# Patient Record
Sex: FEMALE | Race: WHITE | Employment: OTHER | URBAN - METROPOLITAN AREA
[De-identification: names, ages, dates, MRNs, and addresses within clinical notes are randomized per-mention and may not be internally consistent; named-entity substitution may affect disease eponyms.]

---

## 2017-01-02 DIAGNOSIS — E78.2 MIXED HYPERLIPIDEMIA: ICD-10-CM

## 2017-01-02 DIAGNOSIS — E03.9 ACQUIRED HYPOTHYROIDISM: ICD-10-CM

## 2017-01-02 DIAGNOSIS — K21.9 GASTROESOPHAGEAL REFLUX DISEASE WITHOUT ESOPHAGITIS: ICD-10-CM

## 2017-01-02 DIAGNOSIS — I10 ESSENTIAL HYPERTENSION: ICD-10-CM

## 2017-01-02 DIAGNOSIS — F51.01 PRIMARY INSOMNIA: ICD-10-CM

## 2017-01-02 DIAGNOSIS — G25.81 RLS (RESTLESS LEGS SYNDROME): ICD-10-CM

## 2017-01-02 DIAGNOSIS — F32.89 OTHER DEPRESSION: ICD-10-CM

## 2017-01-03 RX ORDER — OMEPRAZOLE 40 MG/1
CAPSULE, DELAYED RELEASE ORAL
Qty: 90 CAPSULE | Refills: 1 | Status: SHIPPED | OUTPATIENT
Start: 2017-01-03 | End: 2017-06-20 | Stop reason: SDUPTHER

## 2017-01-03 RX ORDER — LEVOTHYROXINE SODIUM 0.12 MG/1
TABLET ORAL
Qty: 90 TABLET | Refills: 1 | Status: SHIPPED | OUTPATIENT
Start: 2017-01-03 | End: 2017-06-20 | Stop reason: SDUPTHER

## 2017-01-03 RX ORDER — QUETIAPINE 150 MG/1
TABLET, FILM COATED, EXTENDED RELEASE ORAL
Qty: 90 TABLET | Refills: 1 | Status: SHIPPED | OUTPATIENT
Start: 2017-01-03 | End: 2017-06-20 | Stop reason: SDUPTHER

## 2017-01-03 RX ORDER — TRAZODONE HYDROCHLORIDE 150 MG/1
TABLET ORAL
Qty: 90 TABLET | Refills: 1 | Status: SHIPPED | OUTPATIENT
Start: 2017-01-03 | End: 2017-06-20 | Stop reason: SDUPTHER

## 2017-01-03 RX ORDER — VERAPAMIL HYDROCHLORIDE 360 MG/1
CAPSULE, DELAYED RELEASE PELLETS ORAL
Qty: 90 CAPSULE | Refills: 1 | Status: SHIPPED | OUTPATIENT
Start: 2017-01-03 | End: 2017-06-20 | Stop reason: SDUPTHER

## 2017-01-03 RX ORDER — ROPINIROLE 1 MG/1
TABLET, FILM COATED ORAL
Qty: 270 TABLET | Refills: 1 | Status: SHIPPED | OUTPATIENT
Start: 2017-01-03 | End: 2017-06-20 | Stop reason: SDUPTHER

## 2017-01-03 RX ORDER — LOVASTATIN 20 MG/1
TABLET ORAL
Qty: 90 TABLET | Refills: 1 | Status: SHIPPED | OUTPATIENT
Start: 2017-01-03 | End: 2017-06-20 | Stop reason: SDUPTHER

## 2017-01-10 RX ORDER — HYDROCHLOROTHIAZIDE 50 MG/1
TABLET ORAL
Qty: 90 TABLET | Refills: 1 | Status: SHIPPED | OUTPATIENT
Start: 2017-01-10 | End: 2017-06-19

## 2017-05-15 ENCOUNTER — TELEPHONE (OUTPATIENT)
Dept: BARIATRICS/WEIGHT MGMT | Age: 67
End: 2017-05-15

## 2017-05-18 ENCOUNTER — OFFICE VISIT (OUTPATIENT)
Dept: ORTHOPEDIC SURGERY | Age: 67
End: 2017-05-18

## 2017-05-18 VITALS
HEART RATE: 67 BPM | HEIGHT: 64 IN | WEIGHT: 255.95 LBS | DIASTOLIC BLOOD PRESSURE: 65 MMHG | SYSTOLIC BLOOD PRESSURE: 116 MMHG | BODY MASS INDEX: 43.7 KG/M2

## 2017-05-18 DIAGNOSIS — M25.562 ACUTE PAIN OF BOTH KNEES: Primary | ICD-10-CM

## 2017-05-18 DIAGNOSIS — M25.561 ACUTE PAIN OF BOTH KNEES: Primary | ICD-10-CM

## 2017-05-18 DIAGNOSIS — M17.10 LOCALIZED OSTEOARTHROSIS, LOWER LEG: ICD-10-CM

## 2017-05-18 PROCEDURE — 99202 OFFICE O/P NEW SF 15 MIN: CPT | Performed by: ORTHOPAEDIC SURGERY

## 2017-05-18 PROCEDURE — 73562 X-RAY EXAM OF KNEE 3: CPT | Performed by: ORTHOPAEDIC SURGERY

## 2017-05-18 PROCEDURE — 20610 DRAIN/INJ JOINT/BURSA W/O US: CPT | Performed by: ORTHOPAEDIC SURGERY

## 2017-05-23 ENCOUNTER — TELEPHONE (OUTPATIENT)
Dept: ORTHOPEDIC SURGERY | Age: 67
End: 2017-05-23

## 2017-05-31 ENCOUNTER — OFFICE VISIT (OUTPATIENT)
Dept: ORTHOPEDIC SURGERY | Age: 67
End: 2017-05-31

## 2017-05-31 VITALS
WEIGHT: 255.95 LBS | DIASTOLIC BLOOD PRESSURE: 49 MMHG | HEART RATE: 74 BPM | HEIGHT: 64 IN | BODY MASS INDEX: 43.7 KG/M2 | SYSTOLIC BLOOD PRESSURE: 113 MMHG

## 2017-05-31 DIAGNOSIS — M17.10 LOCALIZED OSTEOARTHROSIS, LOWER LEG: Primary | ICD-10-CM

## 2017-05-31 PROCEDURE — 20610 DRAIN/INJ JOINT/BURSA W/O US: CPT | Performed by: ORTHOPAEDIC SURGERY

## 2017-06-07 ENCOUNTER — OFFICE VISIT (OUTPATIENT)
Dept: ORTHOPEDIC SURGERY | Age: 67
End: 2017-06-07

## 2017-06-07 VITALS
HEART RATE: 66 BPM | HEIGHT: 64 IN | SYSTOLIC BLOOD PRESSURE: 131 MMHG | BODY MASS INDEX: 43.7 KG/M2 | DIASTOLIC BLOOD PRESSURE: 76 MMHG | WEIGHT: 255.95 LBS

## 2017-06-07 DIAGNOSIS — M17.10 LOCALIZED OSTEOARTHROSIS, LOWER LEG: Primary | ICD-10-CM

## 2017-06-07 PROCEDURE — 20610 DRAIN/INJ JOINT/BURSA W/O US: CPT | Performed by: ORTHOPAEDIC SURGERY

## 2017-06-14 ENCOUNTER — OFFICE VISIT (OUTPATIENT)
Dept: ORTHOPEDIC SURGERY | Age: 67
End: 2017-06-14

## 2017-06-14 VITALS
SYSTOLIC BLOOD PRESSURE: 137 MMHG | WEIGHT: 255.95 LBS | DIASTOLIC BLOOD PRESSURE: 77 MMHG | HEIGHT: 64 IN | HEART RATE: 67 BPM | BODY MASS INDEX: 43.7 KG/M2

## 2017-06-14 DIAGNOSIS — M17.10 LOCALIZED OSTEOARTHROSIS, LOWER LEG: Primary | ICD-10-CM

## 2017-06-14 PROCEDURE — 20610 DRAIN/INJ JOINT/BURSA W/O US: CPT | Performed by: ORTHOPAEDIC SURGERY

## 2017-06-19 ENCOUNTER — OFFICE VISIT (OUTPATIENT)
Dept: FAMILY MEDICINE CLINIC | Age: 67
End: 2017-06-19

## 2017-06-19 VITALS
HEIGHT: 64 IN | SYSTOLIC BLOOD PRESSURE: 118 MMHG | WEIGHT: 250 LBS | HEART RATE: 70 BPM | OXYGEN SATURATION: 98 % | DIASTOLIC BLOOD PRESSURE: 70 MMHG | BODY MASS INDEX: 42.68 KG/M2

## 2017-06-19 DIAGNOSIS — R60.0 LOCALIZED EDEMA: Primary | ICD-10-CM

## 2017-06-19 PROCEDURE — 99213 OFFICE O/P EST LOW 20 MIN: CPT | Performed by: FAMILY MEDICINE

## 2017-06-19 RX ORDER — FUROSEMIDE 40 MG/1
40 TABLET ORAL DAILY
Qty: 30 TABLET | Refills: 0 | Status: SHIPPED | OUTPATIENT
Start: 2017-06-19 | End: 2019-01-23 | Stop reason: SDUPTHER

## 2017-06-19 ASSESSMENT — PATIENT HEALTH QUESTIONNAIRE - PHQ9
1. LITTLE INTEREST OR PLEASURE IN DOING THINGS: 0
SUM OF ALL RESPONSES TO PHQ QUESTIONS 1-9: 0
2. FEELING DOWN, DEPRESSED OR HOPELESS: 0
SUM OF ALL RESPONSES TO PHQ9 QUESTIONS 1 & 2: 0

## 2017-06-19 ASSESSMENT — ENCOUNTER SYMPTOMS
GASTROINTESTINAL NEGATIVE: 1
SHORTNESS OF BREATH: 1

## 2017-06-20 DIAGNOSIS — F32.89 OTHER DEPRESSION: ICD-10-CM

## 2017-06-20 DIAGNOSIS — E03.9 ACQUIRED HYPOTHYROIDISM: ICD-10-CM

## 2017-06-20 DIAGNOSIS — K21.9 GASTROESOPHAGEAL REFLUX DISEASE WITHOUT ESOPHAGITIS: ICD-10-CM

## 2017-06-20 DIAGNOSIS — F51.01 PRIMARY INSOMNIA: ICD-10-CM

## 2017-06-20 DIAGNOSIS — E78.2 MIXED HYPERLIPIDEMIA: ICD-10-CM

## 2017-06-20 DIAGNOSIS — I10 ESSENTIAL HYPERTENSION: ICD-10-CM

## 2017-06-20 DIAGNOSIS — G25.81 RLS (RESTLESS LEGS SYNDROME): ICD-10-CM

## 2017-06-21 RX ORDER — LEVOTHYROXINE SODIUM 0.12 MG/1
TABLET ORAL
Qty: 90 TABLET | Refills: 1 | Status: SHIPPED | OUTPATIENT
Start: 2017-06-21 | End: 2017-12-28 | Stop reason: SDUPTHER

## 2017-06-21 RX ORDER — ROPINIROLE 1 MG/1
TABLET, FILM COATED ORAL
Qty: 270 TABLET | Refills: 1 | Status: SHIPPED | OUTPATIENT
Start: 2017-06-21 | End: 2017-12-28 | Stop reason: SDUPTHER

## 2017-06-21 RX ORDER — VERAPAMIL HYDROCHLORIDE 360 MG/1
CAPSULE, DELAYED RELEASE PELLETS ORAL
Qty: 90 CAPSULE | Refills: 1 | Status: SHIPPED | OUTPATIENT
Start: 2017-06-21 | End: 2017-12-28 | Stop reason: SDUPTHER

## 2017-06-21 RX ORDER — LOVASTATIN 20 MG/1
TABLET ORAL
Qty: 90 TABLET | Refills: 1 | Status: SHIPPED | OUTPATIENT
Start: 2017-06-21 | End: 2017-12-28 | Stop reason: SDUPTHER

## 2017-06-21 RX ORDER — QUETIAPINE 150 MG/1
TABLET, FILM COATED, EXTENDED RELEASE ORAL
Qty: 90 TABLET | Refills: 1 | Status: SHIPPED | OUTPATIENT
Start: 2017-06-21 | End: 2017-10-20 | Stop reason: SDUPTHER

## 2017-06-21 RX ORDER — TRAZODONE HYDROCHLORIDE 150 MG/1
TABLET ORAL
Qty: 90 TABLET | Refills: 1 | Status: SHIPPED | OUTPATIENT
Start: 2017-06-21 | End: 2017-12-28 | Stop reason: SDUPTHER

## 2017-06-21 RX ORDER — OMEPRAZOLE 40 MG/1
CAPSULE, DELAYED RELEASE ORAL
Qty: 90 CAPSULE | Refills: 1 | Status: SHIPPED | OUTPATIENT
Start: 2017-06-21 | End: 2017-12-28 | Stop reason: SDUPTHER

## 2017-06-23 ENCOUNTER — OFFICE VISIT (OUTPATIENT)
Dept: ORTHOPEDIC SURGERY | Age: 67
End: 2017-06-23

## 2017-06-23 ENCOUNTER — OFFICE VISIT (OUTPATIENT)
Dept: FAMILY MEDICINE CLINIC | Age: 67
End: 2017-06-23

## 2017-06-23 VITALS
DIASTOLIC BLOOD PRESSURE: 80 MMHG | HEIGHT: 64 IN | WEIGHT: 248 LBS | HEART RATE: 84 BPM | SYSTOLIC BLOOD PRESSURE: 122 MMHG | OXYGEN SATURATION: 96 % | BODY MASS INDEX: 42.34 KG/M2

## 2017-06-23 VITALS
SYSTOLIC BLOOD PRESSURE: 131 MMHG | WEIGHT: 250 LBS | BODY MASS INDEX: 42.68 KG/M2 | HEART RATE: 66 BPM | HEIGHT: 64 IN | DIASTOLIC BLOOD PRESSURE: 76 MMHG

## 2017-06-23 DIAGNOSIS — G89.29 CHRONIC LEFT-SIDED LOW BACK PAIN WITH LEFT-SIDED SCIATICA: Primary | ICD-10-CM

## 2017-06-23 DIAGNOSIS — T73.3XXA FATIGUE DUE TO EXCESSIVE EXERTION, INITIAL ENCOUNTER: ICD-10-CM

## 2017-06-23 DIAGNOSIS — R06.02 SOB (SHORTNESS OF BREATH) ON EXERTION: ICD-10-CM

## 2017-06-23 DIAGNOSIS — R60.0 BILATERAL EDEMA OF LOWER EXTREMITY: Primary | ICD-10-CM

## 2017-06-23 DIAGNOSIS — M54.16 LUMBAR RADICULOPATHY: ICD-10-CM

## 2017-06-23 DIAGNOSIS — M54.42 CHRONIC LEFT-SIDED LOW BACK PAIN WITH LEFT-SIDED SCIATICA: Primary | ICD-10-CM

## 2017-06-23 PROCEDURE — 72100 X-RAY EXAM L-S SPINE 2/3 VWS: CPT | Performed by: PHYSICAL MEDICINE & REHABILITATION

## 2017-06-23 PROCEDURE — 99214 OFFICE O/P EST MOD 30 MIN: CPT | Performed by: PHYSICAL MEDICINE & REHABILITATION

## 2017-06-23 PROCEDURE — 99214 OFFICE O/P EST MOD 30 MIN: CPT | Performed by: FAMILY MEDICINE

## 2017-06-23 RX ORDER — PREDNISONE 10 MG/1
TABLET ORAL
Qty: 26 TABLET | Refills: 0 | Status: SHIPPED | OUTPATIENT
Start: 2017-06-23 | End: 2017-09-28

## 2017-07-05 ENCOUNTER — HOSPITAL ENCOUNTER (OUTPATIENT)
Dept: NON INVASIVE DIAGNOSTICS | Age: 67
Discharge: HOME | End: 2017-07-05
Attending: FAMILY MEDICINE | Admitting: FAMILY MEDICINE

## 2017-07-05 DIAGNOSIS — R06.02 BREATH SHORTNESS: ICD-10-CM

## 2017-07-05 DIAGNOSIS — R60.0 LOCALIZED EDEMA: ICD-10-CM

## 2017-07-05 LAB
LV EF: 55 %
LVEF MODALITY: NORMAL

## 2017-09-28 ENCOUNTER — OFFICE VISIT (OUTPATIENT)
Dept: FAMILY MEDICINE CLINIC | Age: 67
End: 2017-09-28

## 2017-09-28 VITALS
WEIGHT: 247 LBS | TEMPERATURE: 97.6 F | OXYGEN SATURATION: 96 % | BODY MASS INDEX: 42.4 KG/M2 | DIASTOLIC BLOOD PRESSURE: 76 MMHG | HEART RATE: 76 BPM | SYSTOLIC BLOOD PRESSURE: 124 MMHG

## 2017-09-28 DIAGNOSIS — J01.90 ACUTE SINUSITIS, RECURRENCE NOT SPECIFIED, UNSPECIFIED LOCATION: ICD-10-CM

## 2017-09-28 PROCEDURE — 99212 OFFICE O/P EST SF 10 MIN: CPT | Performed by: NURSE PRACTITIONER

## 2017-09-28 RX ORDER — AMOXICILLIN 500 MG/1
500 CAPSULE ORAL 2 TIMES DAILY
Qty: 20 CAPSULE | Refills: 0 | Status: SHIPPED | OUTPATIENT
Start: 2017-09-28 | End: 2017-10-08

## 2017-09-28 NOTE — MR AVS SNAPSHOT
becoming more physically active will help lower your risk of developing or worsening diseases associated with obesity. Learn more at: Nancy.co.uk             Today's Medication Changes          These changes are accurate as of: 9/28/17  4:02 PM.  If you have any questions, ask your nurse or doctor. START taking these medications           amoxicillin 500 MG capsule   Commonly known as:  AMOXIL   Instructions: Take 1 capsule by mouth 2 times daily for 10 days   Quantity:  20 capsule   Refills:  0   Started by:  Naye Glynn NP         STOP taking these medications           predniSONE 10 MG tablet   Commonly known as:  Noemí Nader by:  Naye Glynn NP            Where to Get Your Medications      These medications were sent to [facility] #[id number] - [place], [address], [address] [facility] [address]     Phone:  [phone]     amoxicillin 500 MG capsule               Your Current Medications Are              amoxicillin (AMOXIL) 500 MG capsule Take 1 capsule by mouth 2 times daily for 10 days    lovastatin (MEVACOR) 20 MG tablet TAKE 1 TABLET EVERY NIGHT    rOPINIRole (REQUIP) 1 MG tablet TAKE 1 TABLET THREE TIMES DAILY    QUEtiapine (SEROQUEL XR) 150 MG TB24 extended release tablet TAKE 1 TABLET EVERY DAY    traZODone (DESYREL) 150 MG tablet TAKE 1 TABLET EVERY NIGHT    verapamil (VERELAN) 360 MG extended release capsule TAKE 1 CAPSULE EVERY NIGHT    levothyroxine (SYNTHROID) 125 MCG tablet TAKE 1 TABLET EVERY DAY    omeprazole (PRILOSEC) 40 MG delayed release capsule TAKE 1 CAPSULE EVERY DAY    furosemide (LASIX) 40 MG tablet Take 1 tablet by mouth daily    gabapentin (NEURONTIN) 600 MG tablet TAKE 1 TABLET TWICE DAILY    aspirin 81 MG EC tablet Take 81 mg by mouth daily. Indications: OTC    Calcium Carbonate-Vit D-Min (CALCIUM 1200 PO) Take  by mouth.  Indications: OTC

## 2017-10-01 ASSESSMENT — ENCOUNTER SYMPTOMS
WHEEZING: 0
NAUSEA: 0
CHEST TIGHTNESS: 0
COUGH: 1
SORE THROAT: 0
VOMITING: 0
SINUS PRESSURE: 1
SHORTNESS OF BREATH: 0
ABDOMINAL PAIN: 0

## 2017-10-20 DIAGNOSIS — F32.89 OTHER DEPRESSION: ICD-10-CM

## 2017-10-20 RX ORDER — QUETIAPINE 150 MG/1
TABLET, FILM COATED, EXTENDED RELEASE ORAL
Qty: 90 TABLET | Refills: 1 | Status: SHIPPED | OUTPATIENT
Start: 2017-10-20 | End: 2017-12-28 | Stop reason: SDUPTHER

## 2017-10-25 ENCOUNTER — OFFICE VISIT (OUTPATIENT)
Dept: ORTHOPEDIC SURGERY | Age: 67
End: 2017-10-25

## 2017-10-25 VITALS — HEIGHT: 64 IN | BODY MASS INDEX: 42.15 KG/M2 | WEIGHT: 246.91 LBS

## 2017-10-25 DIAGNOSIS — M79.605 LEFT LEG PAIN: Primary | ICD-10-CM

## 2017-10-25 DIAGNOSIS — M25.561 ACUTE PAIN OF BOTH KNEES: ICD-10-CM

## 2017-10-25 DIAGNOSIS — M17.10 LOCALIZED OSTEOARTHROSIS, LOWER LEG: ICD-10-CM

## 2017-10-25 DIAGNOSIS — M25.562 ACUTE PAIN OF BOTH KNEES: ICD-10-CM

## 2017-10-25 PROCEDURE — 4040F PNEUMOC VAC/ADMIN/RCVD: CPT | Performed by: ORTHOPAEDIC SURGERY

## 2017-10-25 PROCEDURE — 1036F TOBACCO NON-USER: CPT | Performed by: ORTHOPAEDIC SURGERY

## 2017-10-25 PROCEDURE — 3017F COLORECTAL CA SCREEN DOC REV: CPT | Performed by: ORTHOPAEDIC SURGERY

## 2017-10-25 PROCEDURE — 1090F PRES/ABSN URINE INCON ASSESS: CPT | Performed by: ORTHOPAEDIC SURGERY

## 2017-10-25 PROCEDURE — 3014F SCREEN MAMMO DOC REV: CPT | Performed by: ORTHOPAEDIC SURGERY

## 2017-10-25 PROCEDURE — G8427 DOCREV CUR MEDS BY ELIG CLIN: HCPCS | Performed by: ORTHOPAEDIC SURGERY

## 2017-10-25 PROCEDURE — 99213 OFFICE O/P EST LOW 20 MIN: CPT | Performed by: ORTHOPAEDIC SURGERY

## 2017-10-25 PROCEDURE — G8484 FLU IMMUNIZE NO ADMIN: HCPCS | Performed by: ORTHOPAEDIC SURGERY

## 2017-10-25 PROCEDURE — 1123F ACP DISCUSS/DSCN MKR DOCD: CPT | Performed by: ORTHOPAEDIC SURGERY

## 2017-10-25 PROCEDURE — G8417 CALC BMI ABV UP PARAM F/U: HCPCS | Performed by: ORTHOPAEDIC SURGERY

## 2017-10-25 PROCEDURE — G8399 PT W/DXA RESULTS DOCUMENT: HCPCS | Performed by: ORTHOPAEDIC SURGERY

## 2017-10-25 RX ORDER — MELOXICAM 15 MG/1
TABLET ORAL
Qty: 30 TABLET | Refills: 3 | Status: SHIPPED | OUTPATIENT
Start: 2017-10-25 | End: 2018-06-11

## 2017-10-25 NOTE — PATIENT INSTRUCTIONS
Patient Education        Knee Arthritis: Care Instructions  Your Care Instructions  Knee arthritis is a breakdown of the cartilage that cushions your knee joint. When the cartilage wears down, your bones rub against each other. This causes pain and stiffness. Knee arthritis tends to get worse with time. Treatment for knee arthritis involves reducing pain, making the leg muscles stronger, and staying at a healthy body weight. The treatment usually does not improve the health of the cartilage, but it can reduce pain and improve how well your knee works. You can take simple measures to protect your knee joints, ease your pain, and help you stay active. Follow-up care is a key part of your treatment and safety. Be sure to make and go to all appointments, and call your doctor if you are having problems. It's also a good idea to know your test results and keep a list of the medicines you take. How can you care for yourself at home? · Know that knee arthritis will cause more pain on some days than on others. · Stay at a healthy weight. Lose weight if you are overweight. When you stand up, the pressure on your knees from every pound of body weight is multiplied four times. So if you lose 10 pounds, you will reduce the pressure on your knees by 40 pounds. · Talk to your doctor or physical therapist about exercises that will help ease joint pain. ¨ Stretch to help prevent stiffness and to prevent injury before you exercise. You may enjoy gentle forms of yoga to help keep your knee joints and muscles flexible. ¨ Walk instead of jog. ¨ Ride a bike. This makes your thigh muscles stronger and takes pressure off your knee. ¨ Wear well-fitting and comfortable shoes. ¨ Exercise in chest-deep water. This can help you exercise longer with less pain. ¨ Avoid exercises that include squatting or kneeling. They can put a lot of strain on your knees.   ¨ Talk to your doctor to make sure that the exercise you do is not making the arthritis worse. · Do not sit for long periods of time. Try to walk once in a while to keep your knee from getting stiff. · Ask your doctor or physical therapist whether shoe inserts may reduce your arthritis pain. · If you can afford it, get new athletic shoes at least every year. This can help reduce the strain on your knees. · Use a device to help you do everyday activities. ¨ A cane or walking stick can help you keep your balance when you walk. Hold the cane or walking stick in the hand opposite the painful knee. ¨ If you feel like you may fall when you walk, try using crutches or a front-wheeled walker. These can prevent falls that could cause more damage to your knee. ¨ A knee brace may help keep your knee stable and prevent pain. ¨ You also can use other things to make life easier, such as a higher toilet seat and handrails in the bathtub or shower. · Take pain medicines exactly as directed. ¨ Do not wait until you are in severe pain. You will get better results if you take it sooner. ¨ If you are not taking a prescription pain medicine, take an over-the-counter medicine such as acetaminophen (Tylenol), ibuprofen (Advil, Motrin), or naproxen (Aleve). Read and follow all instructions on the label. ¨ Do not take two or more pain medicines at the same time unless the doctor told you to. Many pain medicines have acetaminophen, which is Tylenol. Too much acetaminophen (Tylenol) can be harmful. ¨ Tell your doctor if you take a blood thinner, have diabetes, or have allergies to shellfish. · Ask your doctor if you might benefit from a shot of steroid medicine into your knee. This may provide pain relief for several months. · Many people take the supplements glucosamine and chondroitin for osteoarthritis. Some people feel they help, but the medical research does not show that they work. Talk to your doctor before you take these supplements. When should you call for help?   Call your doctor now or seek immediate medical care if:  · You have sudden swelling, warmth, or pain in your knee. · You have knee pain and a fever or rash. · You have such bad pain that you cannot use your knee. Watch closely for changes in your health, and be sure to contact your doctor if you have any problems. Where can you learn more? Go to [email or web address] and sign in to your [employer] account. Enter V968 in the Float: [place] box to learn more about \"Knee Arthritis: Care Instructions. \"     If you do not have an account, please click on the \"Sign Up Now\" link. Current as of: October 31, 2016  Content Version: 11.3  © 8495-8239 [employer]. Care instructions adapted under license by [facility] ([facility]). If you have questions about a medical condition or this instruction, always ask your healthcare professional. [address] any warranty or liability for your use of this information.

## 2017-12-28 DIAGNOSIS — E78.2 MIXED HYPERLIPIDEMIA: ICD-10-CM

## 2017-12-28 DIAGNOSIS — G25.81 RLS (RESTLESS LEGS SYNDROME): ICD-10-CM

## 2017-12-28 DIAGNOSIS — E03.9 ACQUIRED HYPOTHYROIDISM: ICD-10-CM

## 2017-12-28 DIAGNOSIS — I10 ESSENTIAL HYPERTENSION: ICD-10-CM

## 2017-12-28 DIAGNOSIS — F32.89 OTHER DEPRESSION: ICD-10-CM

## 2017-12-28 DIAGNOSIS — K21.9 GASTROESOPHAGEAL REFLUX DISEASE WITHOUT ESOPHAGITIS: ICD-10-CM

## 2017-12-28 DIAGNOSIS — F51.01 PRIMARY INSOMNIA: ICD-10-CM

## 2017-12-28 RX ORDER — LOVASTATIN 20 MG/1
20 TABLET ORAL NIGHTLY
Qty: 90 TABLET | Refills: 1 | Status: SHIPPED | OUTPATIENT
Start: 2017-12-28 | End: 2018-04-11 | Stop reason: SDUPTHER

## 2017-12-28 RX ORDER — OMEPRAZOLE 40 MG/1
40 CAPSULE, DELAYED RELEASE ORAL DAILY
Qty: 90 CAPSULE | Refills: 1 | Status: SHIPPED | OUTPATIENT
Start: 2017-12-28 | End: 2018-04-11 | Stop reason: SDUPTHER

## 2017-12-28 RX ORDER — GABAPENTIN 600 MG/1
TABLET ORAL
Qty: 180 TABLET | Refills: 1 | Status: SHIPPED | OUTPATIENT
Start: 2017-12-28 | End: 2018-04-11 | Stop reason: SDUPTHER

## 2017-12-28 RX ORDER — VERAPAMIL HYDROCHLORIDE 360 MG/1
360 CAPSULE, DELAYED RELEASE PELLETS ORAL NIGHTLY
Qty: 90 CAPSULE | Refills: 1 | Status: SHIPPED | OUTPATIENT
Start: 2017-12-28 | End: 2018-04-11 | Stop reason: SDUPTHER

## 2017-12-28 RX ORDER — ROPINIROLE 1 MG/1
1 TABLET, FILM COATED ORAL 3 TIMES DAILY
Qty: 270 TABLET | Refills: 1 | Status: SHIPPED | OUTPATIENT
Start: 2017-12-28 | End: 2018-04-11 | Stop reason: SDUPTHER

## 2017-12-28 RX ORDER — QUETIAPINE 150 MG/1
150 TABLET, FILM COATED, EXTENDED RELEASE ORAL DAILY
Qty: 90 TABLET | Refills: 1 | Status: SHIPPED | OUTPATIENT
Start: 2017-12-28 | End: 2018-04-11 | Stop reason: SDUPTHER

## 2017-12-28 RX ORDER — LEVOTHYROXINE SODIUM 0.12 MG/1
125 TABLET ORAL DAILY
Qty: 90 TABLET | Refills: 1 | Status: SHIPPED | OUTPATIENT
Start: 2017-12-28 | End: 2018-02-01 | Stop reason: SDUPTHER

## 2017-12-28 RX ORDER — TRAZODONE HYDROCHLORIDE 150 MG/1
150 TABLET ORAL NIGHTLY
Qty: 90 TABLET | Refills: 1 | Status: SHIPPED | OUTPATIENT
Start: 2017-12-28 | End: 2018-04-11 | Stop reason: SDUPTHER

## 2017-12-28 NOTE — TELEPHONE ENCOUNTER
From: Christelle Feliciano  Sent: 12/28/2017 11:55 AM EST  Subject: Medication Renewal Request    Christelle Feliciano would like a refill of the following medications:  lovastatin (MEVACOR) 20 MG tablet [Mauri Bunch DO]  rOPINIRole (REQUIP) 1 MG tablet [Mauri Bunch DO]  traZODone (DESYREL) 150 MG tablet [Mauri Bunch DO]  verapamil (VERELAN) 360 MG extended release capsule [Mauri Bunch DO]  levothyroxine (SYNTHROID) 125 MCG tablet [Mauri Bunch DO]  omeprazole (PRILOSEC) 40 MG delayed release capsule Penderandreina Morales DO]    Preferred pharmacy: [address] [place] [address], [address] [place] [address] [phone] - F [phone]    Comment:      Medication renewals requested in this message routed separately:  gabapentin (NEURONTIN) 600 MG tablet [VEE SIDHU, CNP]  Cholecalciferol (VITAMIN D3) 5000 UNITS CAPS [MARCELA TRAMMELL DO]  QUEtiapine (SEROQUEL XR) 150 MG TB24 extended release tablet [Lindsay Espinosa, DO]

## 2018-02-01 DIAGNOSIS — E03.9 ACQUIRED HYPOTHYROIDISM: ICD-10-CM

## 2018-02-02 RX ORDER — LEVOTHYROXINE SODIUM 0.12 MG/1
TABLET ORAL
Qty: 90 TABLET | Refills: 0 | Status: SHIPPED | OUTPATIENT
Start: 2018-02-02 | End: 2018-04-11 | Stop reason: SDUPTHER

## 2018-04-10 ENCOUNTER — OFFICE VISIT (OUTPATIENT)
Dept: FAMILY MEDICINE CLINIC | Age: 68
End: 2018-04-10

## 2018-04-10 VITALS
HEART RATE: 74 BPM | RESPIRATION RATE: 16 BRPM | HEIGHT: 64 IN | DIASTOLIC BLOOD PRESSURE: 78 MMHG | BODY MASS INDEX: 43.36 KG/M2 | WEIGHT: 254 LBS | SYSTOLIC BLOOD PRESSURE: 128 MMHG | OXYGEN SATURATION: 97 %

## 2018-04-10 DIAGNOSIS — F33.0 MAJOR DEPRESSIVE DISORDER, RECURRENT EPISODE, MILD (HCC): ICD-10-CM

## 2018-04-10 DIAGNOSIS — E03.9 ACQUIRED HYPOTHYROIDISM: ICD-10-CM

## 2018-04-10 DIAGNOSIS — M79.7 FIBROMYALGIA: ICD-10-CM

## 2018-04-10 DIAGNOSIS — F39 MOOD DISORDER (HCC): ICD-10-CM

## 2018-04-10 DIAGNOSIS — E78.2 MIXED HYPERLIPIDEMIA: ICD-10-CM

## 2018-04-10 DIAGNOSIS — R60.0 BILATERAL EDEMA OF LOWER EXTREMITY: ICD-10-CM

## 2018-04-10 DIAGNOSIS — I10 ESSENTIAL HYPERTENSION: ICD-10-CM

## 2018-04-10 LAB
A/G RATIO: 2 (ref 1.1–2.2)
ALBUMIN SERPL-MCNC: 4.5 G/DL (ref 3.4–5)
ALP BLD-CCNC: 74 U/L (ref 40–129)
ALT SERPL-CCNC: 12 U/L (ref 10–40)
ANION GAP SERPL CALCULATED.3IONS-SCNC: 15 MMOL/L (ref 3–16)
AST SERPL-CCNC: 14 U/L (ref 15–37)
BASOPHILS ABSOLUTE: 0 K/UL (ref 0–0.2)
BASOPHILS RELATIVE PERCENT: 0.3 %
BILIRUB SERPL-MCNC: 0.4 MG/DL (ref 0–1)
BILIRUBIN, POC: NORMAL
BLOOD URINE, POC: NORMAL
BUN BLDV-MCNC: 20 MG/DL (ref 7–20)
CALCIUM SERPL-MCNC: 9 MG/DL (ref 8.3–10.6)
CHLORIDE BLD-SCNC: 101 MMOL/L (ref 99–110)
CHOLESTEROL, TOTAL: 182 MG/DL (ref 0–199)
CLARITY, POC: NORMAL
CO2: 26 MMOL/L (ref 21–32)
COLOR, POC: NORMAL
CREAT SERPL-MCNC: 1.2 MG/DL (ref 0.6–1.2)
EOSINOPHILS ABSOLUTE: 0.1 K/UL (ref 0–0.6)
EOSINOPHILS RELATIVE PERCENT: 2.3 %
GFR AFRICAN AMERICAN: 54
GFR NON-AFRICAN AMERICAN: 45
GLOBULIN: 2.2 G/DL
GLUCOSE BLD-MCNC: 94 MG/DL (ref 70–99)
GLUCOSE URINE, POC: NORMAL
HCT VFR BLD CALC: 40.8 % (ref 36–48)
HDLC SERPL-MCNC: 68 MG/DL (ref 40–60)
HEMOGLOBIN: 13.3 G/DL (ref 12–16)
KETONES, POC: NORMAL
LDL CHOLESTEROL CALCULATED: 74 MG/DL
LEUKOCYTE EST, POC: NORMAL
LYMPHOCYTES ABSOLUTE: 2.2 K/UL (ref 1–5.1)
LYMPHOCYTES RELATIVE PERCENT: 45.4 %
MCH RBC QN AUTO: 28.5 PG (ref 26–34)
MCHC RBC AUTO-ENTMCNC: 32.5 G/DL (ref 31–36)
MCV RBC AUTO: 87.6 FL (ref 80–100)
MONOCYTES ABSOLUTE: 0.3 K/UL (ref 0–1.3)
MONOCYTES RELATIVE PERCENT: 7.2 %
NEUTROPHILS ABSOLUTE: 2.2 K/UL (ref 1.7–7.7)
NEUTROPHILS RELATIVE PERCENT: 44.8 %
NITRITE, POC: NORMAL
PDW BLD-RTO: 15 % (ref 12.4–15.4)
PH, POC: 7.5
PLATELET # BLD: 219 K/UL (ref 135–450)
PMV BLD AUTO: 10.2 FL (ref 5–10.5)
POTASSIUM SERPL-SCNC: 4.7 MMOL/L (ref 3.5–5.1)
PROTEIN, POC: NORMAL
RBC # BLD: 4.66 M/UL (ref 4–5.2)
SODIUM BLD-SCNC: 142 MMOL/L (ref 136–145)
SPECIFIC GRAVITY, POC: 1.01
T4 FREE: 1.4 NG/DL (ref 0.9–1.8)
TOTAL PROTEIN: 6.7 G/DL (ref 6.4–8.2)
TRIGL SERPL-MCNC: 198 MG/DL (ref 0–150)
TSH SERPL DL<=0.05 MIU/L-ACNC: 9.57 UIU/ML (ref 0.27–4.2)
UROBILINOGEN, POC: 0.2
VLDLC SERPL CALC-MCNC: 40 MG/DL
WBC # BLD: 4.8 K/UL (ref 4–11)

## 2018-04-10 PROCEDURE — 3014F SCREEN MAMMO DOC REV: CPT | Performed by: NURSE PRACTITIONER

## 2018-04-10 PROCEDURE — 1036F TOBACCO NON-USER: CPT | Performed by: NURSE PRACTITIONER

## 2018-04-10 PROCEDURE — 81002 URINALYSIS NONAUTO W/O SCOPE: CPT | Performed by: NURSE PRACTITIONER

## 2018-04-10 PROCEDURE — 99214 OFFICE O/P EST MOD 30 MIN: CPT | Performed by: NURSE PRACTITIONER

## 2018-04-10 PROCEDURE — 1090F PRES/ABSN URINE INCON ASSESS: CPT | Performed by: NURSE PRACTITIONER

## 2018-04-10 PROCEDURE — G8427 DOCREV CUR MEDS BY ELIG CLIN: HCPCS | Performed by: NURSE PRACTITIONER

## 2018-04-10 PROCEDURE — G8417 CALC BMI ABV UP PARAM F/U: HCPCS | Performed by: NURSE PRACTITIONER

## 2018-04-10 PROCEDURE — 1123F ACP DISCUSS/DSCN MKR DOCD: CPT | Performed by: NURSE PRACTITIONER

## 2018-04-10 PROCEDURE — 3017F COLORECTAL CA SCREEN DOC REV: CPT | Performed by: NURSE PRACTITIONER

## 2018-04-10 PROCEDURE — 4040F PNEUMOC VAC/ADMIN/RCVD: CPT | Performed by: NURSE PRACTITIONER

## 2018-04-10 PROCEDURE — G8399 PT W/DXA RESULTS DOCUMENT: HCPCS | Performed by: NURSE PRACTITIONER

## 2018-04-10 PROCEDURE — G0444 DEPRESSION SCREEN ANNUAL: HCPCS | Performed by: NURSE PRACTITIONER

## 2018-04-10 RX ORDER — ESCITALOPRAM OXALATE 10 MG/1
10 TABLET ORAL DAILY
Qty: 30 TABLET | Refills: 3 | Status: SHIPPED | OUTPATIENT
Start: 2018-04-10 | End: 2018-04-10

## 2018-04-10 RX ORDER — ESCITALOPRAM OXALATE 10 MG/1
10 TABLET ORAL DAILY
Qty: 30 TABLET | Refills: 3 | Status: SHIPPED | OUTPATIENT
Start: 2018-04-10 | End: 2018-05-01 | Stop reason: SDUPTHER

## 2018-04-10 ASSESSMENT — PATIENT HEALTH QUESTIONNAIRE - PHQ9
5. POOR APPETITE OR OVEREATING: 3
3. TROUBLE FALLING OR STAYING ASLEEP: 3
10. IF YOU CHECKED OFF ANY PROBLEMS, HOW DIFFICULT HAVE THESE PROBLEMS MADE IT FOR YOU TO DO YOUR WORK, TAKE CARE OF THINGS AT HOME, OR GET ALONG WITH OTHER PEOPLE: 2
6. FEELING BAD ABOUT YOURSELF - OR THAT YOU ARE A FAILURE OR HAVE LET YOURSELF OR YOUR FAMILY DOWN: 3
7. TROUBLE CONCENTRATING ON THINGS, SUCH AS READING THE NEWSPAPER OR WATCHING TELEVISION: 0
4. FEELING TIRED OR HAVING LITTLE ENERGY: 3
2. FEELING DOWN, DEPRESSED OR HOPELESS: 3
SUM OF ALL RESPONSES TO PHQ9 QUESTIONS 1 & 2: 6
1. LITTLE INTEREST OR PLEASURE IN DOING THINGS: 3
8. MOVING OR SPEAKING SO SLOWLY THAT OTHER PEOPLE COULD HAVE NOTICED. OR THE OPPOSITE, BEING SO FIGETY OR RESTLESS THAT YOU HAVE BEEN MOVING AROUND A LOT MORE THAN USUAL: 0
SUM OF ALL RESPONSES TO PHQ QUESTIONS 1-9: 18
9. THOUGHTS THAT YOU WOULD BE BETTER OFF DEAD, OR OF HURTING YOURSELF: 0

## 2018-04-11 DIAGNOSIS — G25.81 RLS (RESTLESS LEGS SYNDROME): ICD-10-CM

## 2018-04-11 DIAGNOSIS — K21.9 GASTROESOPHAGEAL REFLUX DISEASE WITHOUT ESOPHAGITIS: ICD-10-CM

## 2018-04-11 DIAGNOSIS — I10 ESSENTIAL HYPERTENSION: ICD-10-CM

## 2018-04-11 DIAGNOSIS — E03.9 ACQUIRED HYPOTHYROIDISM: ICD-10-CM

## 2018-04-11 DIAGNOSIS — F51.01 PRIMARY INSOMNIA: ICD-10-CM

## 2018-04-11 DIAGNOSIS — F32.89 OTHER DEPRESSION: ICD-10-CM

## 2018-04-11 DIAGNOSIS — E78.2 MIXED HYPERLIPIDEMIA: ICD-10-CM

## 2018-04-11 RX ORDER — ROPINIROLE 1 MG/1
1 TABLET, FILM COATED ORAL 3 TIMES DAILY
Qty: 270 TABLET | Refills: 1 | Status: SHIPPED | OUTPATIENT
Start: 2018-04-11 | End: 2018-10-15 | Stop reason: SDUPTHER

## 2018-04-11 RX ORDER — LEVOTHYROXINE SODIUM 112 UG/1
TABLET ORAL
Qty: 90 TABLET | Refills: 0 | Status: SHIPPED | OUTPATIENT
Start: 2018-04-11 | End: 2018-04-11

## 2018-04-11 RX ORDER — GABAPENTIN 600 MG/1
TABLET ORAL
Qty: 180 TABLET | Refills: 1 | Status: SHIPPED | OUTPATIENT
Start: 2018-04-11 | End: 2018-08-24

## 2018-04-11 RX ORDER — LOVASTATIN 20 MG/1
20 TABLET ORAL NIGHTLY
Qty: 90 TABLET | Refills: 1 | Status: SHIPPED | OUTPATIENT
Start: 2018-04-11 | End: 2018-11-14 | Stop reason: SDUPTHER

## 2018-04-11 RX ORDER — OMEPRAZOLE 40 MG/1
40 CAPSULE, DELAYED RELEASE ORAL DAILY
Qty: 90 CAPSULE | Refills: 0 | Status: SHIPPED | OUTPATIENT
Start: 2018-04-11 | End: 2018-07-13 | Stop reason: SDUPTHER

## 2018-04-11 RX ORDER — LEVOTHYROXINE SODIUM 137 UG/1
TABLET ORAL
Qty: 90 TABLET | Refills: 0 | Status: SHIPPED | OUTPATIENT
Start: 2018-04-11 | End: 2018-05-01 | Stop reason: SDUPTHER

## 2018-04-11 RX ORDER — QUETIAPINE 150 MG/1
150 TABLET, FILM COATED, EXTENDED RELEASE ORAL DAILY
Qty: 90 TABLET | Refills: 1 | Status: SHIPPED | OUTPATIENT
Start: 2018-04-11 | End: 2018-10-15 | Stop reason: SDUPTHER

## 2018-04-11 RX ORDER — TRAZODONE HYDROCHLORIDE 150 MG/1
150 TABLET ORAL NIGHTLY
Qty: 90 TABLET | Refills: 1 | Status: SHIPPED | OUTPATIENT
Start: 2018-04-11 | End: 2018-10-15 | Stop reason: SDUPTHER

## 2018-04-11 RX ORDER — VERAPAMIL HYDROCHLORIDE 360 MG/1
360 CAPSULE, DELAYED RELEASE PELLETS ORAL NIGHTLY
Qty: 90 CAPSULE | Refills: 1 | Status: SHIPPED | OUTPATIENT
Start: 2018-04-11 | End: 2019-01-23 | Stop reason: SDUPTHER

## 2018-04-18 ENCOUNTER — OFFICE VISIT (OUTPATIENT)
Dept: ORTHOPEDIC SURGERY | Age: 68
End: 2018-04-18

## 2018-04-18 VITALS
HEART RATE: 69 BPM | SYSTOLIC BLOOD PRESSURE: 107 MMHG | BODY MASS INDEX: 43.36 KG/M2 | HEIGHT: 64 IN | DIASTOLIC BLOOD PRESSURE: 46 MMHG | WEIGHT: 253.97 LBS

## 2018-04-18 DIAGNOSIS — M17.10 LOCALIZED OSTEOARTHROSIS, LOWER LEG: Primary | ICD-10-CM

## 2018-04-18 PROCEDURE — 1036F TOBACCO NON-USER: CPT | Performed by: ORTHOPAEDIC SURGERY

## 2018-04-18 PROCEDURE — G8417 CALC BMI ABV UP PARAM F/U: HCPCS | Performed by: ORTHOPAEDIC SURGERY

## 2018-04-18 PROCEDURE — 1123F ACP DISCUSS/DSCN MKR DOCD: CPT | Performed by: ORTHOPAEDIC SURGERY

## 2018-04-18 PROCEDURE — 20610 DRAIN/INJ JOINT/BURSA W/O US: CPT | Performed by: ORTHOPAEDIC SURGERY

## 2018-04-18 PROCEDURE — G8399 PT W/DXA RESULTS DOCUMENT: HCPCS | Performed by: ORTHOPAEDIC SURGERY

## 2018-04-18 PROCEDURE — 1090F PRES/ABSN URINE INCON ASSESS: CPT | Performed by: ORTHOPAEDIC SURGERY

## 2018-04-18 PROCEDURE — G8427 DOCREV CUR MEDS BY ELIG CLIN: HCPCS | Performed by: ORTHOPAEDIC SURGERY

## 2018-04-18 PROCEDURE — 3017F COLORECTAL CA SCREEN DOC REV: CPT | Performed by: ORTHOPAEDIC SURGERY

## 2018-04-18 PROCEDURE — 3014F SCREEN MAMMO DOC REV: CPT | Performed by: ORTHOPAEDIC SURGERY

## 2018-04-18 PROCEDURE — 99213 OFFICE O/P EST LOW 20 MIN: CPT | Performed by: ORTHOPAEDIC SURGERY

## 2018-04-18 PROCEDURE — 4040F PNEUMOC VAC/ADMIN/RCVD: CPT | Performed by: ORTHOPAEDIC SURGERY

## 2018-04-18 ASSESSMENT — ENCOUNTER SYMPTOMS
ORTHOPNEA: 0
ABDOMINAL PAIN: 0
SHORTNESS OF BREATH: 0
COUGH: 0
BLOOD IN STOOL: 0

## 2018-04-26 ENCOUNTER — OFFICE VISIT (OUTPATIENT)
Dept: ORTHOPEDIC SURGERY | Age: 68
End: 2018-04-26

## 2018-04-26 VITALS
HEART RATE: 68 BPM | HEIGHT: 64 IN | BODY MASS INDEX: 43.36 KG/M2 | WEIGHT: 253.97 LBS | SYSTOLIC BLOOD PRESSURE: 120 MMHG | DIASTOLIC BLOOD PRESSURE: 54 MMHG

## 2018-04-26 DIAGNOSIS — M17.10 LOCALIZED OSTEOARTHROSIS, LOWER LEG: Primary | ICD-10-CM

## 2018-04-26 PROCEDURE — 99999 PR OFFICE/OUTPT VISIT,PROCEDURE ONLY: CPT | Performed by: ORTHOPAEDIC SURGERY

## 2018-04-26 PROCEDURE — 20610 DRAIN/INJ JOINT/BURSA W/O US: CPT | Performed by: ORTHOPAEDIC SURGERY

## 2018-04-27 ENCOUNTER — TELEPHONE (OUTPATIENT)
Dept: ORTHOPEDIC SURGERY | Age: 68
End: 2018-04-27

## 2018-05-03 ENCOUNTER — OFFICE VISIT (OUTPATIENT)
Dept: ORTHOPEDIC SURGERY | Age: 68
End: 2018-05-03

## 2018-05-03 VITALS
WEIGHT: 253.97 LBS | HEIGHT: 64 IN | DIASTOLIC BLOOD PRESSURE: 53 MMHG | BODY MASS INDEX: 43.36 KG/M2 | HEART RATE: 64 BPM | SYSTOLIC BLOOD PRESSURE: 124 MMHG

## 2018-05-03 DIAGNOSIS — M17.10 LOCALIZED OSTEOARTHROSIS, LOWER LEG: Primary | ICD-10-CM

## 2018-05-03 PROCEDURE — 20610 DRAIN/INJ JOINT/BURSA W/O US: CPT | Performed by: ORTHOPAEDIC SURGERY

## 2018-05-03 PROCEDURE — 99999 PR OFFICE/OUTPT VISIT,PROCEDURE ONLY: CPT | Performed by: ORTHOPAEDIC SURGERY

## 2018-05-09 ENCOUNTER — OFFICE VISIT (OUTPATIENT)
Dept: FAMILY MEDICINE CLINIC | Age: 68
End: 2018-05-09

## 2018-05-09 VITALS
SYSTOLIC BLOOD PRESSURE: 116 MMHG | BODY MASS INDEX: 43.23 KG/M2 | HEART RATE: 67 BPM | TEMPERATURE: 97.9 F | DIASTOLIC BLOOD PRESSURE: 70 MMHG | WEIGHT: 252 LBS | OXYGEN SATURATION: 98 %

## 2018-05-09 DIAGNOSIS — J40 BRONCHITIS: Primary | ICD-10-CM

## 2018-05-09 PROCEDURE — 99213 OFFICE O/P EST LOW 20 MIN: CPT | Performed by: PHYSICIAN ASSISTANT

## 2018-05-09 PROCEDURE — G8427 DOCREV CUR MEDS BY ELIG CLIN: HCPCS | Performed by: PHYSICIAN ASSISTANT

## 2018-05-09 PROCEDURE — 1036F TOBACCO NON-USER: CPT | Performed by: PHYSICIAN ASSISTANT

## 2018-05-09 PROCEDURE — 1123F ACP DISCUSS/DSCN MKR DOCD: CPT | Performed by: PHYSICIAN ASSISTANT

## 2018-05-09 PROCEDURE — 1090F PRES/ABSN URINE INCON ASSESS: CPT | Performed by: PHYSICIAN ASSISTANT

## 2018-05-09 PROCEDURE — 3017F COLORECTAL CA SCREEN DOC REV: CPT | Performed by: PHYSICIAN ASSISTANT

## 2018-05-09 PROCEDURE — G8399 PT W/DXA RESULTS DOCUMENT: HCPCS | Performed by: PHYSICIAN ASSISTANT

## 2018-05-09 PROCEDURE — G8417 CALC BMI ABV UP PARAM F/U: HCPCS | Performed by: PHYSICIAN ASSISTANT

## 2018-05-09 PROCEDURE — 4040F PNEUMOC VAC/ADMIN/RCVD: CPT | Performed by: PHYSICIAN ASSISTANT

## 2018-05-09 RX ORDER — AZITHROMYCIN 250 MG/1
TABLET, FILM COATED ORAL
Qty: 1 PACKET | Refills: 0 | Status: SHIPPED | OUTPATIENT
Start: 2018-05-09 | End: 2018-05-19

## 2018-05-09 RX ORDER — ALBUTEROL SULFATE 90 UG/1
2 AEROSOL, METERED RESPIRATORY (INHALATION) EVERY 6 HOURS PRN
Qty: 1 INHALER | Refills: 3 | Status: SHIPPED | OUTPATIENT
Start: 2018-05-09 | End: 2019-01-23 | Stop reason: SDUPTHER

## 2018-05-09 RX ORDER — BENZONATATE 100 MG/1
100 CAPSULE ORAL 3 TIMES DAILY PRN
Qty: 18 CAPSULE | Refills: 0 | Status: SHIPPED | OUTPATIENT
Start: 2018-05-09 | End: 2018-05-16

## 2018-05-09 ASSESSMENT — ENCOUNTER SYMPTOMS
DIARRHEA: 0
COUGH: 1
VOMITING: 0
NAUSEA: 0
SINUS PAIN: 0
SINUS PRESSURE: 0
SORE THROAT: 0
SHORTNESS OF BREATH: 1

## 2018-06-11 ENCOUNTER — OFFICE VISIT (OUTPATIENT)
Dept: FAMILY MEDICINE CLINIC | Age: 68
End: 2018-06-11

## 2018-06-11 VITALS
DIASTOLIC BLOOD PRESSURE: 88 MMHG | OXYGEN SATURATION: 97 % | SYSTOLIC BLOOD PRESSURE: 140 MMHG | BODY MASS INDEX: 42.85 KG/M2 | HEART RATE: 67 BPM | WEIGHT: 251 LBS | HEIGHT: 64 IN

## 2018-06-11 DIAGNOSIS — H53.8 BLURRED VISION, RIGHT EYE: ICD-10-CM

## 2018-06-11 DIAGNOSIS — F33.0 MAJOR DEPRESSIVE DISORDER, RECURRENT EPISODE, MILD (HCC): Primary | ICD-10-CM

## 2018-06-11 DIAGNOSIS — R82.90 ABNORMAL FINDING IN URINE: ICD-10-CM

## 2018-06-11 LAB
BILIRUBIN, POC: NORMAL
BLOOD URINE, POC: NORMAL
CLARITY, POC: CLEAR
COLOR, POC: YELLOW
GLUCOSE URINE, POC: NORMAL
KETONES, POC: NORMAL
LEUKOCYTE EST, POC: NORMAL
NITRITE, POC: NORMAL
PH, POC: 6.5
PROTEIN, POC: NORMAL
SPECIFIC GRAVITY, POC: 1.02
UROBILINOGEN, POC: 0.2

## 2018-06-11 PROCEDURE — 1036F TOBACCO NON-USER: CPT | Performed by: FAMILY MEDICINE

## 2018-06-11 PROCEDURE — 81002 URINALYSIS NONAUTO W/O SCOPE: CPT | Performed by: FAMILY MEDICINE

## 2018-06-11 PROCEDURE — G8417 CALC BMI ABV UP PARAM F/U: HCPCS | Performed by: FAMILY MEDICINE

## 2018-06-11 PROCEDURE — G8427 DOCREV CUR MEDS BY ELIG CLIN: HCPCS | Performed by: FAMILY MEDICINE

## 2018-06-11 PROCEDURE — 1090F PRES/ABSN URINE INCON ASSESS: CPT | Performed by: FAMILY MEDICINE

## 2018-06-11 PROCEDURE — 4040F PNEUMOC VAC/ADMIN/RCVD: CPT | Performed by: FAMILY MEDICINE

## 2018-06-11 PROCEDURE — 3017F COLORECTAL CA SCREEN DOC REV: CPT | Performed by: FAMILY MEDICINE

## 2018-06-11 PROCEDURE — 1123F ACP DISCUSS/DSCN MKR DOCD: CPT | Performed by: FAMILY MEDICINE

## 2018-06-11 PROCEDURE — G8399 PT W/DXA RESULTS DOCUMENT: HCPCS | Performed by: FAMILY MEDICINE

## 2018-06-11 PROCEDURE — 99214 OFFICE O/P EST MOD 30 MIN: CPT | Performed by: FAMILY MEDICINE

## 2018-06-11 RX ORDER — ESCITALOPRAM OXALATE 20 MG/1
20 TABLET ORAL DAILY
Qty: 90 TABLET | Refills: 1 | Status: SHIPPED | OUTPATIENT
Start: 2018-06-11 | End: 2018-10-15 | Stop reason: SDUPTHER

## 2018-06-11 ASSESSMENT — ENCOUNTER SYMPTOMS
RESPIRATORY NEGATIVE: 1
GASTROINTESTINAL NEGATIVE: 1

## 2018-06-26 RX ORDER — ESCITALOPRAM OXALATE 20 MG/1
20 TABLET ORAL DAILY
Qty: 90 TABLET | Refills: 1 | OUTPATIENT
Start: 2018-06-26

## 2018-08-15 ENCOUNTER — HOSPITAL ENCOUNTER (EMERGENCY)
Age: 68
Discharge: HOME | End: 2018-08-15
Payer: MEDICARE

## 2018-08-15 VITALS
TEMPERATURE: 98.2 F | DIASTOLIC BLOOD PRESSURE: 81 MMHG | HEART RATE: 68 BPM | SYSTOLIC BLOOD PRESSURE: 130 MMHG | OXYGEN SATURATION: 93 % | HEIGHT: 64 IN | WEIGHT: 235 LBS | BODY MASS INDEX: 40.12 KG/M2 | RESPIRATION RATE: 16 BRPM

## 2018-08-15 DIAGNOSIS — M54.41 ACUTE RIGHT-SIDED LOW BACK PAIN WITH RIGHT-SIDED SCIATICA: Primary | ICD-10-CM

## 2018-08-15 LAB
BACTERIA: ABNORMAL /HPF
BILIRUBIN URINE: NEGATIVE
BLOOD, URINE: ABNORMAL
CLARITY: CLEAR
COLOR: YELLOW
EPITHELIAL CELLS, UA: ABNORMAL /HPF
GLUCOSE URINE: NEGATIVE MG/DL
KETONES, URINE: NEGATIVE MG/DL
LEUKOCYTE ESTERASE, URINE: NEGATIVE
MICROSCOPIC EXAMINATION: YES
NITRITE, URINE: NEGATIVE
PH UA: 6
PROTEIN UA: NEGATIVE MG/DL
RBC UA: ABNORMAL /HPF (ref 0–2)
SPECIFIC GRAVITY UA: 1.02
URINE TYPE: ABNORMAL
UROBILINOGEN, URINE: 0.2 E.U./DL
WBC UA: ABNORMAL /HPF (ref 0–5)

## 2018-08-15 PROCEDURE — 96372 THER/PROPH/DIAG INJ SC/IM: CPT

## 2018-08-15 PROCEDURE — 99283 EMERGENCY DEPT VISIT LOW MDM: CPT

## 2018-08-15 PROCEDURE — 6370000000 HC RX 637 (ALT 250 FOR IP): Performed by: PHYSICIAN ASSISTANT

## 2018-08-15 PROCEDURE — 81001 URINALYSIS AUTO W/SCOPE: CPT

## 2018-08-15 PROCEDURE — 6360000002 HC RX W HCPCS: Performed by: PHYSICIAN ASSISTANT

## 2018-08-15 RX ORDER — PREDNISONE 20 MG/1
20 TABLET ORAL DAILY
Qty: 9 TABLET | Refills: 0 | Status: SHIPPED | OUTPATIENT
Start: 2018-08-15 | End: 2018-08-24

## 2018-08-15 RX ORDER — NAPROXEN 500 MG/1
500 TABLET ORAL 2 TIMES DAILY WITH MEALS
Qty: 14 TABLET | Refills: 0 | Status: SHIPPED | OUTPATIENT
Start: 2018-08-15 | End: 2018-08-24

## 2018-08-15 RX ORDER — CYCLOBENZAPRINE HCL 10 MG
10 TABLET ORAL 3 TIMES DAILY PRN
Qty: 15 TABLET | Refills: 0 | Status: SHIPPED | OUTPATIENT
Start: 2018-08-15 | End: 2018-08-24

## 2018-08-15 RX ORDER — KETOROLAC TROMETHAMINE 30 MG/ML
30 INJECTION, SOLUTION INTRAMUSCULAR; INTRAVENOUS ONCE
Status: COMPLETED | OUTPATIENT
Start: 2018-08-15 | End: 2018-08-15

## 2018-08-15 RX ORDER — LIDOCAINE 50 MG/G
1 PATCH TOPICAL DAILY
Status: DISCONTINUED | OUTPATIENT
Start: 2018-08-15 | End: 2018-08-15 | Stop reason: HOSPADM

## 2018-08-15 RX ORDER — OXYCODONE HYDROCHLORIDE AND ACETAMINOPHEN 5; 325 MG/1; MG/1
1 TABLET ORAL ONCE
Status: COMPLETED | OUTPATIENT
Start: 2018-08-15 | End: 2018-08-15

## 2018-08-15 RX ORDER — OXYCODONE HYDROCHLORIDE AND ACETAMINOPHEN 5; 325 MG/1; MG/1
1 TABLET ORAL EVERY 6 HOURS PRN
Qty: 10 TABLET | Refills: 0 | Status: SHIPPED | OUTPATIENT
Start: 2018-08-15 | End: 2018-08-22

## 2018-08-15 RX ORDER — LIDOCAINE 50 MG/G
1 PATCH TOPICAL DAILY
Qty: 30 PATCH | Refills: 0 | Status: SHIPPED | OUTPATIENT
Start: 2018-08-15 | End: 2018-08-24

## 2018-08-15 RX ADMIN — OXYCODONE HYDROCHLORIDE AND ACETAMINOPHEN 1 TABLET: 5; 325 TABLET ORAL at 10:03

## 2018-08-15 RX ADMIN — KETOROLAC TROMETHAMINE 30 MG: 30 INJECTION, SOLUTION INTRAMUSCULAR at 10:03

## 2018-08-15 ASSESSMENT — PAIN SCALES - WONG BAKER: WONGBAKER_NUMERICALRESPONSE: 8

## 2018-08-15 ASSESSMENT — PAIN SCALES - GENERAL
PAINLEVEL_OUTOF10: 9
PAINLEVEL_OUTOF10: 9
PAINLEVEL_OUTOF10: 6
PAINLEVEL_OUTOF10: 8

## 2018-08-15 ASSESSMENT — PAIN DESCRIPTION - DESCRIPTORS: DESCRIPTORS: SHARP

## 2018-08-15 ASSESSMENT — ENCOUNTER SYMPTOMS
ABDOMINAL PAIN: 0
BACK PAIN: 1
EYES NEGATIVE: 1
SHORTNESS OF BREATH: 0
COLOR CHANGE: 0
COUGH: 0

## 2018-08-15 ASSESSMENT — PAIN DESCRIPTION - PAIN TYPE: TYPE: ACUTE PAIN

## 2018-08-15 ASSESSMENT — PAIN DESCRIPTION - LOCATION: LOCATION: BACK

## 2018-08-15 ASSESSMENT — PAIN DESCRIPTION - ORIENTATION: ORIENTATION: RIGHT;LOWER

## 2018-08-15 NOTE — ED PROVIDER NOTES
[facility]  ED  eMERGENCY dEPARTMENT eNCOUnter        Pt Name: Negro Pastor  MRN: [id number]  [place] 1950  Date of evaluation: 8/15/2018  Provider: Stephan Silverman PA-C  PCP: Dago Boyer DO  ED Attending: Liliana Baldwin MD    History provided by the patient    CHIEF COMPLAINT:     Chief Complaint   Patient presents with    Back Pain     RIGHT SIDED LOWER BACK PAIN THAT RADIATES TO HER GROIN. NO INJURY       HISTORY OF PRESENT ILLNESS:      Negro Pastor is a 70 y.o. female who arrives to the emergency department by private vehicle. She states her  brought her. The patient complains of pain to her right hip. In speaking with her further, she has pain that seems to originate at her right lower back/upper buttock/SI joint region and radiates around her right hip to her groin. Symptoms started on Monday, 8/13 and have gradually worsened. She works at the [employer] in the [place]. She doesn't recall an injury or trauma or anything that would've necessarily cause this pain. She's had these symptoms on the left side in the past, though states it was over a year ago and symptoms were not as bad. She has tried over-the-counter Tylenol and ibuprofen without any significant improvement in pain. She denies fevers, headaches, neck pain, chest pain or abdominal pain. She denies bowel or bladder incontinence, urinary symptoms such as dysuria, peripheral weakness, numbness or tingling. Pain is exacerbated with movement or when she bends over. She can't identify alleviating factors. Nursing Notes were reviewed     REVIEW OF SYSTEMS:     Review of Systems   Constitutional: Positive for activity change. Negative for appetite change, chills and fever. HENT: Negative. Eyes: Negative. Respiratory: Negative for cough and shortness of breath. Cardiovascular: Negative for chest pain. Gastrointestinal: Negative for abdominal pain.    Genitourinary: Negative for difficulty urinating and dysuria. Musculoskeletal: Positive for arthralgias (right hip) and back pain. Negative for neck pain. Skin: Negative for color change and rash. Neurological: Negative for weakness, numbness and headaches. No bowel or bladder incontinence. No saddle anesthesia. All other systems reviewed and are negative. Except as noted above in the ROS, all other systems were reviewed and negative. PAST MEDICAL HISTORY:     Past Medical History:   Diagnosis Date    Arthritis     Bipolar disorder ([facility] [address]     questionable dx    Cellulitis     L ankle, recurrent; over area of prior surgeries    Chronic back pain     Closed fracture of left fibula 1995    s/p fall; surgery x 6    Closed fracture of left tibia 1995    s/p fall on ice; hx of surgery x 6    Colon polyps     Depression     Fibroid (bleeding) (uterine) 1982    Fibromyalgia     GERD (gastroesophageal reflux disease)     Hyperlipidemia     Hypertension     Myoview Lexiscan WNL on 5/17/12    Hypothyroidism     Migraine     Miscarriage     Morbid obesity ([facility] [address]     Neuropathy 2011    seen initially by neuro., Dr. Tory Macias, on 8/11/10; EMG 7/16/10 showed abn.'s c/w L5 radiculopathy & periph. neuropathy; pt. reports sx in hands and feet ; dx'd by rheum.      MAXIMO (obstructive sleep apnea) 6/7/2012    mild    RLS (restless legs syndrome)     Urinary incontinence     Urine incontinence     saw urology, Dr. Ángela Alexander, on 7/21/11 for microscopic hematuria & hematuria; was started on Vesicare & scheduled for cystoscopy    Vitamin D deficiency 2/27/12    22 ng/mL         SURGICAL HISTORY:      Past Surgical History:   Procedure Laterality Date    BARIATRIC SURGERY  11/14/12    Laparoscopic Sleeve Gastrectomy, Lap Hiatal hernia repair    CHOLECYSTECTOMY  1985    COLONOSCOPY  2010     colon polyps; Dr. Choco Oviedo for fibroids and DUB    LEG SURGERY  1995    L; s/p tibia and fibular fx's; has uma in L spine about 14 months ago that was normal and therefore I did not reimage her at this time. She did get some improvement after Toradol IM, Percocet orally in the Lidoderm patch. I told her she may require outpatient MRI if symptoms persisted or worsened. She has an established PCP and has also been seen by [facility]. Upon discharge, she will be given medication for her symptoms including a prednisone taper for 6 days, a short course of Percocet, Flexeril, Lidoderm patches and then naproxen to start after completion of prednisone. She does not have any report of trauma or indication to x-ray her today. She has no infectious signs or symptoms or concerning neurologic signs or symptoms that would warrant imaging. I estimate there is LOW risk for ACUTE VERTEBRAL FRACTURE, ABDOMINAL AORTIC ANEURYSM, CAUDA EQUINA SYNDROME, EPIDURAL MASS LESION, SPINAL STENOSIS, OR HERNIATED DISK CAUSING SEVERE STENOSIS, thus I consider the discharge disposition reasonable. Wallace Cates and I have discussed the diagnosis and risks, and we agree with discharging home to follow-up with their primary doctor. We also discussed returning to the Emergency Department immediately if new or worsening symptoms occur. We have discussed the symptoms which are most concerning (e.g., saddle anesthesia, urinary or bowel incontinence or retention, changing or worsening pain) that necessitate immediate return. FINAL IMPRESSION:      1.  Acute right-sided low back pain with right-sided sciatica          DISPOSITION/PLAN:   DISPOSITION     DISCHARGE    PATIENT REFERRED TO:  Giuliana Hall DO  [address]  [phone]    Schedule an appointment as soon as possible for a visit       Follow up with [facility] for persistent, worsening pain            DISCHARGE MEDICATIONS:  New Prescriptions    CYCLOBENZAPRINE (FLEXERIL) 10 MG TABLET    Take 1 tablet by mouth 3 times daily as needed for

## 2018-08-15 NOTE — ED NOTES
Patient given all discharge paperwork.  Verbalized understanding and left without difficulty     Zheng Klein RN  08/15/18 [id number]

## 2018-08-22 ENCOUNTER — OFFICE VISIT (OUTPATIENT)
Dept: FAMILY MEDICINE CLINIC | Age: 68
End: 2018-08-22

## 2018-08-22 VITALS
HEIGHT: 64 IN | WEIGHT: 254.2 LBS | HEART RATE: 65 BPM | BODY MASS INDEX: 43.4 KG/M2 | OXYGEN SATURATION: 96 % | DIASTOLIC BLOOD PRESSURE: 78 MMHG | SYSTOLIC BLOOD PRESSURE: 120 MMHG

## 2018-08-22 DIAGNOSIS — Z01.818 PRE-OP EXAMINATION: ICD-10-CM

## 2018-08-22 DIAGNOSIS — H25.012 CORTICAL AGE-RELATED CATARACT OF LEFT EYE: Primary | ICD-10-CM

## 2018-08-22 PROCEDURE — 1101F PT FALLS ASSESS-DOCD LE1/YR: CPT | Performed by: FAMILY MEDICINE

## 2018-08-22 PROCEDURE — 1090F PRES/ABSN URINE INCON ASSESS: CPT | Performed by: FAMILY MEDICINE

## 2018-08-22 PROCEDURE — 99214 OFFICE O/P EST MOD 30 MIN: CPT | Performed by: FAMILY MEDICINE

## 2018-08-22 PROCEDURE — 1036F TOBACCO NON-USER: CPT | Performed by: FAMILY MEDICINE

## 2018-08-22 PROCEDURE — G8417 CALC BMI ABV UP PARAM F/U: HCPCS | Performed by: FAMILY MEDICINE

## 2018-08-22 PROCEDURE — G8399 PT W/DXA RESULTS DOCUMENT: HCPCS | Performed by: FAMILY MEDICINE

## 2018-08-22 PROCEDURE — 3017F COLORECTAL CA SCREEN DOC REV: CPT | Performed by: FAMILY MEDICINE

## 2018-08-22 PROCEDURE — 93000 ELECTROCARDIOGRAM COMPLETE: CPT | Performed by: FAMILY MEDICINE

## 2018-08-22 PROCEDURE — G8427 DOCREV CUR MEDS BY ELIG CLIN: HCPCS | Performed by: FAMILY MEDICINE

## 2018-08-22 PROCEDURE — 1123F ACP DISCUSS/DSCN MKR DOCD: CPT | Performed by: FAMILY MEDICINE

## 2018-08-22 PROCEDURE — 4040F PNEUMOC VAC/ADMIN/RCVD: CPT | Performed by: FAMILY MEDICINE

## 2018-08-22 NOTE — PROGRESS NOTES
Preoperative Consultation      Mariano Jamison  YOB: 1950    Date of Service:  8/22/2018    Vitals:    08/22/18 1529   BP: 120/78   Site: Left Arm   Position: Sitting   Cuff Size: Large Adult   Pulse: 65   SpO2: 96%   Weight: 254 lb 3.2 oz (115.3 kg)   Height: 5' 4\" (1.626 m)      Wt Readings from Last 2 Encounters:   08/22/18 254 lb 3.2 oz (115.3 kg)   08/15/18 235 lb (106.6 kg)     BP Readings from Last 3 Encounters:   08/22/18 120/78   08/15/18 130/81   06/11/18 (!) 140/88        Chief Complaint   Patient presents with   Daniel Otero Pre-op Exam     Patient is having cataract surgery 8/31/18 for the L eye and 9/7/18 for the R eye.  This is being done at [facility] by Dr. Pola Linares Antibiotics Itching     Outpatient Prescriptions Marked as Taking for the 8/22/18 encounter (Office Visit) with Corrine Bunch, DO   Medication Sig Dispense Refill    naproxen (NAPROSYN) 500 MG tablet Take 1 tablet by mouth 2 times daily (with meals) for 7 days 14 tablet 0    levothyroxine (SYNTHROID) 137 MCG tablet TAKE 1 TABLET EVERY DAY 90 tablet 1    omeprazole (PRILOSEC) 40 MG delayed release capsule TAKE 1 CAPSULE EVERY DAY 90 capsule 1    escitalopram (LEXAPRO) 20 MG tablet Take 1 tablet by mouth daily 90 tablet 1    albuterol sulfate HFA (PROVENTIL HFA) 108 (90 Base) MCG/ACT inhaler Inhale 2 puffs into the lungs every 6 hours as needed for Wheezing 1 Inhaler 3    lovastatin (MEVACOR) 20 MG tablet Take 1 tablet by mouth nightly 90 tablet 1    rOPINIRole (REQUIP) 1 MG tablet Take 1 tablet by mouth 3 times daily 270 tablet 1    traZODone (DESYREL) 150 MG tablet Take 1 tablet by mouth nightly 90 tablet 1    verapamil (VERELAN) 360 MG extended release capsule Take 1 capsule by mouth nightly 90 capsule 1    QUEtiapine (SEROQUEL XR) 150 MG TB24 extended release tablet Take 1 tablet by mouth daily 90 tablet 1    furosemide (LASIX) 40 MG tablet Take 1 tablet by mouth daily 30 tablet 0 Used    Alcohol use 0.0 oz/week      Comment: rare    Drug use: No    Sexual activity: Yes     Other Topics Concern    Not on file     Social History Narrative    No narrative on file       Review of Systems  A comprehensive review of systems was negative except for what was noted in the HPI. Physical Exam   Constitutional: She is oriented to person, place, and time. She appears well-developed and well-nourished. No distress. HENT:   Head: Normocephalic and atraumatic. Mouth/Throat: Uvula is midline, oropharynx is clear and moist and mucous membranes are normal.   Eyes: Conjunctivae and EOM are normal. Pupils are equal, round, and reactive to light. Neck: Trachea normal and normal range of motion. Neck supple. No JVD present. Carotid bruit is not present. No mass and no thyromegaly present. Cardiovascular: Normal rate, regular rhythm, normal heart sounds and intact distal pulses. Exam reveals no gallop and no friction rub. No murmur heard. Pulmonary/Chest: Effort normal and breath sounds normal. No respiratory distress. She has no wheezes. She has no rales. Abdominal: Soft. Normal aorta and bowel sounds are normal. She exhibits no distension and no mass. There is no hepatosplenomegaly. No tenderness. Musculoskeletal: She exhibits 1+ edema and no tenderness. Neurological: She is alert and oriented to person, place, and time. She has normal strength. No cranial nerve deficit or sensory deficit. Coordination and gait normal.   Skin: Skin is warm and dry. No rash noted. No erythema. Psychiatric: She has a normal mood and affect. Her behavior is normal.     EKG Interpretation: Essent normal.     Lab Review not applicable        Assessment:       70 y.o. patient with planned surgery as above. Known risk factors for perioperative complications: Hypertension  Current medications which may produce withdrawal symptoms if withheld perioperatively: none      Plan:     1.  Preoperative workup as

## 2018-08-24 RX ORDER — GABAPENTIN 600 MG/1
600 TABLET ORAL 2 TIMES DAILY
COMMUNITY
End: 2019-09-18 | Stop reason: SDUPTHER

## 2018-08-27 ENCOUNTER — OFFICE VISIT (OUTPATIENT)
Dept: ORTHOPEDIC SURGERY | Age: 68
End: 2018-08-27

## 2018-08-27 DIAGNOSIS — M48.062 SPINAL STENOSIS, LUMBAR REGION, WITH NEUROGENIC CLAUDICATION: ICD-10-CM

## 2018-08-27 DIAGNOSIS — M54.41 ACUTE RIGHT-SIDED LOW BACK PAIN WITH RIGHT-SIDED SCIATICA: Primary | ICD-10-CM

## 2018-08-27 PROCEDURE — 4040F PNEUMOC VAC/ADMIN/RCVD: CPT | Performed by: PHYSICAL MEDICINE & REHABILITATION

## 2018-08-27 PROCEDURE — 99214 OFFICE O/P EST MOD 30 MIN: CPT | Performed by: PHYSICAL MEDICINE & REHABILITATION

## 2018-08-27 PROCEDURE — G8427 DOCREV CUR MEDS BY ELIG CLIN: HCPCS | Performed by: PHYSICAL MEDICINE & REHABILITATION

## 2018-08-27 PROCEDURE — G8417 CALC BMI ABV UP PARAM F/U: HCPCS | Performed by: PHYSICAL MEDICINE & REHABILITATION

## 2018-08-27 PROCEDURE — 1036F TOBACCO NON-USER: CPT | Performed by: PHYSICAL MEDICINE & REHABILITATION

## 2018-08-27 PROCEDURE — 1123F ACP DISCUSS/DSCN MKR DOCD: CPT | Performed by: PHYSICAL MEDICINE & REHABILITATION

## 2018-08-27 PROCEDURE — G8399 PT W/DXA RESULTS DOCUMENT: HCPCS | Performed by: PHYSICAL MEDICINE & REHABILITATION

## 2018-08-27 PROCEDURE — 3017F COLORECTAL CA SCREEN DOC REV: CPT | Performed by: PHYSICAL MEDICINE & REHABILITATION

## 2018-08-27 PROCEDURE — 1101F PT FALLS ASSESS-DOCD LE1/YR: CPT | Performed by: PHYSICAL MEDICINE & REHABILITATION

## 2018-08-27 PROCEDURE — 1090F PRES/ABSN URINE INCON ASSESS: CPT | Performed by: PHYSICAL MEDICINE & REHABILITATION

## 2018-08-27 NOTE — PROGRESS NOTES
mouth daily, Disp: 30 tablet, Rfl: 0    aspirin 81 MG EC tablet, Take 81 mg by mouth daily. Indications: OTC, Disp: , Rfl:   Allergies:  Sulfa antibiotics  Social History:    reports that she has never smoked. She has never used smokeless tobacco. She reports that she drinks alcohol. She reports that she does not use drugs. Family History:   Family History   Problem Relation Age of Onset    Arthritis Mother     Asthma Mother     Diabetes Mother     High Blood Pressure Mother     Depression Mother     Heart Disease Mother 48        CHF    High Cholesterol Mother     Stroke Mother     Mental Illness Mother     Arthritis Father     Cancer Father 36        colon    Stroke Father 48        x 2    Substance Abuse Father         alcohol    Kidney Disease Sister     Cancer Sister 50        urethral CA    Cancer Sister 48        lymphoma    Cancer Maternal Uncle         prostate    Cancer Maternal Uncle         prostate    Cancer Maternal Uncle         prostate    Emphysema Neg Hx     Heart Failure Neg Hx     Hypertension Neg Hx        REVIEW OF SYSTEMS: Full ROS noted & scanned   CONSTITUTIONAL: Denies unexplained weight loss, fevers, chills or fatigue  NEUROLOGICAL: Denies unsteady gait or progressive weakness  MUSCULOSKELETAL: Denies joint swelling or redness  PSYCHOLOGICAL: Denies anxiety, depression   SKIN: Denies skin changes, delayed healing, rash, itching   HEMATOLOGIC: Denies easy bleeding or bruising  ENDOCRINE: Denies excessive thirst, urination, heat/cold  RESPIRATORY: Denies current dyspnea, cough  GI: Denies nausea, vomiting, diarrhea   : Denies bowel or bladder issues       PHYSICAL EXAM:    Vitals: RR 12, pulse 66, height 5' 4.17\" (1.63 m), weight 250 lb (113.4 kg),     GENERAL EXAM:  · General Apparence: Uncomfortable and listing to the left with sitting Orientation: The patient is oriented to time, place and person.    · Mood & Affect:The patient's mood and affect are appropriate Value Ref Range & Units Status Collected Lab   Color, UA Yellow  Straw/Yellow Final 08/15/2018  9:05  [place], [place] Clear  Clear Final 08/15/2018  9:05 AM [facility]   Glucose, Ur Negative  Negative mg/dL Final 08/15/2018  9:05 AM [facility]   Bilirubin Urine Negative  Negative Final 08/15/2018  9:05 AM [facility]   Ketones, Urine Negative  Negative mg/dL Final 08/15/2018  9:05 AM [facility]   Specific Gravity, UA 1.020  1.005 - 1.030 Final 08/15/2018  9:05 AM [facility]   Blood, Urine SMALL   Negative Final 08/15/2018  9:05 AM [facility]   pH, UA 6.0  5.0 - 8.0 Final 08/15/2018  9:05 AM [facility]   Protein, UA Negative  Negative mg/dL Final 08/15/2018  9:05 AM [facility]   Urobilinogen, Urine 0.2  <2.0 E.U./dL Final 08/15/2018  9:05 AM [address] Lab   Nitrite, Urine Negative  Negative Final 08/15/2018  9:05 AM [facility]   Leukocyte Esterase, Urine Negative  Negative Final 08/15/2018  9:05 AM [facility]   Microscopic Examination YES   Final 08/15/2018  9:05 AM [address] Lab   Urine Type Not Specified   Final 08/15/2018  9:05 AM [facility]     UA micro    8/15/2018  9:32 AM - Veronica Rossi Incoming Lab Results From Soft (Epic Adt)     Component Results     Component Value Ref Range & Units Status Collected Lab   WBC, UA 0-2  0 - 5 /HPF Final 08/15/2018  9:05 AM [facility]   RBC, UA 3-5   0 - 2 /HPF Final 08/15/2018  9:05 AM [facility]   Epi Cells 3-5  /HPF Final 08/15/2018  9:05 AM [facility]   Bacteria, UA Rare                2010 EMG reviewed showing bilateral L5 chronic radiculopathy and peripheral neuropathy    2 views lumbar spine June 20 13,017 AP and lateral lumbar spine show mild diffuse spondylosis no evidence of fracture or mass    Impression:    Lumbar

## 2018-08-30 ENCOUNTER — ANESTHESIA EVENT (OUTPATIENT)
Dept: OPERATING ROOM | Age: 68
End: 2018-08-30
Payer: MEDICARE

## 2018-08-31 ENCOUNTER — ANESTHESIA (OUTPATIENT)
Dept: OPERATING ROOM | Age: 68
End: 2018-08-31
Payer: MEDICARE

## 2018-08-31 ENCOUNTER — HOSPITAL ENCOUNTER (OUTPATIENT)
Age: 68
Setting detail: OUTPATIENT SURGERY
Discharge: HOME | End: 2018-08-31
Attending: OPHTHALMOLOGY | Admitting: OPHTHALMOLOGY
Payer: MEDICARE

## 2018-08-31 VITALS
SYSTOLIC BLOOD PRESSURE: 119 MMHG | HEIGHT: 64 IN | BODY MASS INDEX: 42.68 KG/M2 | WEIGHT: 250 LBS | OXYGEN SATURATION: 96 % | HEART RATE: 66 BPM | RESPIRATION RATE: 16 BRPM | TEMPERATURE: 97.5 F | DIASTOLIC BLOOD PRESSURE: 55 MMHG

## 2018-08-31 VITALS — DIASTOLIC BLOOD PRESSURE: 65 MMHG | OXYGEN SATURATION: 100 % | SYSTOLIC BLOOD PRESSURE: 112 MMHG

## 2018-08-31 PROCEDURE — 7100000010 HC PHASE II RECOVERY - FIRST 15 MIN: Performed by: OPHTHALMOLOGY

## 2018-08-31 PROCEDURE — 6360000002 HC RX W HCPCS: Performed by: NURSE ANESTHETIST, CERTIFIED REGISTERED

## 2018-08-31 PROCEDURE — 2580000003 HC RX 258: Performed by: ANESTHESIOLOGY

## 2018-08-31 PROCEDURE — 2580000003 HC RX 258: Performed by: OPHTHALMOLOGY

## 2018-08-31 PROCEDURE — 3600000003 HC SURGERY LEVEL 3 BASE: Performed by: OPHTHALMOLOGY

## 2018-08-31 PROCEDURE — 6370000000 HC RX 637 (ALT 250 FOR IP)

## 2018-08-31 PROCEDURE — 3700000001 HC ADD 15 MINUTES (ANESTHESIA): Performed by: OPHTHALMOLOGY

## 2018-08-31 PROCEDURE — V2632 POST CHMBR INTRAOCULAR LENS: HCPCS | Performed by: OPHTHALMOLOGY

## 2018-08-31 PROCEDURE — 6370000000 HC RX 637 (ALT 250 FOR IP): Performed by: OPHTHALMOLOGY

## 2018-08-31 PROCEDURE — 3600000013 HC SURGERY LEVEL 3 ADDTL 15MIN: Performed by: OPHTHALMOLOGY

## 2018-08-31 PROCEDURE — 3700000000 HC ANESTHESIA ATTENDED CARE: Performed by: OPHTHALMOLOGY

## 2018-08-31 PROCEDURE — 2709999900 HC NON-CHARGEABLE SUPPLY: Performed by: OPHTHALMOLOGY

## 2018-08-31 PROCEDURE — 2720000010 HC SURG SUPPLY STERILE: Performed by: OPHTHALMOLOGY

## 2018-08-31 DEVICE — ACRYSOF(R) IQ ASPHERIC NATURAL IOL, SINGLE-PIECE ACRYLIC FOLDABLE PCL, UV WITH BLUE LIGHTFILTER, 13.0MM LENGTH, 6.0MM ANTERIORASYMMETRIC BICONVEX OPTIC, PLANAR HAPTICS.
Type: IMPLANTABLE DEVICE | Site: EYE | Status: FUNCTIONAL
Brand: ACRYSOF®

## 2018-08-31 RX ORDER — BRIMONIDINE TARTRATE 2 MG/ML
SOLUTION/ DROPS OPHTHALMIC PRN
Status: DISCONTINUED | OUTPATIENT
Start: 2018-08-31 | End: 2018-08-31 | Stop reason: HOSPADM

## 2018-08-31 RX ORDER — MOXIFLOXACIN 5 MG/ML
SOLUTION/ DROPS OPHTHALMIC PRN
Status: DISCONTINUED | OUTPATIENT
Start: 2018-08-31 | End: 2018-08-31 | Stop reason: HOSPADM

## 2018-08-31 RX ORDER — MAGNESIUM HYDROXIDE 1200 MG/15ML
LIQUID ORAL PRN
Status: DISCONTINUED | OUTPATIENT
Start: 2018-08-31 | End: 2018-08-31 | Stop reason: HOSPADM

## 2018-08-31 RX ORDER — FENTANYL CITRATE 50 UG/ML
INJECTION, SOLUTION INTRAMUSCULAR; INTRAVENOUS PRN
Status: DISCONTINUED | OUTPATIENT
Start: 2018-08-31 | End: 2018-08-31 | Stop reason: SDUPTHER

## 2018-08-31 RX ORDER — SODIUM CHLORIDE, SODIUM LACTATE, POTASSIUM CHLORIDE, CALCIUM CHLORIDE 600; 310; 30; 20 MG/100ML; MG/100ML; MG/100ML; MG/100ML
INJECTION, SOLUTION INTRAVENOUS CONTINUOUS
Status: DISCONTINUED | OUTPATIENT
Start: 2018-08-31 | End: 2018-08-31 | Stop reason: HOSPADM

## 2018-08-31 RX ORDER — MIDAZOLAM HYDROCHLORIDE 1 MG/ML
INJECTION INTRAMUSCULAR; INTRAVENOUS PRN
Status: DISCONTINUED | OUTPATIENT
Start: 2018-08-31 | End: 2018-08-31 | Stop reason: SDUPTHER

## 2018-08-31 RX ADMIN — MIDAZOLAM HYDROCHLORIDE 1 MG: 2 INJECTION, SOLUTION INTRAMUSCULAR; INTRAVENOUS at 08:20

## 2018-08-31 RX ADMIN — FENTANYL CITRATE 50 MCG: 50 INJECTION INTRAMUSCULAR; INTRAVENOUS at 08:18

## 2018-08-31 RX ADMIN — Medication 0.3 ML: at 06:50

## 2018-08-31 RX ADMIN — MIDAZOLAM HYDROCHLORIDE 1 MG: 2 INJECTION, SOLUTION INTRAMUSCULAR; INTRAVENOUS at 08:18

## 2018-08-31 RX ADMIN — SODIUM CHLORIDE, POTASSIUM CHLORIDE, SODIUM LACTATE AND CALCIUM CHLORIDE: 600; 310; 30; 20 INJECTION, SOLUTION INTRAVENOUS at 07:04

## 2018-08-31 ASSESSMENT — PULMONARY FUNCTION TESTS
PIF_VALUE: 0
PIF_VALUE: 0
PIF_VALUE: 1
PIF_VALUE: 0
PIF_VALUE: 2
PIF_VALUE: 0
PIF_VALUE: 1
PIF_VALUE: 0

## 2018-08-31 ASSESSMENT — PAIN SCALES - GENERAL: PAINLEVEL_OUTOF10: 0

## 2018-08-31 ASSESSMENT — PAIN - FUNCTIONAL ASSESSMENT: PAIN_FUNCTIONAL_ASSESSMENT: 0-10

## 2018-08-31 NOTE — ANESTHESIA PRE PROCEDURE
 Dermatitis L30.9    Localized edema R60.0    Localized osteoarthrosis, lower leg M17.10    Blurred vision, right eye H53.8       Past Medical History:        Diagnosis Date    Arthritis     Bipolar disorder ([facility] [address]     questionable dx    Cellulitis     L ankle, recurrent; over area of prior surgeries    Chronic back pain     Closed fracture of left fibula 1995    s/p fall; surgery x 6    Closed fracture of left tibia 1995    s/p fall on ice; hx of surgery x 6    Colon polyps     Depression     Fibroid (bleeding) (uterine) 1982    Fibromyalgia     GERD (gastroesophageal reflux disease)     Hyperlipidemia     Hypertension     Myoview Lexiscan WNL on 5/17/12    Hypothyroidism     Migraine     Miscarriage     Morbid obesity ([facility] [address]     Neuropathy 2011    seen initially by neuro., Dr. Gregory, on 8/11/10; EMG 7/16/10 showed abn.'s c/w L5 radiculopathy & periph. neuropathy; pt. reports sx in hands and feet ; dx'd by rheum.      MAXIMO (obstructive sleep apnea) 06/07/2012    mild-does not require appliance    RLS (restless legs syndrome)     Urinary incontinence     Urine incontinence     saw urology, Dr. Gabi Laughlin, on 7/21/11 for microscopic hematuria & hematuria; was started on Vesicare & scheduled for cystoscopy    Vitamin D deficiency 2/27/12    22 ng/mL       Past Surgical History:        Procedure Laterality Date    BARIATRIC SURGERY  11/14/12    Laparoscopic Sleeve Gastrectomy, Lap Hiatal hernia repair    CHOLECYSTECTOMY  1985    COLONOSCOPY  2010     colon polyps; Dr. Grace Ngo for fibroids and DUB    LEG SURGERY  1995    L; s/p tibia and fibular fx's; has uma in L tibia; surgery x 6    UPPER GASTROINTESTINAL ENDOSCOPY  2010    Dr. Pili Moreno  4-27-12    WNL       Social History:    Social History   Substance Use Topics    Smoking status: Never Smoker    Smokeless tobacco: Never Used    Alcohol use 0.0 oz/week      Comment: 1 beer twice a year                                Counseling given: Not Answered      Vital Signs (Current):   Vitals:    08/24/18 1349   Weight: 254 lb (115.2 kg)   Height: 5' 4\" (1.626 m)                                              BP Readings from Last 3 Encounters:   08/22/18 120/78   08/15/18 130/81   06/11/18 (!) 140/88       NPO Status:                                                                                 BMI:   Wt Readings from Last 3 Encounters:   08/22/18 254 lb 3.2 oz (115.3 kg)   08/15/18 235 lb (106.6 kg)   06/11/18 251 lb (113.9 kg)     Body mass index is 43.6 kg/m². CBC:   Lab Results   Component Value Date    WBC 4.8 04/10/2018    RBC 4.66 04/10/2018    HGB 13.3 04/10/2018    HCT 40.8 04/10/2018    MCV 87.6 04/10/2018    RDW 15.0 04/10/2018     04/10/2018       CMP:   Lab Results   Component Value Date     04/10/2018    K 4.7 04/10/2018     04/10/2018    CO2 26 04/10/2018    BUN 20 04/10/2018    CREATININE 1.2 04/10/2018    GFRAA 54 04/10/2018    GFRAA >60 06/11/2013    AGRATIO 2.0 04/10/2018    LABGLOM 45 04/10/2018    GLUCOSE 94 04/10/2018    PROT 6.7 04/10/2018    PROT 6.3 11/15/2012    CALCIUM 9.0 04/10/2018    BILITOT 0.4 04/10/2018    ALKPHOS 74 04/10/2018    AST 14 04/10/2018    ALT 12 04/10/2018       POC Tests: No results for input(s): POCGLU, POCNA, POCK, POCCL, POCBUN, POCHEMO, POCHCT in the last 72 hours.     Coags: No results found for: PROTIME, INR, APTT    HCG (If Applicable): No results found for: PREGTESTUR, PREGSERUM, HCG, HCGQUANT     ABGs: No results found for: PHART, PO2ART, QZJ3XMO, ABO4TSS, BEART, N3DSDEKI     Type & Screen (If Applicable):  Lab Results   Component Value Date    LABABO O 11/08/2012    [facility] ERENDIRA Positive 11/08/2012       Anesthesia Evaluation  Patient summary reviewed no history of anesthetic complications:   Airway: Mallampati: II  TM distance: >3 FB   Neck ROM: full  Mouth opening: > = 3 FB Dental:    (+)

## 2018-08-31 NOTE — OP NOTE
Nia Monzon    OPERATIVE NOTE    Preoperative Diagnosis: Cataract, left eye         Miotic pupil left eye         Pseudoexfoliation syndrome, left eye    Postoperative Diagnosis: Cataract, left eye                      Miotic pupil left eye           Pseudoexfoliation syndrome, left eye    Procedure: Phacoemulsification with intraocular lens inplantation, left eye, complex    Surgeon: Dennis Herrera    Anesthesia: MAC with topical    Complications: none    Specimens: none    Indications for procedure: The patient is a 70y.o. year old with decreased vision, glare and halos around lights, and trouble with activities of daily living. Examination revealed a visually significant cataract in the left eye. Risks, benefits, and alternatives to surgery were discussed with the patient and the patient elected to proceed with phacoemulsification with lens implantation. Details of the procedure: Following informed consent, the patient was taken to the operating room and placed in the supine position. The eye was prepped and draped in the usual sterile fashion using aseptic technique for cataract surgery. Following topical tetracaine drops a side port incision was made in the temporal cornea. The eye was filled with trypan and rinsed with a pharmacy prepared epi-Shugarcaine mixture. The eye was then filled with viscoelastic and a 2.4 mm keratome blade was used to make a 3-plane clear corneal incision in the temporal cornea. A 7.0 Malugyn ring was inserted to allow for improved dilation. The cystitome was used to make a tear in the anterior capsule and a Utrata forceps was used to make a complete curvilinear capsulorrhexis. The lens was hydrodissected and freely rotated. Phacoemulsification was performed. Irrigation/aspiration was used to remove all cortical material from the capsular bag.   The eye was filled with viscoelastic and a foldable posterior chamber intraocular lens was injected into the capsular

## 2018-09-04 ENCOUNTER — HOSPITAL ENCOUNTER (OUTPATIENT)
Dept: PHYSICAL THERAPY | Age: 68
Setting detail: THERAPIES SERIES
Discharge: HOME | End: 2018-09-04
Payer: MEDICARE

## 2018-09-04 NOTE — FLOWSHEET NOTE
Physical Therapy  Cancellation/No-show Note    Patient Name:  Ricardo Clark  :  1950   Date:  2018  Cancels to Date: 1  No-shows to Date: 0    For today's appointment patient:  [x]  Cancelled PT eval  [x]  Rescheduled appointment  []  No-show     Reason given by patient:  []  Patient ill  []  Conflicting appointment  []  No transportation    []  Conflict with work  []  No reason given  [x]  Other:     Comments:  Patient arrived for PT eval at 12:30. There was a 20 min delay with start of eval due problems with registering the patient. At that time, the patient stated that she wanted to reschedule because she would need to leave in 30 min for another appt. Patient felt she has been rushed today for personal reasons, and would like to reschedule her eval for another time. Patient rescheduled by office staff.      Electronically signed by:  Bethany Delgado [address] [place] [address]

## 2018-09-10 ENCOUNTER — HOSPITAL ENCOUNTER (OUTPATIENT)
Dept: PHYSICAL THERAPY | Age: 68
Setting detail: THERAPIES SERIES
Discharge: HOME | End: 2018-09-10
Payer: MEDICARE

## 2018-09-10 NOTE — FLOWSHEET NOTE
Physical Therapy  Cancellation/No-show Note    Patient Name:  Mery Robles  :  1950   Date:  9/10/2018  Cancels to Date: 2  No-shows to Date: 0    For today's appointment patient:  [x]  Cancelled PT eval  [x]  Rescheduled appointment  []  No-show     Reason given by patient:  []  Patient ill  []  Conflicting appointment  []  No transportation    []  Conflict with work  []  No reason given  [x]  Other:     Comments:  Patient called to cancel appt. Electronically signed by:   Rafaela Armenta, [address]

## 2018-09-13 ENCOUNTER — ANESTHESIA EVENT (OUTPATIENT)
Dept: OPERATING ROOM | Age: 68
End: 2018-09-13
Payer: MEDICARE

## 2018-09-14 ENCOUNTER — HOSPITAL ENCOUNTER (OUTPATIENT)
Age: 68
Setting detail: OUTPATIENT SURGERY
Discharge: HOME | End: 2018-09-14
Attending: OPHTHALMOLOGY | Admitting: OPHTHALMOLOGY
Payer: MEDICARE

## 2018-09-14 ENCOUNTER — ANESTHESIA (OUTPATIENT)
Dept: OPERATING ROOM | Age: 68
End: 2018-09-14
Payer: MEDICARE

## 2018-09-14 VITALS
RESPIRATION RATE: 15 BRPM | HEIGHT: 64 IN | WEIGHT: 250 LBS | HEART RATE: 70 BPM | DIASTOLIC BLOOD PRESSURE: 68 MMHG | OXYGEN SATURATION: 100 % | TEMPERATURE: 97 F | BODY MASS INDEX: 42.68 KG/M2 | SYSTOLIC BLOOD PRESSURE: 152 MMHG

## 2018-09-14 VITALS — SYSTOLIC BLOOD PRESSURE: 191 MMHG | OXYGEN SATURATION: 100 % | DIASTOLIC BLOOD PRESSURE: 100 MMHG

## 2018-09-14 PROCEDURE — V2632 POST CHMBR INTRAOCULAR LENS: HCPCS | Performed by: OPHTHALMOLOGY

## 2018-09-14 PROCEDURE — 6360000002 HC RX W HCPCS: Performed by: NURSE ANESTHETIST, CERTIFIED REGISTERED

## 2018-09-14 PROCEDURE — 6370000000 HC RX 637 (ALT 250 FOR IP): Performed by: OPHTHALMOLOGY

## 2018-09-14 PROCEDURE — 3700000001 HC ADD 15 MINUTES (ANESTHESIA): Performed by: OPHTHALMOLOGY

## 2018-09-14 PROCEDURE — 7100000010 HC PHASE II RECOVERY - FIRST 15 MIN: Performed by: OPHTHALMOLOGY

## 2018-09-14 PROCEDURE — 2709999900 HC NON-CHARGEABLE SUPPLY: Performed by: OPHTHALMOLOGY

## 2018-09-14 PROCEDURE — 2580000003 HC RX 258: Performed by: ANESTHESIOLOGY

## 2018-09-14 PROCEDURE — 2720000010 HC SURG SUPPLY STERILE: Performed by: OPHTHALMOLOGY

## 2018-09-14 PROCEDURE — 3700000000 HC ANESTHESIA ATTENDED CARE: Performed by: OPHTHALMOLOGY

## 2018-09-14 PROCEDURE — 2500000003 HC RX 250 WO HCPCS: Performed by: OPHTHALMOLOGY

## 2018-09-14 PROCEDURE — 6360000002 HC RX W HCPCS: Performed by: OPHTHALMOLOGY

## 2018-09-14 PROCEDURE — 3600000003 HC SURGERY LEVEL 3 BASE: Performed by: OPHTHALMOLOGY

## 2018-09-14 PROCEDURE — 6370000000 HC RX 637 (ALT 250 FOR IP)

## 2018-09-14 PROCEDURE — 7100000011 HC PHASE II RECOVERY - ADDTL 15 MIN: Performed by: OPHTHALMOLOGY

## 2018-09-14 PROCEDURE — 3600000013 HC SURGERY LEVEL 3 ADDTL 15MIN: Performed by: OPHTHALMOLOGY

## 2018-09-14 DEVICE — ACRYSOF(R) IQ ASPHERIC NATURAL IOL, SINGLE-PIECE ACRYLIC FOLDABLE PCL, UV WITH BLUE LIGHTFILTER, 13.0MM LENGTH, 6.0MM ANTERIORASYMMETRIC BICONVEX OPTIC, PLANAR HAPTICS.
Type: IMPLANTABLE DEVICE | Site: ANTERIOR CHAMBER | Status: FUNCTIONAL
Brand: ACRYSOF®

## 2018-09-14 RX ORDER — MEPERIDINE HYDROCHLORIDE 50 MG/ML
12.5 INJECTION INTRAMUSCULAR; INTRAVENOUS; SUBCUTANEOUS EVERY 5 MIN PRN
Status: DISCONTINUED | OUTPATIENT
Start: 2018-09-14 | End: 2018-09-14 | Stop reason: HOSPADM

## 2018-09-14 RX ORDER — OXYCODONE HYDROCHLORIDE AND ACETAMINOPHEN 5; 325 MG/1; MG/1
2 TABLET ORAL PRN
Status: DISCONTINUED | OUTPATIENT
Start: 2018-09-14 | End: 2018-09-14 | Stop reason: HOSPADM

## 2018-09-14 RX ORDER — BRIMONIDINE TARTRATE 2 MG/ML
SOLUTION/ DROPS OPHTHALMIC PRN
Status: DISCONTINUED | OUTPATIENT
Start: 2018-09-14 | End: 2018-09-14 | Stop reason: HOSPADM

## 2018-09-14 RX ORDER — SODIUM CHLORIDE, SODIUM LACTATE, POTASSIUM CHLORIDE, CALCIUM CHLORIDE 600; 310; 30; 20 MG/100ML; MG/100ML; MG/100ML; MG/100ML
INJECTION, SOLUTION INTRAVENOUS CONTINUOUS
Status: DISCONTINUED | OUTPATIENT
Start: 2018-09-14 | End: 2018-09-14 | Stop reason: HOSPADM

## 2018-09-14 RX ORDER — LIDOCAINE HYDROCHLORIDE 10 MG/ML
1 INJECTION, SOLUTION EPIDURAL; INFILTRATION; INTRACAUDAL; PERINEURAL
Status: DISCONTINUED | OUTPATIENT
Start: 2018-09-14 | End: 2018-09-14 | Stop reason: HOSPADM

## 2018-09-14 RX ORDER — MORPHINE SULFATE 2 MG/ML
1 INJECTION, SOLUTION INTRAMUSCULAR; INTRAVENOUS EVERY 5 MIN PRN
Status: DISCONTINUED | OUTPATIENT
Start: 2018-09-14 | End: 2018-09-14 | Stop reason: HOSPADM

## 2018-09-14 RX ORDER — OXYCODONE HYDROCHLORIDE AND ACETAMINOPHEN 5; 325 MG/1; MG/1
1 TABLET ORAL PRN
Status: DISCONTINUED | OUTPATIENT
Start: 2018-09-14 | End: 2018-09-14 | Stop reason: HOSPADM

## 2018-09-14 RX ORDER — MOXIFLOXACIN 5 MG/ML
SOLUTION/ DROPS OPHTHALMIC PRN
Status: DISCONTINUED | OUTPATIENT
Start: 2018-09-14 | End: 2018-09-14 | Stop reason: HOSPADM

## 2018-09-14 RX ORDER — TETRACAINE HYDROCHLORIDE 5 MG/ML
SOLUTION OPHTHALMIC PRN
Status: DISCONTINUED | OUTPATIENT
Start: 2018-09-14 | End: 2018-09-14 | Stop reason: HOSPADM

## 2018-09-14 RX ORDER — SODIUM CHLORIDE 0.9 % (FLUSH) 0.9 %
10 SYRINGE (ML) INJECTION PRN
Status: DISCONTINUED | OUTPATIENT
Start: 2018-09-14 | End: 2018-09-14 | Stop reason: HOSPADM

## 2018-09-14 RX ORDER — LABETALOL HYDROCHLORIDE 5 MG/ML
5 INJECTION, SOLUTION INTRAVENOUS EVERY 10 MIN PRN
Status: DISCONTINUED | OUTPATIENT
Start: 2018-09-14 | End: 2018-09-14 | Stop reason: HOSPADM

## 2018-09-14 RX ORDER — MORPHINE SULFATE 2 MG/ML
2 INJECTION, SOLUTION INTRAMUSCULAR; INTRAVENOUS EVERY 5 MIN PRN
Status: DISCONTINUED | OUTPATIENT
Start: 2018-09-14 | End: 2018-09-14 | Stop reason: HOSPADM

## 2018-09-14 RX ORDER — SODIUM CHLORIDE 0.9 % (FLUSH) 0.9 %
10 SYRINGE (ML) INJECTION EVERY 12 HOURS SCHEDULED
Status: DISCONTINUED | OUTPATIENT
Start: 2018-09-14 | End: 2018-09-14 | Stop reason: HOSPADM

## 2018-09-14 RX ORDER — HYDRALAZINE HYDROCHLORIDE 20 MG/ML
INJECTION INTRAMUSCULAR; INTRAVENOUS PRN
Status: DISCONTINUED | OUTPATIENT
Start: 2018-09-14 | End: 2018-09-14 | Stop reason: SDUPTHER

## 2018-09-14 RX ORDER — HYDRALAZINE HYDROCHLORIDE 20 MG/ML
5 INJECTION INTRAMUSCULAR; INTRAVENOUS EVERY 10 MIN PRN
Status: DISCONTINUED | OUTPATIENT
Start: 2018-09-14 | End: 2018-09-14 | Stop reason: HOSPADM

## 2018-09-14 RX ORDER — FENTANYL CITRATE 50 UG/ML
INJECTION, SOLUTION INTRAMUSCULAR; INTRAVENOUS PRN
Status: DISCONTINUED | OUTPATIENT
Start: 2018-09-14 | End: 2018-09-14 | Stop reason: SDUPTHER

## 2018-09-14 RX ORDER — ONDANSETRON 2 MG/ML
4 INJECTION INTRAMUSCULAR; INTRAVENOUS PRN
Status: DISCONTINUED | OUTPATIENT
Start: 2018-09-14 | End: 2018-09-14 | Stop reason: HOSPADM

## 2018-09-14 RX ORDER — PROMETHAZINE HYDROCHLORIDE 25 MG/ML
6.25 INJECTION, SOLUTION INTRAMUSCULAR; INTRAVENOUS
Status: DISCONTINUED | OUTPATIENT
Start: 2018-09-14 | End: 2018-09-14 | Stop reason: HOSPADM

## 2018-09-14 RX ORDER — DIPHENHYDRAMINE HYDROCHLORIDE 50 MG/ML
12.5 INJECTION INTRAMUSCULAR; INTRAVENOUS
Status: DISCONTINUED | OUTPATIENT
Start: 2018-09-14 | End: 2018-09-14 | Stop reason: HOSPADM

## 2018-09-14 RX ORDER — MIDAZOLAM HYDROCHLORIDE 1 MG/ML
INJECTION INTRAMUSCULAR; INTRAVENOUS PRN
Status: DISCONTINUED | OUTPATIENT
Start: 2018-09-14 | End: 2018-09-14 | Stop reason: SDUPTHER

## 2018-09-14 RX ADMIN — FENTANYL CITRATE 50 MCG: 50 INJECTION INTRAMUSCULAR; INTRAVENOUS at 08:36

## 2018-09-14 RX ADMIN — MIDAZOLAM HYDROCHLORIDE 2 MG: 2 INJECTION, SOLUTION INTRAMUSCULAR; INTRAVENOUS at 08:36

## 2018-09-14 RX ADMIN — MIDAZOLAM HYDROCHLORIDE 1 MG: 2 INJECTION, SOLUTION INTRAMUSCULAR; INTRAVENOUS at 08:49

## 2018-09-14 RX ADMIN — HYDRALAZINE HYDROCHLORIDE 5 MG: 20 INJECTION INTRAMUSCULAR; INTRAVENOUS at 08:41

## 2018-09-14 RX ADMIN — SODIUM CHLORIDE, POTASSIUM CHLORIDE, SODIUM LACTATE AND CALCIUM CHLORIDE: 600; 310; 30; 20 INJECTION, SOLUTION INTRAVENOUS at 07:01

## 2018-09-14 RX ADMIN — Medication 0.3 ML: at 06:47

## 2018-09-14 ASSESSMENT — PULMONARY FUNCTION TESTS
PIF_VALUE: 0

## 2018-09-14 ASSESSMENT — PAIN - FUNCTIONAL ASSESSMENT: PAIN_FUNCTIONAL_ASSESSMENT: 0-10

## 2018-09-14 ASSESSMENT — PAIN SCALES - GENERAL: PAINLEVEL_OUTOF10: 0

## 2018-09-14 NOTE — ANESTHESIA PRE PROCEDURE
Myoview Guidoiscan WNL on 5/17/12    Hypothyroidism     Migraine     Miscarriage     Morbid obesity ([address]     Neuropathy 2011    seen initially by neuro., Dr. Gregory, on 8/11/10; EMG 7/16/10 showed abn.'s c/w L5 radiculopathy & periph. neuropathy; pt. reports sx in hands and feet ; dx'd by rheum.      MAXIMO (obstructive sleep apnea) 06/07/2012    mild-does not require appliance    RLS (restless legs syndrome)     Urinary incontinence     Urine incontinence     saw urology, Dr. Gabi Laughlin, on 7/21/11 for microscopic hematuria & hematuria; was started on Vesicare & scheduled for cystoscopy    Vitamin D deficiency 2/27/12    22 ng/mL       Past Surgical History:        Procedure Laterality Date    BARIATRIC SURGERY  11/14/12    Laparoscopic Sleeve Gastrectomy, Lap Hiatal hernia repair    CHOLECYSTECTOMY  1985    COLONOSCOPY  2010     colon polyps; Dr. Zeus Saleem Left 08/31/2018    phaco with IOL    HIATAL HERNIA REPAIR      lap    HYSTERECTOMY  1982    TAHBSO for fibroids and DUB    LEG SURGERY  1995    L; s/p tibia and fibular fx's; has uma in L tibia; surgery x 6    [place] REMV CATARACT EXTRACAP,INSERT LENS Left 8/31/2018    PHACOEMULSIFICATION OF CATARACT LEFT EYE WITH INTRAOCULAR LENS IMPLANT performed by Carlene Ham MD at [facility] [address]  2010    Dr. Evelyn Zuñiga  4-27-12    WN       Social History:    Social History   Substance Use Topics    Smoking status: Never Smoker    Smokeless tobacco: Never Used    Alcohol use 0.0 oz/week      Comment: 1 beer twice a year                                Counseling given: Not Answered      Vital Signs (Current):   Vitals:    09/11/18 0916 09/14/18 0704   BP:  (!) 164/84   Pulse:  66   Resp:  16   Temp:  97 °F (36.1 °C)   SpO2:  96%   Weight: 250 lb (113.4 kg) 250 lb (113.4 kg)   Height: 5' 4\" (1.626 m) 5' 4\" (1.626 m)                                              BP Readings from Last 3 Cardiovascular:    (+) hypertension: mild,         Rhythm: regular  Rate: normal           Beta Blocker:  Not on Beta Blocker         Neuro/Psych:   (+) headaches:, psychiatric history:            GI/Hepatic/Renal:   (+) GERD: well controlled,           Endo/Other:    (+) hypothyroidism::., .                 Abdominal:           Vascular: negative vascular ROS. Anesthesia Plan      MAC     ASA 2       Induction: intravenous. Anesthetic plan and risks discussed with patient. Plan discussed with CRNA.                   Corky Morales MD   9/14/2018

## 2018-09-14 NOTE — OP NOTE
Liz Monzon    OPERATIVE NOTE    Preoperative Diagnosis: Cataract, right eye         Miotic pupil, right eye         Pseudoexfoliation syndrome    Postoperative Diagnosis: Cataract, right eye                      Miotic pupil right eye                                            Pseudoexfoliation syndrome    Procedure: Phacoemulsification with intraocular lens inplantation, right eye    Surgeon: Tamanna Colon    Anesthesia: MAC with topical    Complications: none    Specimens: none    Indications for procedure: The patient is a 70y.o. year old with decreased vision, glare and halos around lights, and trouble with activities of daily living. Examination revealed a visually significant cataract in the right eye. Risks, benefits, and alternatives to surgery were discussed with the patient and the patient elected to proceed with phacoemulsification with lens implantation. Details of the procedure: Following informed consent, the patient was taken to the operating room and placed in the supine position. The eye was prepped and draped in the usual sterile fashion using aseptic technique for cataract surgery. Following topical tetracaine drops a side port incision was made in the temporal cornea. The eye was filled with trypan and rinsed with a pharmacy prepared epi-Shugarcaine mixture. The eye was then filled with viscoelastic and a 2.4 mm keratome blade was used to make a 3-plane clear corneal incision in the temporal cornea. A 7.0 Malugyn ring was inserted to allow for improved dilation. The cystitome was used to make a tear in the anterior capsule and a Utrata forceps was used to make a complete curvilinear capsulorrhexis. The lens was hydrodissected and freely rotated. Phacoemulsification was performed. Irrigation/aspiration was used to remove all cortical material from the capsular bag.   The eye was filled with viscoelastic and a foldable posterior chamber intraocular lens was injected into the capsular bag. The Malyugn ring was removed. Irrigation/aspiration was used to remove all excess viscoelastic. The eye was pressurized and the wounds were checked for leaks and none were found. The patient had Vigamox and Alphagan solutions placed on the eye. The eye was covered with a metal shield. The patient tolerated the procedure well and was taken to the PACU in excellent condition. They will follow up with me tomorrow for postop care.     CDE: 9.04    Lens: SN60WF 20.5 D, SN [id number]    Aries Appiah

## 2018-09-14 NOTE — H&P
Update to the History and Physical    There have been no changes to the patient's past medical or surgical history since the pre-operative history and physical.  The patient has not been experiencing any new or worsening symptoms.     Lennox Hamel

## 2018-09-14 NOTE — PROGRESS NOTES
Discharged in no distress accompanied to passenger side of car with family or significant other driving car. Assessment unchanged. Patient denies pain.

## 2018-09-19 DIAGNOSIS — E03.9 ACQUIRED HYPOTHYROIDISM: ICD-10-CM

## 2018-09-19 RX ORDER — LEVOTHYROXINE SODIUM 137 UG/1
137 TABLET ORAL DAILY
Qty: 90 TABLET | Refills: 1 | Status: SHIPPED | OUTPATIENT
Start: 2018-09-19 | End: 2019-07-23 | Stop reason: SDUPTHER

## 2018-10-15 DIAGNOSIS — G25.81 RLS (RESTLESS LEGS SYNDROME): ICD-10-CM

## 2018-10-15 DIAGNOSIS — F51.01 PRIMARY INSOMNIA: ICD-10-CM

## 2018-10-15 DIAGNOSIS — F32.89 OTHER DEPRESSION: ICD-10-CM

## 2018-10-15 RX ORDER — ROPINIROLE 1 MG/1
TABLET, FILM COATED ORAL
Qty: 270 TABLET | Refills: 1 | Status: SHIPPED | OUTPATIENT
Start: 2018-10-15 | End: 2019-01-23 | Stop reason: SDUPTHER

## 2018-10-15 RX ORDER — TRAZODONE HYDROCHLORIDE 150 MG/1
TABLET ORAL
Qty: 90 TABLET | Refills: 1 | Status: SHIPPED | OUTPATIENT
Start: 2018-10-15 | End: 2019-01-23 | Stop reason: SDUPTHER

## 2018-10-15 RX ORDER — QUETIAPINE 150 MG/1
TABLET, FILM COATED, EXTENDED RELEASE ORAL
Qty: 90 TABLET | Refills: 1 | Status: SHIPPED | OUTPATIENT
Start: 2018-10-15 | End: 2019-01-23 | Stop reason: SDUPTHER

## 2018-10-15 RX ORDER — ESCITALOPRAM OXALATE 20 MG/1
TABLET ORAL
Qty: 90 TABLET | Refills: 1 | Status: SHIPPED | OUTPATIENT
Start: 2018-10-15 | End: 2019-01-23 | Stop reason: SDUPTHER

## 2018-11-14 DIAGNOSIS — E78.2 MIXED HYPERLIPIDEMIA: ICD-10-CM

## 2018-11-14 RX ORDER — LOVASTATIN 20 MG/1
TABLET ORAL
Qty: 90 TABLET | Refills: 1 | Status: SHIPPED | OUTPATIENT
Start: 2018-11-14 | End: 2019-01-23 | Stop reason: SDUPTHER

## 2019-01-21 ENCOUNTER — OFFICE VISIT (OUTPATIENT)
Dept: FAMILY MEDICINE CLINIC | Age: 69
End: 2019-01-21
Payer: MEDICARE

## 2019-01-21 VITALS
WEIGHT: 251 LBS | BODY MASS INDEX: 43.08 KG/M2 | SYSTOLIC BLOOD PRESSURE: 128 MMHG | RESPIRATION RATE: 16 BRPM | OXYGEN SATURATION: 98 % | HEART RATE: 68 BPM | DIASTOLIC BLOOD PRESSURE: 82 MMHG

## 2019-01-21 DIAGNOSIS — E03.9 ACQUIRED HYPOTHYROIDISM: ICD-10-CM

## 2019-01-21 DIAGNOSIS — F33.0 MAJOR DEPRESSIVE DISORDER, RECURRENT EPISODE, MILD (HCC): ICD-10-CM

## 2019-01-21 DIAGNOSIS — Z01.818 PRE-OP EXAM: ICD-10-CM

## 2019-01-21 DIAGNOSIS — Z23 NEED FOR INFLUENZA VACCINATION: ICD-10-CM

## 2019-01-21 DIAGNOSIS — H02.403 DROOPING EYELID, BILATERAL: ICD-10-CM

## 2019-01-21 DIAGNOSIS — I10 ESSENTIAL HYPERTENSION: ICD-10-CM

## 2019-01-21 DIAGNOSIS — G47.00 INSOMNIA, UNSPECIFIED TYPE: ICD-10-CM

## 2019-01-21 DIAGNOSIS — Z12.39 SCREENING FOR BREAST CANCER: ICD-10-CM

## 2019-01-21 PROCEDURE — G8482 FLU IMMUNIZE ORDER/ADMIN: HCPCS | Performed by: NURSE PRACTITIONER

## 2019-01-21 PROCEDURE — 90662 IIV NO PRSV INCREASED AG IM: CPT | Performed by: NURSE PRACTITIONER

## 2019-01-21 PROCEDURE — 93000 ELECTROCARDIOGRAM COMPLETE: CPT | Performed by: NURSE PRACTITIONER

## 2019-01-21 PROCEDURE — 1123F ACP DISCUSS/DSCN MKR DOCD: CPT | Performed by: NURSE PRACTITIONER

## 2019-01-21 PROCEDURE — 36415 COLL VENOUS BLD VENIPUNCTURE: CPT | Performed by: NURSE PRACTITIONER

## 2019-01-21 PROCEDURE — 99214 OFFICE O/P EST MOD 30 MIN: CPT | Performed by: NURSE PRACTITIONER

## 2019-01-21 PROCEDURE — 4040F PNEUMOC VAC/ADMIN/RCVD: CPT | Performed by: NURSE PRACTITIONER

## 2019-01-21 PROCEDURE — 1036F TOBACCO NON-USER: CPT | Performed by: NURSE PRACTITIONER

## 2019-01-21 PROCEDURE — 1090F PRES/ABSN URINE INCON ASSESS: CPT | Performed by: NURSE PRACTITIONER

## 2019-01-21 PROCEDURE — 1101F PT FALLS ASSESS-DOCD LE1/YR: CPT | Performed by: NURSE PRACTITIONER

## 2019-01-21 PROCEDURE — G8427 DOCREV CUR MEDS BY ELIG CLIN: HCPCS | Performed by: NURSE PRACTITIONER

## 2019-01-21 PROCEDURE — G0008 ADMIN INFLUENZA VIRUS VAC: HCPCS | Performed by: NURSE PRACTITIONER

## 2019-01-21 PROCEDURE — G8417 CALC BMI ABV UP PARAM F/U: HCPCS | Performed by: NURSE PRACTITIONER

## 2019-01-21 PROCEDURE — G8399 PT W/DXA RESULTS DOCUMENT: HCPCS | Performed by: NURSE PRACTITIONER

## 2019-01-21 PROCEDURE — 3017F COLORECTAL CA SCREEN DOC REV: CPT | Performed by: NURSE PRACTITIONER

## 2019-01-21 ASSESSMENT — PATIENT HEALTH QUESTIONNAIRE - PHQ9
1. LITTLE INTEREST OR PLEASURE IN DOING THINGS: 1
SUM OF ALL RESPONSES TO PHQ9 QUESTIONS 1 & 2: 2
2. FEELING DOWN, DEPRESSED OR HOPELESS: 1
SUM OF ALL RESPONSES TO PHQ QUESTIONS 1-9: 2
SUM OF ALL RESPONSES TO PHQ QUESTIONS 1-9: 2

## 2019-01-22 LAB
A/G RATIO: 1.9 (ref 1.1–2.2)
ALBUMIN SERPL-MCNC: 4.5 G/DL (ref 3.4–5)
ALP BLD-CCNC: 83 U/L (ref 40–129)
ALT SERPL-CCNC: 12 U/L (ref 10–40)
ANION GAP SERPL CALCULATED.3IONS-SCNC: 14 MMOL/L (ref 3–16)
AST SERPL-CCNC: 13 U/L (ref 15–37)
BILIRUB SERPL-MCNC: 0.4 MG/DL (ref 0–1)
BUN BLDV-MCNC: 21 MG/DL (ref 7–20)
CALCIUM SERPL-MCNC: 9.3 MG/DL (ref 8.3–10.6)
CHLORIDE BLD-SCNC: 104 MMOL/L (ref 99–110)
CO2: 27 MMOL/L (ref 21–32)
CREAT SERPL-MCNC: 1.2 MG/DL (ref 0.6–1.2)
GFR AFRICAN AMERICAN: 54
GFR NON-AFRICAN AMERICAN: 44
GLOBULIN: 2.4 G/DL
GLUCOSE BLD-MCNC: 90 MG/DL (ref 70–99)
POTASSIUM SERPL-SCNC: 4.8 MMOL/L (ref 3.5–5.1)
SODIUM BLD-SCNC: 145 MMOL/L (ref 136–145)
TOTAL PROTEIN: 6.9 G/DL (ref 6.4–8.2)
TSH REFLEX: 2.7 UIU/ML (ref 0.27–4.2)

## 2019-01-23 DIAGNOSIS — E78.2 MIXED HYPERLIPIDEMIA: ICD-10-CM

## 2019-01-23 DIAGNOSIS — G25.81 RLS (RESTLESS LEGS SYNDROME): ICD-10-CM

## 2019-01-23 DIAGNOSIS — J40 BRONCHITIS: ICD-10-CM

## 2019-01-23 DIAGNOSIS — I10 ESSENTIAL HYPERTENSION: ICD-10-CM

## 2019-01-23 DIAGNOSIS — F51.01 PRIMARY INSOMNIA: ICD-10-CM

## 2019-01-23 DIAGNOSIS — K21.9 GASTROESOPHAGEAL REFLUX DISEASE WITHOUT ESOPHAGITIS: ICD-10-CM

## 2019-01-23 DIAGNOSIS — F32.89 OTHER DEPRESSION: ICD-10-CM

## 2019-01-23 RX ORDER — OMEPRAZOLE 40 MG/1
40 CAPSULE, DELAYED RELEASE ORAL DAILY
Qty: 90 CAPSULE | Refills: 3 | Status: SHIPPED | OUTPATIENT
Start: 2019-01-23 | End: 2020-03-17

## 2019-01-23 RX ORDER — VERAPAMIL HYDROCHLORIDE 360 MG/1
360 CAPSULE, DELAYED RELEASE PELLETS ORAL NIGHTLY
Qty: 90 CAPSULE | Refills: 1 | Status: SHIPPED | OUTPATIENT
Start: 2019-01-23 | End: 2020-03-17

## 2019-01-23 RX ORDER — QUETIAPINE 150 MG/1
150 TABLET, FILM COATED, EXTENDED RELEASE ORAL DAILY
Qty: 90 TABLET | Refills: 1 | Status: SHIPPED | OUTPATIENT
Start: 2019-01-23 | End: 2019-09-03 | Stop reason: SDUPTHER

## 2019-01-23 RX ORDER — FUROSEMIDE 40 MG/1
40 TABLET ORAL DAILY
Qty: 90 TABLET | Refills: 1 | Status: SHIPPED | OUTPATIENT
Start: 2019-01-23 | End: 2020-04-13

## 2019-01-23 RX ORDER — ROPINIROLE 1 MG/1
1 TABLET, FILM COATED ORAL 3 TIMES DAILY
Qty: 270 TABLET | Refills: 1 | Status: SHIPPED | OUTPATIENT
Start: 2019-01-23 | End: 2019-09-03 | Stop reason: SDUPTHER

## 2019-01-23 RX ORDER — ALBUTEROL SULFATE 90 UG/1
2 AEROSOL, METERED RESPIRATORY (INHALATION) EVERY 6 HOURS PRN
Qty: 3 INHALER | Refills: 3 | Status: SHIPPED | OUTPATIENT
Start: 2019-01-23 | End: 2021-09-13 | Stop reason: SDUPTHER

## 2019-01-23 RX ORDER — TRAZODONE HYDROCHLORIDE 150 MG/1
150 TABLET ORAL NIGHTLY
Qty: 90 TABLET | Refills: 1 | Status: SHIPPED | OUTPATIENT
Start: 2019-01-23 | End: 2019-09-03 | Stop reason: SDUPTHER

## 2019-01-23 RX ORDER — LOVASTATIN 20 MG/1
20 TABLET ORAL NIGHTLY
Qty: 90 TABLET | Refills: 1 | Status: SHIPPED | OUTPATIENT
Start: 2019-01-23 | End: 2019-07-23 | Stop reason: SDUPTHER

## 2019-01-23 RX ORDER — ESCITALOPRAM OXALATE 20 MG/1
20 TABLET ORAL DAILY
Qty: 90 TABLET | Refills: 1 | Status: SHIPPED | OUTPATIENT
Start: 2019-01-23 | End: 2019-04-15 | Stop reason: SDUPTHER

## 2019-03-04 ENCOUNTER — OFFICE VISIT (OUTPATIENT)
Dept: ORTHOPEDIC SURGERY | Age: 69
End: 2019-03-04
Payer: MEDICARE

## 2019-03-04 VITALS
HEIGHT: 64 IN | BODY MASS INDEX: 42.87 KG/M2 | DIASTOLIC BLOOD PRESSURE: 45 MMHG | HEART RATE: 82 BPM | SYSTOLIC BLOOD PRESSURE: 101 MMHG | WEIGHT: 251.1 LBS

## 2019-03-04 DIAGNOSIS — M25.562 LEFT KNEE PAIN, UNSPECIFIED CHRONICITY: Primary | ICD-10-CM

## 2019-03-04 DIAGNOSIS — M25.561 RIGHT KNEE PAIN, UNSPECIFIED CHRONICITY: ICD-10-CM

## 2019-03-04 DIAGNOSIS — M17.0 PRIMARY OSTEOARTHRITIS OF BOTH KNEES: ICD-10-CM

## 2019-03-04 PROCEDURE — G8399 PT W/DXA RESULTS DOCUMENT: HCPCS | Performed by: ORTHOPAEDIC SURGERY

## 2019-03-04 PROCEDURE — 1123F ACP DISCUSS/DSCN MKR DOCD: CPT | Performed by: ORTHOPAEDIC SURGERY

## 2019-03-04 PROCEDURE — 1090F PRES/ABSN URINE INCON ASSESS: CPT | Performed by: ORTHOPAEDIC SURGERY

## 2019-03-04 PROCEDURE — G8428 CUR MEDS NOT DOCUMENT: HCPCS | Performed by: ORTHOPAEDIC SURGERY

## 2019-03-04 PROCEDURE — 1101F PT FALLS ASSESS-DOCD LE1/YR: CPT | Performed by: ORTHOPAEDIC SURGERY

## 2019-03-04 PROCEDURE — G8482 FLU IMMUNIZE ORDER/ADMIN: HCPCS | Performed by: ORTHOPAEDIC SURGERY

## 2019-03-04 PROCEDURE — 1036F TOBACCO NON-USER: CPT | Performed by: ORTHOPAEDIC SURGERY

## 2019-03-04 PROCEDURE — 99214 OFFICE O/P EST MOD 30 MIN: CPT | Performed by: ORTHOPAEDIC SURGERY

## 2019-03-04 PROCEDURE — G8417 CALC BMI ABV UP PARAM F/U: HCPCS | Performed by: ORTHOPAEDIC SURGERY

## 2019-03-04 PROCEDURE — 3017F COLORECTAL CA SCREEN DOC REV: CPT | Performed by: ORTHOPAEDIC SURGERY

## 2019-03-04 PROCEDURE — 20610 DRAIN/INJ JOINT/BURSA W/O US: CPT | Performed by: ORTHOPAEDIC SURGERY

## 2019-03-04 PROCEDURE — 4040F PNEUMOC VAC/ADMIN/RCVD: CPT | Performed by: ORTHOPAEDIC SURGERY

## 2019-03-07 ENCOUNTER — TELEPHONE (OUTPATIENT)
Dept: ORTHOPEDIC SURGERY | Age: 69
End: 2019-03-07

## 2019-03-07 ENCOUNTER — OFFICE VISIT (OUTPATIENT)
Dept: FAMILY MEDICINE CLINIC | Age: 69
End: 2019-03-07
Payer: MEDICARE

## 2019-03-07 VITALS
HEIGHT: 64 IN | HEART RATE: 75 BPM | TEMPERATURE: 99.6 F | WEIGHT: 248 LBS | SYSTOLIC BLOOD PRESSURE: 132 MMHG | DIASTOLIC BLOOD PRESSURE: 80 MMHG | BODY MASS INDEX: 42.34 KG/M2 | OXYGEN SATURATION: 97 %

## 2019-03-07 DIAGNOSIS — R05.9 COUGH: ICD-10-CM

## 2019-03-07 DIAGNOSIS — R06.2 WHEEZING: ICD-10-CM

## 2019-03-07 DIAGNOSIS — R50.9 FEVER, UNSPECIFIED FEVER CAUSE: ICD-10-CM

## 2019-03-07 DIAGNOSIS — J40 BRONCHITIS: ICD-10-CM

## 2019-03-07 DIAGNOSIS — R68.89 FLU-LIKE SYMPTOMS: Primary | ICD-10-CM

## 2019-03-07 PROCEDURE — 99213 OFFICE O/P EST LOW 20 MIN: CPT | Performed by: NURSE PRACTITIONER

## 2019-03-07 PROCEDURE — G8482 FLU IMMUNIZE ORDER/ADMIN: HCPCS | Performed by: NURSE PRACTITIONER

## 2019-03-07 PROCEDURE — 4040F PNEUMOC VAC/ADMIN/RCVD: CPT | Performed by: NURSE PRACTITIONER

## 2019-03-07 PROCEDURE — 1090F PRES/ABSN URINE INCON ASSESS: CPT | Performed by: NURSE PRACTITIONER

## 2019-03-07 PROCEDURE — 3017F COLORECTAL CA SCREEN DOC REV: CPT | Performed by: NURSE PRACTITIONER

## 2019-03-07 PROCEDURE — 94640 AIRWAY INHALATION TREATMENT: CPT | Performed by: NURSE PRACTITIONER

## 2019-03-07 PROCEDURE — 1123F ACP DISCUSS/DSCN MKR DOCD: CPT | Performed by: NURSE PRACTITIONER

## 2019-03-07 PROCEDURE — 87804 INFLUENZA ASSAY W/OPTIC: CPT | Performed by: NURSE PRACTITIONER

## 2019-03-07 PROCEDURE — 1101F PT FALLS ASSESS-DOCD LE1/YR: CPT | Performed by: NURSE PRACTITIONER

## 2019-03-07 PROCEDURE — G8417 CALC BMI ABV UP PARAM F/U: HCPCS | Performed by: NURSE PRACTITIONER

## 2019-03-07 PROCEDURE — G8399 PT W/DXA RESULTS DOCUMENT: HCPCS | Performed by: NURSE PRACTITIONER

## 2019-03-07 PROCEDURE — G8427 DOCREV CUR MEDS BY ELIG CLIN: HCPCS | Performed by: NURSE PRACTITIONER

## 2019-03-07 PROCEDURE — 1036F TOBACCO NON-USER: CPT | Performed by: NURSE PRACTITIONER

## 2019-03-07 RX ORDER — BENZONATATE 100 MG/1
100 CAPSULE ORAL 3 TIMES DAILY PRN
Qty: 30 CAPSULE | Refills: 0 | Status: SHIPPED | OUTPATIENT
Start: 2019-03-07 | End: 2021-07-06 | Stop reason: CLARIF

## 2019-03-07 RX ORDER — IPRATROPIUM BROMIDE AND ALBUTEROL SULFATE 2.5; .5 MG/3ML; MG/3ML
1 SOLUTION RESPIRATORY (INHALATION) ONCE
Status: COMPLETED | OUTPATIENT
Start: 2019-03-07 | End: 2019-03-07

## 2019-03-07 RX ORDER — METHYLPREDNISOLONE 4 MG/1
TABLET ORAL
Qty: 1 KIT | Refills: 0 | Status: SHIPPED | OUTPATIENT
Start: 2019-03-07 | End: 2021-09-13 | Stop reason: SDUPTHER

## 2019-03-07 RX ORDER — DOXYCYCLINE HYCLATE 100 MG/1
100 CAPSULE ORAL 2 TIMES DAILY
Qty: 14 CAPSULE | Refills: 0 | Status: SHIPPED | OUTPATIENT
Start: 2019-03-07 | End: 2021-09-13 | Stop reason: SDUPTHER

## 2019-03-07 RX ADMIN — IPRATROPIUM BROMIDE AND ALBUTEROL SULFATE 1 AMPULE: 2.5; .5 SOLUTION RESPIRATORY (INHALATION) at 20:25

## 2019-03-07 ASSESSMENT — ENCOUNTER SYMPTOMS
WHEEZING: 1
RHINORRHEA: 1
COUGH: 1
CHEST TIGHTNESS: 1
SINUS PRESSURE: 1
NAUSEA: 0
VOMITING: 0

## 2019-03-21 ENCOUNTER — TELEPHONE (OUTPATIENT)
Dept: FAMILY MEDICINE CLINIC | Age: 69
End: 2019-03-21

## 2019-03-21 RX ORDER — SULFAMETHOXAZOLE AND TRIMETHOPRIM 800; 160 MG/1; MG/1
1 TABLET ORAL 2 TIMES DAILY
Qty: 20 TABLET | Refills: 0 | Status: SHIPPED | OUTPATIENT
Start: 2019-03-21 | End: 2019-03-31

## 2019-03-27 ENCOUNTER — TELEPHONE (OUTPATIENT)
Dept: ORTHOPEDIC SURGERY | Age: 69
End: 2019-03-27

## 2019-04-08 ENCOUNTER — OFFICE VISIT (OUTPATIENT)
Dept: ORTHOPEDIC SURGERY | Age: 69
End: 2019-04-08
Payer: MEDICARE

## 2019-04-08 DIAGNOSIS — M17.0 PRIMARY OSTEOARTHRITIS OF BOTH KNEES: Primary | ICD-10-CM

## 2019-04-08 PROCEDURE — 20610 DRAIN/INJ JOINT/BURSA W/O US: CPT | Performed by: PHYSICIAN ASSISTANT

## 2019-04-08 NOTE — PROGRESS NOTES
Chief Complaint   Patient presents with    Knee Pain     #1 EUFLEXXA RACHANA KNEES     The patient is symptomatic from osteoarthritis of the right and left knee joint with documented radiological signs of osteoarthritis. The patient has also failed 3 months of conservative treatment including home exercise, education, Tylenol and/or NSAIDs use. The patient was offered a Visco supplementation today. Risks, benefits, and alternatives to the injections were discussed in detail with the patient. The risks discussed included but are not limited to infection, skin reactions, hot swollen joints, and anaphylaxis. The patient gave verbal informed consent for the injection. The patient's skin was prepped with  3 sterile gauze  pads soaked with alcohol solution and the knee joint was injected with 2cc Euflexxa intra-articularly under sterile conditions. Technique: Under sterile conditions a [employer] ultrasound unit with a variable frequency (6.0-15.0 MHz) linear transducer was used to localize the placement of a 22-gauge needle into the right and left knee joint. Findings: Successful needle placement for intra-articular Visco supplementation injection. Final images were taken and saved for permanent record. The patient tolerated the injection reasonably well. The patient was given instructions to ice the kne and avoid strenuous activities for 24-48 hours. The patient was instructed to call the office immediately if there is increased pain, redness, warmth, fever, or chills. We will see the patient back in one week for their second injection. [facility]    This dictation was performed with a verbal recognition program [facility]) and it was checked for errors. It is possible that there are still dictated errors within this office note. If so, please bring any errors to my attention for an addendum. All efforts were made to ensure that this office note is accurate.

## 2019-04-08 NOTE — PROGRESS NOTES
SITE: #1 EUFLEXXA RIGHT & LEFT KNEE  [facility]:[id number]  [facility]#:[id number]  EXP:3/2020

## 2019-04-15 ENCOUNTER — OFFICE VISIT (OUTPATIENT)
Dept: ORTHOPEDIC SURGERY | Age: 69
End: 2019-04-15
Payer: MEDICARE

## 2019-04-15 VITALS — BODY MASS INDEX: 42.34 KG/M2 | WEIGHT: 248.02 LBS | HEIGHT: 64 IN

## 2019-04-15 DIAGNOSIS — M17.0 PRIMARY OSTEOARTHRITIS OF BOTH KNEES: Primary | ICD-10-CM

## 2019-04-15 PROCEDURE — 99999 PR OFFICE/OUTPT VISIT,PROCEDURE ONLY: CPT | Performed by: ORTHOPAEDIC SURGERY

## 2019-04-15 PROCEDURE — 20611 DRAIN/INJ JOINT/BURSA W/US: CPT | Performed by: ORTHOPAEDIC SURGERY

## 2019-04-15 NOTE — PROGRESS NOTES
Chief Complaint   Patient presents with    Follow-up     Euflexxa #2 Erwin Knees     Visco supplementation injection toThe patient returns today for their second right and left knee injection for osteoarthritis. The risks, benefits, and complications of the injections were again discussed in detail with the patient. The risks discussed included but are not limited to infection, skin reactions, hot swollen joints, and anaphylaxis. The patient gave verbal informed consent for the injection. The patient skin was prepped with 3 sterile gauze pads soaked with alcohol solution and the knee joint was injected with 2cc Euflexxa intra-articularly under sterile conditions . Technique: Under sterile conditions a [employer] ultrasound unit with a variable frequency (6.0-15.0 MHz) linear transducer was used to localize the placement of a 22-gauge needle into the right and left knee joint. Findings: Successful needle placement for intra-articular Visco supplementation injection. Final images were taken and saved for permanent record. The patient tolerated the injection reasonably well. The patient was given instructions to ice the the knee and avoid strenuous activities for 24-48 hours. The patient was instructed to call the office immediately if there is increased pain, redness, warmth, fever, or chills. We will see the patient back in one week for the third injection. Gisella Covington, [facility]    This dictation was performed with a verbal recognition program [facility]) and it was checked for errors. It is possible that there are still dictated errors within this office note. If so, please bring any errors to my attention for an addendum. All efforts were made to ensure that this office note is accurate.

## 2019-04-16 RX ORDER — ESCITALOPRAM OXALATE 20 MG/1
20 TABLET ORAL DAILY
Qty: 90 TABLET | Refills: 1 | Status: SHIPPED | OUTPATIENT
Start: 2019-04-16 | End: 2019-07-23 | Stop reason: SDUPTHER

## 2019-04-29 ENCOUNTER — OFFICE VISIT (OUTPATIENT)
Dept: ORTHOPEDIC SURGERY | Age: 69
End: 2019-04-29
Payer: MEDICARE

## 2019-04-29 DIAGNOSIS — M17.0 PRIMARY OSTEOARTHRITIS OF BOTH KNEES: Primary | ICD-10-CM

## 2019-04-29 PROCEDURE — 99999 PR OFFICE/OUTPT VISIT,PROCEDURE ONLY: CPT | Performed by: PHYSICIAN ASSISTANT

## 2019-04-29 PROCEDURE — 20610 DRAIN/INJ JOINT/BURSA W/O US: CPT | Performed by: ORTHOPAEDIC SURGERY

## 2019-04-29 NOTE — PROGRESS NOTES
Euflexxa # 3 Right and LEft knee  Diagnosis: Osteoarthritis Right and LEFT knee        The patient returns today for their third and final injection for the treatment of Right and LEFT  knee osteoarthritis. The risks, benefits, and complications of the injections were again discussed in detail with the patient. The risks discussed include but are not limited to infection, skin reactions, hot swollen joints, and anaphylaxis. The patient gave verbal informed consent for the injection. The patient's skin was prepped with 3 sterile gauze pads soaked with alcohol solution and the knee joint was injected with 2 mL Euflexxa RIGHT and LEFT intra-articularly under sterile conditions. Technique: Under sterile conditions a [employer] ultrasound unit with a variable frequency (6.0-15.0 MHz) linear transducer was used to localize the placement of a 22-gauge needle into the knee joint. Findings: Successful needle placement for intra-articular Visco supplementation injection. Final images were taken and saved for permanent record. The patient tolerated the injection reasonably well. The patient was given instructions to ice of the knee and avoid strenuous activity for 24-48 hours. The patient was instructed to call the office immediately if there is increased pain, redness, warmth, fever, or chills. We will see the patient back on an as-needed basis  from this point.

## 2019-05-21 ENCOUNTER — OFFICE VISIT (OUTPATIENT)
Dept: ORTHOPEDIC SURGERY | Age: 69
End: 2019-05-21
Payer: MEDICARE

## 2019-05-21 VITALS
BODY MASS INDEX: 42.34 KG/M2 | HEIGHT: 64 IN | SYSTOLIC BLOOD PRESSURE: 129 MMHG | WEIGHT: 248.02 LBS | HEART RATE: 61 BPM | DIASTOLIC BLOOD PRESSURE: 79 MMHG

## 2019-05-21 DIAGNOSIS — M79.671 PAIN OF RIGHT HEEL: ICD-10-CM

## 2019-05-21 DIAGNOSIS — M19.071 PRIMARY OSTEOARTHRITIS OF RIGHT FOOT: ICD-10-CM

## 2019-05-21 DIAGNOSIS — M79.672 PAIN OF LEFT HEEL: ICD-10-CM

## 2019-05-21 DIAGNOSIS — M77.8 CAPSULITIS OF FOOT, LEFT: ICD-10-CM

## 2019-05-21 DIAGNOSIS — M79.671 FOOT PAIN, RIGHT: Primary | ICD-10-CM

## 2019-05-21 DIAGNOSIS — M72.2 PLANTAR FASCIITIS, BILATERAL: ICD-10-CM

## 2019-05-21 DIAGNOSIS — M79.672 FOOT PAIN, LEFT: ICD-10-CM

## 2019-05-21 PROCEDURE — L3040 FT ARCH SUPRT PREMOLD LONGIT: HCPCS | Performed by: PODIATRIST

## 2019-05-21 PROCEDURE — 1090F PRES/ABSN URINE INCON ASSESS: CPT | Performed by: PODIATRIST

## 2019-05-21 PROCEDURE — 4040F PNEUMOC VAC/ADMIN/RCVD: CPT | Performed by: PODIATRIST

## 2019-05-21 PROCEDURE — G8399 PT W/DXA RESULTS DOCUMENT: HCPCS | Performed by: PODIATRIST

## 2019-05-21 PROCEDURE — 3017F COLORECTAL CA SCREEN DOC REV: CPT | Performed by: PODIATRIST

## 2019-05-21 PROCEDURE — 1036F TOBACCO NON-USER: CPT | Performed by: PODIATRIST

## 2019-05-21 PROCEDURE — G8417 CALC BMI ABV UP PARAM F/U: HCPCS | Performed by: PODIATRIST

## 2019-05-21 PROCEDURE — 1123F ACP DISCUSS/DSCN MKR DOCD: CPT | Performed by: PODIATRIST

## 2019-05-21 PROCEDURE — L3260 AMBULATORY SURGICAL BOOT EAC: HCPCS | Performed by: PODIATRIST

## 2019-05-21 PROCEDURE — G8427 DOCREV CUR MEDS BY ELIG CLIN: HCPCS | Performed by: PODIATRIST

## 2019-05-21 PROCEDURE — 99203 OFFICE O/P NEW LOW 30 MIN: CPT | Performed by: PODIATRIST

## 2019-05-21 RX ORDER — PREDNISONE 10 MG/1
TABLET ORAL
Qty: 26 TABLET | Refills: 0 | Status: ON HOLD | OUTPATIENT
Start: 2019-05-21 | End: 2020-02-02 | Stop reason: HOSPADM

## 2019-05-21 NOTE — PROGRESS NOTES
Verbal and written instructions for the use of and application of this item were provided. They were instructed to contact the office immediately should the brace result in increased pain, decreased sensation, increased swelling or worsening of the condition.  Powerstep Protech Full Length Insert     Patient was prescribed Powerstep Protech Full Length Inserts. The bilateral foot will require stabilization / support from this semi-rigid / rigid orthosis to improve their function. The orthosis will assist in protecting the affected area, provide functional support and facilitate healing. The patient was educated and fit by a healthcare professional with expert knowledge and specialization in brace application while under the direct supervision of the treating physician. Verbal and written instructions for the use of and application of this item were provided. They were instructed to contact the office immediately should the brace result in increased pain, decreased sensation, increased swelling or worsening of the condition.

## 2019-06-18 ENCOUNTER — OFFICE VISIT (OUTPATIENT)
Dept: ORTHOPEDIC SURGERY | Age: 69
End: 2019-06-18
Payer: MEDICARE

## 2019-06-18 VITALS
WEIGHT: 248.02 LBS | SYSTOLIC BLOOD PRESSURE: 132 MMHG | HEIGHT: 64 IN | HEART RATE: 82 BPM | DIASTOLIC BLOOD PRESSURE: 72 MMHG | BODY MASS INDEX: 42.34 KG/M2

## 2019-06-18 DIAGNOSIS — M19.071 PRIMARY OSTEOARTHRITIS OF RIGHT FOOT: Primary | ICD-10-CM

## 2019-06-18 PROCEDURE — G8399 PT W/DXA RESULTS DOCUMENT: HCPCS | Performed by: PODIATRIST

## 2019-06-18 PROCEDURE — 1123F ACP DISCUSS/DSCN MKR DOCD: CPT | Performed by: PODIATRIST

## 2019-06-18 PROCEDURE — 99213 OFFICE O/P EST LOW 20 MIN: CPT | Performed by: PODIATRIST

## 2019-06-18 PROCEDURE — 4040F PNEUMOC VAC/ADMIN/RCVD: CPT | Performed by: PODIATRIST

## 2019-06-18 PROCEDURE — G8428 CUR MEDS NOT DOCUMENT: HCPCS | Performed by: PODIATRIST

## 2019-06-18 PROCEDURE — 1036F TOBACCO NON-USER: CPT | Performed by: PODIATRIST

## 2019-06-18 PROCEDURE — G8417 CALC BMI ABV UP PARAM F/U: HCPCS | Performed by: PODIATRIST

## 2019-06-18 PROCEDURE — 1090F PRES/ABSN URINE INCON ASSESS: CPT | Performed by: PODIATRIST

## 2019-06-18 PROCEDURE — 3017F COLORECTAL CA SCREEN DOC REV: CPT | Performed by: PODIATRIST

## 2019-06-18 NOTE — PROGRESS NOTES
HISTORY OF PRESENT ILLNESS: This is a followup for midfoot synovitis and tenosynovitis of the right foot. The patient states that the pain is overall not improved. She did much better while she was taking the prednisone but once it finished, she began having the same pain. PHYSICAL EXAM: The majority of her palpable tenderness remains over the dorsal central aspect of the right midfoot. There is very mild edema present. There is no erythema or ecchymosis present. The patient has palpable pedal pulses bilateral.  The sensation is grossly intact bilateral.      ASSESSMENT:  Midfoot synovitis and osteoarthritis , right foot. PLAN:I ordered a MRI to evaluate the midfoot further. In the meantime she will return to the postop shoe. A new temporary arch support was applied to the right foot. I will see her back after the MRI.

## 2019-06-19 ENCOUNTER — TELEPHONE (OUTPATIENT)
Dept: ORTHOPEDIC SURGERY | Age: 69
End: 2019-06-19

## 2019-07-05 ENCOUNTER — HOSPITAL ENCOUNTER (OUTPATIENT)
Dept: MRI IMAGING | Age: 69
Discharge: HOME | End: 2019-07-05
Payer: MEDICARE

## 2019-07-05 PROCEDURE — 73718 MRI LOWER EXTREMITY W/O DYE: CPT

## 2019-07-23 DIAGNOSIS — E03.9 ACQUIRED HYPOTHYROIDISM: ICD-10-CM

## 2019-07-23 DIAGNOSIS — E78.2 MIXED HYPERLIPIDEMIA: ICD-10-CM

## 2019-07-24 RX ORDER — LEVOTHYROXINE SODIUM 137 UG/1
TABLET ORAL
Qty: 90 TABLET | Refills: 1 | Status: SHIPPED | OUTPATIENT
Start: 2019-07-24 | End: 2019-12-31

## 2019-07-24 RX ORDER — LOVASTATIN 20 MG/1
TABLET ORAL
Qty: 90 TABLET | Refills: 1 | Status: SHIPPED | OUTPATIENT
Start: 2019-07-24 | End: 2019-12-31

## 2019-07-24 RX ORDER — ESCITALOPRAM OXALATE 20 MG/1
TABLET ORAL
Qty: 90 TABLET | Refills: 1 | Status: SHIPPED | OUTPATIENT
Start: 2019-07-24 | End: 2019-12-31

## 2019-08-30 ENCOUNTER — HOSPITAL ENCOUNTER (EMERGENCY)
Age: 69
Discharge: HOME | End: 2019-08-30
Payer: MEDICARE

## 2019-08-30 ENCOUNTER — APPOINTMENT (OUTPATIENT)
Dept: CT IMAGING | Age: 69
End: 2019-08-30
Payer: MEDICARE

## 2019-08-30 ENCOUNTER — APPOINTMENT (OUTPATIENT)
Dept: GENERAL RADIOLOGY | Age: 69
End: 2019-08-30
Payer: MEDICARE

## 2019-08-30 VITALS
WEIGHT: 240 LBS | TEMPERATURE: 98 F | OXYGEN SATURATION: 97 % | HEIGHT: 64 IN | DIASTOLIC BLOOD PRESSURE: 65 MMHG | RESPIRATION RATE: 14 BRPM | HEART RATE: 80 BPM | BODY MASS INDEX: 40.97 KG/M2 | SYSTOLIC BLOOD PRESSURE: 119 MMHG

## 2019-08-30 DIAGNOSIS — S32.040A COMPRESSION FRACTURE OF L4 LUMBAR VERTEBRA, CLOSED, INITIAL ENCOUNTER (HCC): Primary | ICD-10-CM

## 2019-08-30 DIAGNOSIS — M54.50 ACUTE MIDLINE LOW BACK PAIN WITHOUT SCIATICA: ICD-10-CM

## 2019-08-30 PROCEDURE — 72100 X-RAY EXAM L-S SPINE 2/3 VWS: CPT

## 2019-08-30 PROCEDURE — 6370000000 HC RX 637 (ALT 250 FOR IP): Performed by: NURSE PRACTITIONER

## 2019-08-30 PROCEDURE — 72131 CT LUMBAR SPINE W/O DYE: CPT

## 2019-08-30 PROCEDURE — 99283 EMERGENCY DEPT VISIT LOW MDM: CPT

## 2019-08-30 RX ORDER — LIDOCAINE 4 G/G
1 PATCH TOPICAL ONCE
Status: DISCONTINUED | OUTPATIENT
Start: 2019-08-30 | End: 2019-08-30 | Stop reason: HOSPADM

## 2019-08-30 RX ORDER — OXYCODONE HYDROCHLORIDE AND ACETAMINOPHEN 5; 325 MG/1; MG/1
1 TABLET ORAL ONCE
Status: COMPLETED | OUTPATIENT
Start: 2019-08-30 | End: 2019-08-30

## 2019-08-30 RX ORDER — PREDNISONE 20 MG/1
60 TABLET ORAL ONCE
Status: COMPLETED | OUTPATIENT
Start: 2019-08-30 | End: 2019-08-30

## 2019-08-30 RX ORDER — OXYCODONE HYDROCHLORIDE AND ACETAMINOPHEN 5; 325 MG/1; MG/1
1-2 TABLET ORAL EVERY 6 HOURS PRN
Qty: 20 TABLET | Refills: 0 | Status: SHIPPED | OUTPATIENT
Start: 2019-08-30 | End: 2019-09-04 | Stop reason: SDUPTHER

## 2019-08-30 RX ORDER — CYCLOBENZAPRINE HCL 10 MG
10 TABLET ORAL ONCE
Status: COMPLETED | OUTPATIENT
Start: 2019-08-30 | End: 2019-08-30

## 2019-08-30 RX ORDER — CYCLOBENZAPRINE HCL 10 MG
10 TABLET ORAL 3 TIMES DAILY PRN
Qty: 21 TABLET | Refills: 0 | Status: SHIPPED | OUTPATIENT
Start: 2019-08-30 | End: 2019-09-09

## 2019-08-30 RX ADMIN — OXYCODONE HYDROCHLORIDE AND ACETAMINOPHEN 1 TABLET: 5; 325 TABLET ORAL at 17:34

## 2019-08-30 RX ADMIN — OXYCODONE HYDROCHLORIDE AND ACETAMINOPHEN 1 TABLET: 5; 325 TABLET ORAL at 19:41

## 2019-08-30 RX ADMIN — PREDNISONE 60 MG: 20 TABLET ORAL at 17:34

## 2019-08-30 RX ADMIN — CYCLOBENZAPRINE HYDROCHLORIDE 10 MG: 10 TABLET, FILM COATED ORAL at 17:34

## 2019-08-30 ASSESSMENT — PAIN DESCRIPTION - LOCATION: LOCATION: BACK

## 2019-08-30 ASSESSMENT — PAIN DESCRIPTION - PROGRESSION: CLINICAL_PROGRESSION: NOT CHANGED

## 2019-08-30 ASSESSMENT — PAIN DESCRIPTION - ONSET: ONSET: ON-GOING

## 2019-08-30 ASSESSMENT — PAIN SCALES - GENERAL
PAINLEVEL_OUTOF10: 7
PAINLEVEL_OUTOF10: 7
PAINLEVEL_OUTOF10: 10
PAINLEVEL_OUTOF10: 8

## 2019-08-30 ASSESSMENT — PAIN DESCRIPTION - FREQUENCY: FREQUENCY: CONTINUOUS

## 2019-08-30 ASSESSMENT — PAIN DESCRIPTION - DESCRIPTORS: DESCRIPTORS: ACHING

## 2019-08-30 ASSESSMENT — PAIN DESCRIPTION - PAIN TYPE: TYPE: ACUTE PAIN

## 2019-08-30 NOTE — ED PROVIDER NOTES
Evaluated by Advanced Practice Provider    [address] [facility]  ED    CHIEF COMPLAINT  Back Pain (10 days ago patient went to mow the grass and lifted the gas can and felt a pop in lower back patient states pain getting worse)    HISTORY OF PRESENT ILLNESS  Johnathan Anderson is a 78 y.o. female who presents to the ED complaining of low back pain. The patient states this pain began about 1 week ago, reports it is starting to radiate up the back. Reports that her left sciatic nerve is \"garcia\" acting up. She was lifting a 5 gallon gas can and felt a pop. Denies direct injury/trauma as a precipitating factor to the pain. Denies numbness or tingling into either leg. Denies saddle anesthesia. Denies abdominal pain. Denies bowel or bladder dysfunction/urinary retention, fecal incontinence. The patient does not have a history of back pain. Denies history of recent invasive procedures to the back. Denies ETOH use. Denies diabetes. Denies IVDU. The patient is currently rating their pain as 8/10 and describes it as an aching type of pain. Treatments tried prior to arrival in the ED: tramadol-husbands-not helping, ibuprofen and aleve, chiropractor-popped her back and at first it was wonderful but next morning hurting worse. The patient denies other complaints, modifying factors or associated symptoms. The patient arrived to the ED via private car. Patient is accompanied by family who is/are bedside for the visit. Nursing notes reviewed.    Past Medical History:   Diagnosis Date    Arthritis     Bipolar disorder ([facility] [address]     questionable dx    Cellulitis     L ankle, recurrent; over area of prior surgeries    Chronic back pain     Closed fracture of left fibula 1995    s/p fall; surgery x 6    Closed fracture of left tibia 1995    s/p fall on ice; hx of surgery x 6    Colon polyps     Depression     Fibroid (bleeding) (uterine) 1982    Fibromyalgia     GERD (gastroesophageal reflux disease)     Hyperlipidemia     Hypertension     Myoview Lexiscan WNL on 5/17/12    Hypothyroidism     Migraine     Miscarriage     Morbid obesity ([address]     Neuropathy 2011    seen initially by neuro., Dr. Boom Hart, on 8/11/10; EMG 7/16/10 showed abn.'s c/w L5 radiculopathy & periph. neuropathy; pt. reports sx in hands and feet ; dx'd by rheum.      MAXIMO (obstructive sleep apnea) 06/07/2012    mild-does not require appliance    RLS (restless legs syndrome)     Urinary incontinence     Urine incontinence     saw urology, Dr. Lisa Fuller, on 7/21/11 for microscopic hematuria & hematuria; was started on Vesicare & scheduled for cystoscopy    Vitamin D deficiency 2/27/12    22 ng/mL     Past Surgical History:   Procedure Laterality Date    BARIATRIC SURGERY  11/14/12    Laparoscopic Sleeve Gastrectomy, Lap Hiatal hernia repair    CHOLECYSTECTOMY  1985    COLONOSCOPY  2010     colon polyps; Dr. Shikha Lechuga Left 08/31/2018    phaco with IOL    HIATAL HERNIA REPAIR      lap    HYSTERECTOMY  1982    TAHBSO for fibroids and DUB    LEG SURGERY  1995    L; s/p tibia and fibular fx's; has uma in L tibia; surgery x 6    MS REMV CATARACT EXTRACAP,INSERT LENS Left 8/31/2018    PHACOEMULSIFICATION OF CATARACT LEFT EYE WITH INTRAOCULAR LENS IMPLANT performed by Chanel Davis MD at [address] Right 9/14/2018    PHACOEMULSIFICATION OF CATARACT RIGHT  EYE WITH INTRAOCULAR LENS IMPLANT performed by Chanel Davis MD at [address]  2010    Dr. Jg Hurtado UPPER GASTROINTESTINAL ENDOSCOPY  4-27-12    WNL     Family History   Problem Relation Age of Onset    Arthritis Mother     Asthma Mother     Diabetes Mother     High Blood Pressure Mother     Depression Mother     Heart Disease Mother 48        CHF    High Cholesterol Mother     Stroke Mother     Mental Illness Mother     Arthritis Father     Cancer Father 36        colon    Transdermal Once SANDOR Norris - CNP   1 patch at 08/30/19 4298     Current Outpatient Medications   Medication Sig Dispense Refill    oxyCODONE-acetaminophen (PERCOCET) 5-325 MG per tablet Take 1-2 tablets by mouth every 6 hours as needed for Pain for up to 3 days. WARNING:  May cause drowsiness. May impair ability to operate vehicles or machinery. Do not use in combination with alcohol. 20 tablet 0    cyclobenzaprine (FLEXERIL) 10 MG tablet Take 1 tablet by mouth 3 times daily as needed for Muscle spasms 21 tablet 0    diclofenac sodium 1 % GEL APPLY 4 GRAMS 4 TIMES DAILY 500 g 0    levothyroxine (SYNTHROID) 137 MCG tablet TAKE 1 TABLET EVERY DAY 90 tablet 1    lovastatin (MEVACOR) 20 MG tablet TAKE 1 TABLET EVERY NIGHT 90 tablet 1    escitalopram (LEXAPRO) 20 MG tablet TAKE 1 TABLET EVERY DAY 90 tablet 1    predniSONE (DELTASONE) 10 MG tablet 3 po bid x 2 days, then 2 po bid x 2 days, then 1 po bid x 2 days, then 1 po qd x 2 days 26 tablet 0    benzonatate (TESSALON PERLES) 100 MG capsule Take 1 capsule by mouth 3 times daily as needed for Cough 30 capsule 0    albuterol sulfate HFA (PROVENTIL HFA) 108 (90 Base) MCG/ACT inhaler Inhale 2 puffs into the lungs every 6 hours as needed for Wheezing 3 Inhaler 3    furosemide (LASIX) 40 MG tablet Take 1 tablet by mouth daily 90 tablet 1    traZODone (DESYREL) 150 MG tablet Take 1 tablet by mouth nightly 90 tablet 1    rOPINIRole (REQUIP) 1 MG tablet Take 1 tablet by mouth 3 times daily 270 tablet 1    QUEtiapine (SEROQUEL XR) 150 MG TB24 extended release tablet Take 1 tablet by mouth daily 90 tablet 1    omeprazole (PRILOSEC) 40 MG delayed release capsule Take 1 capsule by mouth daily 90 capsule 3    verapamil (VERELAN) 360 MG extended release capsule Take 1 capsule by mouth nightly 90 capsule 1    gabapentin (NEURONTIN) 600 MG tablet Take 600 mg by mouth 2 times daily. Queenie Chick aspirin 81 MG EC tablet Take 81 mg by mouth daily.  Indications: OTC No Known Allergies    REVIEW OF SYSTEMS    10 systems reviewed, pertinent positives per HPI otherwise noted to be negative      PHYSICAL EXAM  /65   Pulse 80   Temp 98 °F (36.7 °C) (Oral)   Resp 14   Ht 5' 4\" (1.626 m)   Wt 240 lb (108.9 kg)   SpO2 97%   BMI 41.20 kg/m²   GENERAL APPEARANCE: Patient is well-developed, well-nourished. Awake and alert. No apparent distress. HEENT: Normocephalic, atraumatic. Conjunctiva appear normal. Sclera is non-icteric. External ears are normal.  Mucous membranes moist.  EYES: EOM's grossly intact. NECK: Supple with normal ROM. Trachea midline   CARDIOVASCULAR:  Regular rate and rhythm. Brisk capillary refill. LUNGS: Equal symmetric chest rise. Breathing is unlabored. Speaking comfortably in full sentences. Abdomen: Soft, Nondistended, nontender to palpation. There is no pulsatile mass to palpation. No masses or hepatosplenomegaly. EXTREMITIES: Normal ROM, no edema, no tenderness, or deformity. Distal pulses palpable. No gross deformities or trauma noted. Moving all extremities equally and appropriately. BACK: On exam of lumbar spine, there is no swelling, bruising, or color change noted. There is lumbar midline bony tenderness, without crepitus, deformity, or step off. Patient exhibits tenderness of paraspinal musculature to both sides of midline. There is no point tenderness over the SI Joint. Straight leg raise positive. SKIN: Warm and dry. Skin is intact. No rashes/lesions noted. NEUROLOGICAL: Alert and oriented. Normal strength (5/5 in all extremities) and sensation is intact. Patellar deep tendon reflexes are 1+ bilaterally.   L1-inner thigh sensation is intact to light touch  L2 able to adduct thighs bilaterally  L3 Able to extend bilateral knees without difficulty  L4 able to dorsiflex ankles bilaterally  L5 able to point great toes upward bilaterally  S1 able to flex knees bilaterally  S2 able to plantar flex toes bilaterally  S3-5 denies external patch 1 patch (1 patch Transdermal Patch Applied 8/30/19 1735)   oxyCODONE-acetaminophen (PERCOCET) 5-325 MG per tablet 1 tablet (1 tablet Oral Given 8/30/19 1734)   cyclobenzaprine (FLEXERIL) tablet 10 mg (10 mg Oral Given 8/30/19 1734)   predniSONE (DELTASONE) tablet 60 mg (60 mg Oral Given 8/30/19 1734)   oxyCODONE-acetaminophen (PERCOCET) 5-325 MG per tablet 1 tablet (1 tablet Oral Given 8/30/19 1941)     Patient reports that the above medications took the edge off her pain but she was still having a good deal of pain. Patient has no neurologic findings on exam therefore I do feel she can reasonably be discharged and have follow-up with orthopedics in the office. She will be discharged with medications for pain control, patient was requesting a muscle relaxer I did give her a prescription but strongly cautioned her on sedating effects. A discussion was had with the patient regarding diagnosis, diagnostic testing results, treatment/ plan of care, and follow up. All questions were answered. Patient will follow up as directed for further evaluation/treatment. The patient was given strict return precautions as we discussed symptoms that would necessitate return to the ED. Patient will return to ED for new/worsening symptoms. The patient verbalized their understanding and agreement with the above plan. I estimate there is LOW risk for ABDOMINAL AORTIC ANEURYSM, CAUDA EQUINA SYNDROME, EPIDURAL MASS LESION, SPINAL STENOSIS, OR HERNIATED DISK CAUSING SEVERE STENOSIS, thus I consider the discharge disposition reasonable. Johnathan Anderson and I have discussed the diagnosis and risks, and we agree with discharging home to follow-up with their primary doctor. We also discussed returning to the Emergency Department immediately if new or worsening symptoms occur.  We have discussed the symptoms which are most concerning (e.g., saddle anesthesia, urinary or bowel incontinence or retention, changing or

## 2019-09-04 ENCOUNTER — TELEPHONE (OUTPATIENT)
Dept: ORTHOPEDIC SURGERY | Age: 69
End: 2019-09-04

## 2019-09-04 ENCOUNTER — OFFICE VISIT (OUTPATIENT)
Dept: ORTHOPEDIC SURGERY | Age: 69
End: 2019-09-04
Payer: MEDICARE

## 2019-09-04 VITALS — HEIGHT: 64 IN | WEIGHT: 240.08 LBS | BODY MASS INDEX: 40.99 KG/M2

## 2019-09-04 DIAGNOSIS — S32.040A COMPRESSION FRACTURE OF L4 LUMBAR VERTEBRA, CLOSED, INITIAL ENCOUNTER (HCC): ICD-10-CM

## 2019-09-04 DIAGNOSIS — S32.040A CLOSED COMPRESSION FRACTURE OF L4 LUMBAR VERTEBRA, INITIAL ENCOUNTER (HCC): Primary | ICD-10-CM

## 2019-09-04 DIAGNOSIS — M54.50 ACUTE MIDLINE LOW BACK PAIN WITHOUT SCIATICA: ICD-10-CM

## 2019-09-04 PROCEDURE — 99214 OFFICE O/P EST MOD 30 MIN: CPT | Performed by: PHYSICIAN ASSISTANT

## 2019-09-04 PROCEDURE — G8399 PT W/DXA RESULTS DOCUMENT: HCPCS | Performed by: PHYSICIAN ASSISTANT

## 2019-09-04 PROCEDURE — 1123F ACP DISCUSS/DSCN MKR DOCD: CPT | Performed by: PHYSICIAN ASSISTANT

## 2019-09-04 PROCEDURE — 4040F PNEUMOC VAC/ADMIN/RCVD: CPT | Performed by: PHYSICIAN ASSISTANT

## 2019-09-04 PROCEDURE — 1090F PRES/ABSN URINE INCON ASSESS: CPT | Performed by: PHYSICIAN ASSISTANT

## 2019-09-04 PROCEDURE — 1036F TOBACCO NON-USER: CPT | Performed by: PHYSICIAN ASSISTANT

## 2019-09-04 PROCEDURE — G8417 CALC BMI ABV UP PARAM F/U: HCPCS | Performed by: PHYSICIAN ASSISTANT

## 2019-09-04 PROCEDURE — G8427 DOCREV CUR MEDS BY ELIG CLIN: HCPCS | Performed by: PHYSICIAN ASSISTANT

## 2019-09-04 PROCEDURE — 3017F COLORECTAL CA SCREEN DOC REV: CPT | Performed by: PHYSICIAN ASSISTANT

## 2019-09-04 RX ORDER — OXYCODONE HYDROCHLORIDE AND ACETAMINOPHEN 5; 325 MG/1; MG/1
1 TABLET ORAL EVERY 6 HOURS PRN
Qty: 28 TABLET | Refills: 0 | Status: SHIPPED | OUTPATIENT
Start: 2019-09-04 | End: 2019-09-11

## 2019-09-10 ENCOUNTER — HOSPITAL ENCOUNTER (OUTPATIENT)
Dept: MRI IMAGING | Age: 69
Discharge: HOME | End: 2019-09-10
Payer: MEDICARE

## 2019-09-10 DIAGNOSIS — S32.040A CLOSED COMPRESSION FRACTURE OF L4 LUMBAR VERTEBRA, INITIAL ENCOUNTER (HCC): ICD-10-CM

## 2019-09-10 PROCEDURE — 72148 MRI LUMBAR SPINE W/O DYE: CPT

## 2019-09-13 ENCOUNTER — TELEPHONE (OUTPATIENT)
Dept: ORTHOPEDIC SURGERY | Age: 69
End: 2019-09-13

## 2019-09-19 RX ORDER — GABAPENTIN 600 MG/1
TABLET ORAL
Qty: 180 TABLET | Refills: 1 | Status: SHIPPED | OUTPATIENT
Start: 2019-09-19 | End: 2020-03-17

## 2019-10-09 ENCOUNTER — OFFICE VISIT (OUTPATIENT)
Dept: ORTHOPEDIC SURGERY | Age: 69
End: 2019-10-09
Payer: MEDICARE

## 2019-10-09 VITALS — HEIGHT: 64 IN | BODY MASS INDEX: 40.99 KG/M2 | WEIGHT: 240.08 LBS

## 2019-10-09 DIAGNOSIS — S32.040D CLOSED COMPRESSION FRACTURE OF L4 LUMBAR VERTEBRA WITH ROUTINE HEALING, SUBSEQUENT ENCOUNTER: Primary | ICD-10-CM

## 2019-10-09 PROCEDURE — 3017F COLORECTAL CA SCREEN DOC REV: CPT | Performed by: PHYSICIAN ASSISTANT

## 2019-10-09 PROCEDURE — 99213 OFFICE O/P EST LOW 20 MIN: CPT | Performed by: PHYSICIAN ASSISTANT

## 2019-10-09 PROCEDURE — 1036F TOBACCO NON-USER: CPT | Performed by: PHYSICIAN ASSISTANT

## 2019-10-09 PROCEDURE — G8417 CALC BMI ABV UP PARAM F/U: HCPCS | Performed by: PHYSICIAN ASSISTANT

## 2019-10-09 PROCEDURE — 1090F PRES/ABSN URINE INCON ASSESS: CPT | Performed by: PHYSICIAN ASSISTANT

## 2019-10-09 PROCEDURE — G8484 FLU IMMUNIZE NO ADMIN: HCPCS | Performed by: PHYSICIAN ASSISTANT

## 2019-10-09 PROCEDURE — G8399 PT W/DXA RESULTS DOCUMENT: HCPCS | Performed by: PHYSICIAN ASSISTANT

## 2019-10-09 PROCEDURE — 1123F ACP DISCUSS/DSCN MKR DOCD: CPT | Performed by: PHYSICIAN ASSISTANT

## 2019-10-09 PROCEDURE — G8427 DOCREV CUR MEDS BY ELIG CLIN: HCPCS | Performed by: PHYSICIAN ASSISTANT

## 2019-10-09 PROCEDURE — 4040F PNEUMOC VAC/ADMIN/RCVD: CPT | Performed by: PHYSICIAN ASSISTANT

## 2019-10-14 ENCOUNTER — OFFICE VISIT (OUTPATIENT)
Dept: ORTHOPEDIC SURGERY | Age: 69
End: 2019-10-14
Payer: MEDICARE

## 2019-10-14 DIAGNOSIS — M17.0 PRIMARY OSTEOARTHRITIS OF BOTH KNEES: Primary | ICD-10-CM

## 2019-10-14 PROCEDURE — 20611 DRAIN/INJ JOINT/BURSA W/US: CPT | Performed by: ORTHOPAEDIC SURGERY

## 2019-10-14 RX ORDER — METHYLPREDNISOLONE ACETATE 40 MG/ML
160 INJECTION, SUSPENSION INTRA-ARTICULAR; INTRALESIONAL; INTRAMUSCULAR; SOFT TISSUE ONCE
Status: COMPLETED | OUTPATIENT
Start: 2019-10-14 | End: 2019-10-14

## 2019-10-14 RX ADMIN — METHYLPREDNISOLONE ACETATE 160 MG: 40 INJECTION, SUSPENSION INTRA-ARTICULAR; INTRALESIONAL; INTRAMUSCULAR; SOFT TISSUE at 14:11

## 2019-11-04 DIAGNOSIS — M81.0 HIGH RISK FOR FRACTURE DUE TO OSTEOPOROSIS BY DEXA SCAN: Primary | ICD-10-CM

## 2019-11-04 DIAGNOSIS — G25.81 RLS (RESTLESS LEGS SYNDROME): ICD-10-CM

## 2019-11-04 DIAGNOSIS — M80.00XD OSTEOPOROSIS WITH CURRENT PATHOLOGICAL FRACTURE WITH ROUTINE HEALING, UNSPECIFIED OSTEOPOROSIS TYPE, SUBSEQUENT ENCOUNTER: ICD-10-CM

## 2019-11-07 ENCOUNTER — OFFICE VISIT (OUTPATIENT)
Dept: ORTHOPEDIC SURGERY | Age: 69
End: 2019-11-07
Payer: MEDICARE

## 2019-11-07 VITALS — HEIGHT: 64 IN | BODY MASS INDEX: 40.99 KG/M2 | RESPIRATION RATE: 12 BRPM | WEIGHT: 240.08 LBS

## 2019-11-07 DIAGNOSIS — S32.040D CLOSED COMPRESSION FRACTURE OF L4 LUMBAR VERTEBRA WITH ROUTINE HEALING, SUBSEQUENT ENCOUNTER: Primary | ICD-10-CM

## 2019-11-07 PROCEDURE — 4040F PNEUMOC VAC/ADMIN/RCVD: CPT | Performed by: ORTHOPAEDIC SURGERY

## 2019-11-07 PROCEDURE — 1090F PRES/ABSN URINE INCON ASSESS: CPT | Performed by: ORTHOPAEDIC SURGERY

## 2019-11-07 PROCEDURE — G8399 PT W/DXA RESULTS DOCUMENT: HCPCS | Performed by: ORTHOPAEDIC SURGERY

## 2019-11-07 PROCEDURE — G8417 CALC BMI ABV UP PARAM F/U: HCPCS | Performed by: ORTHOPAEDIC SURGERY

## 2019-11-07 PROCEDURE — 99213 OFFICE O/P EST LOW 20 MIN: CPT | Performed by: ORTHOPAEDIC SURGERY

## 2019-11-07 PROCEDURE — G8427 DOCREV CUR MEDS BY ELIG CLIN: HCPCS | Performed by: ORTHOPAEDIC SURGERY

## 2019-11-07 PROCEDURE — G8484 FLU IMMUNIZE NO ADMIN: HCPCS | Performed by: ORTHOPAEDIC SURGERY

## 2019-11-07 PROCEDURE — 3017F COLORECTAL CA SCREEN DOC REV: CPT | Performed by: ORTHOPAEDIC SURGERY

## 2019-11-07 PROCEDURE — 1036F TOBACCO NON-USER: CPT | Performed by: ORTHOPAEDIC SURGERY

## 2019-11-07 PROCEDURE — 1123F ACP DISCUSS/DSCN MKR DOCD: CPT | Performed by: ORTHOPAEDIC SURGERY

## 2019-11-07 RX ORDER — ROPINIROLE 1 MG/1
1 TABLET, FILM COATED ORAL 3 TIMES DAILY
Qty: 270 TABLET | Refills: 0 | Status: SHIPPED | OUTPATIENT
Start: 2019-11-07 | End: 2019-12-31

## 2020-01-09 ENCOUNTER — HOSPITAL ENCOUNTER (EMERGENCY)
Age: 70
Discharge: HOME | End: 2020-01-09
Payer: MEDICARE

## 2020-01-09 ENCOUNTER — TELEPHONE (OUTPATIENT)
Dept: FAMILY MEDICINE CLINIC | Age: 70
End: 2020-01-09

## 2020-01-09 ENCOUNTER — APPOINTMENT (OUTPATIENT)
Dept: CT IMAGING | Age: 70
End: 2020-01-09
Payer: MEDICARE

## 2020-01-09 ENCOUNTER — APPOINTMENT (OUTPATIENT)
Dept: GENERAL RADIOLOGY | Age: 70
End: 2020-01-09
Payer: MEDICARE

## 2020-01-09 VITALS
TEMPERATURE: 98.1 F | HEIGHT: 64 IN | SYSTOLIC BLOOD PRESSURE: 135 MMHG | OXYGEN SATURATION: 99 % | WEIGHT: 245 LBS | HEART RATE: 74 BPM | RESPIRATION RATE: 16 BRPM | DIASTOLIC BLOOD PRESSURE: 80 MMHG | BODY MASS INDEX: 41.83 KG/M2

## 2020-01-09 PROCEDURE — 99284 EMERGENCY DEPT VISIT MOD MDM: CPT

## 2020-01-09 PROCEDURE — 72100 X-RAY EXAM L-S SPINE 2/3 VWS: CPT

## 2020-01-09 PROCEDURE — 6370000000 HC RX 637 (ALT 250 FOR IP): Performed by: NURSE PRACTITIONER

## 2020-01-09 PROCEDURE — 96372 THER/PROPH/DIAG INJ SC/IM: CPT

## 2020-01-09 PROCEDURE — 72125 CT NECK SPINE W/O DYE: CPT

## 2020-01-09 PROCEDURE — 6360000002 HC RX W HCPCS: Performed by: NURSE PRACTITIONER

## 2020-01-09 RX ORDER — LIDOCAINE 4 G/G
1 PATCH TOPICAL ONCE
Status: DISCONTINUED | OUTPATIENT
Start: 2020-01-09 | End: 2020-01-09 | Stop reason: HOSPADM

## 2020-01-09 RX ORDER — ORPHENADRINE CITRATE 30 MG/ML
60 INJECTION INTRAMUSCULAR; INTRAVENOUS ONCE
Status: COMPLETED | OUTPATIENT
Start: 2020-01-09 | End: 2020-01-09

## 2020-01-09 RX ORDER — METHOCARBAMOL 750 MG/1
750 TABLET, FILM COATED ORAL 4 TIMES DAILY
Qty: 40 TABLET | Refills: 0 | Status: SHIPPED | OUTPATIENT
Start: 2020-01-09 | End: 2020-01-19

## 2020-01-09 RX ORDER — KETOROLAC TROMETHAMINE 30 MG/ML
15 INJECTION, SOLUTION INTRAMUSCULAR; INTRAVENOUS ONCE
Status: COMPLETED | OUTPATIENT
Start: 2020-01-09 | End: 2020-01-09

## 2020-01-09 RX ORDER — LIDOCAINE 50 MG/G
1 PATCH TOPICAL DAILY
Qty: 30 PATCH | Refills: 0 | Status: SHIPPED | OUTPATIENT
Start: 2020-01-09 | End: 2021-07-21

## 2020-01-09 RX ADMIN — ORPHENADRINE CITRATE 60 MG: 30 INJECTION INTRAMUSCULAR; INTRAVENOUS at 19:54

## 2020-01-09 RX ADMIN — KETOROLAC TROMETHAMINE 15 MG: 30 INJECTION, SOLUTION INTRAMUSCULAR; INTRAVENOUS at 19:53

## 2020-01-09 ASSESSMENT — PAIN SCALES - GENERAL
PAINLEVEL_OUTOF10: 9
PAINLEVEL_OUTOF10: 3

## 2020-01-09 ASSESSMENT — ENCOUNTER SYMPTOMS: BACK PAIN: 1

## 2020-01-09 ASSESSMENT — PAIN DESCRIPTION - LOCATION: LOCATION: BACK;NECK

## 2020-01-09 NOTE — ED TRIAGE NOTES
Pt reports falling 2X in the last 3 weeks. Falls were mechanical in nature. No LOC. Complaints of neck pain and back pain. No loss of bowel or bladder. Denies any numbness or tingling.  VSS

## 2020-01-09 NOTE — TELEPHONE ENCOUNTER
Patient called. Said she has follen a couple of times recently. Her back and neck are causing her quite a bit of pain. Last fall was the day before yesterday. Thought her neck and back would get better with rest but they seem to be getting worse. No appt available here today. She has considered going to the ER. Please advise. She can be reached on her cell at [phone].

## 2020-01-10 NOTE — ED PROVIDER NOTES
**[facility]  [facility]ER[facility]NCOUNTER      Pt Name: Elliot Briceño  BSL:[id number]  [place] 1950  Date of evaluation: 1/9/2020  Provider: SANDOR Saldivar CNP      Chief Complaint:    Chief Complaint   Patient presents with    Back Pain       Nursing Notes, Past Medical Hx, Past Surgical Hx, Social Hx, Allergies, and Family Hx were all reviewed and agreed with or any disagreements were addressed in the HPI.    HPI:  (Location, Duration, Timing, Severity, Quality, Assoc Sx, Context, Modifying factors)  This is a  78 y.o. female sent to the emergency department with complaints of multiple falls. Patient reports that 2 weeks ago she was on a second step of a stepladder and fell backwards onto her back. She was not evaluated at that time. She reports that last Friday she tripped and fell forward going up the stairs and then yesterday she was sitting in a chair in the chair flipped over and she landed on her buttocks. Since that time she has had pain through her neck and back. Lower back seems to be more left-sided and neck seems to be more right-sided. No new numbness, tingling or weakness. No caudal anesthesia or bowel or bladder dysfunction. She rates her pain 9 out of 10. She has tried taking ibuprofen and Ultram without any relief.     PastMedical/Surgical History:      Diagnosis Date    Arthritis     Bipolar disorder ([facility] [address]     questionable dx    Cellulitis     L ankle, recurrent; over area of prior surgeries    Chronic back pain     Closed fracture of left fibula 1995    s/p fall; surgery x 6    Closed fracture of left tibia 1995    s/p fall on ice; hx of surgery x 6    Colon polyps     Depression     Fibroid (bleeding) (uterine) 1982    Fibromyalgia     GERD (gastroesophageal reflux disease)     Hyperlipidemia     Hypertension     Myoview Lexiscan WNL on 5/17/12    Hypothyroidism     Migraine     Miscarriage     Morbid obesity ([address]     Neuropathy 2011    seen initially by neuro., Dr. Bob Dus, on 8/11/10; EMG 7/16/10 showed abn.'s c/w L5 radiculopathy & periph. neuropathy; pt. reports sx in hands and feet ; dx'd by rheum.      MAXIMO (obstructive sleep apnea) 06/07/2012    mild-does not require appliance    RLS (restless legs syndrome)     Urinary incontinence     Urine incontinence     saw urology, Dr. Meera Steele, on 7/21/11 for microscopic hematuria & hematuria; was started on Vesicare & scheduled for cystoscopy    Vitamin D deficiency 2/27/12    22 ng/mL         Procedure Laterality Date    BARIATRIC SURGERY  11/14/12    Laparoscopic Sleeve Gastrectomy, Lap Hiatal hernia repair   [address] COLONOSCOPY  2010     colon polyps; Dr. Clemencia Mulligan Left 08/31/2018    phaco with IOL    HIATAL HERNIA REPAIR      lap    HYSTERECTOMY  1982    TAHBSO for fibroids and DUB    LEG SURGERY  1995    L; s/p tibia and fibular fx's; has uma in L tibia; surgery x 6    DE XCAPSL CTRC RMVL INSJ IO LENS PROSTH W/O ECP Left 8/31/2018    PHACOEMULSIFICATION OF CATARACT LEFT EYE WITH INTRAOCULAR LENS IMPLANT performed by Lacie Benoit MD at [address] W/O ECP Right 9/14/2018    PHACOEMULSIFICATION OF CATARACT RIGHT  EYE WITH INTRAOCULAR LENS IMPLANT performed by Lacie Benoit MD at [facility] 2010    Dr. Parviz Haywood  4-27-12    WNL       Medications:  Discharge Medication List as of 1/9/2020  8:30 PM      CONTINUE these medications which have NOT CHANGED    Details   rOPINIRole (REQUIP) 1 MG tablet TAKE 1 TABLET THREE TIMES DAILY, Disp-270 tablet, R-0Normal      QUEtiapine (SEROQUEL XR) 150 MG TB24 extended release tablet TAKE 1 TABLET ONE TIME DAILY, Disp-90 tablet, R-0Normal      lovastatin (MEVACOR) 20 MG tablet TAKE 1 TABLET EVERY NIGHT, Disp-90 tablet, R-0Normal      levothyroxine (SYNTHROID) 137 MCG tablet TAKE 1 TABLET EVERY DAY, Disp-90 tablet, R-0Normal      escitalopram (LEXAPRO) 20 MG tablet TAKE 1 TABLET EVERY DAY, Disp-90 tablet, R-0Normal      gabapentin (NEURONTIN) 600 MG tablet TAKE 1 TABLET TWICE DAILY, Disp-180 tablet, R-1Normal      traZODone (DESYREL) 150 MG tablet TAKE 1 TABLET EVERY NIGHT, Disp-90 tablet, R-0Normal      diclofenac sodium 1 % GEL APPLY 4 GRAMS 4 TIMES DAILY, Disp-500 g, R-0, Normal      predniSONE (DELTASONE) 10 MG tablet 3 po bid x 2 days, then 2 po bid x 2 days, then 1 po bid x 2 days, then 1 po qd x 2 days, Disp-26 tablet, R-0Normal      benzonatate (TESSALON PERLES) 100 MG capsule Take 1 capsule by mouth 3 times daily as needed for Cough, Disp-30 capsule, R-0Normal      albuterol sulfate HFA (PROVENTIL HFA) 108 (90 Base) MCG/ACT inhaler Inhale 2 puffs into the lungs every 6 hours as needed for Wheezing, Disp-3 Inhaler, R-3Normal      furosemide (LASIX) 40 MG tablet Take 1 tablet by mouth daily, Disp-90 tablet, R-1Normal      omeprazole (PRILOSEC) 40 MG delayed release capsule Take 1 capsule by mouth daily, Disp-90 capsule, R-3Normal      verapamil (VERELAN) 360 MG extended release capsule Take 1 capsule by mouth nightly, Disp-90 capsule, R-1Normal      aspirin 81 MG EC tablet Take 81 mg by mouth daily. Indications: OTC               Review of Systems:  Review of Systems   Constitutional: Negative for chills and fever. Genitourinary: Negative. Musculoskeletal: Positive for back pain and neck pain. Neurological: Negative for syncope, weakness, numbness and headaches. All other systems reviewed and are negative. Positives and Pertinent negatives as per HPI. Except as noted above in the ROS, problem specific ROS was completed and is negative. Physical Exam:  Physical Exam  Vitals signs and nursing note reviewed. Constitutional:       Appearance: Normal appearance. She is obese. HENT:      Head: Normocephalic and atraumatic.       Right Ear: available at the time of this note:    CT Cervical Spine WO Contrast   Final Result   No acute abnormality of the cervical spine. XR LUMBAR SPINE (2-3 VIEWS)   Final Result   1. Multilevel degenerative disc disease and facet joint arthropathy   2. No acute lumbar spine abnormality              No results found. MEDICAL DECISION MAKING / ED COURSE:      PROCEDURES:   Procedures    None    Patient was given:  Medications   ketorolac (TORADOL) injection 15 mg (15 mg Intramuscular Given 1/9/20 1953)   orphenadrine (NORFLEX) injection 60 mg (60 mg Intramuscular Given 1/9/20 1954)       Patient presented to the emergency department with complaints of multiple falls where she had injured her back. Physical exam did reveal some cervical spine and lumbar spine tenderness. CT of the cervical spine was obtained and showed no acute abnormality. X-ray of the lumbar spine showed to level degenerative disc disease and facet joint arthropathy with no acute lumbar spine abnormality. She was medicated here and did have good relief of her symptoms. She will be discharged home with prescriptions for Voltaren, Robaxin and Lidoderm patches. She is to follow-up with primary care. She is to return to the emergency department with any worsening of symptoms. I discussed treatment plan with patient, patient is agreeable and denies questions at this time. No results found for this visit on 01/09/20. I estimate there is LOW risk for ABDOMINAL AORTIC ANEURYSM, CAUDA EQUINA SYNDROME, EPIDURAL MASS LESION, SPINAL STENOSIS, OR HERNIATED DISK CAUSING SEVERE STENOSIS, thus I consider the discharge disposition reasonable. Ayleen Urena and I have discussed the diagnosis and risks, and we agree with discharging home to follow-up with their primary doctor. We also discussed returning to the Emergency Department immediately if new or worsening symptoms occur.  We have discussed the symptoms which are most concerning (e.g., saddle anesthesia, urinary or bowel incontinence or retention, changing or worsening pain) that necessitate immediate return. Final Impression    1. Strain of lumbar region, initial encounter    2. Strain of neck muscle, initial encounter        Blood pressure 135/80, pulse 74, temperature 98.1 °F (36.7 °C), temperature source Oral, resp. rate 16, height 5' 4\" (1.626 m), weight 245 lb (111.1 kg), SpO2 99 %, not currently breastfeeding. The patient tolerated their visit well. I evaluated the patient. The physician was available for consultation as needed. The patient and / or the family were informed of the results of any tests, a time was given to answer questions, a plan was proposed and they agreed with plan. CLINICAL IMPRESSION:  1. Strain of lumbar region, initial encounter    2.  Strain of neck muscle, initial encounter        DISPOSITION Decision To Discharge 01/09/2020 08:19:31 PM      PATIENT REFERRED TO:  Andrew Ramirez DO  [place] [id number]  [phone]    Schedule an appointment as soon as possible for a visit in 3 days  For follow up care    [facility] [place] [facility] [place] [facility]  [address] [facility]  Go to   As needed, If symptoms worsen      DISCHARGE MEDICATIONS:  Discharge Medication List as of 1/9/2020  8:30 PM      START taking these medications    Details   lidocaine (LIDODERM) 5 % Place 1 patch onto the skin daily 12 hours on, 12 hours off., Disp-30 patch, R-0Print      diclofenac (VOLTAREN) 50 MG EC tablet Take 1 tablet by mouth 2 times daily, Disp-30 tablet, R-0Print      methocarbamol (ROBAXIN-750) 750 MG tablet Take 1 tablet by mouth 4 times daily for 10 days, Disp-40 tablet, R-0Print             DISCONTINUED MEDICATIONS:  Discharge Medication List as of 1/9/2020  8:30 PM                 (Please note the MDM and HPI sections of this note were completed with a voice recognition program.  Efforts were made to edit the dictations but occasionally words are mis-transcribed.)    Electronically signed, SANDOR Noriega CNP,          SANDOR Norigea CNP  01/09/20 0274

## 2020-01-15 ENCOUNTER — OFFICE VISIT (OUTPATIENT)
Dept: FAMILY MEDICINE CLINIC | Age: 70
End: 2020-01-15
Payer: MEDICARE

## 2020-01-15 VITALS
BODY MASS INDEX: 43.26 KG/M2 | SYSTOLIC BLOOD PRESSURE: 135 MMHG | HEIGHT: 64 IN | HEART RATE: 70 BPM | DIASTOLIC BLOOD PRESSURE: 75 MMHG | OXYGEN SATURATION: 97 % | WEIGHT: 253.4 LBS

## 2020-01-15 LAB
ANION GAP SERPL CALCULATED.3IONS-SCNC: 15 MMOL/L (ref 3–16)
BUN BLDV-MCNC: 31 MG/DL (ref 7–20)
CALCIUM SERPL-MCNC: 9.1 MG/DL (ref 8.3–10.6)
CHLORIDE BLD-SCNC: 102 MMOL/L (ref 99–110)
CO2: 25 MMOL/L (ref 21–32)
CREAT SERPL-MCNC: 1.4 MG/DL (ref 0.6–1.2)
GFR AFRICAN AMERICAN: 45
GFR NON-AFRICAN AMERICAN: 37
GLUCOSE BLD-MCNC: 91 MG/DL (ref 70–99)
POTASSIUM SERPL-SCNC: 4.2 MMOL/L (ref 3.5–5.1)
SODIUM BLD-SCNC: 142 MMOL/L (ref 136–145)
TSH SERPL DL<=0.05 MIU/L-ACNC: 11.18 UIU/ML (ref 0.27–4.2)

## 2020-01-15 PROCEDURE — 3017F COLORECTAL CA SCREEN DOC REV: CPT | Performed by: FAMILY MEDICINE

## 2020-01-15 PROCEDURE — 1090F PRES/ABSN URINE INCON ASSESS: CPT | Performed by: FAMILY MEDICINE

## 2020-01-15 PROCEDURE — G8399 PT W/DXA RESULTS DOCUMENT: HCPCS | Performed by: FAMILY MEDICINE

## 2020-01-15 PROCEDURE — G8484 FLU IMMUNIZE NO ADMIN: HCPCS | Performed by: FAMILY MEDICINE

## 2020-01-15 PROCEDURE — 1123F ACP DISCUSS/DSCN MKR DOCD: CPT | Performed by: FAMILY MEDICINE

## 2020-01-15 PROCEDURE — G8427 DOCREV CUR MEDS BY ELIG CLIN: HCPCS | Performed by: FAMILY MEDICINE

## 2020-01-15 PROCEDURE — G8417 CALC BMI ABV UP PARAM F/U: HCPCS | Performed by: FAMILY MEDICINE

## 2020-01-15 PROCEDURE — 4040F PNEUMOC VAC/ADMIN/RCVD: CPT | Performed by: FAMILY MEDICINE

## 2020-01-15 PROCEDURE — 99214 OFFICE O/P EST MOD 30 MIN: CPT | Performed by: FAMILY MEDICINE

## 2020-01-15 PROCEDURE — 1036F TOBACCO NON-USER: CPT | Performed by: FAMILY MEDICINE

## 2020-01-15 ASSESSMENT — ENCOUNTER SYMPTOMS
BACK PAIN: 1
COUGH: 0
BLOOD IN STOOL: 0
CHEST TIGHTNESS: 0
SHORTNESS OF BREATH: 0
ABDOMINAL PAIN: 0

## 2020-01-15 NOTE — PROGRESS NOTES
Subjective:      Patient ID: Isi Hunter is a 78 y.o. female. HPI  Fallen x 2 in last 10 days-one tripped & one leaning over in chair-had pain C&L spine in ER-XR's-nothing acute-still pain but better-using Voltaren & Robaxin. Pain worse Rt Cerv & L Lumb's. Very diff time dealing with 's death(2 mos)--grandson lives with her. She has 5 rental properties and just wants to get rid of them she has 5 properties that she wants to get rid of soon. Prior to Visit Medications :  Medication lidocaine (LIDODERM) 5 %, Sig Place 1 patch onto the skin daily 12 hours on, 12 hours off., Taking? Yes, Authorizing Provider SANDOR Hernandez CNP    Medication diclofenac (VOLTAREN) 50 MG EC tablet, Sig Take 1 tablet by mouth 2 times daily, Taking? Yes, Authorizing Provider SANDOR Hernandez CNP    Medication methocarbamol (ROBAXIN-750) 750 MG tablet, Sig Take 1 tablet by mouth 4 times daily for 10 days, Taking? Yes, Authorizing Provider SANDOR Hernandez CNP    Medication rOPINIRole (REQUIP) 1 MG tablet, Sig TAKE 1 TABLET THREE TIMES DAILY, Taking? Yes, Authorizing Provider Mauri Bunch, DO    Medication QUEtiapine (SEROQUEL XR) 150 MG TB24 extended release tablet, Sig TAKE 1 TABLET ONE TIME DAILY, Taking? Yes, Authorizing Provider Mauri Bunch, DO    Medication lovastatin (MEVACOR) 20 MG tablet, Sig TAKE 1 TABLET EVERY NIGHT, Taking? Yes, Authorizing Provider Mauri Bunch, DO    Medication levothyroxine (SYNTHROID) 137 MCG tablet, Sig TAKE 1 TABLET EVERY DAY, Taking? Yes, Authorizing Provider Mauri Bunch, DO    Medication escitalopram (LEXAPRO) 20 MG tablet, Sig TAKE 1 TABLET EVERY DAY, Taking? Yes, Authorizing Provider Mauri Bunch, DO    Medication traZODone (DESYREL) 150 MG tablet, Sig TAKE 1 TABLET EVERY NIGHT, Taking? Yes, Authorizing Provider Mauri Bunch, DO    Medication diclofenac sodium 1 % GEL, Sig APPLY 4 GRAMS 4 TIMES DAILY, Taking?  Yes, Authorizing Provider Celina Rousseau, DPM    Medication predniSONE (DELTASONE) 10 MG tablet, Sig 3 po bid x 2 days, then 2 po bid x 2 days, then 1 po bid x 2 days, then 1 po qd x 2 days, Taking? Yes, Authorizing Provider Portia Fountain, DPM    Medication benzonatate (TESSALON PERLES) 100 MG capsule, Sig Take 1 capsule by mouth 3 times daily as needed for Cough, Taking? Yes, Authorizing Provider Mortimer Augusta, APRN - CNP    Medication albuterol sulfate HFA (PROVENTIL HFA) 108 (90 Base) MCG/ACT inhaler, Sig Inhale 2 puffs into the lungs every 6 hours as needed for Wheezing, Taking? Yes, Authorizing Provider Mauri Bunch, DO    Medication furosemide (LASIX) 40 MG tablet, Sig Take 1 tablet by mouth daily, Taking? Yes, Authorizing Provider Mauri Bunch, DO    Medication omeprazole (PRILOSEC) 40 MG delayed release capsule, Sig Take 1 capsule by mouth daily, Taking? Yes, Authorizing Provider Mauri Bunch, DO    Medication verapamil (VERELAN) 360 MG extended release capsule, Sig Take 1 capsule by mouth nightly, Taking? Yes, Authorizing Provider Mauri Bunch, DO    Medication aspirin 81 MG EC tablet, Sig Take 81 mg by mouth daily. Indications: OTC, Taking?  Yes, Authorizing Provider Historical Provider, MD    Medication gabapentin (NEURONTIN) 600 MG tablet, Sig TAKE 1 TABLET TWICE DAILY, Taking? , Authorizing Provider Dyana Holliday, DO      Past Medical History:  No date: Arthritis  No date: Bipolar disorder ([facility] [address]      Comment:  questionable dx  No date: Cellulitis      Comment:  L ankle, recurrent; over area of prior surgeries  No date: Chronic back pain  1995: Closed fracture of left fibula      Comment:  s/p fall; surgery x 6  1995: Closed fracture of left tibia      Comment:  s/p fall on ice; hx of surgery x 6  No date: Colon polyps  No date: Depression  1982: Fibroid (bleeding) (uterine)  No date: Fibromyalgia  No date: GERD (gastroesophageal reflux disease)  No date: Hyperlipidemia  No date: Hypertension      Comment:  Myoview Lexiscan WNL on 5/17/12  No date: Hypothyroidism  No date: Migraine  No date: Miscarriage  No date: Morbid obesity ([facility] [address]  2011: Neuropathy      Comment:  seen initially by neuro., Dr. Rocio Limon, on 8/11/10; EMG                7/16/10 showed abn.'s c/w L5 radiculopathy & periph.                neuropathy; pt. reports sx in hands and feet ; dx'd by                rheum. 06/07/2012: MAXIMO (obstructive sleep apnea)      Comment:  mild-does not require appliance  No date: RLS (restless legs syndrome)  No date: Urinary incontinence  No date: Urine incontinence      Comment:  saw urology, Dr. Ce Fajardo, on 7/21/11 for microscopic                hematuria & hematuria; was started on Vesicare &                scheduled for cystoscopy  2/27/12: Vitamin D deficiency      Comment:  22 ng/mL        Review of Systems    Review of Systems   Constitutional: Negative for unexpected weight change. HENT: Negative for congestion and postnasal drip. Eyes: Negative for visual disturbance. Respiratory: Negative for cough, chest tightness and shortness of breath. Cardiovascular: Negative for chest pain, palpitations and leg swelling. Gastrointestinal: Negative for abdominal pain and blood in stool. Genitourinary: Negative for dysuria, frequency and hematuria. Musculoskeletal: Positive for arthralgias, back pain and neck pain. Neurological: Negative for tremors and headaches. Psychiatric/Behavioral: Positive for dysphoric mood. Negative for sleep disturbance. The patient is nervous/anxious. Objective:   Physical Exam      Physical Exam  Constitutional:       Appearance: Normal appearance. She is well-developed. She is obese. HENT:      Head: Normocephalic. Mouth/Throat:      Mouth: Mucous membranes are moist.      Pharynx: Oropharynx is clear. Eyes:      General: No scleral icterus. Conjunctiva/sclera: Conjunctivae normal.   Neck:      Musculoskeletal: Neck supple. Thyroid: No thyromegaly. Vascular: No carotid bruit. Cardiovascular:      Rate and Rhythm: Normal rate and regular rhythm. Heart sounds: Normal heart sounds. Pulmonary:      Effort: Pulmonary effort is normal.      Breath sounds: Normal breath sounds. Abdominal:      General: Bowel sounds are normal. There is no distension. Palpations: Abdomen is soft. There is no mass. Tenderness: There is no tenderness. Musculoskeletal:      Comments: Tender lower right cervical muscles and right trapezius. Range of motion increases discomfort. Tenderness lower left lumbar muscles and range of motion increases pain. Lymphadenopathy:      Cervical: No cervical adenopathy. Skin:     General: Skin is warm and dry. Coloration: Skin is not pale. Neurological:      Mental Status: She is alert and oriented to person, place, and time. Cranial Nerves: No cranial nerve deficit. Motor: No abnormal muscle tone. Coordination: Coordination normal.      Deep Tendon Reflexes: Reflexes normal.   Psychiatric:         Behavior: Behavior normal.         Thought Content: Thought content normal.         Judgment: Judgment normal.      Comments: Crying at times during the office visit. Assessment:      1. Lumbar strain, sequela    2. Acute strain of neck muscle, subsequent encounter    3. Breast cancer screening by mammogram    4. Acquired hypothyroidism    5. Essential hypertension            Plan:      Aura Carter was seen today for other. Diagnoses and all orders for this visit:    Lumbar strain, sequela  Continue medications from the ER and start doing low back stretches carefully as tolerated. Acute strain of neck muscle, subsequent encounter  Continue medications from the ER and start neck exercises or stretches carefully as tolerated  Breast cancer screening by mammogram  -     [facility] DIGITAL SCREEN W OR WO CAD BILATERAL;  Future  acquired hypothyroidism  -     TSH without Reflex  Lab now  Essential hypertension  -     Basic Metabolic Panel  Lab now    Refer to [facility] for evaluation for stress due to 's passing.           Mauri Bunch, DO

## 2020-01-15 NOTE — PATIENT INSTRUCTIONS
Lumbar strain, sequela  Continue medications from the ER and start doing low back stretches carefully as tolerated. Acute strain of neck muscle, subsequent encounter  Continue medications from the ER and start neck exercises or stretches carefully as tolerated  Breast cancer screening by mammogram  -     [facility] DIGITAL SCREEN W OR WO CAD BILATERAL;  Future  acquired hypothyroidism  -     TSH without Reflex  Lab now  Essential hypertension  -     Basic Metabolic Panel  Lab now

## 2020-01-22 ENCOUNTER — HOSPITAL ENCOUNTER (OUTPATIENT)
Dept: WOMENS IMAGING | Age: 70
Discharge: HOME | End: 2020-01-22
Payer: MEDICARE

## 2020-01-22 ENCOUNTER — OFFICE VISIT (OUTPATIENT)
Dept: PSYCHOLOGY | Age: 70
End: 2020-01-22
Payer: MEDICARE

## 2020-01-22 PROBLEM — F43.21 GRIEF: Status: ACTIVE | Noted: 2020-01-22

## 2020-01-22 PROCEDURE — 77067 SCR MAMMO BI INCL CAD: CPT

## 2020-01-22 PROCEDURE — 90791 PSYCH DIAGNOSTIC EVALUATION: CPT | Performed by: PSYCHOLOGIST

## 2020-01-22 NOTE — PROGRESS NOTES
Behavioral Health Consultation  [address] Linnea Lezama PsyD  Psychologist  1/22/2020   11:07 AM       Time spent with Patient: 30 minutes  This is patient's first [facility] appointment. Reason for Consult:  Grief  Referring Provider: Kirti Chino DO     Pt provided informed consent for the behavioral health program. Discussed with patient model of service to include the limits of confidentiality (i.e. abuse reporting, suicide intervention, etc.) and short-term intervention focused approach. Pt indicated understanding. Feedback given to PCP. S:  Lost her  2 months ago. Buried him on their 52nd anniversary. He   Did everything for her. Hard adjustment. Still not acceptanting he is dead - keeps waiting for him to walk through the door. 21year old grandson lives with her. Has financial stress. Has 5 rental properties she wont get what she owes. Trying to a new norm. Broke back in Sept. Been out of work since Nov for the first time since age 14. Doesn't have 's check anymore so that's more difficult. Feels down. Doesn't always accept friends calls. Stays in her room a lot and only comes out to cook meals. Everything reminds her of . Doesn't cry much unless she is talking about it with someone. Doesn't allow herself to cry.      O:  MSE:    Appearance: adequate hygiene  Attitude: cooperative and friendly  Consciousness: alert  Orientation: oriented to person, place, time, general circumstance  Memory: recent and remote memory intact  Attention/Concentration: intact during session  Psychomotor Activity:normal  Eye Contact: normal  Speech: normal rate and volume, well-articulated  Mood: \"sad\"  Affect: euthymic and congruent   Perception: within normal limits  Thought Content: within normal limits  Thought Process: logical, coherent and goal-directed  Insight: good  Judgment: intact  Ability to understand instructions: Yes  Ability to respond meaningfully: Yes  Morbid Ideation: no   Suicide Sister 48        lymphoma    Cancer Maternal Uncle         prostate    Cancer Maternal Uncle         prostate    Cancer Maternal Uncle         prostate    Emphysema Neg Hx     Heart Failure Neg Hx     Hypertension Neg Hx          A:  Ms. Yoanna Douglass has been grieving the loss of her  who passed away 2 months ago. She was friendly and engaged throughout the visit. She responded positively to behavioral interventions. She notes possible interest in specialty mental health due to distance and shorter [facility] visits. Diagnosis:    1. Grief           Plan:  Pt interventions:    Established rapport, Discussed [facility] model of care vs specialty mental health, Conducted functional assessment, [place]-setting to identify pt's primary goals for [facility] visit / overall health, Supportive techniques, Provided psychoeducation re: depression vs grief, Discussed potential treatments for  grief, behavioral activation interventions, grief interventions and treatment planning    Pt Behavioral Change Plan:  Pt set goals to 1) put on new bedspread 2) think of other ways to re-decorate your room and living room 3) Return in about 1 week (around 1/29/2020).

## 2020-01-23 ENCOUNTER — TELEPHONE (OUTPATIENT)
Dept: WOMENS IMAGING | Age: 70
End: 2020-01-23

## 2020-01-23 NOTE — TELEPHONE ENCOUNTER
Spoke with patient regarding screening mammogram results. Results negative. Radiologist recommends follow up in one year for a screening mammogram. Patient verbalized understanding.

## 2020-01-29 ENCOUNTER — OFFICE VISIT (OUTPATIENT)
Dept: ORTHOPEDIC SURGERY | Age: 70
End: 2020-01-29
Payer: MEDICARE

## 2020-01-29 ENCOUNTER — OFFICE VISIT (OUTPATIENT)
Dept: PSYCHOLOGY | Age: 70
End: 2020-01-29
Payer: MEDICARE

## 2020-01-29 VITALS — BODY MASS INDEX: 43.25 KG/M2 | WEIGHT: 253.31 LBS | HEIGHT: 64 IN

## 2020-01-29 PROCEDURE — 1090F PRES/ABSN URINE INCON ASSESS: CPT | Performed by: PHYSICIAN ASSISTANT

## 2020-01-29 PROCEDURE — 90832 PSYTX W PT 30 MINUTES: CPT | Performed by: PSYCHOLOGIST

## 2020-01-29 PROCEDURE — G8399 PT W/DXA RESULTS DOCUMENT: HCPCS | Performed by: PHYSICIAN ASSISTANT

## 2020-01-29 PROCEDURE — G8417 CALC BMI ABV UP PARAM F/U: HCPCS | Performed by: PHYSICIAN ASSISTANT

## 2020-01-29 PROCEDURE — G8427 DOCREV CUR MEDS BY ELIG CLIN: HCPCS | Performed by: PHYSICIAN ASSISTANT

## 2020-01-29 PROCEDURE — 1123F ACP DISCUSS/DSCN MKR DOCD: CPT | Performed by: PHYSICIAN ASSISTANT

## 2020-01-29 PROCEDURE — 0509F URINE INCON PLAN DOCD: CPT | Performed by: PHYSICIAN ASSISTANT

## 2020-01-29 PROCEDURE — G8484 FLU IMMUNIZE NO ADMIN: HCPCS | Performed by: PHYSICIAN ASSISTANT

## 2020-01-29 PROCEDURE — 4040F PNEUMOC VAC/ADMIN/RCVD: CPT | Performed by: PHYSICIAN ASSISTANT

## 2020-01-29 PROCEDURE — 99213 OFFICE O/P EST LOW 20 MIN: CPT | Performed by: PHYSICIAN ASSISTANT

## 2020-01-29 PROCEDURE — 1036F TOBACCO NON-USER: CPT | Performed by: PHYSICIAN ASSISTANT

## 2020-01-29 PROCEDURE — 3017F COLORECTAL CA SCREEN DOC REV: CPT | Performed by: PHYSICIAN ASSISTANT

## 2020-01-29 ASSESSMENT — ANXIETY QUESTIONNAIRES
GAD7 TOTAL SCORE: 12
1. FEELING NERVOUS, ANXIOUS, OR ON EDGE: 3-NEARLY EVERY DAY
6. BECOMING EASILY ANNOYED OR IRRITABLE: 1-SEVERAL DAYS
2. NOT BEING ABLE TO STOP OR CONTROL WORRYING: 2-OVER HALF THE DAYS
3. WORRYING TOO MUCH ABOUT DIFFERENT THINGS: 2-OVER HALF THE DAYS
4. TROUBLE RELAXING: 2-OVER HALF THE DAYS
5. BEING SO RESTLESS THAT IT IS HARD TO SIT STILL: 1-SEVERAL DAYS
7. FEELING AFRAID AS IF SOMETHING AWFUL MIGHT HAPPEN: 1-SEVERAL DAYS

## 2020-01-29 ASSESSMENT — PATIENT HEALTH QUESTIONNAIRE - PHQ9
9. THOUGHTS THAT YOU WOULD BE BETTER OFF DEAD, OR OF HURTING YOURSELF: 0
SUM OF ALL RESPONSES TO PHQ9 QUESTIONS 1 & 2: 5
SUM OF ALL RESPONSES TO PHQ QUESTIONS 1-9: 16
7. TROUBLE CONCENTRATING ON THINGS, SUCH AS READING THE NEWSPAPER OR WATCHING TELEVISION: 1
SUM OF ALL RESPONSES TO PHQ QUESTIONS 1-9: 16
2. FEELING DOWN, DEPRESSED OR HOPELESS: 2
8. MOVING OR SPEAKING SO SLOWLY THAT OTHER PEOPLE COULD HAVE NOTICED. OR THE OPPOSITE, BEING SO FIGETY OR RESTLESS THAT YOU HAVE BEEN MOVING AROUND A LOT MORE THAN USUAL: 1
1. LITTLE INTEREST OR PLEASURE IN DOING THINGS: 3
4. FEELING TIRED OR HAVING LITTLE ENERGY: 3
5. POOR APPETITE OR OVEREATING: 3
6. FEELING BAD ABOUT YOURSELF - OR THAT YOU ARE A FAILURE OR HAVE LET YOURSELF OR YOUR FAMILY DOWN: 0
3. TROUBLE FALLING OR STAYING ASLEEP: 3
10. IF YOU CHECKED OFF ANY PROBLEMS, HOW DIFFICULT HAVE THESE PROBLEMS MADE IT FOR YOU TO DO YOUR WORK, TAKE CARE OF THINGS AT HOME, OR GET ALONG WITH OTHER PEOPLE: 1

## 2020-01-29 NOTE — PROGRESS NOTES
History of present illness:   Ms. Yolette Finley is a pleasant 78 y.o. female with a PMH of urinary incontinence, RLS, neuropathy, hypothyroidism, GERD, fibromyalgia, depression, bipolar disorder, and chronic back pain, who returns today 4 months s/p L4 compression fracture after lifting a heavy can of gasoline. Her back pain has never completely resolved since then. She notes new (over the past 1-2 months) intermittent left lower extremity posterolateral leg pain, that will occasionally cause her left leg to give out. She notes she has had 4 recent falls that occurred after her leg gave out on her. She notes her  passed away two months ago, and since then she has also had worsening urinary incontinence. She denies new saddle numbness or bowel dysfunction. She was evaluated in the ED on 1/9/20 following a fall with negative lumbar xray and negative CT cervical spine. She denies new upper extremity symptoms or balance disruption. Physical examination:  Ms. Jones Monzon's most recent vitals: Body mass index is 43.46 kg/m². General exam:  She is well-developed and well-nourished, is in obvious pain and alert and oriented to person, place, and time. She demonstrates appropriate mood and affect. Her skin is warm and dry. Her gait is normal and she walks heel to toe without limp or instability. Back:  She stands with slight lumbar flexion. Her lumbar flexion, extension and lateral bending are moderately reduced with pain. She has mild tenderness over her lumbar spine without obvious muscle spasm. The skin over her lumbar spine is normal without a surgical scar. Lower extremities:  She has 5/5 motor strength of bilateral lower extremities. She has a negative straight leg raise, bilaterally. Deep tendon reflexes at knees and achilles are 1+. Sensation is intact to light touch L3 to S1 bilaterally. She has no clonus. Hip range of motion painless.     Imaging:  I reviewed MRI images of her lumbar spine from 9/10/19. They show a subacute L4 superior endplate fracture without retropulsion. No significant stenosis or nerve impingement noted. I reviewed AP and lateral xray images of her lumbar spine from 1/9/20 and today. They show stable alignment of her lumbar spine without additional collapse to her L4 compression fracture. She does have some misalignment of her lower sacrum/coccyx, but this appears similar to radiographs from 3 months ago. Assessment:  4 months s/p L4 compression fracture  Urinary incontinence    Plan: With her new lower extremity symptoms and frequent bladder incontinence, I recommend repeating her lumbar MRI. She did not have these symptoms when her lumbar MRI was completed several months prior. We will call her with her results. If her lumbar MRI is negative, she may need a thoracic MRI.

## 2020-01-29 NOTE — PROGRESS NOTES
Behavioral Health Consultation  900 Linnea Lezama PsyD  Psychologist  2020   11:07 AM       Time spent with Patient: 30 minutes  This is patient's second [facility] appointment. Reason for Consult:  Grief  Referring Provider: Eduardo Silva DO       S:  During the last visit pt set goals to 1) put on new bedspread 2) think of other ways to re-decorate your room and living room       Pt reports she is \"ok\" today. Reports she got a new bedspread and put it on. Moved around some furniture in her room too. Also got her nails done for the first time ever. Has thought a lot about what was discussed in the last visit about allowing herself to cry. Did that and felt more comfortable with it. Also got a job with [employer]. May go back to [facility] for another job too. Trying to think of things to do to get out more. Went out with sister yesterday and found it helpful for her mood. Cousin  of OD since the last visit. Finding that she has waves of different emotions since husbands passing and remembers the stages of grief. Trying to let them be. Wants to move out of her house to something smaller and closer to the city. Pt brought past [facility] testing results for chart to be reviewed by PCP.       O:  MSE:    Appearance: adequate hygiene  Attitude: cooperative and friendly  Consciousness: alert  Orientation: oriented to person, place, time, general circumstance  Memory: recent and remote memory intact  Attention/Concentration: intact during session  Psychomotor Activity:normal  Eye Contact: normal  Speech: normal rate and volume, well-articulated  Mood: \"ok\"  Affect: euthymic and congruent   Perception: within normal limits  Thought Content: within normal limits  Thought Process: logical, coherent and goal-directed  Insight: good  Judgment: intact  Ability to understand instructions: Yes  Ability to respond meaningfully: Yes  Morbid Ideation: no   Suicide Assessment: no suicidal ideation, plan, or Uncle         prostate    Cancer Maternal Uncle         prostate    Cancer Maternal Uncle         prostate    Emphysema Neg Hx     Heart Failure Neg Hx     Hypertension Neg Hx          A:  Ms. Gael Torres continues to grieve the loss of her . She continues to bee active and engaged and responds positively to behavioral interventions. PHQ Scores 1/29/2020 1/21/2019 4/10/2018 6/19/2017 11/24/2014 5/22/2014   PHQ2 Score 5 2 6 0 0 0   PHQ9 Score 16 2 18 0 0 0     Interpretation of Total Score Depression Severity: 1-4 = Minimal depression, 5-9 = Mild depression, 10-14 = Moderate depression, 15-19 = Moderately severe depression, 20-27 = Severe depression    CARLOS 7 SCORE 1/29/2020   CARLOS-7 Total Score 12     Interpretation of CARLOS-7 score: 5-9 = mild anxiety, 10-14 = moderate anxiety, 15+ = severe anxiety. Recommend referral to behavioral health for scores 10 or greater. Diagnosis:    1. Grief           Plan:  Pt interventions:    Established rapport, Discussed [facility] model of care vs specialty mental health, Conducted functional assessment, [place]-setting to identify pt's primary goals for [facility] visit / overall health, Supportive techniques, Provided psychoeducation re: depression vs grief, Discussed potential treatments for  grief, behavioral activation interventions, grief interventions and treatment planning    Pt Behavioral Change Plan:  Pt set goals to 1) put on new bedspread 2) think of other ways to re-decorate your room and living room 3) Return in about 2 weeks (around 2/12/2020).

## 2020-01-30 ENCOUNTER — TELEPHONE (OUTPATIENT)
Dept: ORTHOPEDIC SURGERY | Age: 70
End: 2020-01-30

## 2020-01-30 NOTE — TELEPHONE ENCOUNTER
Called and let patient know that MRI is approved. She will call [facility] to schedule.

## 2020-01-31 ENCOUNTER — HOSPITAL ENCOUNTER (INPATIENT)
Age: 70
LOS: 2 days | Discharge: HOME | DRG: 193 | End: 2020-02-02
Attending: EMERGENCY MEDICINE | Admitting: INTERNAL MEDICINE
Payer: MEDICARE

## 2020-01-31 ENCOUNTER — APPOINTMENT (OUTPATIENT)
Dept: GENERAL RADIOLOGY | Age: 70
DRG: 193 | End: 2020-01-31
Payer: MEDICARE

## 2020-01-31 PROBLEM — J96.01 ACUTE RESPIRATORY FAILURE WITH HYPOXEMIA (HCC): Status: ACTIVE | Noted: 2020-01-31

## 2020-01-31 PROBLEM — J18.9 MULTIFOCAL PNEUMONIA: Status: ACTIVE | Noted: 2020-01-31

## 2020-01-31 PROBLEM — J09.X1 INFLUENZA A WITH PNEUMONIA: Status: ACTIVE | Noted: 2020-01-31

## 2020-01-31 LAB
A/G RATIO: 1.4 (ref 1.1–2.2)
ALBUMIN SERPL-MCNC: 3.7 G/DL (ref 3.4–5)
ALP BLD-CCNC: 88 U/L (ref 40–129)
ALT SERPL-CCNC: 43 U/L (ref 10–40)
ANION GAP SERPL CALCULATED.3IONS-SCNC: 10 MMOL/L (ref 3–16)
AST SERPL-CCNC: 40 U/L (ref 15–37)
BASOPHILS ABSOLUTE: 0 K/UL (ref 0–0.2)
BASOPHILS RELATIVE PERCENT: 0.6 %
BILIRUB SERPL-MCNC: 0.4 MG/DL (ref 0–1)
BUN BLDV-MCNC: 18 MG/DL (ref 7–20)
CALCIUM SERPL-MCNC: 8.6 MG/DL (ref 8.3–10.6)
CHLORIDE BLD-SCNC: 99 MMOL/L (ref 99–110)
CO2: 28 MMOL/L (ref 21–32)
CREAT SERPL-MCNC: 1.1 MG/DL (ref 0.6–1.2)
EOSINOPHILS ABSOLUTE: 0 K/UL (ref 0–0.6)
EOSINOPHILS RELATIVE PERCENT: 0.2 %
GFR AFRICAN AMERICAN: 59
GFR NON-AFRICAN AMERICAN: 49
GLOBULIN: 2.6 G/DL
GLUCOSE BLD-MCNC: 114 MG/DL (ref 70–99)
HCT VFR BLD CALC: 35.6 % (ref 36–48)
HEMOGLOBIN: 11.7 G/DL (ref 12–16)
LACTIC ACID, SEPSIS: 0.7 MMOL/L (ref 0.4–1.9)
LYMPHOCYTES ABSOLUTE: 0.5 K/UL (ref 1–5.1)
LYMPHOCYTES RELATIVE PERCENT: 12.2 %
MCH RBC QN AUTO: 28.9 PG (ref 26–34)
MCHC RBC AUTO-ENTMCNC: 32.7 G/DL (ref 31–36)
MCV RBC AUTO: 88.3 FL (ref 80–100)
MONOCYTES ABSOLUTE: 0.3 K/UL (ref 0–1.3)
MONOCYTES RELATIVE PERCENT: 7.1 %
NEUTROPHILS ABSOLUTE: 3.5 K/UL (ref 1.7–7.7)
NEUTROPHILS RELATIVE PERCENT: 79.9 %
PDW BLD-RTO: 14.8 % (ref 12.4–15.4)
PLATELET # BLD: 145 K/UL (ref 135–450)
PMV BLD AUTO: 9.2 FL (ref 5–10.5)
POTASSIUM SERPL-SCNC: 4 MMOL/L (ref 3.5–5.1)
RAPID INFLUENZA  B AGN: NEGATIVE
RAPID INFLUENZA A AGN: POSITIVE
RBC # BLD: 4.04 M/UL (ref 4–5.2)
SODIUM BLD-SCNC: 137 MMOL/L (ref 136–145)
TOTAL PROTEIN: 6.3 G/DL (ref 6.4–8.2)
TROPONIN: <0.01 NG/ML
WBC # BLD: 4.4 K/UL (ref 4–11)

## 2020-01-31 PROCEDURE — 93005 ELECTROCARDIOGRAM TRACING: CPT | Performed by: EMERGENCY MEDICINE

## 2020-01-31 PROCEDURE — 84484 ASSAY OF TROPONIN QUANT: CPT

## 2020-01-31 PROCEDURE — 6360000002 HC RX W HCPCS: Performed by: EMERGENCY MEDICINE

## 2020-01-31 PROCEDURE — 85025 COMPLETE CBC W/AUTO DIFF WBC: CPT

## 2020-01-31 PROCEDURE — 94761 N-INVAS EAR/PLS OXIMETRY MLT: CPT

## 2020-01-31 PROCEDURE — 1200000000 HC SEMI PRIVATE

## 2020-01-31 PROCEDURE — 94640 AIRWAY INHALATION TREATMENT: CPT

## 2020-01-31 PROCEDURE — 2700000000 HC OXYGEN THERAPY PER DAY

## 2020-01-31 PROCEDURE — 99291 CRITICAL CARE FIRST HOUR: CPT

## 2020-01-31 PROCEDURE — 87040 BLOOD CULTURE FOR BACTERIA: CPT

## 2020-01-31 PROCEDURE — 83605 ASSAY OF LACTIC ACID: CPT

## 2020-01-31 PROCEDURE — 6370000000 HC RX 637 (ALT 250 FOR IP): Performed by: EMERGENCY MEDICINE

## 2020-01-31 PROCEDURE — 2580000003 HC RX 258: Performed by: EMERGENCY MEDICINE

## 2020-01-31 PROCEDURE — 80053 COMPREHEN METABOLIC PANEL: CPT

## 2020-01-31 PROCEDURE — 71045 X-RAY EXAM CHEST 1 VIEW: CPT

## 2020-01-31 PROCEDURE — 87804 INFLUENZA ASSAY W/OPTIC: CPT

## 2020-01-31 RX ORDER — METOCLOPRAMIDE HYDROCHLORIDE 5 MG/ML
10 INJECTION INTRAMUSCULAR; INTRAVENOUS ONCE
Status: COMPLETED | OUTPATIENT
Start: 2020-01-31 | End: 2020-01-31

## 2020-01-31 RX ORDER — PREDNISONE 20 MG/1
60 TABLET ORAL ONCE
Status: DISCONTINUED | OUTPATIENT
Start: 2020-01-31 | End: 2020-01-31

## 2020-01-31 RX ORDER — 0.9 % SODIUM CHLORIDE 0.9 %
1000 INTRAVENOUS SOLUTION INTRAVENOUS ONCE
Status: COMPLETED | OUTPATIENT
Start: 2020-01-31 | End: 2020-02-01

## 2020-01-31 RX ORDER — ACETAMINOPHEN 500 MG
1000 TABLET ORAL ONCE
Status: COMPLETED | OUTPATIENT
Start: 2020-01-31 | End: 2020-01-31

## 2020-01-31 RX ORDER — OSELTAMIVIR PHOSPHATE 75 MG/1
75 CAPSULE ORAL ONCE
Status: COMPLETED | OUTPATIENT
Start: 2020-01-31 | End: 2020-01-31

## 2020-01-31 RX ORDER — CEFUROXIME AXETIL 250 MG/1
500 TABLET ORAL EVERY 12 HOURS SCHEDULED
Status: DISCONTINUED | OUTPATIENT
Start: 2020-01-31 | End: 2020-02-01

## 2020-01-31 RX ORDER — IPRATROPIUM BROMIDE AND ALBUTEROL SULFATE 2.5; .5 MG/3ML; MG/3ML
1 SOLUTION RESPIRATORY (INHALATION) ONCE
Status: COMPLETED | OUTPATIENT
Start: 2020-01-31 | End: 2020-01-31

## 2020-01-31 RX ADMIN — CEFUROXIME AXETIL 500 MG: 250 TABLET ORAL at 23:01

## 2020-01-31 RX ADMIN — ACETAMINOPHEN 1000 MG: 500 TABLET ORAL at 23:01

## 2020-01-31 RX ADMIN — VANCOMYCIN HYDROCHLORIDE 1250 MG: 10 INJECTION, POWDER, LYOPHILIZED, FOR SOLUTION INTRAVENOUS at 23:42

## 2020-01-31 RX ADMIN — METOCLOPRAMIDE HYDROCHLORIDE 10 MG: 5 INJECTION INTRAMUSCULAR; INTRAVENOUS at 23:02

## 2020-01-31 RX ADMIN — OSELTAMIVIR PHOSPHATE 75 MG: 75 CAPSULE ORAL at 23:01

## 2020-01-31 RX ADMIN — IPRATROPIUM BROMIDE AND ALBUTEROL SULFATE 1 AMPULE: .5; 3 SOLUTION RESPIRATORY (INHALATION) at 22:52

## 2020-01-31 RX ADMIN — SODIUM CHLORIDE 1000 ML: 9 INJECTION, SOLUTION INTRAVENOUS at 23:02

## 2020-01-31 ASSESSMENT — PAIN DESCRIPTION - LOCATION: LOCATION: CHEST

## 2020-01-31 ASSESSMENT — PAIN SCALES - GENERAL
PAINLEVEL_OUTOF10: 6
PAINLEVEL_OUTOF10: 6

## 2020-01-31 ASSESSMENT — PAIN DESCRIPTION - PAIN TYPE: TYPE: ACUTE PAIN

## 2020-02-01 LAB
ANION GAP SERPL CALCULATED.3IONS-SCNC: 6 MMOL/L (ref 3–16)
BASOPHILS ABSOLUTE: 0 K/UL (ref 0–0.2)
BASOPHILS RELATIVE PERCENT: 0.6 %
BUN BLDV-MCNC: 16 MG/DL (ref 7–20)
CALCIUM SERPL-MCNC: 8.1 MG/DL (ref 8.3–10.6)
CHLORIDE BLD-SCNC: 106 MMOL/L (ref 99–110)
CO2: 30 MMOL/L (ref 21–32)
CREAT SERPL-MCNC: 1.3 MG/DL (ref 0.6–1.2)
EKG ATRIAL RATE: 73 BPM
EKG DIAGNOSIS: NORMAL
EKG P AXIS: 23 DEGREES
EKG P-R INTERVAL: 174 MS
EKG Q-T INTERVAL: 398 MS
EKG QRS DURATION: 76 MS
EKG QTC CALCULATION (BAZETT): 438 MS
EKG R AXIS: 42 DEGREES
EKG T AXIS: 0 DEGREES
EKG VENTRICULAR RATE: 73 BPM
EOSINOPHILS ABSOLUTE: 0 K/UL (ref 0–0.6)
EOSINOPHILS RELATIVE PERCENT: 0.1 %
GFR AFRICAN AMERICAN: 49
GFR NON-AFRICAN AMERICAN: 40
GLUCOSE BLD-MCNC: 91 MG/DL (ref 70–99)
HCT VFR BLD CALC: 32 % (ref 36–48)
HEMOGLOBIN: 10.4 G/DL (ref 12–16)
LYMPHOCYTES ABSOLUTE: 0.8 K/UL (ref 1–5.1)
LYMPHOCYTES RELATIVE PERCENT: 24.6 %
MCH RBC QN AUTO: 28.8 PG (ref 26–34)
MCHC RBC AUTO-ENTMCNC: 32.5 G/DL (ref 31–36)
MCV RBC AUTO: 88.6 FL (ref 80–100)
MONOCYTES ABSOLUTE: 0.3 K/UL (ref 0–1.3)
MONOCYTES RELATIVE PERCENT: 7.7 %
NEUTROPHILS ABSOLUTE: 2.2 K/UL (ref 1.7–7.7)
NEUTROPHILS RELATIVE PERCENT: 67 %
PDW BLD-RTO: 14.7 % (ref 12.4–15.4)
PLATELET # BLD: 129 K/UL (ref 135–450)
PMV BLD AUTO: 9.3 FL (ref 5–10.5)
POTASSIUM REFLEX MAGNESIUM: 4.6 MMOL/L (ref 3.5–5.1)
RBC # BLD: 3.61 M/UL (ref 4–5.2)
SODIUM BLD-SCNC: 142 MMOL/L (ref 136–145)
WBC # BLD: 3.3 K/UL (ref 4–11)

## 2020-02-01 PROCEDURE — 94761 N-INVAS EAR/PLS OXIMETRY MLT: CPT

## 2020-02-01 PROCEDURE — 6360000002 HC RX W HCPCS: Performed by: INTERNAL MEDICINE

## 2020-02-01 PROCEDURE — 36415 COLL VENOUS BLD VENIPUNCTURE: CPT

## 2020-02-01 PROCEDURE — 93010 ELECTROCARDIOGRAM REPORT: CPT | Performed by: INTERNAL MEDICINE

## 2020-02-01 PROCEDURE — 87449 NOS EACH ORGANISM AG IA: CPT

## 2020-02-01 PROCEDURE — 2700000000 HC OXYGEN THERAPY PER DAY

## 2020-02-01 PROCEDURE — 6370000000 HC RX 637 (ALT 250 FOR IP): Performed by: INTERNAL MEDICINE

## 2020-02-01 PROCEDURE — 1200000000 HC SEMI PRIVATE

## 2020-02-01 PROCEDURE — 2580000003 HC RX 258: Performed by: INTERNAL MEDICINE

## 2020-02-01 PROCEDURE — 87070 CULTURE OTHR SPECIMN AEROBIC: CPT

## 2020-02-01 PROCEDURE — 6360000002 HC RX W HCPCS: Performed by: FAMILY MEDICINE

## 2020-02-01 PROCEDURE — 80048 BASIC METABOLIC PNL TOTAL CA: CPT

## 2020-02-01 PROCEDURE — 94640 AIRWAY INHALATION TREATMENT: CPT

## 2020-02-01 PROCEDURE — 85025 COMPLETE CBC W/AUTO DIFF WBC: CPT

## 2020-02-01 PROCEDURE — 6370000000 HC RX 637 (ALT 250 FOR IP)

## 2020-02-01 PROCEDURE — 87205 SMEAR GRAM STAIN: CPT

## 2020-02-01 PROCEDURE — 2500000003 HC RX 250 WO HCPCS: Performed by: INTERNAL MEDICINE

## 2020-02-01 RX ORDER — QUETIAPINE FUMARATE 50 MG/1
150 TABLET, EXTENDED RELEASE ORAL DAILY
Status: DISCONTINUED | OUTPATIENT
Start: 2020-02-01 | End: 2020-02-02 | Stop reason: HOSPADM

## 2020-02-01 RX ORDER — ASPIRIN 81 MG/1
81 TABLET ORAL DAILY
Status: DISCONTINUED | OUTPATIENT
Start: 2020-02-01 | End: 2020-02-02 | Stop reason: HOSPADM

## 2020-02-01 RX ORDER — IPRATROPIUM BROMIDE AND ALBUTEROL SULFATE 2.5; .5 MG/3ML; MG/3ML
1 SOLUTION RESPIRATORY (INHALATION)
Status: DISCONTINUED | OUTPATIENT
Start: 2020-02-01 | End: 2020-02-01

## 2020-02-01 RX ORDER — KETOROLAC TROMETHAMINE 30 MG/ML
15 INJECTION, SOLUTION INTRAMUSCULAR; INTRAVENOUS EVERY 6 HOURS PRN
Status: DISCONTINUED | OUTPATIENT
Start: 2020-02-01 | End: 2020-02-02 | Stop reason: HOSPADM

## 2020-02-01 RX ORDER — IPRATROPIUM BROMIDE AND ALBUTEROL SULFATE 2.5; .5 MG/3ML; MG/3ML
1 SOLUTION RESPIRATORY (INHALATION)
Status: DISCONTINUED | OUTPATIENT
Start: 2020-02-01 | End: 2020-02-02 | Stop reason: HOSPADM

## 2020-02-01 RX ORDER — BENZONATATE 100 MG/1
100 CAPSULE ORAL 3 TIMES DAILY PRN
Status: DISCONTINUED | OUTPATIENT
Start: 2020-02-01 | End: 2020-02-02 | Stop reason: HOSPADM

## 2020-02-01 RX ORDER — GABAPENTIN 300 MG/1
600 CAPSULE ORAL 2 TIMES DAILY
Status: DISCONTINUED | OUTPATIENT
Start: 2020-02-01 | End: 2020-02-02 | Stop reason: HOSPADM

## 2020-02-01 RX ORDER — SIMVASTATIN 10 MG
10 TABLET ORAL NIGHTLY
Status: DISCONTINUED | OUTPATIENT
Start: 2020-02-01 | End: 2020-02-02 | Stop reason: HOSPADM

## 2020-02-01 RX ORDER — ROPINIROLE 0.5 MG/1
1 TABLET, FILM COATED ORAL ONCE
Status: COMPLETED | OUTPATIENT
Start: 2020-02-01 | End: 2020-02-01

## 2020-02-01 RX ORDER — TRAZODONE HYDROCHLORIDE 50 MG/1
150 TABLET ORAL NIGHTLY
Status: DISCONTINUED | OUTPATIENT
Start: 2020-02-01 | End: 2020-02-02 | Stop reason: HOSPADM

## 2020-02-01 RX ORDER — OSELTAMIVIR PHOSPHATE 75 MG/1
75 CAPSULE ORAL 2 TIMES DAILY
Status: DISCONTINUED | OUTPATIENT
Start: 2020-02-01 | End: 2020-02-02 | Stop reason: HOSPADM

## 2020-02-01 RX ORDER — SODIUM CHLORIDE 9 MG/ML
INJECTION, SOLUTION INTRAVENOUS CONTINUOUS
Status: DISCONTINUED | OUTPATIENT
Start: 2020-02-01 | End: 2020-02-02 | Stop reason: HOSPADM

## 2020-02-01 RX ORDER — SODIUM CHLORIDE 0.9 % (FLUSH) 0.9 %
10 SYRINGE (ML) INJECTION PRN
Status: DISCONTINUED | OUTPATIENT
Start: 2020-02-01 | End: 2020-02-02 | Stop reason: HOSPADM

## 2020-02-01 RX ORDER — LEVOTHYROXINE SODIUM 0.12 MG/1
125 TABLET ORAL DAILY
Status: DISCONTINUED | OUTPATIENT
Start: 2020-02-01 | End: 2020-02-02 | Stop reason: HOSPADM

## 2020-02-01 RX ORDER — SODIUM CHLORIDE 0.9 % (FLUSH) 0.9 %
10 SYRINGE (ML) INJECTION EVERY 12 HOURS SCHEDULED
Status: DISCONTINUED | OUTPATIENT
Start: 2020-02-01 | End: 2020-02-02 | Stop reason: HOSPADM

## 2020-02-01 RX ORDER — ONDANSETRON 2 MG/ML
4 INJECTION INTRAMUSCULAR; INTRAVENOUS EVERY 6 HOURS PRN
Status: DISCONTINUED | OUTPATIENT
Start: 2020-02-01 | End: 2020-02-02 | Stop reason: HOSPADM

## 2020-02-01 RX ORDER — ESCITALOPRAM OXALATE 10 MG/1
20 TABLET ORAL DAILY
Status: DISCONTINUED | OUTPATIENT
Start: 2020-02-01 | End: 2020-02-02 | Stop reason: HOSPADM

## 2020-02-01 RX ORDER — ROPINIROLE 0.5 MG/1
1 TABLET, FILM COATED ORAL 3 TIMES DAILY
Status: DISCONTINUED | OUTPATIENT
Start: 2020-02-01 | End: 2020-02-02 | Stop reason: HOSPADM

## 2020-02-01 RX ORDER — ACETAMINOPHEN 325 MG/1
650 TABLET ORAL EVERY 4 HOURS PRN
Status: DISCONTINUED | OUTPATIENT
Start: 2020-02-01 | End: 2020-02-02 | Stop reason: HOSPADM

## 2020-02-01 RX ADMIN — GABAPENTIN 600 MG: 300 CAPSULE ORAL at 02:36

## 2020-02-01 RX ADMIN — QUETIAPINE FUMARATE 150 MG: 50 TABLET, EXTENDED RELEASE ORAL at 21:58

## 2020-02-01 RX ADMIN — IPRATROPIUM BROMIDE AND ALBUTEROL SULFATE 1 AMPULE: .5; 3 SOLUTION RESPIRATORY (INHALATION) at 19:30

## 2020-02-01 RX ADMIN — SODIUM CHLORIDE: 9 INJECTION, SOLUTION INTRAVENOUS at 21:59

## 2020-02-01 RX ADMIN — IPRATROPIUM BROMIDE AND ALBUTEROL SULFATE 1 AMPULE: .5; 3 SOLUTION RESPIRATORY (INHALATION) at 07:39

## 2020-02-01 RX ADMIN — ROPINIROLE HYDROCHLORIDE 1 MG: 0.5 TABLET, FILM COATED ORAL at 14:27

## 2020-02-01 RX ADMIN — ACETAMINOPHEN 650 MG: 325 TABLET ORAL at 19:56

## 2020-02-01 RX ADMIN — ACETAMINOPHEN 650 MG: 325 TABLET ORAL at 02:36

## 2020-02-01 RX ADMIN — ASPIRIN 81 MG: 81 TABLET, COATED ORAL at 10:06

## 2020-02-01 RX ADMIN — KETOROLAC TROMETHAMINE 15 MG: 30 INJECTION, SOLUTION INTRAMUSCULAR at 11:46

## 2020-02-01 RX ADMIN — ROPINIROLE HYDROCHLORIDE 1 MG: 0.5 TABLET, FILM COATED ORAL at 21:52

## 2020-02-01 RX ADMIN — GABAPENTIN 600 MG: 300 CAPSULE ORAL at 21:53

## 2020-02-01 RX ADMIN — SODIUM CHLORIDE: 9 INJECTION, SOLUTION INTRAVENOUS at 02:29

## 2020-02-01 RX ADMIN — Medication 10 ML: at 02:34

## 2020-02-01 RX ADMIN — ACETAMINOPHEN 650 MG: 325 TABLET ORAL at 14:45

## 2020-02-01 RX ADMIN — Medication: at 22:14

## 2020-02-01 RX ADMIN — VERAPAMIL HYDROCHLORIDE 360 MG: 180 TABLET, FILM COATED, EXTENDED RELEASE ORAL at 21:58

## 2020-02-01 RX ADMIN — TRAZODONE HYDROCHLORIDE 150 MG: 50 TABLET ORAL at 21:53

## 2020-02-01 RX ADMIN — BENZONATATE 100 MG: 100 CAPSULE ORAL at 11:46

## 2020-02-01 RX ADMIN — CEFTRIAXONE SODIUM 1 G: 1 INJECTION, POWDER, FOR SOLUTION INTRAMUSCULAR; INTRAVENOUS at 02:34

## 2020-02-01 RX ADMIN — DOXYCYCLINE 100 MG: 100 INJECTION, POWDER, LYOPHILIZED, FOR SOLUTION INTRAVENOUS at 06:30

## 2020-02-01 RX ADMIN — ACETAMINOPHEN 650 MG: 325 TABLET ORAL at 08:54

## 2020-02-01 RX ADMIN — OSELTAMIVIR PHOSPHATE 75 MG: 75 CAPSULE ORAL at 10:05

## 2020-02-01 RX ADMIN — ROPINIROLE HYDROCHLORIDE 1 MG: 0.5 TABLET, FILM COATED ORAL at 03:13

## 2020-02-01 RX ADMIN — DOXYCYCLINE 100 MG: 100 INJECTION, POWDER, LYOPHILIZED, FOR SOLUTION INTRAVENOUS at 17:50

## 2020-02-01 RX ADMIN — SIMVASTATIN 10 MG: 10 TABLET, FILM COATED ORAL at 21:53

## 2020-02-01 RX ADMIN — BENZONATATE 100 MG: 100 CAPSULE ORAL at 21:53

## 2020-02-01 RX ADMIN — IPRATROPIUM BROMIDE AND ALBUTEROL SULFATE 1 AMPULE: .5; 3 SOLUTION RESPIRATORY (INHALATION) at 16:07

## 2020-02-01 RX ADMIN — IPRATROPIUM BROMIDE AND ALBUTEROL SULFATE 1 AMPULE: .5; 3 SOLUTION RESPIRATORY (INHALATION) at 12:31

## 2020-02-01 RX ADMIN — KETOROLAC TROMETHAMINE 15 MG: 30 INJECTION, SOLUTION INTRAMUSCULAR at 22:00

## 2020-02-01 RX ADMIN — SODIUM CHLORIDE 75 ML/HR: 9 INJECTION, SOLUTION INTRAVENOUS at 17:50

## 2020-02-01 RX ADMIN — OSELTAMIVIR PHOSPHATE 75 MG: 75 CAPSULE ORAL at 21:53

## 2020-02-01 RX ADMIN — ESCITALOPRAM OXALATE 20 MG: 10 TABLET ORAL at 10:06

## 2020-02-01 RX ADMIN — Medication 10 ML: at 10:07

## 2020-02-01 RX ADMIN — GABAPENTIN 600 MG: 300 CAPSULE ORAL at 10:05

## 2020-02-01 RX ADMIN — LEVOTHYROXINE SODIUM 125 MCG: 125 TABLET ORAL at 06:31

## 2020-02-01 RX ADMIN — ROPINIROLE HYDROCHLORIDE 1 MG: 0.5 TABLET, FILM COATED ORAL at 10:06

## 2020-02-01 RX ADMIN — ENOXAPARIN SODIUM 40 MG: 40 INJECTION SUBCUTANEOUS at 11:46

## 2020-02-01 ASSESSMENT — PAIN SCALES - GENERAL
PAINLEVEL_OUTOF10: 4
PAINLEVEL_OUTOF10: 7
PAINLEVEL_OUTOF10: 6
PAINLEVEL_OUTOF10: 7
PAINLEVEL_OUTOF10: 8
PAINLEVEL_OUTOF10: 6
PAINLEVEL_OUTOF10: 7

## 2020-02-01 ASSESSMENT — ENCOUNTER SYMPTOMS
FACIAL SWELLING: 0
ABDOMINAL PAIN: 0
WHEEZING: 0
TROUBLE SWALLOWING: 0
COUGH: 1
VOMITING: 0
STRIDOR: 0
VOICE CHANGE: 0
COLOR CHANGE: 0
SHORTNESS OF BREATH: 1
NAUSEA: 0

## 2020-02-01 ASSESSMENT — PAIN DESCRIPTION - LOCATION: LOCATION: HEAD

## 2020-02-01 ASSESSMENT — PAIN DESCRIPTION - PAIN TYPE: TYPE: ACUTE PAIN

## 2020-02-01 NOTE — PROGRESS NOTES
RESPIRATORY THERAPY ASSESSMENT    Name:  Junaid Enciso Record Number:  [id number]  Age: 78 y.o. Gender: female  : 1950  Today's Date:  2020  Room:  0324/0324-01    Assessment     Is the patient being admitted for a COPD or Asthma exacerbation? No   (If yes the patient will be seen every 4 hours for the first 24 hours and then reassessed)    Patient Admission Diagnosis      Allergies  No Known Allergies    Minimum Predicted Vital Capacity:     825          Actual Vital Capacity:      Patient refusing at this time. Pulmonary History:No history  Home Oxygen Therapy:  room air  Home Respiratory Therapy:Albuterol PRN   Current Respiratory Therapy:  HHN Duoneb Q4WA  Treatment Type: HHN  Medications: Albuterol/Ipratropium    Respiratory Severity Index(RSI)   Patients with orders for inhalation medications, oxygen, or any therapeutic treatment modality will be placed on Respiratory Protocol. They will be assessed with the first treatment and at least every 72 hours thereafter. The following severity scale will be used to determine frequency of treatment intervention. Smoking History: No Smoking History = 0    Social History  Social History     Tobacco Use    Smoking status: Never Smoker    Smokeless tobacco: Never Used   Substance Use Topics    Alcohol use:  Yes     Alcohol/week: 0.0 standard drinks     Comment: 1 beer twice a year    Drug use: No       Recent Surgical History: None = 0  Past Surgical History  Past Surgical History:   Procedure Laterality Date    BARIATRIC SURGERY  12    Laparoscopic Sleeve Gastrectomy, Lap Hiatal hernia repair    CHOLECYSTECTOMY  1985    COLONOSCOPY       colon polyps; Dr. Cecilia Gamboa Left 2018    phaco with IOL    HIATAL HERNIA REPAIR      lap    HYSTERECTOMY      TAHBSO for fibroids and DUB    LEG SURGERY      L; s/p tibia and fibular fx's; has uma in L tibia; surgery x 6    DE XCAPSL CTRC RMVL INSJ IO LENS PROSTH W/O ECP Left 8/31/2018    PHACOEMULSIFICATION OF CATARACT LEFT EYE WITH INTRAOCULAR LENS IMPLANT performed by Kirit Astorga MD at [address] W/O ECP Right 9/14/2018    PHACOEMULSIFICATION OF CATARACT RIGHT  EYE WITH INTRAOCULAR LENS IMPLANT performed by Kirit Astorga MD at [address]  2010    Dr. Urszula Caldwell  4-27-12    WNL       Level of Consciousness: Alert, Oriented, and Cooperative = 0    Level of Activity: Walking with assistance = 1    Respiratory Pattern: Dyspnea with exertion;Irregular pattern;or RR less than 6 = 2    Breath Sounds: Diminshed bilaterally and/or crackles = 2    Sputum   ,  ,    Cough: Strong, spontaneous, non-productive = 0    Vital Signs   /74   Pulse 70   Temp 98.9 °F (37.2 °C) (Oral)   Resp 18   Ht 5' 4\" (1.626 m)   Wt 249 lb 8 oz (113.2 kg)   SpO2 95%   BMI 42.83 kg/m²   SPO2 (COPD values may differ): 90-91% on room air or greater than 92% on FiO2 24- 28% = 1    Peak Flow (asthma only): not applicable = 0    RSI: 5-6 = Q4hr PRN (every four hours as needed) for dyspnea        Plan       Goals: medication delivery, mobilize retained secretions, volume expansion and improve oxygenation    Patient/caregiver was educated on the proper method of use for Respiratory Care Devices:  Yes      Level of patient/caregiver understanding able to:   ? Verbalize understanding   ? Demonstrate understanding       ? Teach back        ? Needs reinforcement       ? No available caregiver               ? Other:     Response to education:  Good     Is patient being placed on Home Treatment Regimen? No     Does the patient have everything they need prior to discharge? NA     Comments: Patient admits with shortness of breath and chest pain.     Plan of Care: [facility] Duoneb Q4WA    Electronically signed by Migdalia Kelly RCP on 2/1/2020 at 5:32 AM    Respiratory Protocol

## 2020-02-01 NOTE — PROGRESS NOTES
4 Eyes Skin Assessment     The patient is being assess for  Admission    I agree that 2 RN's have performed a thorough Head to Toe Skin Assessment on the patient. ALL assessment sites listed below have been assessed. Areas assessed by both nurses: Therisa Inches  [x]   Head, Face, and Ears   [x]   Shoulders, Back, and Chest  [x]   Arms, Elbows, and Hands   [x]   Coccyx, Sacrum, and Ischum  [x]   Legs, Feet, and Heels        Does the Patient have Skin Breakdown?   No         [place] Prevention initiated:  Yes   Wound Care Orders initiated:  NA      [facility] nurse consulted for Pressure Injury (Stage 3,4, Unstageable, DTI, NWPT, and Complex wounds):  NA      Nurse 1 eSignature: Electronically signed by Janusz Carver RN on 2/1/20 at 2:43 AM    **SHARE this note so that the co-signing nurse is able to place an eSignature**    Nurse 2 eSignature: Electronically signed by Alexander Bautista RN on 2/1/20 at 2:54 AM

## 2020-02-01 NOTE — PROGRESS NOTES
Pt a/o. VSS. Assessment complete as charted. Lungs with crackles and wheezes bilat. Occasional cough with thick bloody sputum noted. Repositioned for comfort. Call light in reach. Denies needs.

## 2020-02-01 NOTE — H&P
[facility] & scheduled for cystoscopy    Vitamin D deficiency 2/27/12    22 ng/mL       Past Surgical History:          Procedure Laterality Date    BARIATRIC SURGERY  11/14/12    Laparoscopic Sleeve Gastrectomy, Lap Hiatal hernia repair    CHOLECYSTECTOMY  1985    COLONOSCOPY  2010     colon polyps; Dr. Tori Peabody Left 08/31/2018    phaco with IOL    HIATAL HERNIA REPAIR      lap    HYSTERECTOMY  1982    TAHBSO for fibroids and DUB    LEG SURGERY  1995    L; s/p tibia and fibular fx's; has uma in L tibia; surgery x 6    DE XCAPSL CTRC RMVL INSJ IO LENS PROSTH W/O ECP Left 8/31/2018    PHACOEMULSIFICATION OF CATARACT LEFT EYE WITH INTRAOCULAR LENS IMPLANT performed by Suzy Hu MD at [address] W/O ECP Right 9/14/2018    PHACOEMULSIFICATION OF CATARACT RIGHT  EYE WITH INTRAOCULAR LENS IMPLANT performed by Suzy Hu MD at [id number] [place] [address] 140  2010    Dr. Colt Urban  4-27-12    WNL       Medications Prior to Admission:      Prior to Admission medications    Medication Sig Start Date End Date Taking?  Authorizing Provider   lidocaine (LIDODERM) 5 % Place 1 patch onto the skin daily 12 hours on, 12 hours off. 1/9/20   Lars Males, APRN - CNP   diclofenac (VOLTAREN) 50 MG EC tablet Take 1 tablet by mouth 2 times daily 1/9/20   Lars Males, APRN - CNP   rOPINIRole (REQUIP) 1 MG tablet TAKE 1 TABLET THREE TIMES DAILY 12/31/19   Mauri Bunch,    QUEtiapine (SEROQUEL XR) 150 MG TB24 extended release tablet TAKE 1 TABLET ONE TIME DAILY 12/31/19   Mauri Bunch DO   lovastatin (MEVACOR) 20 MG tablet TAKE 1 TABLET EVERY NIGHT 12/31/19   Mauri Bunch DO   levothyroxine (SYNTHROID) 137 MCG tablet TAKE 1 TABLET EVERY DAY 12/31/19   Mauri Bunch DO   escitalopram (LEXAPRO) 20 MG tablet TAKE 1 TABLET EVERY DAY 12/31/19   Mauri Bunch DO   gabapentin (NEURONTIN) 600 MG tablet TAKE 1 TABLET TWICE DAILY 9/19/19 10/19/19  Mauri Bunch DO   traZODone (DESYREL) 150 MG tablet TAKE 1 TABLET EVERY NIGHT 9/4/19   Mauri Bunch DO   diclofenac sodium 1 % GEL APPLY 4 GRAMS 4 TIMES DAILY 8/23/19   Alfred Yarbrough DPM   predniSONE (DELTASONE) 10 MG tablet 3 po bid x 2 days, then 2 po bid x 2 days, then 1 po bid x 2 days, then 1 po qd x 2 days 5/21/19   Alfred Yarbrough DPM   benzonatate (TESSALON PERLES) 100 MG capsule Take 1 capsule by mouth 3 times daily as needed for Cough 3/7/19   SANDOR Burns CNP   albuterol sulfate HFA (PROVENTIL HFA) 108 (90 Base) MCG/ACT inhaler Inhale 2 puffs into the lungs every 6 hours as needed for Wheezing 1/23/19   Mauri Bunch DO   furosemide (LASIX) 40 MG tablet Take 1 tablet by mouth daily 1/23/19   Mauri Bunch DO   omeprazole (PRILOSEC) 40 MG delayed release capsule Take 1 capsule by mouth daily 1/23/19   Mauri Bunch DO   verapamil (VERELAN) 360 MG extended release capsule Take 1 capsule by mouth nightly 1/23/19   Mauri Bunch DO   aspirin 81 MG EC tablet Take 81 mg by mouth daily. Indications: OTC    Historical Provider, MD       Allergies:  Patient has no known allergies. Social History:      The patient currently lives at home    TOBACCO:   reports that she has never smoked. She has never used smokeless tobacco.  ETOH:   reports current alcohol use. E-Cigarettes Vaping or Juuling     Questions Responses    E-Cigarette Use     Start Date     Does device contain nicotine?      Quit Date     E-Cigarette Type             Family History:  Positive as follows:        Problem Relation Age of Onset    Arthritis Mother     Asthma Mother     Diabetes Mother     High Blood Pressure Mother     Depression Mother     Heart Disease Mother 48        CHF    High Cholesterol Mother     Stroke Mother     Mental Illness Mother     Arthritis Father     Cancer Father 36        colon    Stroke Father 48        x 2    Substance Abuse Father alcohol    Kidney Disease Sister     Cancer Sister 50        urethral CA    Cancer Sister 48        lymphoma    Cancer Maternal Uncle         prostate    Cancer Maternal Uncle         prostate    Cancer Maternal Uncle         prostate    Emphysema Neg Hx     Heart Failure Neg Hx     Hypertension Neg Hx        REVIEW OF SYSTEMS:   Pertinent positives as noted in the HPI. All other systems reviewed and negative. PHYSICAL EXAM PERFORMED:    BP (!) 120/99   Pulse 73   Temp 99.6 °F (37.6 °C) (Oral)   Resp 18   Wt 250 lb (113.4 kg)   SpO2 97%   BMI 42.89 kg/m²     General appearance:  No apparent distress, appears stated age and cooperative. HEENT:  Normal cephalic, atraumatic without obvious deformity. Pupils equal, round, and reactive to light. Extra ocular muscles intact. Conjunctivae/corneas clear. Neck: Supple, with full range of motion. No jugular venous distention. Trachea midline. Respiratory:  Normal respiratory effort. Clear to auscultation bilaterally with occasional crackles   cardiovascular:  Regular rate and rhythm with normal S1/S2 without murmurs, rubs or gallops. Abdomen: Soft, non-tender, non-distended with normal bowel sounds. Musculoskeletal:  No clubbing, cyanosis or edema bilaterally. Full range of motion without deformity. Skin: Skin color, texture, turgor normal.  No rashes or lesions. Neurologic:  Neurovascularly intact without any focal sensory/motor deficits.  Cranial nerves: II-XII intact, grossly non-focal.  Psychiatric:  Alert and oriented, thought content appropriate, normal insight  Capillary Refill: Brisk,< 3 seconds   Peripheral Pulses: +2 palpable, equal bilaterally       Labs:     Recent Labs     01/31/20 2147   WBC 4.4   HGB 11.7*   HCT 35.6*        Recent Labs     01/31/20 2147      K 4.0   CL 99   CO2 28   BUN 18   CREATININE 1.1   CALCIUM 8.6     Recent Labs     01/31/20 2147   AST 40*   ALT 43*   BILITOT 0.4   ALKPHOS 88     No results for input(s): INR in the last 72 hours. Recent Labs     01/31/20  2147   TROPONINI <0.01         Radiology:     CXR: I have reviewed the CXR with the following interpretation: Multifocal patchy opacities bilaterally      XR CHEST PORTABLE   Final Result   Patchy opacities within both lungs, most compatible with multifocal   pneumonia. That should be followed to resolution. ASSESSMENT:PLAN:    Influenza A with multifocal pneumonia  -Most likely influenza pneumonia, cannot rule out concomitant bacterial pneumonia most likely gram-positive, staph or strep  -Empiric IV Rocephin and doxy  -Sputum culture, Gram stain, Legionella and strep pneumo urine antigen ordered  - Acapella, I-S, pulmonary toilet  - supplemental O2 to keep sats >92%  -Tamiflu p.o. twice daily    Acute respiratory failure with hypoxia  -Due to multifocal pneumonia and influenza  -Placed on 3 L oxygen, wean as tolerated to maintain sats greater than 92%    Acquired hypothyroidism  Clinically euthyroid, resume levothyroxine-    Depression-mood stable, resume home medication regimen    Hypertension-stable, resume home medications    Fibromyalgia -resume home meds    DVT Prophylaxis: Lovenox  Diet: No diet orders on file  Code Status: Full code  PT/OT Eval Status: Ambulatory    Dispo -IP stay       Thuan Candelario MD    Thank you Holley Bell DO for the opportunity to be involved in this patient's care. If you have any questions or concerns please feel free to contact me at [phone].

## 2020-02-01 NOTE — ED PROVIDER NOTES
Martina Hallman  eMERGENCY dEPARTMENT eNCOUnter      Pt Name: Dwain Gore  MRN: [id number]  [place] 1950  Date of evaluation: 1/31/2020  Provider: Vickie Hubbard MD    [address] [place] [address]       Chief Complaint   Patient presents with    Shortness of Breath     SOB tonight with cough, mid chest pain and fevers today, 83% on room air     Chest Pain    Fever         HISTORY OF PRESENT ILLNESS   (Location/Symptom, Timing/Onset, Context/Setting, Quality, Duration, Modifying Factors, Severity)  Note limiting factors. Dwain Gore is a 78 y.o. female who presents via EMS for 2 days of cough and fever and 1 day of severe shortness of breath and hypoxia. EMS reports that the patient was 83% on room air on arrival.  The patient does not normally have an oxygen requirement. She reports her symptoms are severe, constant, and worsening. She denies any known aggravating or alleviating factors. She denies any hemoptysis or leg swelling. HPI    Nursing Notes were reviewed. REVIEW OFSYSTEMS    (2-9 systems for level 4, 10 or more for level 5)     Review of Systems   Constitutional: Positive for fatigue. Negative for appetite change, fever and unexpected weight change. HENT: Negative for facial swelling, trouble swallowing and voice change. Eyes: Negative for visual disturbance. Respiratory: Positive for cough and shortness of breath. Negative for wheezing and stridor. Cardiovascular: Negative for chest pain and palpitations. Gastrointestinal: Negative for abdominal pain, nausea and vomiting. Genitourinary: Negative for dysuria and vaginal bleeding. Musculoskeletal: Positive for myalgias. Negative for neck pain and neck stiffness. Skin: Negative for color change and wound. Neurological: Positive for headaches. Negative for seizures and syncope. Psychiatric/Behavioral: Negative for self-injury and suicidal ideas.        Except as noted above the remainder of the review of systems was (PROVENTIL HFA) 108 (90 Base) MCG/ACT inhaler Inhale 2 puffs into the lungs every 6 hours as needed for Wheezing  Qty: 3 Inhaler, Refills: 3    Associated Diagnoses: Bronchitis      furosemide (LASIX) 40 MG tablet Take 1 tablet by mouth daily  Qty: 90 tablet, Refills: 1      omeprazole (PRILOSEC) 40 MG delayed release capsule Take 1 capsule by mouth daily  Qty: 90 capsule, Refills: 3    Associated Diagnoses: Gastroesophageal reflux disease without esophagitis      verapamil (VERELAN) 360 MG extended release capsule Take 1 capsule by mouth nightly  Qty: 90 capsule, Refills: 1    Associated Diagnoses: Essential hypertension      aspirin 81 MG EC tablet Take 81 mg by mouth daily. Indications: OTC             ALLERGIES     Patient has no known allergies.     FAMILY HISTORY       Family History   Problem Relation Age of Onset    Arthritis Mother     Asthma Mother     Diabetes Mother     High Blood Pressure Mother     Depression Mother     Heart Disease Mother 48        CHF    High Cholesterol Mother     Stroke Mother     Mental Illness Mother     Arthritis Father     Cancer Father 36        colon    Stroke Father 48        x 2    Substance Abuse Father         alcohol    Kidney Disease Sister     Cancer Sister 50        urethral CA    Cancer Sister 48        lymphoma    Cancer Maternal Uncle         prostate    Cancer Maternal Uncle         prostate    Cancer Maternal Uncle         prostate    Emphysema Neg Hx     Heart Failure Neg Hx     Hypertension Neg Hx           SOCIAL HISTORY       Social History     Socioeconomic History    Marital status:      Spouse name: None    Number of children: None    Years of education: None    Highest education level: None   Occupational History    None   Social Needs    Financial resource strain: None    Food insecurity:     Worry: None     Inability: None    Transportation needs:     Medical: None     Non-medical: None   Tobacco Use    Smoking status: Never Smoker    Smokeless tobacco: Never Used   Substance and Sexual Activity    Alcohol use: Yes     Alcohol/week: 0.0 standard drinks     Comment: 1 beer twice a year    Drug use: No    Sexual activity: Yes   Lifestyle    Physical activity:     Days per week: None     Minutes per session: None    Stress: None   Relationships    Social connections:     Talks on phone: None     Gets together: None     Attends Nondenominational service: None     Active member of club or organization: None     Attends meetings of clubs or organizations: None     Relationship status: None    Intimate partner violence:     Fear of current or ex partner: None     Emotionally abused: None     Physically abused: None     Forced sexual activity: None   Other Topics Concern    None   Social History Narrative    None         PHYSICAL EXAM    (up to 7 for level 4, 8 or more for level 5)     ED Triage Vitals [01/31/20 2128]   BP Temp Temp Source Pulse Resp SpO2 Height Weight   (!) 151/71 99.6 °F (37.6 °C) Oral 80 18 95 % -- 250 lb (113.4 kg)       Physical Exam  Vitals signs and nursing note reviewed. Constitutional:       Appearance: She is well-developed. Comments: Early  female in mild respiratory distress. HENT:      Head: Normocephalic and atraumatic. Right Ear: External ear normal.      Left Ear: External ear normal.   Eyes:      Conjunctiva/sclera: Conjunctivae normal.   Neck:      Musculoskeletal: Neck supple. Vascular: No JVD. Trachea: No tracheal deviation. Cardiovascular:      Rate and Rhythm: Normal rate. Pulmonary:      Comments: Mild end expiratory rhonchi. No accessory muscle usage. Patient able to speak in 5-6 word sentences. Abdominal:      General: Abdomen is flat. There is no distension. Palpations: Abdomen is soft. Tenderness: There is no abdominal tenderness. There is no guarding or rebound. Musculoskeletal: Normal range of motion.          General: No tenderness or deformity. Skin:     General: Skin is warm and dry. Neurological:      General: No focal deficit present. Mental Status: She is oriented to person, place, and time. Cranial Nerves: No cranial nerve deficit. DIAGNOSTIC RESULTS     EKG:All EKG's are interpreted by the Emergency Department Physician who either signs or Co-signs this chart in the absence of a cardiologist.    The Ekg interpreted by me shows  normal sinus rhythm with a rate of 73  Axis is   Normal  QTc is  438ms  Intervals and Durations are unremarkable. ST Segments: Flattened T waves in lateral leads  No significant changes from previous EKG on 1/21/2019      RADIOLOGY:     Interpretation per the Radiologist below, if available at the time of this note:    XR CHEST PORTABLE   Final Result   Patchy opacities within both lungs, most compatible with multifocal   pneumonia. That should be followed to resolution.                LABS:  Labs Reviewed   RAPID INFLUENZA A/B ANTIGENS - Abnormal; Notable for the following components:       Result Value    Rapid Influenza A Ag POSITIVE (*)     All other components within normal limits    Narrative:     Performed at:  [address],  [place], [address] [place]   Phone [phone]   CBC WITH AUTO DIFFERENTIAL - Abnormal; Notable for the following components:    Hemoglobin 11.7 (*)     Hematocrit 35.6 (*)     Lymphocytes Absolute 0.5 (*)     All other components within normal limits    Narrative:     Performed at:  [address],  [place], [address] [place]   Phone [phone]   COMPREHENSIVE METABOLIC PANEL - Abnormal; Notable for the following components:    Glucose 114 (*)     GFR Non- 49 (*)     GFR  59 (*)     Total Protein 6.3 (*)     ALT 43 (*)     AST 40 (*)     All other components within normal limits    Narrative:     Performed at:  [address] [facility], [facility] [employer]   Phone [phone]   CULTURE BLOOD #1   CULTURE BLOOD #2   TROPONIN    Narrative:     Performed at:  [facility]) [place] [address] [employer]   Phone [phone]   LACTATE, SEPSIS    Narrative:     Performed at:  [facility]) [place] [address] [employer]   Phone [phone]       All otherlabs were within normal range or not returned as of this dictation. EMERGENCY DEPARTMENT COURSE and DIFFERENTIAL DIAGNOSIS/MDM:   Vitals:    Vitals:    01/31/20 2135 01/31/20 2252 01/31/20 2303 01/31/20 2337   BP:   130/69 (!) 120/99   Pulse:   78 73   Resp:  16 16 18   Temp:       TempSrc:       SpO2: 94% 99% 98% 97%   Weight:             MDM  Patient has oxygen requirement on arrival which is new from her baseline. Work-up is concerning for flu swab positive for influenza A and chest x-ray concerning for multifocal pneumonia. Based on patient's new acute hypoxic respiratory failure I do feel she warrants Tamiflu, broad-spectrum antibiotics, and admission for further care. The patient is also given a breathing treatment. Patient is reevaluated multiple times her stay in the emergency department with no acute worsening in clinical status. The patient family expressed understanding and agreement with this plan. She is admitted for further care.     CONSULTS:  IP CONSULT TO HOSPITALIST     Critical Care  Performed by: Alfonso Krueger MD  Authorized by: Alfonso Krueger MD     Critical care provider statement:     Critical care time (minutes):  35    Critical care time was exclusive of:  Separately billable procedures and treating other patients and teaching time    Critical care was necessary to treat or prevent imminent or life-threatening deterioration of the following conditions:  Respiratory failure and shock    Critical care was time spent personally by me on the following activities:  Ordering and performing treatments and interventions, development of treatment plan with patient or surrogate, ordering and review of laboratory studies, discussions with consultants, ordering and review of radiographic studies, pulse oximetry, evaluation of patient's response to treatment, re-evaluation of patient's condition, examination of patient and obtaining history from patient or surrogate        FINAL IMPRESSION      1. Acute respiratory failure with hypoxia ([facility] [address]    2. Influenza A    3. Multifocal pneumonia          DISPOSITION/PLAN   DISPOSITION Admitted 01/31/2020 11:08:09 PM        (Please note that portions of this note were completed with a voice recognition program.  Efforts were made to edit the dictations but occasionally words aremis-transcribed. )    Jessy Cohen MD (electronically signed)  Attending Emergency Physician           Jessy Cohen MD  02/01/20 8731

## 2020-02-01 NOTE — PLAN OF CARE
Problem: Falls - Risk of:  Goal: Will remain free from falls  Description  Will remain free from falls  Outcome: Ongoing  Goal: Absence of physical injury  Description  Absence of physical injury  Outcome: Ongoing  Note:   Bed locked in lowest position, side rails up 2/4, non-skid socks on. Call light/bedside table within reach. Will continue to monitor.

## 2020-02-02 VITALS
WEIGHT: 249.5 LBS | OXYGEN SATURATION: 93 % | HEART RATE: 73 BPM | TEMPERATURE: 98 F | SYSTOLIC BLOOD PRESSURE: 145 MMHG | DIASTOLIC BLOOD PRESSURE: 84 MMHG | BODY MASS INDEX: 42.59 KG/M2 | RESPIRATION RATE: 16 BRPM | HEIGHT: 64 IN

## 2020-02-02 LAB
L. PNEUMOPHILA SEROGP 1 UR AG: NORMAL
STREP PNEUMONIAE ANTIGEN, URINE: NORMAL

## 2020-02-02 PROCEDURE — 94640 AIRWAY INHALATION TREATMENT: CPT

## 2020-02-02 PROCEDURE — 2580000003 HC RX 258: Performed by: INTERNAL MEDICINE

## 2020-02-02 PROCEDURE — 6370000000 HC RX 637 (ALT 250 FOR IP): Performed by: INTERNAL MEDICINE

## 2020-02-02 PROCEDURE — 94761 N-INVAS EAR/PLS OXIMETRY MLT: CPT

## 2020-02-02 PROCEDURE — 6360000002 HC RX W HCPCS: Performed by: INTERNAL MEDICINE

## 2020-02-02 PROCEDURE — 6360000002 HC RX W HCPCS: Performed by: FAMILY MEDICINE

## 2020-02-02 PROCEDURE — 2700000000 HC OXYGEN THERAPY PER DAY

## 2020-02-02 PROCEDURE — 2500000003 HC RX 250 WO HCPCS: Performed by: INTERNAL MEDICINE

## 2020-02-02 RX ORDER — CEFUROXIME AXETIL 500 MG/1
500 TABLET ORAL 2 TIMES DAILY
Qty: 10 TABLET | Refills: 0 | Status: SHIPPED | OUTPATIENT
Start: 2020-02-02 | End: 2020-02-07

## 2020-02-02 RX ORDER — OSELTAMIVIR PHOSPHATE 75 MG/1
75 CAPSULE ORAL 2 TIMES DAILY
Qty: 7 CAPSULE | Refills: 0 | Status: SHIPPED | OUTPATIENT
Start: 2020-02-02 | End: 2020-02-06

## 2020-02-02 RX ADMIN — ASPIRIN 81 MG: 81 TABLET, COATED ORAL at 09:04

## 2020-02-02 RX ADMIN — KETOROLAC TROMETHAMINE 15 MG: 30 INJECTION, SOLUTION INTRAMUSCULAR at 05:38

## 2020-02-02 RX ADMIN — OSELTAMIVIR PHOSPHATE 75 MG: 75 CAPSULE ORAL at 09:04

## 2020-02-02 RX ADMIN — GABAPENTIN 600 MG: 300 CAPSULE ORAL at 09:04

## 2020-02-02 RX ADMIN — IPRATROPIUM BROMIDE AND ALBUTEROL SULFATE 1 AMPULE: .5; 3 SOLUTION RESPIRATORY (INHALATION) at 09:28

## 2020-02-02 RX ADMIN — DOXYCYCLINE 100 MG: 100 INJECTION, POWDER, LYOPHILIZED, FOR SOLUTION INTRAVENOUS at 05:46

## 2020-02-02 RX ADMIN — ROPINIROLE HYDROCHLORIDE 1 MG: 0.5 TABLET, FILM COATED ORAL at 09:03

## 2020-02-02 RX ADMIN — CEFTRIAXONE SODIUM 1 G: 1 INJECTION, POWDER, FOR SOLUTION INTRAMUSCULAR; INTRAVENOUS at 02:24

## 2020-02-02 RX ADMIN — ESCITALOPRAM OXALATE 20 MG: 10 TABLET ORAL at 09:03

## 2020-02-02 RX ADMIN — LEVOTHYROXINE SODIUM 125 MCG: 125 TABLET ORAL at 05:38

## 2020-02-02 ASSESSMENT — PAIN SCALES - GENERAL
PAINLEVEL_OUTOF10: 7
PAINLEVEL_OUTOF10: 4

## 2020-02-02 NOTE — PROGRESS NOTES
Shift assessments completed and charted. Pt alert & oriented,not in distress. Supplemental oxygen was weaned off- saturating at mid 90s on RA, SOB wasn't noted. Still w. non-productive cough, expiratory wheezes heard b/l upon auscultation. Will continue to monitor.

## 2020-02-02 NOTE — DISCHARGE SUMMARY
Disp-500 g, R-0, Normal      benzonatate (TESSALON PERLES) 100 MG capsule Take 1 capsule by mouth 3 times daily as needed for Cough, Disp-30 capsule, R-0Normal      albuterol sulfate HFA (PROVENTIL HFA) 108 (90 Base) MCG/ACT inhaler Inhale 2 puffs into the lungs every 6 hours as needed for Wheezing, Disp-3 Inhaler, R-3Normal      furosemide (LASIX) 40 MG tablet Take 1 tablet by mouth daily, Disp-90 tablet, R-1Normal      omeprazole (PRILOSEC) 40 MG delayed release capsule Take 1 capsule by mouth daily, Disp-90 capsule, R-3Normal      verapamil (VERELAN) 360 MG extended release capsule Take 1 capsule by mouth nightly, Disp-90 capsule, R-1Normal      aspirin 81 MG EC tablet Take 81 mg by mouth daily. Indications: OTC             Time Spent on discharge is more than 30 minutes in the examination, evaluation, counseling and review of medications and discharge plan. Signed:    Letty Gleason MD   2/2/2020      Thank you Radha Hdz DO for the opportunity to be involved in this patient's care. If you have any questions or concerns please feel free to contact me at [phone].

## 2020-02-03 ENCOUNTER — TELEPHONE (OUTPATIENT)
Dept: FAMILY MEDICINE CLINIC | Age: 70
End: 2020-02-03

## 2020-02-03 LAB
CULTURE, RESPIRATORY: NORMAL
GRAM STAIN RESULT: NORMAL

## 2020-02-05 LAB
BLOOD CULTURE, ROUTINE: NORMAL
CULTURE, BLOOD 2: NORMAL

## 2020-02-10 ENCOUNTER — OFFICE VISIT (OUTPATIENT)
Dept: FAMILY MEDICINE CLINIC | Age: 70
End: 2020-02-10
Payer: MEDICARE

## 2020-02-10 VITALS
BODY MASS INDEX: 42.14 KG/M2 | DIASTOLIC BLOOD PRESSURE: 78 MMHG | HEIGHT: 64 IN | SYSTOLIC BLOOD PRESSURE: 128 MMHG | TEMPERATURE: 98.1 F | OXYGEN SATURATION: 98 % | HEART RATE: 77 BPM | WEIGHT: 246.8 LBS

## 2020-02-10 PROCEDURE — 1123F ACP DISCUSS/DSCN MKR DOCD: CPT | Performed by: FAMILY MEDICINE

## 2020-02-10 PROCEDURE — G8417 CALC BMI ABV UP PARAM F/U: HCPCS | Performed by: FAMILY MEDICINE

## 2020-02-10 PROCEDURE — 4040F PNEUMOC VAC/ADMIN/RCVD: CPT | Performed by: FAMILY MEDICINE

## 2020-02-10 PROCEDURE — 1111F DSCHRG MED/CURRENT MED MERGE: CPT | Performed by: FAMILY MEDICINE

## 2020-02-10 PROCEDURE — G8484 FLU IMMUNIZE NO ADMIN: HCPCS | Performed by: FAMILY MEDICINE

## 2020-02-10 PROCEDURE — 99214 OFFICE O/P EST MOD 30 MIN: CPT | Performed by: FAMILY MEDICINE

## 2020-02-10 PROCEDURE — G8399 PT W/DXA RESULTS DOCUMENT: HCPCS | Performed by: FAMILY MEDICINE

## 2020-02-10 PROCEDURE — 1036F TOBACCO NON-USER: CPT | Performed by: FAMILY MEDICINE

## 2020-02-10 PROCEDURE — 3017F COLORECTAL CA SCREEN DOC REV: CPT | Performed by: FAMILY MEDICINE

## 2020-02-10 PROCEDURE — G8427 DOCREV CUR MEDS BY ELIG CLIN: HCPCS | Performed by: FAMILY MEDICINE

## 2020-02-10 PROCEDURE — 1090F PRES/ABSN URINE INCON ASSESS: CPT | Performed by: FAMILY MEDICINE

## 2020-02-10 NOTE — PROGRESS NOTES
Post-Discharge Transitional Care Management Services or Hospital Follow Up      Delma Presumshanika   YOB: 1950    Date of Office Visit:  2/10/2020  Date of Hospital Admission: 1/31/20  Date of Hospital Discharge: 2/2/20  Risk of hospital readmission (high >=14%. Medium >=10%) :Readmission Risk Score: 15      Care management risk score Rising risk (score 2-5) and Complex Care (Scores >=6): 1     Non face to face  following discharge, date last encounter closed (first attempt may have been earlier): 2/3/2020  2:06 PM    Call initiated 2 business days of discharge: Yes    Patient Active Problem List   Diagnosis    Insomnia    Fibromyalgia    Hyperlipidemia    Mood disorder    Vitamin D deficiency    Urinary incontinence    Chronic back pain    GERD (gastroesophageal reflux disease)    Acquired hypothyroidism    Hypertriglyceridemia    Essential hypertension    Leg edema    RLS (restless legs syndrome)    S/P bariatric surgery - sleeve    Pain, foot, chronic    BMI 40.0-44.9, adult (HCC)    Major depressive disorder, recurrent episode, mild (HCC)    Dermatitis    Localized edema    Localized osteoarthrosis, lower leg    Blurred vision, right eye    Grief    Influenza A with pneumonia    Acute respiratory failure with hypoxemia (HCC)    Multifocal pneumonia       No Known Allergies    Medications listed as ordered at the time of discharge from hospital   Nicolás Imani J   Home Medication Instructions JONNY:    Printed on:02/10/20 1413   Medication Information                      albuterol sulfate HFA (PROVENTIL HFA) 108 (90 Base) MCG/ACT inhaler  Inhale 2 puffs into the lungs every 6 hours as needed for Wheezing             aspirin 81 MG EC tablet  Take 81 mg by mouth daily.  Indications: OTC             benzonatate (TESSALON PERLES) 100 MG capsule  Take 1 capsule by mouth 3 times daily as needed for Cough             diclofenac (VOLTAREN) 50 MG EC tablet  Take 1 tablet by mouth 2 normal air movement, no respiratory distress and mild decrease in BS's. Cardiovascular: normal rate, normal S1 and S2 and no gallops  Extremities: no edema    Assessment/Plan:  1. Pneumonia of both lungs due to influenza A virus, unspecified part of lung    Fluids-rest-need repeat CXR at next visit.        Medical Decision Making: Low Compl

## 2020-03-13 ENCOUNTER — TELEPHONE (OUTPATIENT)
Dept: FAMILY MEDICINE CLINIC | Age: 70
End: 2020-03-13

## 2020-04-21 RX ORDER — LEVOTHYROXINE SODIUM 0.15 MG/1
150 TABLET ORAL DAILY
Qty: 90 TABLET | Refills: 0 | Status: SHIPPED | OUTPATIENT
Start: 2020-04-21 | End: 2020-06-01

## 2020-05-13 DIAGNOSIS — I10 ESSENTIAL HYPERTENSION: ICD-10-CM

## 2020-05-13 LAB
ANION GAP SERPL CALCULATED.3IONS-SCNC: 10 MMOL/L (ref 3–16)
BUN BLDV-MCNC: 19 MG/DL (ref 7–20)
CALCIUM SERPL-MCNC: 9.2 MG/DL (ref 8.3–10.6)
CHLORIDE BLD-SCNC: 107 MMOL/L (ref 99–110)
CO2: 28 MMOL/L (ref 21–32)
CREAT SERPL-MCNC: 1.4 MG/DL (ref 0.6–1.2)
GFR AFRICAN AMERICAN: 45
GFR NON-AFRICAN AMERICAN: 37
GLUCOSE BLD-MCNC: 85 MG/DL (ref 70–99)
POTASSIUM SERPL-SCNC: 4.2 MMOL/L (ref 3.5–5.1)
SODIUM BLD-SCNC: 145 MMOL/L (ref 136–145)

## 2020-06-22 ENCOUNTER — NURSE ONLY (OUTPATIENT)
Dept: ORTHOPEDIC SURGERY | Age: 70
End: 2020-06-22
Payer: MEDICARE

## 2020-06-22 PROCEDURE — 20611 DRAIN/INJ JOINT/BURSA W/US: CPT | Performed by: PHYSICIAN ASSISTANT

## 2020-06-22 RX ORDER — METHYLPREDNISOLONE ACETATE 40 MG/ML
160 INJECTION, SUSPENSION INTRA-ARTICULAR; INTRALESIONAL; INTRAMUSCULAR; SOFT TISSUE ONCE
Status: COMPLETED | OUTPATIENT
Start: 2020-06-22 | End: 2020-06-22

## 2020-06-22 RX ORDER — BUPIVACAINE HYDROCHLORIDE 2.5 MG/ML
30 INJECTION, SOLUTION INFILTRATION; PERINEURAL ONCE
Status: COMPLETED | OUTPATIENT
Start: 2020-06-22 | End: 2020-06-22

## 2020-06-22 RX ORDER — LIDOCAINE HYDROCHLORIDE 10 MG/ML
20 INJECTION, SOLUTION INFILTRATION; PERINEURAL ONCE
Status: COMPLETED | OUTPATIENT
Start: 2020-06-22 | End: 2020-06-22

## 2020-06-22 RX ADMIN — LIDOCAINE HYDROCHLORIDE 20 ML: 10 INJECTION, SOLUTION INFILTRATION; PERINEURAL at 16:08

## 2020-06-22 RX ADMIN — METHYLPREDNISOLONE ACETATE 160 MG: 40 INJECTION, SUSPENSION INTRA-ARTICULAR; INTRALESIONAL; INTRAMUSCULAR; SOFT TISSUE at 16:08

## 2020-06-22 RX ADMIN — BUPIVACAINE HYDROCHLORIDE 75 MG: 2.5 INJECTION, SOLUTION INFILTRATION; PERINEURAL at 16:08

## 2020-06-22 NOTE — PROGRESS NOTES
Chief Complaint   Patient presents with    Injections     CORTISONE INJ RACHANA KNEES     Bilateral knee osteoarthritis    She returns to clinic today for repeat bilateral knee cortisone injections. She is received excellent pain relief with these injections in the past.  Unfortunately, since last visit she lost her  which delayed her being able to return to our clinic for repeat injections. Orders Placed This Encounter   Procedures    US ARTHR/ASP/INJ MAJOR JNT/BURSA RIGHT     Order Specific Question:   Reason for exam:     Answer:   PAIN    SC ARTHROCENTESIS ASPIR&/INJ MAJOR JT/BURSA W/O US     I discussed in detail the risks, benefits and complications of an injection which included but are not limited to infection, skin reactions, hot swollen joint, and anaphylaxis with the patient. The patient verbalized understanding and gave informed consent for the injection. The patient's knee was flexed to 90° and the skin prepped using sterile alcohol solution. A sterile 22-gauge needle was inserted into the knee and the mixture of 4 mL of 2% Carbocaine, 4 mL of 0.25% Marcaine, and 80 mg of Depo-Medrol was injected under sterile technique. The needle was withdrawn and the puncture site sealed with a Band-Aid. Technique: Under sterile conditions a [employer] ultrasound unit with a variable frequency (6.0-15.0 MHz) linear transducer was used to localize the placement of a 22-gauge needle into the right and left knee joint. Findings: Successful needle placement for knee injection. Final images were taken and saved for permanent record. The patient tolerated the injection well. The patient was instructed to call the office immediately if there is any pain, redness, warmth, fever, or chills. She is going to return to the clinic in 1 month to begin viscosupplementation injections. She has [facility] which does not need authorization.         [facility]    This dictation was performed with a

## 2020-08-04 ENCOUNTER — NURSE ONLY (OUTPATIENT)
Dept: ORTHOPEDIC SURGERY | Age: 70
End: 2020-08-04
Payer: MEDICARE

## 2020-08-04 PROCEDURE — 20611 DRAIN/INJ JOINT/BURSA W/US: CPT | Performed by: PHYSICIAN ASSISTANT

## 2020-08-04 NOTE — PROGRESS NOTES
Chief Complaint   Patient presents with    Knee Pain     #1 ORTHOVISC RACHANA KNEES     Dx: Osteoarthritis right and left knee    The patient is symptomatic from osteoarthritis of the right and left knee joint with documented radiological signs of arthritis. The patient has also failed 3 months of conservative treatment including home exercise, education, Tylenol and/or NSAIDs use. The patient was offered a Visco supplementation today. Risks, benefits, and alternatives to the injections were discussed in detail with the patient. The risks discussed included but are not limited to infection, skin reactions, hot swollen joints, and anaphylaxis. The patient gave verbal informed consent for the injection. The patient's skin was prepped with  3 sterile gauze  pads soaked with alcohol solution and the knee joint was injected with 2cc Orthovisc intra-articularly under sterile conditions. Technique: Under sterile conditions a SonMeta ultrasound unit with a variable frequency (6.0-15.0 MHz) linear transducer was used to localize the placement of a 22-gauge needle into the right and left knee joint. Findings: Successful needle placement for intra-articular Visco supplementation injection. Final images were taken and saved for permanent record. The patient tolerated the injection reasonably well. The patient was given instructions to ice the kne and avoid strenuous activities for 24-48 hours. The patient was instructed to call the office immediately if there is increased pain, redness, warmth, fever, or chills. We will see the patient back in one week for their second injection. Tony Hill, Cedrick Lezama    This dictation was performed with a verbal recognition program [facility]) and it was checked for errors. It is possible that there are still dictated errors within this office note. If so, please bring any errors to my attention for an addendum. All efforts were made to ensure that this office note is accurate.

## 2020-08-10 ENCOUNTER — NURSE TRIAGE (OUTPATIENT)
Dept: OTHER | Facility: CLINIC | Age: 70
End: 2020-08-10

## 2020-08-10 ENCOUNTER — TELEPHONE (OUTPATIENT)
Dept: FAMILY MEDICINE CLINIC | Age: 70
End: 2020-08-10

## 2020-08-10 NOTE — TELEPHONE ENCOUNTER
Dizzy spells with falls for a couple months however is increasing in frequency in the past couple weeks. Last fell 2 days ago. She has bruises and skin tears from catch herself. No head injury. Reason for Disposition   Extra heart beats OR irregular heart beating (i.e., 'palpitations')    Answer Assessment - Initial Assessment Questions  1. DESCRIPTION: \"Describe your dizziness. \"      Denies room tilting    2. LIGHTHEADED: \"Do you feel lightheaded? \" (e.g., somewhat faint, woozy, weak upon standing)      Yes    3. VERTIGO: \"Do you feel like either you or the room is spinning or tilting? \" (i.e. vertigo)      Denies    4. SEVERITY: \"How bad is it? \"  \"Do you feel like you are going to faint? \" \"Can you stand and walk? \"    - MILD - walking normally    - MODERATE - interferes with normal activities (e.g., work, school)     - SEVERE - unable to stand, requires support to walk, feels like passing out now. Comes and goes. Is severe when it occurs    5. ONSET:  \"When did the dizziness begin? \"      No pattern. Comes out of the blue. 6. AGGRAVATING FACTORS: \"Does anything make it worse? \" (e.g., standing, change in head position)      Denies    7. HEART RATE: \"Can you tell me your heart rate? \" \"How many beats in 15 seconds? \"  (Note: not all patients can do this)        Feels fluttering in chest sometimes with the dizziness and has tightness that radiates to left shoulder. This happens and sometimes she doesn't get dizzy. However she also gets dizzy without having this sensation. 8. CAUSE: \"What do you think is causing the dizziness? \"      Unsure    9. RECURRENT SYMPTOM: \"Have you had dizziness before? \" If so, ask: \"When was the last time? \" \"What happened that time? \"      Denies    10. OTHER SYMPTOMS: \"Do you have any other symptoms? \" (e.g., fever, chest pain, vomiting, diarrhea, bleeding)        See above    11. PREGNANCY: \"Is there any chance you are pregnant? \" \"When was your last menstrual period? \" NA    Protocols used: [id number]    Patient called [facility]service center [facility]) to schedule appointment, with red flag complaint, transferred to RN access for triage. See above questions and answers. Caller talking full sentences without any distress on phone. Discussed disposition and patient not agreeable to going to the ED. She is agreeable to PCP today. Discussed potential consequences for not following disposition recommendation. Aware to call back with any concerns or persistent, worsening, or new symptoms develop. Warm transfer to [facility] scheduling for appointment. Please do not respond to the triage nurse through this encounter. Any subsequent communication should be directly with the patient.

## 2020-08-10 NOTE — TELEPHONE ENCOUNTER
----- Message from [employer] sent at 8/10/2020 11:26 AM EDT -----  Subject: Appointment Request    Reason for Call: Immediate Return from RN Triage    QUESTIONS  Type of Appointment? Established Patient  Reason for appointment request? No appointments available during search  Additional Information for Provider? Returned from nurse triage   having dizzy spells and heart palpitations. Nurse recommended that they   be seen today. Screened green  ---------------------------------------------------------------------------  --------------  CALL BACK INFO  What is the best way for the office to contact you? OK to leave message on   voicemail   OK to respond with secure message via [employer] portal (only for patients   who have registered [employer] account)  Preferred Call Back Phone Number? [phone]  ---------------------------------------------------------------------------  --------------  SCRIPT ANSWERS  Patient needs to be seen today? Yes  Nurse Name? Colleen Tenorio  (Did RN indicate the need for Red Scheduling?)? No  Have you been diagnosed with COVID-19 (Coronavirus)   tested for COVID-19 (Coronavirus)   or told that you are suspected of having COVID-19 (Coronavirus)? No  Have you had a fever or taken medication to treat a fever within the past   3 days? No  Have you had a cough   shortness of breath or flu-like symptoms within the past 3 days? No  Do you currently have flu-like symptoms including fever or chills   cough   shortness of breath   or difficulty breathing   or new loss of taste or smell? No  (Service Expert  click yes below to proceed with [employer]   Scheduling)?  Yes

## 2020-08-12 ENCOUNTER — NURSE ONLY (OUTPATIENT)
Dept: ORTHOPEDIC SURGERY | Age: 70
End: 2020-08-12
Payer: MEDICARE

## 2020-08-12 PROCEDURE — 20611 DRAIN/INJ JOINT/BURSA W/US: CPT | Performed by: PHYSICIAN ASSISTANT

## 2020-08-12 NOTE — PROGRESS NOTES
Chief Complaint   Patient presents with    Injections     #2 ORTHOVISC RACHANA KNEES     Dx: Osteoarthritis right and left knee      The patient returns today for their second right and left knee Visco supplementation injection. The risks, benefits, and complications of the injections were again discussed in detail with the patient. The risks discussed included but are not limited to infection, skin reactions, hot swollen joints, and anaphylaxis. The patient gave verbal informed consent for the injection. The patient skin was prepped with 3 sterile gauze pads soaked with alcohol solution and the knee joint was injected with 2cc Orthovisc intra-articularly under sterile conditions . Technique: Under sterile conditions a Son[employer] ultrasound unit with a variable frequency (6.0-15.0 MHz) linear transducer was used to localize the placement of a 22-gauge needle into the right and left knee joint. Findings: Successful needle placement for intra-articular Visco supplementation injection. Final images were taken and saved for permanent record. The patient tolerated the injection reasonably well. The patient was given instructions to ice the the knee and avoid strenuous activities for 24-48 hours. The patient was instructed to call the office immediately if there is increased pain, redness, warmth, fever, or chills. We will see the patient back in one week for the third injection. Blaze Baires [facility]    This dictation was performed with a verbal recognition program [facility]) and it was checked for errors. It is possible that there are still dictated errors within this office note. If so, please bring any errors to my attention for an addendum. All efforts were made to ensure that this office note is accurate.

## 2020-08-18 ENCOUNTER — NURSE ONLY (OUTPATIENT)
Dept: ORTHOPEDIC SURGERY | Age: 70
End: 2020-08-18
Payer: MEDICARE

## 2020-08-18 PROCEDURE — 20611 DRAIN/INJ JOINT/BURSA W/US: CPT | Performed by: PHYSICIAN ASSISTANT

## 2020-08-18 NOTE — PROGRESS NOTES
Bilateral knee osteoarthritis  Orthovisc No. 3 bilateral knee    The patient returns today for their third and final RIGHT and LEFT knee Visco supplementation injection. The risks, benefits, and complications of the injections were again discussed in detail with the patient. The risks discussed include but are not limited to infection, skin reactions, hot swollen joints, and anaphylaxis. The patient gave verbal informed consent for the injection. The patient's skin was prepped with 3 sterile gauze pads soaked with alcohol solution and the knee joint was injected with 2cc Orthovisc RIGHT and LEFT intra-articularly under sterile conditions. Technique: Under sterile conditions a [employer] ultrasound unit with a variable frequency (6.0-15.0 MHz) linear transducer was used to localize the placement of a 22-gauge needle into the RIGHT and left knee joint. Findings: Successful needle placement for intra-articular Visco supplementation injection. Final images were taken and saved for permanent record. The patient tolerated the injection reasonably well. The patient was given instructions to ice of the knee and avoid strenuous activity for 24-48 hours. The patient was instructed to call the office immediately if there is increased pain, redness, warmth, fever, or chills. We will see the patient back on an as-needed basis  from this point.

## 2020-11-09 ENCOUNTER — NURSE TRIAGE (OUTPATIENT)
Dept: OTHER | Facility: CLINIC | Age: 70
End: 2020-11-09

## 2020-11-09 NOTE — TELEPHONE ENCOUNTER
Back pain in middle back on right side. Denies pain and burning when urinating. C/o urgency when have to urinate. Symptoms getting worse last couple months. Reason for Disposition   Patient wants to be seen    Answer Assessment - Initial Assessment Questions  1. ONSET: \"When did the pain begin? \"       See above    2. LOCATION: \"Where does it hurt? \" (upper, mid or lower back)      Middle back     3. SEVERITY: \"How bad is the pain? \"  (e.g., Scale 1-10; mild, moderate, or severe)    - MILD (1-3): doesn't interfere with normal activities     - MODERATE (4-7): interferes with normal activities or awakens from sleep     - SEVERE (8-10): excruciating pain, unable to do any normal activities       8/10    4. PATTERN: \"Is the pain constant? \" (e.g., yes, no; constant, intermittent)       Constant    5. RADIATION: \"Does the pain shoot into your legs or elsewhere? \"      Denies    6. CAUSE:  \"What do you think is causing the back pain? \"       Not usr    7. BACK OVERUSE:  Viktor Cobia recent lifting of heavy objects, strenuous work or exercise? \"      Denies    8. MEDICATIONS: \"What have you taken so far for the pain? \" (e.g., nothing, acetaminophen, NSAIDS)      aleve    9. NEUROLOGIC SYMPTOMS: \"Do you have any weakness, numbness, or problems with bowel/bladder control? \"      Numbness and tingling-has neuropathy    10. OTHER SYMPTOMS: \"Do you have any other symptoms? \" (e.g., fever, abdominal pain, burning with urination, blood in urine)        Denies    11. PREGNANCY: \"Is there any chance you are pregnant? \" (e.g., yes, no; LMP)        Na    Protocols used: BACK PAIN-ADULT-[place]    Patient called pre-service center [facility]) to schedule appointment, with red flag complaint, transferred to RN access for triage. See above questions and answers. Caller talking full sentences without any distress on phone. Discussed disposition and patient agreeable. Discussed potential consequences for not following disposition recommendation.   Aware to call back with any concerns or persistent, worsening, or new symptoms develop. Warm transfer to [facility] scheduling for appointment. Attention Provider: Thank you for allowing me to participate in the care of your patient. The  patient was connected to triage in response to information provided to the ECC. Please do not respond through this encounter as the response is not directed to a shared pool.

## 2020-11-10 ENCOUNTER — OFFICE VISIT (OUTPATIENT)
Dept: FAMILY MEDICINE CLINIC | Age: 70
End: 2020-11-10
Payer: MEDICARE

## 2020-11-10 VITALS
HEART RATE: 75 BPM | HEIGHT: 64 IN | SYSTOLIC BLOOD PRESSURE: 130 MMHG | BODY MASS INDEX: 44.39 KG/M2 | OXYGEN SATURATION: 92 % | WEIGHT: 260 LBS | DIASTOLIC BLOOD PRESSURE: 80 MMHG

## 2020-11-10 LAB
ANION GAP SERPL CALCULATED.3IONS-SCNC: 13 MMOL/L (ref 3–16)
BILIRUBIN, POC: NORMAL
BLOOD URINE, POC: NORMAL
BUN BLDV-MCNC: 11 MG/DL (ref 7–20)
CALCIUM SERPL-MCNC: 9 MG/DL (ref 8.3–10.6)
CHLORIDE BLD-SCNC: 101 MMOL/L (ref 99–110)
CLARITY, POC: CLEAR
CO2: 25 MMOL/L (ref 21–32)
COLOR, POC: YELLOW
CREAT SERPL-MCNC: 1.3 MG/DL (ref 0.6–1.2)
GFR AFRICAN AMERICAN: 49
GFR NON-AFRICAN AMERICAN: 40
GLUCOSE BLD-MCNC: 92 MG/DL (ref 70–99)
GLUCOSE URINE, POC: NORMAL
KETONES, POC: NORMAL
LEUKOCYTE EST, POC: NORMAL
NITRITE, POC: NORMAL
PH, POC: 6.5
POTASSIUM SERPL-SCNC: 4.9 MMOL/L (ref 3.5–5.1)
PROTEIN, POC: NORMAL
SODIUM BLD-SCNC: 139 MMOL/L (ref 136–145)
SPECIFIC GRAVITY, POC: 1.01
TSH SERPL DL<=0.05 MIU/L-ACNC: 21.77 UIU/ML (ref 0.27–4.2)
UROBILINOGEN, POC: 0.2

## 2020-11-10 PROCEDURE — G8484 FLU IMMUNIZE NO ADMIN: HCPCS | Performed by: FAMILY MEDICINE

## 2020-11-10 PROCEDURE — G0008 ADMIN INFLUENZA VIRUS VAC: HCPCS | Performed by: FAMILY MEDICINE

## 2020-11-10 PROCEDURE — 4040F PNEUMOC VAC/ADMIN/RCVD: CPT | Performed by: FAMILY MEDICINE

## 2020-11-10 PROCEDURE — 81002 URINALYSIS NONAUTO W/O SCOPE: CPT | Performed by: FAMILY MEDICINE

## 2020-11-10 PROCEDURE — G8417 CALC BMI ABV UP PARAM F/U: HCPCS | Performed by: FAMILY MEDICINE

## 2020-11-10 PROCEDURE — 1090F PRES/ABSN URINE INCON ASSESS: CPT | Performed by: FAMILY MEDICINE

## 2020-11-10 PROCEDURE — 3017F COLORECTAL CA SCREEN DOC REV: CPT | Performed by: FAMILY MEDICINE

## 2020-11-10 PROCEDURE — 99214 OFFICE O/P EST MOD 30 MIN: CPT | Performed by: FAMILY MEDICINE

## 2020-11-10 PROCEDURE — 1036F TOBACCO NON-USER: CPT | Performed by: FAMILY MEDICINE

## 2020-11-10 PROCEDURE — G8427 DOCREV CUR MEDS BY ELIG CLIN: HCPCS | Performed by: FAMILY MEDICINE

## 2020-11-10 PROCEDURE — G8399 PT W/DXA RESULTS DOCUMENT: HCPCS | Performed by: FAMILY MEDICINE

## 2020-11-10 PROCEDURE — 1123F ACP DISCUSS/DSCN MKR DOCD: CPT | Performed by: FAMILY MEDICINE

## 2020-11-10 PROCEDURE — 90694 VACC AIIV4 NO PRSRV 0.5ML IM: CPT | Performed by: FAMILY MEDICINE

## 2020-11-10 ASSESSMENT — ENCOUNTER SYMPTOMS
NAUSEA: 0
COUGH: 0
BACK PAIN: 1
SHORTNESS OF BREATH: 0
VOMITING: 0

## 2020-11-10 NOTE — PROGRESS NOTES
Subjective:      Patient ID: Eduardo Lomas is a 69 y.o. female. HPI  Patient in to discuss several medical issues. She does need her thyroid checked and would like her kidney blood test rechecked. She has some right-sided lumbar discomfort just under the costovertebral angle-onset few weeks-no apparent trauma and denies any urinary symptoms. Palpitations-she states she has been having some palpitations which just last a few seconds may be 2-3 times a day along with some sensation in the chest occasionally. She called about this once before and I think the message was to go to the ER which she did not want to do. She denies any other issues to discuss. Prior to Visit Medications :  Medication omeprazole (PRILOSEC) 40 MG delayed release capsule, Sig TAKE 1 CAPSULE EVERY DAY, Taking? Yes, Authorizing Provider Mauri Bunch, DO    Medication verapamil (VERELAN) 360 MG extended release capsule, Sig TAKE 1 CAPSULE EVERY NIGHT, Taking? Yes, Authorizing Provider Mauri Bunch, DO    Medication QUEtiapine (SEROQUEL XR) 150 MG TB24 extended release tablet, Sig TAKE 1 TABLET EVERY DAY, Taking? Yes, Authorizing Provider Mauri Bunch, DO    Medication traZODone (DESYREL) 150 MG tablet, Sig TAKE 1 TABLET EVERY NIGHT, Taking? Yes, Authorizing Provider Mauri Bunch, DO    Medication rOPINIRole (REQUIP) 1 MG tablet, Sig TAKE 1 TABLET THREE TIMES DAILY, Taking? Yes, Authorizing Provider Mauri Bunch, DO    Medication furosemide (LASIX) 40 MG tablet, Sig TAKE 1 TABLET EVERY DAY, Taking? Yes, Authorizing Provider Mauri Bunch, DO    Medication levothyroxine (SYNTHROID) 150 MCG tablet, Sig TAKE 1 TABLET EVERY DAY, Taking? Yes, Authorizing Provider Mauri Bunch, DO    Medication lovastatin (MEVACOR) 20 MG tablet, Sig TAKE 1 TABLET EVERY NIGHT, Taking? Yes, Authorizing Provider Mauri Bunch, DO    Medication lidocaine (LIDODERM) 5 %, Sig Place 1 patch onto the skin daily 12 hours on, 12 hours off., Taking?  Yes, Authorizing Provider SANDOR Cortes CNP    Medication diclofenac (VOLTAREN) 50 MG EC tablet, Sig Take 1 tablet by mouth 2 times daily, Taking? Yes, Authorizing Provider SANDOR Cortes CNP    Medication escitalopram (LEXAPRO) 20 MG tablet, Sig TAKE 1 TABLET EVERY DAY, Taking? Yes, Authorizing Provider Mauri Bunch, DO    Medication diclofenac sodium 1 % GEL, Sig APPLY 4 GRAMS 4 TIMES DAILY, Taking? Yes, Authorizing Provider Reynaldo Campbell DPM    Medication albuterol sulfate HFA (PROVENTIL HFA) 108 (90 Base) MCG/ACT inhaler, Sig Inhale 2 puffs into the lungs every 6 hours as needed for Wheezing, Taking? Yes, Authorizing Provider Mauri Bunch, DO    Medication aspirin 81 MG EC tablet, Sig Take 81 mg by mouth daily. Indications: OTC, Taking? Yes, Authorizing Provider Historical Provider, MD    Medication gabapentin (NEURONTIN) 600 MG tablet, Sig TAKE 1 TABLET TWICE DAILY, Taking? , Authorizing Provider Mauri Bunch, DO    Medication benzonatate (TESSALON PERLES) 100 MG capsule, Sig Take 1 capsule by mouth 3 times daily as needed for Cough  Patient not taking: Reported on 2/10/2020, Taking? , Authorizing Provider SANDOR Pedroza CNP      Past Medical History:  No date: Arthritis  No date: [facility]      Comment:  questionable dx  No date: Cellulitis      Comment:  L ankle, recurrent; over area of prior surgeries  No date: Chronic back pain  1995: Closed fracture of left fibula      Comment:  s/p fall; surgery x 6  1995: Closed fracture of left tibia      Comment:  s/p fall on ice; hx of surgery x 6  No date: Colon polyps  No date: Depression  1982: Fibroid (bleeding) (uterine)  No date: Fibromyalgia  No date: GERD (gastroesophageal reflux disease)  No date: Hyperlipidemia  No date: Hypertension      Comment:  Myoview Lexiscan WNL on 5/17/12  No date: Hypothyroidism  01/31/2020: Influenza A  No date: Migraine  No date: Miscarriage  No date:  Morbid obesity ([facility]  2011: Neuropathy      Comment:  seen initially by neuro., Dr. Kenny Jaramillo, on 8/11/10; EMG                7/16/10 showed abn.'s c/w L5 radiculopathy & periph.                neuropathy; pt. reports sx in hands and feet ; dx'd by                rheum. 06/07/2012: MAXIMO (obstructive sleep apnea)      Comment:  mild-does not require appliance  No date: RLS (restless legs syndrome)  No date: Urinary incontinence  No date: Urine incontinence      Comment:  saw urology, Dr. Sonam Espinoza, on 7/21/11 for microscopic                hematuria & hematuria; was started on Vesicare &                scheduled for cystoscopy  2/27/12: Vitamin D deficiency      Comment:  22 ng/mL        Review of Systems    Review of Systems   Constitutional: Negative for fever. Respiratory: Negative for cough and shortness of breath. Cardiovascular: Positive for palpitations. Negative for leg swelling. Vague chest pain that could be related to her palpitations. Gastrointestinal: Negative for nausea and vomiting. Genitourinary: Negative for dysuria, frequency and hematuria. Musculoskeletal: Positive for back pain. Objective:   Physical Exam      Physical Exam  Constitutional:       Appearance: Normal appearance. She is obese. Eyes:      Conjunctiva/sclera: Conjunctivae normal.   Neck:      Vascular: No carotid bruit. Cardiovascular:      Rate and Rhythm: Normal rate and regular rhythm. Heart sounds: Normal heart sounds. Pulmonary:      Effort: Pulmonary effort is normal.      Breath sounds: Normal breath sounds. Musculoskeletal:      Right lower leg: No edema. Left lower leg: No edema. Neurological:      Mental Status: She is alert and oriented to person, place, and time. Psychiatric:         Mood and Affect: Mood normal.         Behavior: Behavior normal.         Thought Content: Thought content normal.         Judgment: Judgment normal.         Assessment:       Diagnosis Orders   1.  Low back pain without sciatica, unspecified back pain laterality, unspecified chronicity  POCT Urinalysis no Micro   2. Essential hypertension  Basic Metabolic Panel   3. Acquired hypothyroidism  TSH without Reflex   4. Palpitations  Holter Monitor 24 Hour         Plan:      Nish Owens was seen today for lower back pain. Diagnoses and all orders for this visit:    Low back pain without sciatica, unspecified back pain laterality, unspecified chronicity  -     POCT Urinalysis no Micro  Take 2 Aleve twice a day with food for 3 to 5 days and may stop if pain is resolved. May use heating pad for 20 minutes 2 or 3 times a day if needed. Urinalysis was negative  Essential hypertension  -     Basic Metabolic Panel  Continue medications and no added salt diet  Acquired hypothyroidism  -     TSH without Reflex  Lab today  Palpitations  -     Holter Monitor 24 Hour; Future  Refer for Holter monitor-need to go to the emergency room if palpitations increase or last longer associated with any type of chest discomfort. Other orders  -     Cancel: INFLUENZA, TRIV, INACTIVATED, SUBUNIT, ADJUVANTED, 65 YRS AND OLDER, IM, PREFILL SYR, 0.5ML (FLUAD TRIV)  -     INFLUENZA, QUADV, ADJUVANTED, 65 YRS =, IM, PF, PREFILL SYR, 0.5ML (FLUAD)            Mauri Bunch, DOVaccine Information Sheet, \"Influenza - Inactivated\"  given to Justo Po, or parent/legal guardian of  Justo Po and verbalized understanding. Patient responses:    Have you ever had a reaction to a flu vaccine? No  Do you have any current illness? No  Have you ever had Guillian [place] Syndrome? No  Do you have a serious allergy to any of the follow: Neomycin, Polymyxin, Thimerosal, eggs or egg products? No    Flu vaccine given per order. Please see immunization tab. Risks and benefits explained. Current VIS given.       Immunizations Administered     Name Date Dose Route    Influenza, Quadv, adjuvanted, 65 yrs +, IM, PF (Fluad) 11/10/2020 0.5 mL Intramuscular    Site: Deltoid- Left Lot: [id number]    [facility]: 33870-589-54

## 2020-11-10 NOTE — PATIENT INSTRUCTIONS
Low back pain without sciatica, unspecified back pain laterality, unspecified chronicity  -     POCT Urinalysis no Micro  Take 2 Aleve twice a day with food for 3 to 5 days and may stop if pain is resolved. May use heating pad for 20 minutes 2 or 3 times a day if needed. Urinalysis was negative  Essential hypertension  -     Basic Metabolic Panel  Continue medications and no added salt diet  Acquired hypothyroidism  -     TSH without Reflex  Lab today  Palpitations  -     Holter Monitor 24 Hour; Future  Refer for Holter monitor-need to go to the emergency room if palpitations increase or last longer associated with any type of chest discomfort.   Other orders  -     Cancel: INFLUENZA, TRIV, INACTIVATED, SUBUNIT, ADJUVANTED, 65 YRS AND OLDER, IM, PREFILL SYR, 0.5ML (FLUAD TRIV)  -     INFLUENZA, QUADV, ADJUVANTED, 65 YRS =, IM, PF, PREFILL SYR, 0.5ML (FLUAD)

## 2020-11-11 ENCOUNTER — OFFICE VISIT (OUTPATIENT)
Dept: PRIMARY CARE CLINIC | Age: 70
End: 2020-11-11
Payer: MEDICARE

## 2020-11-11 ENCOUNTER — HOSPITAL ENCOUNTER (OUTPATIENT)
Dept: NON INVASIVE DIAGNOSTICS | Age: 70
Discharge: HOME | End: 2020-11-11
Payer: MEDICARE

## 2020-11-11 PROCEDURE — 93225 XTRNL ECG REC<48 HRS REC: CPT

## 2020-11-11 PROCEDURE — 99211 OFF/OP EST MAY X REQ PHY/QHP: CPT | Performed by: NURSE PRACTITIONER

## 2020-11-11 PROCEDURE — 93226 XTRNL ECG REC<48 HR SCAN A/R: CPT

## 2020-11-13 LAB — SARS-COV-2, NAA: NOT DETECTED

## 2020-11-16 LAB
ACQUISITION DURATION: NORMAL S
AVERAGE HEART RATE: 68 BPM
EKG DIAGNOSIS: NORMAL
FASTEST SUPRAVENTRICULAR RATE: 152 BPM
HOLTER MAX HEART RATE: 120 BPM
HOOKUP DATE: NORMAL
HOOKUP TIME: NORMAL
LONGEST SUPRAVENTRICULAR RATE: 113 BPM
MAX HEART RATE TIME/DATE: NORMAL
MIN HEART RATE TIME/DATE: NORMAL
MIN HEART RATE: 52 BPM
NUMBER OF FASTEST SUPRAVENTRICULAR BEATS: 5
NUMBER OF LONGEST SUPRAVENTRICULAR BEATS: 7
NUMBER OF QRS COMPLEXES: NORMAL
NUMBER OF SUPRAVENTRICULAR BEATS IN RUNS: 32
NUMBER OF SUPRAVENTRICULAR COUPLETS: 4
NUMBER OF SUPRAVENTRICULAR ECTOPICS: 150
NUMBER OF SUPRAVENTRICULAR ISOLATED BEATS: 110
NUMBER OF SUPRAVENTRICULAR RUNS: 7
NUMBER OF VENTRICULAR BEATS IN RUNS: 0
NUMBER OF VENTRICULAR BIGEMINAL CYCLES: 0
NUMBER OF VENTRICULAR COUPLETS: 0
NUMBER OF VENTRICULAR ECTOPICS: 33
NUMBER OF VENTRICULAR ISOLATED BEATS: 33
NUMBER OF VENTRICULAR RUNS: 0

## 2020-11-18 ENCOUNTER — OFFICE VISIT (OUTPATIENT)
Dept: ORTHOPEDIC SURGERY | Age: 70
End: 2020-11-18
Payer: MEDICARE

## 2020-11-18 VITALS — BODY MASS INDEX: 44.38 KG/M2 | HEIGHT: 64 IN | WEIGHT: 259.92 LBS

## 2020-11-18 PROCEDURE — G8417 CALC BMI ABV UP PARAM F/U: HCPCS | Performed by: PHYSICIAN ASSISTANT

## 2020-11-18 PROCEDURE — G8427 DOCREV CUR MEDS BY ELIG CLIN: HCPCS | Performed by: PHYSICIAN ASSISTANT

## 2020-11-18 PROCEDURE — 1090F PRES/ABSN URINE INCON ASSESS: CPT | Performed by: PHYSICIAN ASSISTANT

## 2020-11-18 PROCEDURE — 99213 OFFICE O/P EST LOW 20 MIN: CPT | Performed by: PHYSICIAN ASSISTANT

## 2020-11-18 PROCEDURE — 1036F TOBACCO NON-USER: CPT | Performed by: PHYSICIAN ASSISTANT

## 2020-11-18 PROCEDURE — G8399 PT W/DXA RESULTS DOCUMENT: HCPCS | Performed by: PHYSICIAN ASSISTANT

## 2020-11-18 PROCEDURE — G8484 FLU IMMUNIZE NO ADMIN: HCPCS | Performed by: PHYSICIAN ASSISTANT

## 2020-11-18 PROCEDURE — 3017F COLORECTAL CA SCREEN DOC REV: CPT | Performed by: PHYSICIAN ASSISTANT

## 2020-11-18 PROCEDURE — 4040F PNEUMOC VAC/ADMIN/RCVD: CPT | Performed by: PHYSICIAN ASSISTANT

## 2020-11-18 PROCEDURE — 1123F ACP DISCUSS/DSCN MKR DOCD: CPT | Performed by: PHYSICIAN ASSISTANT

## 2020-11-18 PROCEDURE — 0509F URINE INCON PLAN DOCD: CPT | Performed by: PHYSICIAN ASSISTANT

## 2020-11-18 NOTE — PROGRESS NOTES
History of present illness:   Ms. Jam Rashid is a pleasant 69 y.o. female with a PMH of urinary incontinence, RLS, neuropathy, hypothyroidism, GERD, fibromyalgia, depression, bipolar disorder, and chronic back pain, who returns today 1 year s/p L4 compression fracture after lifting a heavy can of gasoline. Her back pain has never completely resolved since then. She notes constant low back pain, but states her pain significantly increased over the past 1-2 weeks. She denies a recent fall, injury or trauma to her lumbar spine. She notes she will occasionally sit down or \"plop down\" hard in her seat. She denies new lower extremity pain, numbness or weakness. She notes progressive urinary incontinence over the past year. She notes she wears depends because she is unable to make it to the restroom in time after feeling the urge to void. She denies saddle numbness or bowel dysfunction. She denies neck pain, thoracic back pain, or upper extremity symptoms. She did not have lumbar MRI completed after her last visit in January of this year. Physical examination:  Ms. Sven Monzon's most recent vitals:  Vitals  Height: 5' 4.02\" (162.6 cm)  Weight: 259 lb 14.8 oz (117.9 kg)  Body mass index is 44.59 kg/m². General exam:  She is well-developed and well-nourished, is in obvious pain and alert and oriented to person, place, and time. She demonstrates appropriate mood and affect. Her skin is warm and dry. Her gait is normal and she walks heel to toe without limp or instability. She is able to tandem walk with minimal difficulty. Back:  She stands with slight lumbar flexion. Her lumbar flexion, extension and lateral bending are moderately reduced with pain. She has mild to moderate tenderness over her lower lumbar spine without obvious muscle spasm. The skin over her lumbar spine is normal without a surgical scar. Lower extremities:  She has 5/5 motor strength of bilateral lower extremities.  She has a

## 2020-11-24 ENCOUNTER — TELEPHONE (OUTPATIENT)
Dept: ORTHOPEDIC SURGERY | Age: 70
End: 2020-11-24

## 2020-11-25 ENCOUNTER — HOSPITAL ENCOUNTER (OUTPATIENT)
Dept: MRI IMAGING | Age: 70
Discharge: HOME | End: 2020-11-25
Payer: MEDICARE

## 2020-11-25 PROCEDURE — 72148 MRI LUMBAR SPINE W/O DYE: CPT

## 2020-12-02 ENCOUNTER — OFFICE VISIT (OUTPATIENT)
Dept: ORTHOPEDIC SURGERY | Age: 70
End: 2020-12-02
Payer: MEDICARE

## 2020-12-02 VITALS — WEIGHT: 259 LBS | HEIGHT: 64 IN | BODY MASS INDEX: 44.22 KG/M2

## 2020-12-02 PROCEDURE — 4040F PNEUMOC VAC/ADMIN/RCVD: CPT | Performed by: PHYSICIAN ASSISTANT

## 2020-12-02 PROCEDURE — 3017F COLORECTAL CA SCREEN DOC REV: CPT | Performed by: PHYSICIAN ASSISTANT

## 2020-12-02 PROCEDURE — 99212 OFFICE O/P EST SF 10 MIN: CPT | Performed by: PHYSICIAN ASSISTANT

## 2020-12-02 PROCEDURE — G8417 CALC BMI ABV UP PARAM F/U: HCPCS | Performed by: PHYSICIAN ASSISTANT

## 2020-12-02 PROCEDURE — 0509F URINE INCON PLAN DOCD: CPT | Performed by: PHYSICIAN ASSISTANT

## 2020-12-02 PROCEDURE — 1090F PRES/ABSN URINE INCON ASSESS: CPT | Performed by: PHYSICIAN ASSISTANT

## 2020-12-02 PROCEDURE — 1123F ACP DISCUSS/DSCN MKR DOCD: CPT | Performed by: PHYSICIAN ASSISTANT

## 2020-12-02 PROCEDURE — G8484 FLU IMMUNIZE NO ADMIN: HCPCS | Performed by: PHYSICIAN ASSISTANT

## 2020-12-02 PROCEDURE — G8427 DOCREV CUR MEDS BY ELIG CLIN: HCPCS | Performed by: PHYSICIAN ASSISTANT

## 2020-12-02 PROCEDURE — 1036F TOBACCO NON-USER: CPT | Performed by: PHYSICIAN ASSISTANT

## 2020-12-02 PROCEDURE — G8399 PT W/DXA RESULTS DOCUMENT: HCPCS | Performed by: PHYSICIAN ASSISTANT

## 2020-12-02 NOTE — PROGRESS NOTES
History of present illness:   Ms. Justo Saldana is a pleasant 69 y.o. female with a PMH of urinary incontinence, RLS, neuropathy, hypothyroidism, GERD, fibromyalgia, depression, bipolar disorder, and chronic back pain, who is 1 year s/p L4 compression fracture after lifting a heavy can of gasoline. Her back pain has never completely resolved since then. She is here today to discuss her new lumbar MRI. She still notes constant left sided low back pain, but states her pain significantly increased over the past 1 month. She denies a recent fall, injury or trauma to her lumbar spine. She notes she will occasionally sit down or \"plop down\" hard in her seat. She denies new lower extremity pain, numbness or weakness. She notes progressive urinary incontinence over the past year. She notes she wears depends because she is unable to make it to the restroom in time after feeling the urge to void. States this has continued to progress as she has an accident daily. She denies saddle numbness or bowel dysfunction. She denies neck pain, thoracic back pain, or upper extremity symptoms. Denies gait instability. Physical examination:  Ms. Henrey Babinski Roland's most recent vitals:  Vitals  Height: 5' 4\" (162.6 cm)  Weight: 259 lb (117.5 kg)  Body mass index is 44.46 kg/m². General exam:  She is well-developed and well-nourished, is in obvious pain and alert and oriented to person, place, and time. She demonstrates appropriate mood and affect. Her skin is warm and dry. Her gait is normal and she walks heel to toe without limp or instability. She is able to tandem walk with minimal difficulty. Back:  She stands with slight lumbar flexion. Her lumbar flexion, extension and lateral bending are moderately reduced with pain. She has mild to moderate tenderness over her lower lumbar spine without obvious muscle spasm. The skin over her lumbar spine is normal without a surgical scar.      Lower extremities:  She has 5/5 motor strength of bilateral lower extremities. She has a negative straight leg raise, bilaterally. Deep tendon reflexes at knees and achilles are 1+. Sensation is intact to light touch L3 to S1 bilaterally. She has no clonus. Hip range of motion painless. Imaging:  Reviewed MRI images from 11/25/20 which shows chronic L4 compression deformity; no acute fracture or spinal canal stenosis. Minimal left foraminal narrowing L3-4. I reviewed AP and lateral xray images of her lumbar spine from 1/29/20 and previous office visit. They show stable alignment of her lumbar spine without additional collapse to her L4 compression fracture. No obvious acute fracture noted. Cervical CT reviewed from 1/9/20 which shows no mild degenerative changes without evidence of fracture or instability. I reviewed MRI images of her lumbar spine from 9/10/19. They show a subacute L4 superior endplate fracture without retropulsion. No significant stenosis or nerve impingement noted. Assessment:  1 year s/p L4 compression fracture  Urinary incontinence  Chronic low back pain    Plan:  Since her lumbar MRI does not explain her urinary symptoms, I recommend getting a thoracic MRI. I also recommend speaking with Bobbi Camacho PA-C to discuss MBB/JEFF since there is no high grade nerve compression on her lumbar MRI.      Wayne Castro PA-C

## 2020-12-03 ENCOUNTER — TELEPHONE (OUTPATIENT)
Dept: ORTHOPEDIC SURGERY | Age: 70
End: 2020-12-03

## 2020-12-19 ENCOUNTER — NURSE TRIAGE (OUTPATIENT)
Dept: OTHER | Facility: CLINIC | Age: 70
End: 2020-12-19

## 2020-12-19 ENCOUNTER — HOSPITAL ENCOUNTER (EMERGENCY)
Age: 70
Discharge: HOME | End: 2020-12-19
Attending: EMERGENCY MEDICINE
Payer: MEDICARE

## 2020-12-19 ENCOUNTER — APPOINTMENT (OUTPATIENT)
Dept: GENERAL RADIOLOGY | Age: 70
End: 2020-12-19
Payer: MEDICARE

## 2020-12-19 VITALS
SYSTOLIC BLOOD PRESSURE: 150 MMHG | OXYGEN SATURATION: 96 % | WEIGHT: 250 LBS | RESPIRATION RATE: 24 BRPM | TEMPERATURE: 98.1 F | BODY MASS INDEX: 41.65 KG/M2 | HEIGHT: 65 IN | DIASTOLIC BLOOD PRESSURE: 86 MMHG | HEART RATE: 79 BPM

## 2020-12-19 LAB
A/G RATIO: 1.3 (ref 1.1–2.2)
ALBUMIN SERPL-MCNC: 4.1 G/DL (ref 3.4–5)
ALP BLD-CCNC: 66 U/L (ref 40–129)
ALT SERPL-CCNC: 17 U/L (ref 10–40)
ANION GAP SERPL CALCULATED.3IONS-SCNC: 15 MMOL/L (ref 3–16)
AST SERPL-CCNC: 31 U/L (ref 15–37)
BASOPHILS ABSOLUTE: 0.1 K/UL (ref 0–0.2)
BASOPHILS RELATIVE PERCENT: 1.2 %
BILIRUB SERPL-MCNC: 0.6 MG/DL (ref 0–1)
BILIRUBIN URINE: NEGATIVE
BLOOD, URINE: ABNORMAL
BUN BLDV-MCNC: 18 MG/DL (ref 7–20)
CALCIUM SERPL-MCNC: 9.5 MG/DL (ref 8.3–10.6)
CHLORIDE BLD-SCNC: 103 MMOL/L (ref 99–110)
CLARITY: CLEAR
CO2: 21 MMOL/L (ref 21–32)
COLOR: YELLOW
CREAT SERPL-MCNC: 1.1 MG/DL (ref 0.6–1.2)
EOSINOPHILS ABSOLUTE: 0.1 K/UL (ref 0–0.6)
EOSINOPHILS RELATIVE PERCENT: 1 %
EPITHELIAL CELLS, UA: ABNORMAL /HPF (ref 0–5)
GFR AFRICAN AMERICAN: 59
GFR NON-AFRICAN AMERICAN: 49
GLOBULIN: 3.1 G/DL
GLUCOSE BLD-MCNC: 98 MG/DL (ref 70–99)
GLUCOSE URINE: NEGATIVE MG/DL
HCT VFR BLD CALC: 40.8 % (ref 36–48)
HEMOGLOBIN: 13.3 G/DL (ref 12–16)
KETONES, URINE: >=80 MG/DL
LEUKOCYTE ESTERASE, URINE: NEGATIVE
LIPASE: 28 U/L (ref 13–60)
LYMPHOCYTES ABSOLUTE: 2.4 K/UL (ref 1–5.1)
LYMPHOCYTES RELATIVE PERCENT: 37.9 %
MCH RBC QN AUTO: 28 PG (ref 26–34)
MCHC RBC AUTO-ENTMCNC: 32.6 G/DL (ref 31–36)
MCV RBC AUTO: 85.9 FL (ref 80–100)
MICROSCOPIC EXAMINATION: YES
MONOCYTES ABSOLUTE: 0.6 K/UL (ref 0–1.3)
MONOCYTES RELATIVE PERCENT: 9.4 %
NEUTROPHILS ABSOLUTE: 3.2 K/UL (ref 1.7–7.7)
NEUTROPHILS RELATIVE PERCENT: 50.5 %
NITRITE, URINE: NEGATIVE
PDW BLD-RTO: 15.4 % (ref 12.4–15.4)
PH UA: >=9 (ref 5–8)
PLATELET # BLD: 245 K/UL (ref 135–450)
PMV BLD AUTO: 9.3 FL (ref 5–10.5)
POTASSIUM REFLEX MAGNESIUM: 4.8 MMOL/L (ref 3.5–5.1)
PROTEIN UA: NEGATIVE MG/DL
RBC # BLD: 4.76 M/UL (ref 4–5.2)
RBC UA: ABNORMAL /HPF (ref 0–4)
SODIUM BLD-SCNC: 139 MMOL/L (ref 136–145)
SPECIFIC GRAVITY UA: 1.02 (ref 1–1.03)
TOTAL PROTEIN: 7.2 G/DL (ref 6.4–8.2)
TROPONIN: <0.01 NG/ML
URINE REFLEX TO CULTURE: ABNORMAL
URINE TYPE: ABNORMAL
UROBILINOGEN, URINE: 1 E.U./DL
WBC # BLD: 6.4 K/UL (ref 4–11)
WBC UA: ABNORMAL /HPF (ref 0–5)

## 2020-12-19 PROCEDURE — 2580000003 HC RX 258: Performed by: EMERGENCY MEDICINE

## 2020-12-19 PROCEDURE — U0003 INFECTIOUS AGENT DETECTION BY NUCLEIC ACID (DNA OR RNA); SEVERE ACUTE RESPIRATORY SYNDROME CORONAVIRUS 2 (SARS-COV-2) (CORONAVIRUS DISEASE [COVID-19]), AMPLIFIED PROBE TECHNIQUE, MAKING USE OF HIGH THROUGHPUT TECHNOLOGIES AS DESCRIBED BY CMS-2020-01-R: HCPCS

## 2020-12-19 PROCEDURE — 99283 EMERGENCY DEPT VISIT LOW MDM: CPT

## 2020-12-19 PROCEDURE — 83690 ASSAY OF LIPASE: CPT

## 2020-12-19 PROCEDURE — 80053 COMPREHEN METABOLIC PANEL: CPT

## 2020-12-19 PROCEDURE — 71045 X-RAY EXAM CHEST 1 VIEW: CPT

## 2020-12-19 PROCEDURE — 81001 URINALYSIS AUTO W/SCOPE: CPT

## 2020-12-19 PROCEDURE — 96374 THER/PROPH/DIAG INJ IV PUSH: CPT

## 2020-12-19 PROCEDURE — 93005 ELECTROCARDIOGRAM TRACING: CPT | Performed by: EMERGENCY MEDICINE

## 2020-12-19 PROCEDURE — 6360000002 HC RX W HCPCS: Performed by: EMERGENCY MEDICINE

## 2020-12-19 PROCEDURE — 85025 COMPLETE CBC W/AUTO DIFF WBC: CPT

## 2020-12-19 PROCEDURE — 84484 ASSAY OF TROPONIN QUANT: CPT

## 2020-12-19 RX ORDER — ONDANSETRON 4 MG/1
4 TABLET, ORALLY DISINTEGRATING ORAL EVERY 8 HOURS PRN
Qty: 12 TABLET | Refills: 0 | Status: SHIPPED | OUTPATIENT
Start: 2020-12-19 | End: 2021-07-06 | Stop reason: CLARIF

## 2020-12-19 RX ORDER — ONDANSETRON 2 MG/ML
4 INJECTION INTRAMUSCULAR; INTRAVENOUS ONCE
Status: COMPLETED | OUTPATIENT
Start: 2020-12-19 | End: 2020-12-19

## 2020-12-19 RX ORDER — 0.9 % SODIUM CHLORIDE 0.9 %
1000 INTRAVENOUS SOLUTION INTRAVENOUS ONCE
Status: COMPLETED | OUTPATIENT
Start: 2020-12-19 | End: 2020-12-19

## 2020-12-19 RX ADMIN — ONDANSETRON 4 MG: 2 INJECTION INTRAMUSCULAR; INTRAVENOUS at 15:49

## 2020-12-19 RX ADMIN — SODIUM CHLORIDE 1000 ML: 9 INJECTION, SOLUTION INTRAVENOUS at 15:35

## 2020-12-19 NOTE — TELEPHONE ENCOUNTER
Patient called pre-service center [facility]) Shahnaz with red flag complaint. Brief description of triage: Isai Langston states that she has been very sick for the last couple of days and has not been able to eat or drink much. She states only having a Pepsi in the last 3 days. Urinating but has dry mouth and lips  Dizzy and extremely weak    Please see triage below for more detailed information. Triage indicates for patient to go to ER for evaluation    Care advice provided, patient verbalizes understanding; denies any other questions or concerns; instructed to call back for any new or worsening symptoms. Attention Provider: Thank you for allowing me to participate in the care of your patient. The patient was connected to triage in response to information provided to the ECC. Please do not respond through this encounter as the response is not directed to a shared pool. Reason for Disposition   Patient sounds very sick or weak to the triager    Answer Assessment - Initial Assessment Questions  1. ONSET: \"When did the nasal discharge start? \"       4 days    2. AMOUNT: \"How much discharge is there? \"       A lot of congestion and does not clear with blowing very often    3. COUGH: \"Do you have a cough? \" If yes, ask: \"Describe the color of your sputum\" (clear, white, yellow, green)      No cough    4. RESPIRATORY DISTRESS: \"Describe your breathing. \"       No    5. FEVER: \"Do you have a fever? \" If so, ask: \"What is your temperature, how was it measured, and when did it start? \"      Chills and sweats but unsure if she has a fever    6. SEVERITY: \"Overall, how bad are you feeling right now? \" (e.g., doesn't interfere with normal activities, staying home from school/work, staying in bed)       Terrible    7. OTHER SYMPTOMS: \"Do you have any other symptoms? \" (e.g., sore throat, earache, wheezing, vomiting)      Weakness is main concern, partial coke over the past 3 days    8.  PREGNANCY: \"Is there any chance you are

## 2020-12-19 NOTE — ED PROVIDER NOTES
CHIEF COMPLAINT  Dizziness (sx for 2 weeks), Fatigue, and Nausea      HISTORY OF PRESENT ILLNESS  Lois Reinoso is a 69 y.o. female with a history of hypertension, MAXIMO, migraines, restless leg syndrome, GERD, depression, fibromyalgia who presents to the ED complaining of body aches, dizziness, decreased appetite, nausea, chills. Patient states she has felt ill for the past 4 days. She feels weak and generally fatigued. Reports some mild dyspnea. Initially she had several episodes of diarrhea however this has resolved. Denies emesis, chest pain, dysuria, rash, sore throat. She does have some nasal congestion. No known sick contacts. Has not taken anything prior to arrival.  No other complaints, modifying factors or associated symptoms. I have reviewed the following from the nursing documentation. Past Medical History:   Diagnosis Date    Arthritis     Bipolar disorder ([address]     questionable dx    Cellulitis     L ankle, recurrent; over area of prior surgeries    Chronic back pain     Closed fracture of left fibula 1995    s/p fall; surgery x 6    Closed fracture of left tibia 1995    s/p fall on ice; hx of surgery x 6    Colon polyps     Depression     Fibroid (bleeding) (uterine) 1982    Fibromyalgia     GERD (gastroesophageal reflux disease)     Hyperlipidemia     Hypertension     Myoview Lexiscan WNL on 5/17/12    Hypothyroidism     Influenza A 01/31/2020    Migraine     Miscarriage     Morbid obesity ([address]     Neuropathy 2011    seen initially by neuro., Dr. Oumar Hernandez, on 8/11/10; EMG 7/16/10 showed abn.'s c/w L5 radiculopathy & periph. neuropathy; pt. reports sx in hands and feet ; dx'd by rheum.      MAXIMO (obstructive sleep apnea) 06/07/2012    mild-does not require appliance    RLS (restless legs syndrome)     Urinary incontinence     Urine incontinence saw urology, Dr. John Triana, on 7/21/11 for microscopic hematuria & hematuria; was started on Vesicare & scheduled for cystoscopy    Vitamin D deficiency 2/27/12    22 ng/mL     Past Surgical History:   Procedure Laterality Date    BARIATRIC SURGERY  11/14/12    Laparoscopic Sleeve Gastrectomy, Lap Hiatal hernia repair    CHOLECYSTECTOMY  1985    COLONOSCOPY  2010     colon polyps; Dr. Emeli Perez Left 08/31/2018    phaco with IOL    HIATAL HERNIA REPAIR      lap    HYSTERECTOMY  1982    TAHBSO for fibroids and DUB    LEG SURGERY  1995    L; s/p tibia and fibular fx's; has uma in L tibia; surgery x 6    RI XCAPSL CTRC RMVL INSJ IO LENS PROSTH W/O ECP Left 8/31/2018    PHACOEMULSIFICATION OF CATARACT LEFT EYE WITH INTRAOCULAR LENS IMPLANT performed by Ting Rocha MD at [address] W/O ECP Right 9/14/2018    PHACOEMULSIFICATION OF CATARACT RIGHT  EYE WITH INTRAOCULAR LENS IMPLANT performed by Ting Rocha MD at [address]  2010    Dr. Pamela Briseno  4-27-12    WNL     Family History   Problem Relation Age of Onset    Arthritis Mother     Asthma Mother     Diabetes Mother     High Blood Pressure Mother     Depression Mother     Heart Disease Mother 48        CHF    High Cholesterol Mother     Stroke Mother     Mental Illness Mother     Arthritis Father     Cancer Father 36        colon    Stroke Father 48        x 2    Substance Abuse Father         alcohol    Kidney Disease Sister     Cancer Sister 50        urethral CA    Cancer Sister 48        lymphoma    Cancer Maternal Uncle         prostate    Cancer Maternal Uncle         prostate    Cancer Maternal Uncle         prostate    Emphysema Neg Hx     Heart Failure Neg Hx     Hypertension Neg Hx      Social History     Socioeconomic History    Marital status:       Spouse name: Not on file  Number of children: Not on file    Years of education: Not on file    Highest education level: Not on file   Occupational History    Not on file   Social Needs    Financial resource strain: Not on file    Food insecurity     Worry: Not on file     Inability: Not on file    Transportation needs     Medical: Not on file     Non-medical: Not on file   Tobacco Use    Smoking status: Never Smoker    Smokeless tobacco: Never Used   Substance and Sexual Activity    Alcohol use: Yes     Alcohol/week: 0.0 standard drinks     Comment: 1 beer twice a year    Drug use: No    Sexual activity: Yes   Lifestyle    Physical activity     Days per week: Not on file     Minutes per session: Not on file    Stress: Not on file   Relationships    Social connections     Talks on phone: Not on file     Gets together: Not on file     Attends Sabianist service: Not on file     Active member of club or organization: Not on file     Attends meetings of clubs or organizations: Not on file     Relationship status: Not on file    Intimate partner violence     Fear of current or ex partner: Not on file     Emotionally abused: Not on file     Physically abused: Not on file     Forced sexual activity: Not on file   Other Topics Concern    Not on file   Social History Narrative    Not on file     No current facility-administered medications for this encounter.       Current Outpatient Medications   Medication Sig Dispense Refill    ondansetron (ZOFRAN ODT) 4 MG disintegrating tablet Take 1 tablet by mouth every 8 hours as needed for Nausea or Vomiting 12 tablet 0    omeprazole (PRILOSEC) 40 MG delayed release capsule TAKE 1 CAPSULE EVERY DAY 90 capsule 0    verapamil (VERELAN) 360 MG extended release capsule TAKE 1 CAPSULE EVERY NIGHT 90 capsule 0    QUEtiapine (SEROQUEL XR) 150 MG TB24 extended release tablet TAKE 1 TABLET EVERY DAY 90 tablet 0    traZODone (DESYREL) 150 MG tablet TAKE 1 TABLET EVERY NIGHT 90 tablet 0 EXTREMITIES: No peripheral edema. MAEE. No acute deformities. SKIN: Warm and dry. No acute rashes. NEUROLOGICAL: Alert and oriented X 3. CN II-XII intact. No gross facial drooping. Strength 5/5, sensation intact. PSYCHIATRIC: Normal mood and affect. LABS  I have reviewed all labs for this visit.    Results for orders placed or performed during the hospital encounter of 12/19/20   CBC Auto Differential   Result Value Ref Range    WBC 6.4 4.0 - 11.0 K/uL    RBC 4.76 4.00 - 5.20 M/uL    Hemoglobin 13.3 12.0 - 16.0 g/dL    Hematocrit 40.8 36.0 - 48.0 %    MCV 85.9 80.0 - 100.0 fL    MCH 28.0 26.0 - 34.0 pg    MCHC 32.6 31.0 - 36.0 g/dL    RDW 15.4 12.4 - 15.4 %    Platelets 990 250 - 932 K/uL    MPV 9.3 5.0 - 10.5 fL    Neutrophils % 50.5 %    Lymphocytes % 37.9 %    Monocytes % 9.4 %    Eosinophils % 1.0 %    Basophils % 1.2 %    Neutrophils Absolute 3.2 1.7 - 7.7 K/uL    Lymphocytes Absolute 2.4 1.0 - 5.1 K/uL    Monocytes Absolute 0.6 0.0 - 1.3 K/uL    Eosinophils Absolute 0.1 0.0 - 0.6 K/uL    Basophils Absolute 0.1 0.0 - 0.2 K/uL   Comprehensive Metabolic Panel w/ Reflex to MG   Result Value Ref Range    Sodium 139 136 - 145 mmol/L    Potassium reflex Magnesium 4.8 3.5 - 5.1 mmol/L    Chloride 103 99 - 110 mmol/L    CO2 21 21 - 32 mmol/L    Anion Gap 15 3 - 16    Glucose 98 70 - 99 mg/dL    BUN 18 7 - 20 mg/dL    CREATININE 1.1 0.6 - 1.2 mg/dL    GFR Non- 49 (A) >60    GFR  59 (A) >60    Calcium 9.5 8.3 - 10.6 mg/dL    Total Protein 7.2 6.4 - 8.2 g/dL    Alb 4.1 3.4 - 5.0 g/dL    Albumin/Globulin Ratio 1.3 1.1 - 2.2    Total Bilirubin 0.6 0.0 - 1.0 mg/dL    Alkaline Phosphatase 66 40 - 129 U/L    ALT 17 10 - 40 U/L    AST 31 15 - 37 U/L    Globulin 3.1 g/dL   Lipase   Result Value Ref Range    Lipase 28.0 13.0 - 60.0 U/L   Troponin   Result Value Ref Range    Troponin <0.01 <0.01 ng/mL   Urinalysis Reflex to Culture    Specimen: Urine, clean catch   Result Value Ref Range Color, UA Yellow Straw/Yellow    Clarity, UA Clear Clear    Glucose, Ur Negative Negative mg/dL    Bilirubin Urine Negative Negative    Ketones, Urine >=80 (A) Negative mg/dL    Specific Gravity, UA 1.020 1.005 - 1.030    Blood, Urine TRACE-INTACT (A) Negative    pH, UA >=9.0 (A) 5.0 - 8.0    Protein, UA Negative Negative mg/dL    Urobilinogen, Urine 1.0 <2.0 E.U./dL    Nitrite, Urine Negative Negative    Leukocyte Esterase, Urine Negative Negative    Microscopic Examination YES     Urine Type NotGiven     Urine Reflex to Culture Not Indicated    Microscopic Urinalysis   Result Value Ref Range    WBC, UA 0-2 0 - 5 /HPF    RBC, UA 5-10 (A) 0 - 4 /HPF    Epithelial Cells, UA 2-5 0 - 5 /HPF   EKG 12 Lead   Result Value Ref Range    Ventricular Rate 58 BPM    Atrial Rate 58 BPM    P-R Interval 180 ms    QRS Duration 74 ms    Q-T Interval 452 ms    QTc Calculation (Bazett) 443 ms    P Axis 46 degrees    R Axis 75 degrees    T Axis 73 degrees    Diagnosis       Sinus bradycardiaOtherwise normal ECGWhen compared with ECG of 31-JAN-2020 21:31,Nonspecific T wave abnormality no longer evident in Inferior leadsNonspecific T wave abnormality, improved in Anterolateral leads       EKG  The Ekg interpreted by myself  normal sinus rhythm with a rate of 58  Axis is   Normal  QTc is  normal  Intervals and Durations are unremarkable. No specific ST-T wave changes appreciated. No evidence of acute ischemia. No significant change from prior EKG dated 1/31/20        RADIOLOGY  X-RAYS:  I have reviewed radiologic plain film image(s). ALL OTHER NON-PLAIN FILM IMAGES SUCH AS CT, ULTRASOUND AND MRI HAVE BEEN READ BY THE RADIOLOGIST. XR CHEST PORTABLE   Final Result   1. No acute abnormality. Rechecks: Physical assessment performed. Patient feeling improved on reevaluation. States she feels comfortable going home.           ED COURSE/MDM

## 2020-12-20 LAB
EKG ATRIAL RATE: 58 BPM
EKG DIAGNOSIS: NORMAL
EKG P AXIS: 46 DEGREES
EKG P-R INTERVAL: 180 MS
EKG Q-T INTERVAL: 452 MS
EKG QRS DURATION: 74 MS
EKG QTC CALCULATION (BAZETT): 443 MS
EKG R AXIS: 75 DEGREES
EKG T AXIS: 73 DEGREES
EKG VENTRICULAR RATE: 58 BPM
SARS-COV-2: NOT DETECTED

## 2020-12-20 PROCEDURE — 93010 ELECTROCARDIOGRAM REPORT: CPT | Performed by: INTERNAL MEDICINE

## 2020-12-21 ENCOUNTER — NURSE TRIAGE (OUTPATIENT)
Dept: OTHER | Facility: CLINIC | Age: 70
End: 2020-12-21

## 2020-12-22 ENCOUNTER — TELEPHONE (OUTPATIENT)
Dept: FAMILY MEDICINE CLINIC | Age: 70
End: 2020-12-22

## 2020-12-22 NOTE — TELEPHONE ENCOUNTER
----- Message from Efrenrao Cyrrachael sent at 12/21/2020  2:09 PM EST -----  Subject: Appointment Request    Reason for Call: Urgent Return from RN Triage    QUESTIONS  Type of Appointment? Established Patient  Reason for appointment request? No appointments available during search  Additional Information for Provider? Patient wants to talk to  about   getting a note to call off work tomorrow. She still feels weak and dizzy. She was dehydrated on Saturday and was at the hospital.  ---------------------------------------------------------------------------  --------------  [address]  What is the best way for the office to contact you? OK to leave message on   voicemail  Preferred Call Back Phone Number? [phone]  ---------------------------------------------------------------------------  --------------  SCRIPT ANSWERS  Patient needs to be seen today or tomorrow? Yes  Nurse Name? Chely Montoya  (Did RN indicate the need for Red Scheduling?)? No  Have you been diagnosed with COVID-19 (Coronavirus)   tested for COVID-19 (Coronavirus)   or told that you are suspected of having COVID-19 (Coronavirus)? Yes  Did your symptoms begin or have you been tested for COVID-19 in the last   10 days?  Yes

## 2020-12-22 NOTE — TELEPHONE ENCOUNTER
Letter was written for her for work and she will call Wednesday and let you know what to do with it.

## 2021-01-10 NOTE — PROGRESS NOTES
[facility]   Cardiac Consultation    Referring Provider:  Tatiana Hood DO     Chief Complaint   Patient presents with    New Patient    Chest Pain     comes and goes    Shortness of Breath     comes and goes    Edema     feet and legs    Fatigue     tires all the time    Palpitations     beats fast and takes breath away        History of Present Illness:    Helene Jimenez is a 69 y.o. female who is here today as a NEW patient consult for cardiology, referred by Tatiana Hood DO for palpitations. She has a past medical history of hypertension, hyperlipidemia, sleep apnea, and hypothyroidism. She wore a Holter monitor in 11/2020 which showed PAC's, atrial runs, rare PVC's. An echocardiogram from 2017 showed an EF of 55%. Today she states she has been told she had kidney diease. She states she has been having palpitations that started a year ago. She has had this more over the last year, now daily. This feels like a fast heart rate and also a fluttering. She has associated chest tightness. Last for 2-3 minutes and resolves. She has palpitations with activity and rest. She has ankle swelling most days. She has fatigue and states she had a sleep study years ago. Diagnosed with sleep apnea. She has not wore her CPAP for several years. Her daughter tells her she snores. She states her blood pressure at home runs around 130/70's. Patient currently denies any weight gain, dizziness, and syncope.         Past Medical History:   has a past medical history of Arthritis, Bipolar disorder ([address] Cellulitis, Chronic back pain, Closed fracture of left fibula, Closed fracture of left tibia, Colon polyps, Depression, Fibroid (bleeding) (uterine), Fibromyalgia, GERD (gastroesophageal reflux disease), Hyperlipidemia, Hypertension, Hypothyroidism, Influenza A, Migraine, Miscarriage, Morbid obesity ([address] Neuropathy, MAXIMO (obstructive sleep apnea), RLS (restless legs syndrome), Urinary incontinence, Urine incontinence, and Vitamin D deficiency. Surgical History:   has a past surgical history that includes Cholecystectomy (1985); Leg Surgery (1995); Hysterectomy (1982); Upper gastrointestinal endoscopy (2010); Colonoscopy (2010 ); Upper gastrointestinal endoscopy (4-27-12); Bariatric Surgery (11/14/12); hiatal hernia repair; eye surgery (Left, 08/31/2018); pr xcapsl ctrc rmvl insj io lens prosth w/o ecp (Left, 8/31/2018); and pr xcapsl ctrc rmvl insj io lens prosth w/o ecp (Right, 9/14/2018). Social History:   reports that she has never smoked. She has never used smokeless tobacco. She reports current alcohol use. She reports that she does not use drugs. Family History:  family history includes Arthritis in her father and mother; Asthma in her mother; Cancer in her maternal uncle, maternal uncle, and maternal uncle; Cancer (age of onset: 36) in her father; Cancer (age of onset: 50) in her sister; Cancer (age of onset: 48) in her sister; Depression in her mother; Diabetes in her mother; Heart Disease (age of onset: 48) in her mother; High Blood Pressure in her mother; High Cholesterol in her mother; Kidney Disease in her sister; Mental Illness in her mother; Stroke in her mother; Stroke (age of onset: 48) in her father; Substance Abuse in her father. Home Medications:  Prior to Admission medications    Medication Sig Start Date End Date Taking?  Authorizing Provider   ondansetron (ZOFRAN ODT) 4 MG disintegrating tablet Take 1 tablet by mouth every 8 hours as needed for Nausea or Vomiting 12/19/20  Yes Yuki Garcia MD   omeprazole (PRILOSEC) 40 MG delayed release capsule TAKE 1 CAPSULE EVERY DAY 10/13/20  Yes Mauri Bunch DO   verapamil (VERELAN) 360 MG extended release capsule TAKE 1 CAPSULE EVERY NIGHT 10/13/20  Yes Mauri Bunch DO   QUEtiapine (SEROQUEL XR) 150 MG TB24 extended release tablet TAKE 1 TABLET EVERY DAY 10/13/20  Yes Mauri Bunch DO   traZODone (DESYREL) 150 MG tablet TAKE 1 TABLET EVERY NIGHT 10/13/20  Yes Godfrey Bunch DO   rOPINIRole (REQUIP) 1 MG tablet TAKE 1 TABLET THREE TIMES DAILY 9/16/20  Yes Godfrey Bunch DO   furosemide (LASIX) 40 MG tablet TAKE 1 TABLET EVERY DAY 6/1/20  Yes Mauri Bunch DO   levothyroxine (SYNTHROID) 150 MCG tablet TAKE 1 TABLET EVERY DAY 6/1/20  Yes Mauri Bunch DO   lovastatin (MEVACOR) 20 MG tablet TAKE 1 TABLET EVERY NIGHT 3/17/20  Yes Mauri Bunch DO   lidocaine (LIDODERM) 5 % Place 1 patch onto the skin daily 12 hours on, 12 hours off. 1/9/20  Yes SANDOR Arellano CNP   escitalopram (LEXAPRO) 20 MG tablet TAKE 1 TABLET EVERY DAY 12/31/19  Yes Mauri Bunch DO   diclofenac sodium 1 % GEL APPLY 4 GRAMS 4 TIMES DAILY 8/23/19  Yes Liliana Thomas, DPM   albuterol sulfate HFA (PROVENTIL HFA) 108 (90 Base) MCG/ACT inhaler Inhale 2 puffs into the lungs every 6 hours as needed for Wheezing 1/23/19  Yes Mauri Bunch DO   aspirin 81 MG EC tablet Take 81 mg by mouth daily Indications: OTC Takes at times   Yes Historical Provider, MD   gabapentin (NEURONTIN) 600 MG tablet TAKE 1 TABLET TWICE DAILY 3/17/20 4/16/20  Mauri Bunch DO   diclofenac (VOLTAREN) 50 MG EC tablet Take 1 tablet by mouth 2 times daily 1/9/20   SANDOR Arellano CNP   benzonatate (TESSALON PERLES) 100 MG capsule Take 1 capsule by mouth 3 times daily as needed for Cough  Patient not taking: Reported on 1/11/2021 3/7/19   SANDOR Villeda CNP        Allergies:  Patient has no known allergies. Review of Systems:   · Constitutional: there has been no unanticipated weight loss. There's been no change in energy level, sleep pattern, or activity level. · Eyes: No visual changes or diplopia. No scleral icterus. · ENT: No Headaches, hearing loss or vertigo. No mouth sores or sore throat. · Cardiovascular: Reviewed in HPI  · Respiratory: No cough or wheezing, no sputum production. No hematemesis.     · Gastrointestinal: No abdominal pain, appetite loss, blood in stools. No change in bowel or bladder habits. · Genitourinary: No dysuria, trouble voiding, or hematuria. · Musculoskeletal:  No gait disturbance, weakness or joint complaints. · Integumentary: No rash or pruritis. · Neurological: No headache, diplopia, change in muscle strength, numbness or tingling. No change in gait, balance, coordination, mood, affect, memory, mentation, behavior. · Psychiatric: No anxiety, no depression. · Endocrine: No malaise, fatigue or temperature intolerance. No excessive thirst, fluid intake, or urination. No tremor. · Hematologic/Lymphatic: No abnormal bruising or bleeding, blood clots or swollen lymph nodes. · Allergic/Immunologic: No nasal congestion or hives. Physical Examination:    Vitals:    01/11/21 1249   BP: 134/82   Pulse: 69   SpO2: 98%        Constitutional and General Appearance: NAD   Respiratory:  · Normal excursion and expansion without use of accessory muscles  · Resp Auscultation: Normal breath sounds without dullness  Cardiovascular:  · The apical impulses not displaced  · Heart tones are crisp and normal  · Cervical veins are not engorged  · The carotid upstroke is normal in amplitude and contour without delay or bruit  · Normal S1S2, No S3, 1/2  Murmur  · Peripheral pulses are symmetrical and full  · There is no clubbing, cyanosis of the extremities. · No edema  · Femoral Arteries: 2+ and equal  · Pedal Pulses: 2+ and equal   Abdomen:  · No masses or tenderness  · Liver/Spleen: No Abnormalities Noted  Neurological/Psychiatric:  · Alert and oriented in all spheres  · Moves all extremities well  · Exhibits normal gait balance and coordination  · No abnormalities of mood, affect, memory, mentation, or behavior are noted      Echocardiogram 2010  CONCLUSION-  Echocardiogram with a Doppler shows normal systolic   function of left ventricle with ejection fraction of 55%. There is   diastolic dysfunction of left ventricle.   No valvular heart disease Thank you for allowing me to participate in the care of this individual.      Gildardo Moreno.  Cynthia Guardado M.D., [place]

## 2021-01-11 ENCOUNTER — OFFICE VISIT (OUTPATIENT)
Dept: CARDIOLOGY CLINIC | Age: 71
End: 2021-01-11
Payer: MEDICARE

## 2021-01-11 VITALS
HEIGHT: 65 IN | OXYGEN SATURATION: 98 % | WEIGHT: 253 LBS | HEART RATE: 69 BPM | SYSTOLIC BLOOD PRESSURE: 134 MMHG | DIASTOLIC BLOOD PRESSURE: 82 MMHG | BODY MASS INDEX: 42.15 KG/M2

## 2021-01-11 DIAGNOSIS — I10 ESSENTIAL HYPERTENSION: Primary | ICD-10-CM

## 2021-01-11 DIAGNOSIS — R07.2 PRECORDIAL PAIN: ICD-10-CM

## 2021-01-11 DIAGNOSIS — G47.30 SLEEP APNEA, UNSPECIFIED TYPE: ICD-10-CM

## 2021-01-11 DIAGNOSIS — R00.2 PALPITATIONS: ICD-10-CM

## 2021-01-11 PROCEDURE — 1036F TOBACCO NON-USER: CPT | Performed by: INTERNAL MEDICINE

## 2021-01-11 PROCEDURE — 1090F PRES/ABSN URINE INCON ASSESS: CPT | Performed by: INTERNAL MEDICINE

## 2021-01-11 PROCEDURE — 1123F ACP DISCUSS/DSCN MKR DOCD: CPT | Performed by: INTERNAL MEDICINE

## 2021-01-11 PROCEDURE — G8484 FLU IMMUNIZE NO ADMIN: HCPCS | Performed by: INTERNAL MEDICINE

## 2021-01-11 PROCEDURE — 99204 OFFICE O/P NEW MOD 45 MIN: CPT | Performed by: INTERNAL MEDICINE

## 2021-01-11 PROCEDURE — 4040F PNEUMOC VAC/ADMIN/RCVD: CPT | Performed by: INTERNAL MEDICINE

## 2021-01-11 PROCEDURE — G8417 CALC BMI ABV UP PARAM F/U: HCPCS | Performed by: INTERNAL MEDICINE

## 2021-01-11 PROCEDURE — 3017F COLORECTAL CA SCREEN DOC REV: CPT | Performed by: INTERNAL MEDICINE

## 2021-01-11 PROCEDURE — G8399 PT W/DXA RESULTS DOCUMENT: HCPCS | Performed by: INTERNAL MEDICINE

## 2021-01-11 PROCEDURE — G8427 DOCREV CUR MEDS BY ELIG CLIN: HCPCS | Performed by: INTERNAL MEDICINE

## 2021-01-11 RX ORDER — METOPROLOL SUCCINATE 25 MG/1
25 TABLET, EXTENDED RELEASE ORAL DAILY
Qty: 90 TABLET | Refills: 3 | Status: SHIPPED | OUTPATIENT
Start: 2021-01-11 | End: 2021-05-03 | Stop reason: SDUPTHER

## 2021-01-11 NOTE — LETTER
[address]   Cardiac Consultation    Referring Provider:  Ahsan Benedict DO     Chief Complaint   Patient presents with    New Patient    Chest Pain     comes and goes    Shortness of Breath     comes and goes    Edema     feet and legs    Fatigue     tires all the time    Palpitations     beats fast and takes breath away        History of Present Illness:    Carmel Foster is a 69 y.o. female who is here today as a NEW patient consult for cardiology, referred by Ahsan Benedict DO for palpitations. She has a past medical history of hypertension, hyperlipidemia, sleep apnea, and hypothyroidism. She wore a Holter monitor in 11/2020 which showed PAC's, atrial runs, rare PVC's. An echocardiogram from 2017 showed an EF of 55%. Today she states she has been told she had kidney diease. She states she has been having palpitations that started a year ago. She has had this more over the last year, now daily. This feels like a fast heart rate and also a fluttering. She has associated chest tightness. Last for 2-3 minutes and resolves. She has palpitations with activity and rest. She has ankle swelling most days. She has fatigue and states she had a sleep study years ago. Diagnosed with sleep apnea. She has not wore her CPAP for several years. Her daughter tells her she snores. She states her blood pressure at home runs around 130/70's. Patient currently denies any weight gain, dizziness, and syncope.         Past Medical History: has a past medical history of Arthritis, Bipolar disorder ([facility] [address] Cellulitis, Chronic back pain, Closed fracture of left fibula, Closed fracture of left tibia, Colon polyps, Depression, Fibroid (bleeding) (uterine), Fibromyalgia, GERD (gastroesophageal reflux disease), Hyperlipidemia, Hypertension, Hypothyroidism, Influenza A, Migraine, Miscarriage, Morbid obesity ([address] Neuropathy, MAXIMO (obstructive sleep apnea), RLS (restless legs syndrome), Urinary incontinence, Urine incontinence, and Vitamin D deficiency. Surgical History:   has a past surgical history that includes Cholecystectomy (1985); Leg Surgery (1995); Hysterectomy (1982); Upper gastrointestinal endoscopy (2010); Colonoscopy (2010 ); Upper gastrointestinal endoscopy (4-27-12); Bariatric Surgery (11/14/12); hiatal hernia repair; eye surgery (Left, 08/31/2018); pr xcapsl ctrc rmvl insj io lens prosth w/o ecp (Left, 8/31/2018); and pr xcapsl ctrc rmvl insj io lens prosth w/o ecp (Right, 9/14/2018). Social History:   reports that she has never smoked. She has never used smokeless tobacco. She reports current alcohol use. She reports that she does not use drugs. Family History:  family history includes Arthritis in her father and mother; Asthma in her mother; Cancer in her maternal uncle, maternal uncle, and maternal uncle; Cancer (age of onset: 36) in her father; Cancer (age of onset: 50) in her sister; Cancer (age of onset: 48) in her sister; Depression in her mother; Diabetes in her mother; Heart Disease (age of onset: 48) in her mother; High Blood Pressure in her mother; High Cholesterol in her mother; Kidney Disease in her sister; Mental Illness in her mother; Stroke in her mother; Stroke (age of onset: 48) in her father; Substance Abuse in her father. Home Medications:  Prior to Admission medications    Medication Sig Start Date End Date Taking?  Authorizing Provider ondansetron (ZOFRAN ODT) 4 MG disintegrating tablet Take 1 tablet by mouth every 8 hours as needed for Nausea or Vomiting 12/19/20  Yes Yuki Garcia MD   omeprazole (PRILOSEC) 40 MG delayed release capsule TAKE 1 CAPSULE EVERY DAY 10/13/20  Yes Mauri Bunch DO   verapamil (VERELAN) 360 MG extended release capsule TAKE 1 CAPSULE EVERY NIGHT 10/13/20  Yes Mauri Bunch DO   QUEtiapine (SEROQUEL XR) 150 MG TB24 extended release tablet TAKE 1 TABLET EVERY DAY 10/13/20  Yes Clary Bunch DO   traZODone (DESYREL) 150 MG tablet TAKE 1 TABLET EVERY NIGHT 10/13/20  Yes Clary Bunch DO   rOPINIRole (REQUIP) 1 MG tablet TAKE 1 TABLET THREE TIMES DAILY 9/16/20  Yes Clary Bunch DO   furosemide (LASIX) 40 MG tablet TAKE 1 TABLET EVERY DAY 6/1/20  Yes Clary Bunch DO   levothyroxine (SYNTHROID) 150 MCG tablet TAKE 1 TABLET EVERY DAY 6/1/20  Yes Mauri Bunch DO   lovastatin (MEVACOR) 20 MG tablet TAKE 1 TABLET EVERY NIGHT 3/17/20  Yes Mauri Bunch DO   lidocaine (LIDODERM) 5 % Place 1 patch onto the skin daily 12 hours on, 12 hours off. 1/9/20  Yes SANDOR Larson CNP   escitalopram (LEXAPRO) 20 MG tablet TAKE 1 TABLET EVERY DAY 12/31/19  Yes Mauri Bunch DO   diclofenac sodium 1 % GEL APPLY 4 GRAMS 4 TIMES DAILY 8/23/19  Yes Cindia Pollen, DPM   albuterol sulfate HFA (PROVENTIL HFA) 108 (90 Base) MCG/ACT inhaler Inhale 2 puffs into the lungs every 6 hours as needed for Wheezing 1/23/19  Yes Mauri Bunch DO   aspirin 81 MG EC tablet Take 81 mg by mouth daily Indications: OTC Takes at times   Yes Historical MD Jon   gabapentin (NEURONTIN) 600 MG tablet TAKE 1 TABLET TWICE DAILY 3/17/20 4/16/20  Mauri Bunch DO   diclofenac (VOLTAREN) 50 MG EC tablet Take 1 tablet by mouth 2 times daily 1/9/20   SANDOR Larson CNP   benzonatate (TESSALON PERLES) 100 MG capsule Take 1 capsule by mouth 3 times daily as needed for Cough Patient not taking: Reported on 1/11/2021 3/7/19   SANDOR Morrow CNP        Allergies:  Patient has no known allergies. Review of Systems:   · Constitutional: there has been no unanticipated weight loss. There's been no change in energy level, sleep pattern, or activity level. · Eyes: No visual changes or diplopia. No scleral icterus. · ENT: No Headaches, hearing loss or vertigo. No mouth sores or sore throat. · Cardiovascular: Reviewed in HPI  · Respiratory: No cough or wheezing, no sputum production. No hematemesis. · Gastrointestinal: No abdominal pain, appetite loss, blood in stools. No change in bowel or bladder habits. · Genitourinary: No dysuria, trouble voiding, or hematuria. · Musculoskeletal:  No gait disturbance, weakness or joint complaints. · Integumentary: No rash or pruritis. · Neurological: No headache, diplopia, change in muscle strength, numbness or tingling. No change in gait, balance, coordination, mood, affect, memory, mentation, behavior. · Psychiatric: No anxiety, no depression. · Endocrine: No malaise, fatigue or temperature intolerance. No excessive thirst, fluid intake, or urination. No tremor. · Hematologic/Lymphatic: No abnormal bruising or bleeding, blood clots or swollen lymph nodes. · Allergic/Immunologic: No nasal congestion or hives. Physical Examination:    Vitals:    01/11/21 1249   BP: 134/82   Pulse: 69   SpO2: 98%        Constitutional and General Appearance: NAD   Respiratory:  · Normal excursion and expansion without use of accessory muscles  · Resp Auscultation: Normal breath sounds without dullness  Cardiovascular:  · The apical impulses not displaced  · Heart tones are crisp and normal  · Cervical veins are not engorged  · The carotid upstroke is normal in amplitude and contour without delay or bruit  · Normal S1S2, No S3, 1/2  Murmur  · Peripheral pulses are symmetrical and full  · There is no clubbing, cyanosis of the extremities. · No edema  · Femoral Arteries: 2+ and equal  · Pedal Pulses: 2+ and equal   Abdomen:  · No masses or tenderness  · Liver/Spleen: No Abnormalities Noted  Neurological/Psychiatric:  · Alert and oriented in all spheres  · Moves all extremities well  · Exhibits normal gait balance and coordination  · No abnormalities of mood, affect, memory, mentation, or behavior are noted      Echocardiogram 2010  CONCLUSION-  Echocardiogram with a Doppler shows normal systolic   function of left ventricle with ejection fraction of 55%. There is   diastolic dysfunction of left ventricle. No valvular heart disease   is seen. There is no evidence of pulmonary hypertension . Echocardiogram 7/5/17  Summary   Left ventricular systolic function is normal with the ejection fraction   estimated at 55%. No regional wall motion abnormalities. Normal left ventricular diastolic   filling pressure. Left ventricular size is mildly dilated. Normal left ventricular wall   thickness. Holter Monitor 11/2020  The rhythm was sinus. Average WA interval 0.20 average QRS duration 0.08. Average daily heart rate 68 ranging from 52 to 120 bpm.2. Occasional premature supraventricular ectopic beats total 150 consisting of 110isolated PACs, 4 atrial pairs and 7 atrial runs. The longest run was 7 beats HR - 113 bpm at 05:41:16. Fastest run 5 beats HR - 152 bpm at 14:08:00 11/11. 3. Rare premature ventricular ectopic beats consisting of 33 isolated PVCs. 4.  Patient events recorded with isolated PVCs, PAC and atrial run        EKG 12/19/2020  Sinus bradycardia, rate 58 bpm     Assessment:   Palpitations - PAC/PVCs  Chest pain - typical/atypical  Hypertension - high normal, will monitor for now  Hyperlipidemia   Hypothyroidism  Fatigue w/ assoc snoring   Sleep apnea - not treating  Murmur - not noted in past  Peripheral edema - multifactorial due to MAXIMO, obesity, inactivity, HTN    Plan:  Start Toprol XL 25 mg daily for palpitations   Echocardiogram Stress test- [facility]   Referral for sleep study   Continue other medications   Check blood pressure at home weekly  Regular exercise and following a healthy diet encouraged   Follow up with me in 6 weeks       Leoibe's attestation: This note was scribed in the presence of Dr. Migue Ross M.D. By Gema Cook RN    The scribes documentation has been prepared under my direction and personally reviewed by me in its entirety. I confirm that the note above accurately reflects all work, treatment, procedures, and medical decision making performed by me. Dr. Migue Ross MD        Thank you for allowing me to participate in the care of this individual.      Jonny Tran.  Piyush Roa M.D., [place]

## 2021-01-12 ENCOUNTER — TELEPHONE (OUTPATIENT)
Dept: PULMONOLOGY | Age: 71
End: 2021-01-12

## 2021-01-12 NOTE — TELEPHONE ENCOUNTER
Within this Telehealth Consent, the terms you and yours refer to the person using the Telehealth Service (Service), or in the case of a use of the Service by or on behalf of a minor, you and yours refer to and include (i) the parent or legal guardian who provides consent to the use of the Service by such minor or uses the Service on behalf of such minor, and (ii) the minor for whom consent is being provided or on whose behalf the Service is being utilized. When using Service, you will be consulting with your health care providers via the use of Telehealth.   Telehealth involves the delivery of healthcare services using electronic communications, information technology or other means between a healthcare provider and a patient who are not in the same physical location. Telehealth may be used for diagnosis, treatment, follow-up and/or patient education, and may include, but is not limited to, one or more of the following:    Electronic transmission of medical records, photo images, personal health information or other data between a patient and a healthcare provider    Interactions between a patient and healthcare provider via audio, video and/or data communications    Use of output data from medical devices, sound and video files    Anticipated Benefits   The use of Telehealth by your Provider(s) through the Service may have the following possible benefits:    Making it easier and more efficient for you to access medical care and treatment for the conditions treated by such Provider(s) utilizing the Service    Allowing you to obtain medical care and treatment by Provider(s) at times that are convenient for you    Enabling you to interact with Provider(s) without the necessity of an in-office appointment     Possible Risks   While the use of Telehealth can provide potential benefits for you, there are also potential risks associated with the use of Telehealth.  These risks include, but may not be the terms described in the Terms of Service and this Telehealth Consent. The patient was read the following statement and has consented to the visit as of 1/12/21. The patient has been scheduled for their first telehealth visit on 2/24/21 with .

## 2021-01-19 ENCOUNTER — TELEPHONE (OUTPATIENT)
Dept: CARDIOLOGY CLINIC | Age: 71
End: 2021-01-19

## 2021-01-19 ENCOUNTER — HOSPITAL ENCOUNTER (OUTPATIENT)
Dept: CARDIOLOGY | Age: 71
Discharge: HOME | End: 2021-01-19
Payer: MEDICARE

## 2021-01-19 ENCOUNTER — HOSPITAL ENCOUNTER (OUTPATIENT)
Dept: NUCLEAR MEDICINE | Age: 71
Discharge: HOME | End: 2021-01-19
Payer: MEDICARE

## 2021-01-19 ENCOUNTER — HOSPITAL ENCOUNTER (OUTPATIENT)
Dept: NON INVASIVE DIAGNOSTICS | Age: 71
Discharge: HOME | End: 2021-01-19
Payer: MEDICARE

## 2021-01-19 ENCOUNTER — HOSPITAL ENCOUNTER (OUTPATIENT)
Dept: CARDIOLOGY | Age: 71
End: 2021-01-19
Payer: MEDICARE

## 2021-01-19 DIAGNOSIS — I10 ESSENTIAL HYPERTENSION: ICD-10-CM

## 2021-01-19 DIAGNOSIS — R07.2 PRECORDIAL PAIN: ICD-10-CM

## 2021-01-19 DIAGNOSIS — R00.2 PALPITATIONS: ICD-10-CM

## 2021-01-19 LAB
LV EF: 55 %
LV EF: 68 %
LVEF MODALITY: NORMAL
LVEF MODALITY: NORMAL

## 2021-01-19 PROCEDURE — 78452 HT MUSCLE IMAGE SPECT MULT: CPT

## 2021-01-19 PROCEDURE — 3430000000 HC RX DIAGNOSTIC RADIOPHARMACEUTICAL: Performed by: INTERNAL MEDICINE

## 2021-01-19 PROCEDURE — 93017 CV STRESS TEST TRACING ONLY: CPT

## 2021-01-19 PROCEDURE — 6360000002 HC RX W HCPCS: Performed by: INTERNAL MEDICINE

## 2021-01-19 PROCEDURE — 93306 TTE W/DOPPLER COMPLETE: CPT

## 2021-01-19 PROCEDURE — A9502 TC99M TETROFOSMIN: HCPCS | Performed by: INTERNAL MEDICINE

## 2021-01-19 RX ADMIN — TETROFOSMIN 32.5 MILLICURIE: 1.38 INJECTION, POWDER, LYOPHILIZED, FOR SOLUTION INTRAVENOUS at 11:15

## 2021-01-19 RX ADMIN — TETROFOSMIN 10.6 MILLICURIE: 1.38 INJECTION, POWDER, LYOPHILIZED, FOR SOLUTION INTRAVENOUS at 10:00

## 2021-01-19 RX ADMIN — REGADENOSON 0.4 MG: 0.08 INJECTION, SOLUTION INTRAVENOUS at 11:15

## 2021-01-19 NOTE — TELEPHONE ENCOUNTER
----- Message from Yaneli Alegria MD sent at 1/19/2021 10:59 AM EST -----  Call  Echo looks good, heart fxn normal  Waiting on stress test results

## 2021-02-07 RX ORDER — LEVOTHYROXINE SODIUM 175 UG/1
CAPSULE ORAL
Qty: 90 CAPSULE | Refills: 0 | Status: SHIPPED | OUTPATIENT
Start: 2021-02-07 | End: 2021-02-20

## 2021-02-24 ENCOUNTER — TELEPHONE (OUTPATIENT)
Dept: PULMONOLOGY | Age: 71
End: 2021-02-24

## 2021-02-24 NOTE — TELEPHONE ENCOUNTER
Patient did not show for NPT Sleep appointment referred by  with Estrella Kan on 2/24/21. Patient saw Estrella Kan 2013    Called patient 3x to check-in for virtual visit and patient did not answer or return calls. Patient was also no show on: n/a    LOV   Assessment:1/21/13      1. Mild sleep apnea, AHI of 10, intolerant of CPAP  2. Plan for bariatric surgery  3  Symptoms of restless legs syndrome                Plan:      1. D/C CPAP, no symptomatic improvement and very poorly tolerant  2. Requip  3. I would like patient to try coming off of Seroquel and use an alternate agent such as celexa if okay with Dr. Shirley Monsivais  4.   See me in 6 months

## 2021-02-25 ENCOUNTER — VIRTUAL VISIT (OUTPATIENT)
Dept: PULMONOLOGY | Age: 71
End: 2021-02-25
Payer: MEDICARE

## 2021-02-25 DIAGNOSIS — G47.33 OSA (OBSTRUCTIVE SLEEP APNEA): Primary | ICD-10-CM

## 2021-02-25 DIAGNOSIS — G25.81 RLS (RESTLESS LEGS SYNDROME): ICD-10-CM

## 2021-02-25 PROCEDURE — 99204 OFFICE O/P NEW MOD 45 MIN: CPT | Performed by: INTERNAL MEDICINE

## 2021-02-25 RX ORDER — BUPROPION HYDROCHLORIDE 150 MG/1
TABLET ORAL
Qty: 60 TABLET | Refills: 5 | Status: SHIPPED | OUTPATIENT
Start: 2021-02-25 | End: 2021-04-23 | Stop reason: SDUPTHER

## 2021-02-25 ASSESSMENT — SLEEP AND FATIGUE QUESTIONNAIRES
HOW LIKELY ARE YOU TO NOD OFF OR FALL ASLEEP WHILE WATCHING TV: 2
HOW LIKELY ARE YOU TO NOD OFF OR FALL ASLEEP WHEN YOU ARE A PASSENGER IN A CAR FOR AN HOUR WITHOUT A BREAK: 3
HOW LIKELY ARE YOU TO NOD OFF OR FALL ASLEEP WHILE SITTING AND TALKING TO SOMEONE: 0
ESS TOTAL SCORE: 13
HOW LIKELY ARE YOU TO NOD OFF OR FALL ASLEEP WHILE SITTING AND READING: 1
HOW LIKELY ARE YOU TO NOD OFF OR FALL ASLEEP IN A CAR, WHILE STOPPED FOR A FEW MINUTES IN TRAFFIC: 1
HOW LIKELY ARE YOU TO NOD OFF OR FALL ASLEEP WHILE LYING DOWN TO REST IN THE AFTERNOON WHEN CIRCUMSTANCES PERMIT: 3
HOW LIKELY ARE YOU TO NOD OFF OR FALL ASLEEP WHILE SITTING QUIETLY AFTER LUNCH WITHOUT ALCOHOL: 1
HOW LIKELY ARE YOU TO NOD OFF OR FALL ASLEEP WHILE SITTING INACTIVE IN A PUBLIC PLACE: 2

## 2021-02-25 NOTE — Clinical Note
Rosi Dumas, if okay with you, I'd like to try changing lexapro to wellbutrin to see if we can improve RLS symptoms. Eventually, I may also try titrating trazodone off, but I'd like to start with lexapro since it is most likely culprit in exacerbating RLS symptoms. I did write for the rx change, but I asked patient to hold off implementing until I had a chance to check with you.   Thanks, Lenoarjesus manuel Garciamps

## 2021-02-25 NOTE — PROGRESS NOTES
CC: MAXIMO    HPI  61 yo with many year h/o obesity s/p gastric sleeve, known mild MAXIMO, AHI of 10, diagnosed by PSG at [facility] in 2012, tried CPAP but poorly tolerant, now having palpitations and snoring and waking with headache, poor sleep quality. Problems with CPAP in the past and did not like having face covered. Patient   Patient has No Known Allergies. Past Medical History:   Diagnosis Date    Arthritis     Bipolar disorder ([facility]     questionable dx    Cellulitis     L ankle, recurrent; over area of prior surgeries    Chronic back pain     Closed fracture of left fibula 1995    s/p fall; surgery x 6    Closed fracture of left tibia 1995    s/p fall on ice; hx of surgery x 6    Colon polyps     Depression     Fibroid (bleeding) (uterine) 1982    Fibromyalgia     GERD (gastroesophageal reflux disease)     Hyperlipidemia     Hypertension     Myoview Lexiscan WNL on 5/17/12    Hypothyroidism     Influenza A 01/31/2020    Migraine     Miscarriage     Morbid obesity ([facility]     Neuropathy 2011    seen initially by neuro., Dr. Amanda Redding, on 8/11/10; EMG 7/16/10 showed abn.'s c/w L5 radiculopathy & periph. neuropathy; pt. reports sx in hands and feet ; dx'd by rheum.      MAXIMO (obstructive sleep apnea) 06/07/2012    mild-does not require appliance    RLS (restless legs syndrome)     Urinary incontinence     Urine incontinence     saw urology, Dr. Dakotah Garcia, on 7/21/11 for microscopic hematuria & hematuria; was started on Vesicare & scheduled for cystoscopy    Vitamin D deficiency 2/27/12    22 ng/mL       Past Surgical History:   Procedure Laterality Date    BARIATRIC SURGERY  11/14/12    Laparoscopic Sleeve Gastrectomy, Lap Hiatal hernia repair    CHOLECYSTECTOMY  1985    COLONOSCOPY  2010     colon polyps; Dr. Beatriz Esparza Left 08/31/2018    phaco with IOL    HIATAL HERNIA REPAIR      lap    HYSTERECTOMY  1982    TAHBSO for fibroids and DUB    LEG SURGERY  1995    L; s/p tibia and fibular zero    STRESS 1/19/21   Normal myocardial perfusion study with normal left ventricular function,    size, and wall motion.    The estimated left ventricular function is 68%     12/19/20 CXR no focal airspace disease     Assessment:      · Mild sleep apnea, AHI of 10, previously intolerant of CPAP; underwent bariatric surgery but has regained weight   · Very loud snoring  · Severe Restless legs syndrome, poorly controlled with neurontin and requip  · Anxiety, on Lexapro      Plan:      · HST now, if needs trial of APAP, please request nasal dreamwear mask  · Requip 3 mg HS, Neurontin 600 mg BID -- both are taken 30 minutes prior to bedtime  · If okay with Dr. Aiden John, trial changing lexapro to wellbutrin as lexapro is very common culprit in RLS. Wellbutrin is only option in antidpressant/antianxiety class that will not cause RLS (and may actually ameliorate symptoms). I would taper lexapro off over 6 weeks and titrate on wellbutrin over same period   · Email AVS to patient, please highlight section with my instructions re: lexapro  · See me in about 8 weeks      Helene Jimenez is a 69 y.o. female being evaluated by a Virtual Visit (audiovideo visit) encounter to address concerns as mentioned above. A caregiver was present when appropriate. Due to this being a TeleHealth encounter (During [facility] public health emergency), evaluation of the following organ systems was limited: Vitals/Constitutional/EENT/Resp/CV/GI//MS/Neuro/Skin/Heme-Lymph-Imm. Pursuant to the emergency declaration under the [address] [place] [address], [address] [place] [address] authority and the [employer] Act, this Virtual Visit was conducted with patient's (and/or legal guardian's) consent, to reduce the patient's risk of exposure to COVID-19 and provide necessary medical care.   The patient (and/or legal guardian) is advised to contact this office for worsening conditions or

## 2021-02-25 NOTE — PATIENT INSTRUCTIONS
Dr. Maurice Gutierrez office will confirm change is okay with Dr. Beck. Once you hear from us, do the following:    START Bupropion (Wellbutrin) 150 mg very morning  DECREASE escitalopram (Lexapro) to 10 mg every morning (1/2 tab)    AFTER 2 WEEKS  INCREASE Buproprion (wellbutrin) to 300 mg (2 tablets) every morning  DECREASE escitalopram (lexapro) to 5 mg every morning (1/4 tab) for two weeks    AFTER 2 MORE WEEKS simply stop the lexapro and continue the buproprion    If you have worsening anxiety, depression or any thoughts of harming yourself or harming someone else, immediately stop wellbutrin and notify your physician.

## 2021-02-26 ENCOUNTER — TELEPHONE (OUTPATIENT)
Dept: FAMILY MEDICINE CLINIC | Age: 71
End: 2021-02-26

## 2021-02-26 NOTE — TELEPHONE ENCOUNTER
Dr. Abreu Overall would like to know if Dr. Pamela Foss would agree with him changing pt's Lexapro to Wellbutrin.

## 2021-03-01 NOTE — PROGRESS NOTES
Tell pt that Dr. Claude Martinez did get back to me and we should proceed with the change to wellbutrin as I discussed with her.

## 2021-03-02 ENCOUNTER — TELEPHONE (OUTPATIENT)
Dept: PULMONOLOGY | Age: 71
End: 2021-03-02

## 2021-03-02 NOTE — TELEPHONE ENCOUNTER
Lisset Oscar MD   Physician   Specialty:  Pulmonology   Progress Notes   Signed   Encounter Date:  2/25/2021               Signed           Added by:  [x]Kalpesh Hernandez MD    Tell pt that Dr. Ashley Gallo did get back to me and we should proceed with the change to wellbutrin as I discussed with her.

## 2021-03-03 ENCOUNTER — IMMUNIZATION (OUTPATIENT)
Dept: PRIMARY CARE CLINIC | Age: 71
End: 2021-03-03
Payer: MEDICARE

## 2021-03-03 PROCEDURE — 0001A COVID-19, PFIZER VACCINE 30MCG/0.3ML DOSE: CPT | Performed by: FAMILY MEDICINE

## 2021-03-03 PROCEDURE — 91300 COVID-19, PFIZER VACCINE 30MCG/0.3ML DOSE: CPT | Performed by: FAMILY MEDICINE

## 2021-03-15 ENCOUNTER — HOSPITAL ENCOUNTER (OUTPATIENT)
Dept: SLEEP CENTER | Age: 71
Discharge: HOME | End: 2021-03-17
Payer: MEDICARE

## 2021-03-15 DIAGNOSIS — G47.33 OSA (OBSTRUCTIVE SLEEP APNEA): ICD-10-CM

## 2021-03-15 PROCEDURE — 95806 SLEEP STUDY UNATT&RESP EFFT: CPT

## 2021-03-24 ENCOUNTER — IMMUNIZATION (OUTPATIENT)
Dept: PRIMARY CARE CLINIC | Age: 71
End: 2021-03-24
Payer: MEDICARE

## 2021-03-24 PROCEDURE — 0002A COVID-19, PFIZER VACCINE 30MCG/0.3ML DOSE: CPT | Performed by: FAMILY MEDICINE

## 2021-03-24 PROCEDURE — 91300 COVID-19, PFIZER VACCINE 30MCG/0.3ML DOSE: CPT | Performed by: FAMILY MEDICINE

## 2021-03-29 ENCOUNTER — TELEPHONE (OUTPATIENT)
Dept: PULMONOLOGY | Age: 71
End: 2021-03-29

## 2021-03-29 ENCOUNTER — TELEPHONE (OUTPATIENT)
Dept: WOMENS IMAGING | Age: 71
End: 2021-03-29

## 2021-03-29 DIAGNOSIS — G47.33 OSA (OBSTRUCTIVE SLEEP APNEA): Primary | ICD-10-CM

## 2021-03-29 NOTE — TELEPHONE ENCOUNTER
Left a message for the patient- trying to schedule an appointment for OVERDUE screening mammogram. Phone number was provided for scheduling.

## 2021-04-15 ENCOUNTER — OFFICE VISIT (OUTPATIENT)
Dept: PRIMARY CARE CLINIC | Age: 71
End: 2021-04-15
Payer: MEDICARE

## 2021-04-15 DIAGNOSIS — Z01.818 PREOP TESTING: Primary | ICD-10-CM

## 2021-04-15 LAB — SARS-COV-2: NOT DETECTED

## 2021-04-15 PROCEDURE — 99211 OFF/OP EST MAY X REQ PHY/QHP: CPT | Performed by: NURSE PRACTITIONER

## 2021-04-15 PROCEDURE — G8428 CUR MEDS NOT DOCUMENT: HCPCS | Performed by: NURSE PRACTITIONER

## 2021-04-15 PROCEDURE — G8417 CALC BMI ABV UP PARAM F/U: HCPCS | Performed by: NURSE PRACTITIONER

## 2021-04-15 NOTE — PROGRESS NOTES
Bijal Clark received a viral test for COVID-19. They were educated on isolation and quarantine as appropriate. For any symptoms, they were directed to seek care from their PCP, given contact information to establish with a doctor, directed to an urgent care or the emergency room.
Detail Level: Zone
Detail Level: Simple

## 2021-04-15 NOTE — PROGRESS NOTES
Javed Armstrong received a viral test for COVID-19. They were educated on isolation and quarantine as appropriate. For any symptoms, they were directed to seek care from their PCP, given contact information to establish with a doctor, directed to an urgent care or the emergency room.

## 2021-04-15 NOTE — PATIENT INSTRUCTIONS

## 2021-04-21 ENCOUNTER — HOSPITAL ENCOUNTER (OUTPATIENT)
Dept: SLEEP CENTER | Age: 71
Discharge: HOME | End: 2021-04-23
Payer: MEDICARE

## 2021-04-21 DIAGNOSIS — G47.33 OSA (OBSTRUCTIVE SLEEP APNEA): ICD-10-CM

## 2021-04-21 PROCEDURE — 95811 POLYSOM 6/>YRS CPAP 4/> PARM: CPT

## 2021-04-23 ENCOUNTER — TELEPHONE (OUTPATIENT)
Dept: PULMONOLOGY | Age: 71
End: 2021-04-23

## 2021-04-23 DIAGNOSIS — G47.33 OSA (OBSTRUCTIVE SLEEP APNEA): Primary | ICD-10-CM

## 2021-04-23 DIAGNOSIS — Z78.9 INTOLERANCE OF CONTINUOUS POSITIVE AIRWAY PRESSURE (CPAP) VENTILATION: ICD-10-CM

## 2021-04-26 RX ORDER — BUPROPION HYDROCHLORIDE 150 MG/1
TABLET ORAL
Qty: 180 TABLET | Refills: 1 | Status: SHIPPED | OUTPATIENT
Start: 2021-04-26 | End: 2021-08-31

## 2021-05-03 ENCOUNTER — OFFICE VISIT (OUTPATIENT)
Dept: CARDIOLOGY CLINIC | Age: 71
End: 2021-05-03
Payer: MEDICARE

## 2021-05-03 VITALS
DIASTOLIC BLOOD PRESSURE: 70 MMHG | OXYGEN SATURATION: 97 % | HEIGHT: 65 IN | SYSTOLIC BLOOD PRESSURE: 112 MMHG | HEART RATE: 74 BPM | BODY MASS INDEX: 41.65 KG/M2 | WEIGHT: 250 LBS

## 2021-05-03 DIAGNOSIS — E78.2 MIXED HYPERLIPIDEMIA: ICD-10-CM

## 2021-05-03 DIAGNOSIS — R00.2 PALPITATIONS: Primary | ICD-10-CM

## 2021-05-03 PROCEDURE — 3017F COLORECTAL CA SCREEN DOC REV: CPT | Performed by: INTERNAL MEDICINE

## 2021-05-03 PROCEDURE — 99214 OFFICE O/P EST MOD 30 MIN: CPT | Performed by: INTERNAL MEDICINE

## 2021-05-03 PROCEDURE — 1036F TOBACCO NON-USER: CPT | Performed by: INTERNAL MEDICINE

## 2021-05-03 PROCEDURE — 1090F PRES/ABSN URINE INCON ASSESS: CPT | Performed by: INTERNAL MEDICINE

## 2021-05-03 PROCEDURE — G8399 PT W/DXA RESULTS DOCUMENT: HCPCS | Performed by: INTERNAL MEDICINE

## 2021-05-03 PROCEDURE — G8417 CALC BMI ABV UP PARAM F/U: HCPCS | Performed by: INTERNAL MEDICINE

## 2021-05-03 PROCEDURE — G8427 DOCREV CUR MEDS BY ELIG CLIN: HCPCS | Performed by: INTERNAL MEDICINE

## 2021-05-03 PROCEDURE — 1123F ACP DISCUSS/DSCN MKR DOCD: CPT | Performed by: INTERNAL MEDICINE

## 2021-05-03 PROCEDURE — 4040F PNEUMOC VAC/ADMIN/RCVD: CPT | Performed by: INTERNAL MEDICINE

## 2021-05-03 RX ORDER — METOPROLOL SUCCINATE 25 MG/1
25 TABLET, EXTENDED RELEASE ORAL DAILY
Qty: 90 TABLET | Refills: 3 | Status: SHIPPED | OUTPATIENT
Start: 2021-05-03 | End: 2022-01-03 | Stop reason: SDUPTHER

## 2021-05-03 NOTE — PATIENT INSTRUCTIONS
Plan:  Continue current medications   Check blood pressure at home weekly  Regular exercise and following a healthy diet encouraged   Follow up with me in 6 months and me 1 year

## 2021-05-03 NOTE — LETTER
[facility]   Cardiac Consultation    Referring Provider:  Yessica Abreu DO     Chief Complaint   Patient presents with    Follow-up    Hypertension    Hyperlipidemia    Palpitations        History of Present Illness:    Jayden Foy is a 66 y.o. female who is here today for follow up for palpitations and chest pain. He has a past medical history of hypertension, hyperlipidemia, sleep apnea, and hypothyroidism. She wore a Holter monitor in 11/2020 which showed PAC's, atrial runs, rare PVC's. An echocardiogram from 2017 showed an EF of 55%. She had a stress test on 1/19/2021, an echocardiogram showed 1/19/2021 showed an EF of 55%, mild mitral and tricuspid regurgitation. At her last visit, Toprol 25 mg was stared for palpitations. She underwent a sleep study and has been diagnosed with sleep pnea. Today he states she has been feeling well overall. She states she has palpitations worse when she lays down at night to rest. She has some palpitations during the day but overall this is decreased on Metoprolol. No assoc symptoms. Several times weekly. No [[t factors. She is tolerating her medications and taking them as prescribed. She started her CPAP a few days ago and is trying to get used to her machine. Patient currently denies any weight gain, edema, chest pain, shortness of breath, dizziness, and syncope. Past Medical History:   has a past medical history of Arthritis, Bipolar disorder ([address] Cellulitis, Chronic back pain, Closed fracture of left fibula, Closed fracture of left tibia, Colon polyps, Depression, Fibroid (bleeding) (uterine), Fibromyalgia, GERD (gastroesophageal reflux disease), Hyperlipidemia, Hypertension, Hypothyroidism, Influenza A, Migraine, Miscarriage, Morbid obesity ([address] Neuropathy, MAXIMO (obstructive sleep apnea), RLS (restless legs syndrome), Urinary incontinence, Urine incontinence, and Vitamin D deficiency.     Surgical History:   has a past surgical history that includes Cholecystectomy (1985); Leg Surgery (1995); Hysterectomy (1982); Upper gastrointestinal endoscopy (2010); Colonoscopy (2010 ); Upper gastrointestinal endoscopy (4-27-12); Bariatric Surgery (11/14/12); hiatal hernia repair; eye surgery (Left, 08/31/2018); pr xcapsl ctrc rmvl insj io lens prosth w/o ecp (Left, 8/31/2018); and pr xcapsl ctrc rmvl insj io lens prosth w/o ecp (Right, 9/14/2018). Social History:   reports that she has never smoked. She has never used smokeless tobacco. She reports current alcohol use. She reports that she does not use drugs. Family History:  family history includes Arthritis in her father and mother; Asthma in her mother; Cancer in her maternal uncle, maternal uncle, and maternal uncle; Cancer (age of onset: 36) in her father; Cancer (age of onset: 50) in her sister; Cancer (age of onset: 48) in her sister; Depression in her mother; Diabetes in her mother; Heart Disease (age of onset: 48) in her mother; High Blood Pressure in her mother; High Cholesterol in her mother; Kidney Disease in her sister; Mental Illness in her mother; Stroke in her mother; Stroke (age of onset: 48) in her father; Substance Abuse in her father. Home Medications:  Prior to Admission medications    Medication Sig Start Date End Date Taking? Authorizing Provider   buPROPion (WELLBUTRIN XL) 150 MG extended release tablet 2 tabs daily 4/26/21  Yes Gustavo Gutiérrez MD   levothyroxine (SYNTHROID) 175 MCG tablet Take 1 tablet by mouth daily Recheck labs in 3 mths.  2/20/21  Yes Mauri Bunch DO   rOPINIRole (REQUIP) 1 MG tablet TAKE 1 TABLET THREE TIMES DAILY 1/29/21  Yes Mauri Bunch DO   QUEtiapine (SEROQUEL XR) 150 MG TB24 extended release tablet TAKE 1 TABLET EVERY DAY 1/29/21  Yes Mauri Bunch DO   traZODone (DESYREL) 150 MG tablet TAKE 1 TABLET EVERY NIGHT 1/29/21  Yes Mauri Bunch DO   metoprolol succinate (TOPROL XL) 25 MG extended release tablet Take 1 tablet by mouth daily 1/11/21 Yes Donna Mirza MD   omeprazole (PRILOSEC) 40 MG delayed release capsule TAKE 1 CAPSULE EVERY DAY 10/13/20  Yes Mauri Bunch DO   verapamil (VERELAN) 360 MG extended release capsule TAKE 1 CAPSULE EVERY NIGHT 10/13/20  Yes Mauri Bunch DO   furosemide (LASIX) 40 MG tablet TAKE 1 TABLET EVERY DAY 6/1/20  Yes Mauri Bunch DO   lovastatin (MEVACOR) 20 MG tablet TAKE 1 TABLET EVERY NIGHT 3/17/20  Yes Mauri Bunch DO   diclofenac sodium 1 % GEL APPLY 4 GRAMS 4 TIMES DAILY 8/23/19  Yes Fitz Charles DPM   aspirin 81 MG EC tablet Take 81 mg by mouth daily Indications: OTC Takes at times   Yes Historical Provider, MD   gabapentin (NEURONTIN) 600 MG tablet TAKE 1 TABLET TWICE DAILY 1/29/21 2/28/21  Mauri Bunch DO   ondansetron (ZOFRAN ODT) 4 MG disintegrating tablet Take 1 tablet by mouth every 8 hours as needed for Nausea or Vomiting  Patient not taking: Reported on 2/25/2021 12/19/20   Ibrahima Ghosh MD   lidocaine (LIDODERM) 5 % Place 1 patch onto the skin daily 12 hours on, 12 hours off. 1/9/20   SANDOR Irene CNP   diclofenac (VOLTAREN) 50 MG EC tablet Take 1 tablet by mouth 2 times daily 1/9/20   SANDOR Irene CNP   escitalopram (LEXAPRO) 20 MG tablet TAKE 1 TABLET EVERY DAY 12/31/19   Mauri Bunch DO   benzonatate (TESSALON PERLES) 100 MG capsule Take 1 capsule by mouth 3 times daily as needed for Cough  Patient not taking: Reported on 1/11/2021 3/7/19   SANDOR Platt CNP   albuterol sulfate HFA (PROVENTIL HFA) 108 (90 Base) MCG/ACT inhaler Inhale 2 puffs into the lungs every 6 hours as needed for Wheezing 1/23/19   Mauri Bunch DO        Allergies:  Patient has no known allergies. Review of Systems:   · Constitutional: there has been no unanticipated weight loss. There's been no change in energy level, sleep pattern, or activity level. · Eyes: No visual changes or diplopia. No scleral icterus. · ENT: No Headaches, hearing loss or vertigo. No mouth sores or sore throat. · Cardiovascular: Reviewed in HPI  · Respiratory: No cough or wheezing, no sputum production. No hematemesis. · Gastrointestinal: No abdominal pain, appetite loss, blood in stools. No change in bowel or bladder habits. · Genitourinary: No dysuria, trouble voiding, or hematuria. · Musculoskeletal:  No gait disturbance, weakness or joint complaints. · Integumentary: No rash or pruritis. · Neurological: No headache, diplopia, change in muscle strength, numbness or tingling. No change in gait, balance, coordination, mood, affect, memory, mentation, behavior. · Psychiatric: No anxiety, no depression. · Endocrine: No malaise, fatigue or temperature intolerance. No excessive thirst, fluid intake, or urination. No tremor. · Hematologic/Lymphatic: No abnormal bruising or bleeding, blood clots or swollen lymph nodes. · Allergic/Immunologic: No nasal congestion or hives. Physical Examination:    Vitals:    05/03/21 1032   BP: 112/70   Pulse: 74   SpO2: 97%        Constitutional and General Appearance: NAD   Respiratory:  · Normal excursion and expansion without use of accessory muscles  · Resp Auscultation: Normal breath sounds without dullness  Cardiovascular:  · The apical impulses not displaced  · Heart tones are crisp and normal  · Cervical veins are not engorged  · The carotid upstroke is normal in amplitude and contour without delay or bruit  · Normal S1S2, No S3, 1/6  Murmur  · Peripheral pulses are symmetrical and full  · There is no clubbing, cyanosis of the extremities.   · trace edema  · Femoral Arteries: 2+ and equal  · Pedal Pulses: 2+ and equal   Abdomen:  · No masses or tenderness  · Liver/Spleen: No Abnormalities Noted  Neurological/Psychiatric:  · Alert and oriented in all spheres  · Moves all extremities well  · Exhibits normal gait balance and coordination  · No abnormalities of mood, affect, memory, mentation, or behavior are noted      Echocardiogram 2010 CONCLUSION-  Echocardiogram with a Doppler shows normal systolic   function of left ventricle with ejection fraction of 55%. There is   diastolic dysfunction of left ventricle. No valvular heart disease   is seen. There is no evidence of pulmonary hypertension . Echocardiogram 7/5/17  Summary   Left ventricular systolic function is normal with the ejection fraction   estimated at 55%. No regional wall motion abnormalities. Normal left ventricular diastolic   filling pressure. Left ventricular size is mildly dilated. Normal left ventricular wall   thickness. Holter Monitor 11/2020  The rhythm was sinus. Average NH interval 0.20 average QRS duration 0.08. Average daily heart rate 68 ranging from 52 to 120 bpm.2. Occasional premature supraventricular ectopic beats total 150 consisting of 110isolated PACs, 4 atrial pairs and 7 atrial runs. The longest run was 7 beats HR - 113 bpm at 05:41:16. Fastest run 5 beats HR - 152 bpm at 14:08:00 11/11. 3. Rare premature ventricular ectopic beats consisting of 33 isolated PVCs. 4. Patient events recorded with isolated PVCs, PAC and atrial run        EKG 12/19/2020  Sinus bradycardia, rate 58 bpm     Echocardiogram 1/19/2021  Conclusions   Summary   Normal left ventricle systolic function with an estimated ejection fraction   of 55%. No regional wall motion abnormalities are seen. Normal left ventricular diastolic filling pressure. Mild mitral and tricuspid regurgitation. Systolic pulmonary artery pressure (SPAP) is normal and estimated at 30 mmHg   (right atrial pressure 8 mmHg). Stress test 1/19/2021  Conclusions    Summary  Normal myocardial perfusion study with normal left ventricular function,  size, and wall motion. The estimated left ventricular function is 68%. Assessment:   Palpitations - PAC/PVCs. Decreased on metoprolol. Discussed increasing dose. Will hold at current for now.    Chest pain - typical/atypical. No recent   Hypertension - stable, will monitor for now  Hyperlipidemia   Hypothyroidism  Murmur - unchanged   Peripheral edema - multifactorial due to MAXIMO, obesity, inactivity, HTN. Unchanged   Sleep apnea     Plan:  Continue current medications   Check blood pressure at home weekly  Regular exercise and following a healthy diet encouraged   Follow up with me in 6 months and me 1 year     The scribes documentation has been prepared under my direction and personally reviewed by me in its entirety. I confirm that the note above accurately reflects all work, treatment, procedures, and medical decision making performed by me. Dr. Yossi Clifford MD    Scribe's attestation: This note was scribed in the presence of Dr. Yossi Clifford M.D. By David Pascual RN      Thank you for allowing me to participate in the care of this individual.      Jonathan Bell.  Patricia Colon M.D., Archie Govea

## 2021-05-03 NOTE — PROGRESS NOTES
[facility], [place]   Cardiac Consultation    Referring Provider:  Gabriela Espinoza DO     Chief Complaint   Patient presents with    Follow-up    Hypertension    Hyperlipidemia    Palpitations        History of Present Illness:    Lidya Lam is a 66 y.o. female who is here today for follow up for palpitations and chest pain. He has a past medical history of hypertension, hyperlipidemia, sleep apnea, and hypothyroidism. She wore a Holter monitor in 11/2020 which showed PAC's, atrial runs, rare PVC's. An echocardiogram from 2017 showed an EF of 55%. She had a stress test on 1/19/2021, an echocardiogram showed 1/19/2021 showed an EF of 55%, mild mitral and tricuspid regurgitation. At her last visit, Toprol 25 mg was stared for palpitations. She underwent a sleep study and has been diagnosed with sleep pnea. Today he states she has been feeling well overall. She states she has palpitations worse when she lays down at night to rest. She has some palpitations during the day but overall this is decreased on Metoprolol. No assoc symptoms. Several times weekly. No [[t factors. She is tolerating her medications and taking them as prescribed. She started her CPAP a few days ago and is trying to get used to her machine. Patient currently denies any weight gain, edema, chest pain, shortness of breath, dizziness, and syncope. Past Medical History:   has a past medical history of Arthritis, Bipolar disorder ([address] Cellulitis, Chronic back pain, Closed fracture of left fibula, Closed fracture of left tibia, Colon polyps, Depression, Fibroid (bleeding) (uterine), Fibromyalgia, GERD (gastroesophageal reflux disease), Hyperlipidemia, Hypertension, Hypothyroidism, Influenza A, Migraine, Miscarriage, Morbid obesity ([address] Neuropathy, MAXIMO (obstructive sleep apnea), RLS (restless legs syndrome), Urinary incontinence, Urine incontinence, and Vitamin D deficiency.     Surgical History:   has a past surgical history No mouth sores or sore throat. · Cardiovascular: Reviewed in HPI  · Respiratory: No cough or wheezing, no sputum production. No hematemesis. · Gastrointestinal: No abdominal pain, appetite loss, blood in stools. No change in bowel or bladder habits. · Genitourinary: No dysuria, trouble voiding, or hematuria. · Musculoskeletal:  No gait disturbance, weakness or joint complaints. · Integumentary: No rash or pruritis. · Neurological: No headache, diplopia, change in muscle strength, numbness or tingling. No change in gait, balance, coordination, mood, affect, memory, mentation, behavior. · Psychiatric: No anxiety, no depression. · Endocrine: No malaise, fatigue or temperature intolerance. No excessive thirst, fluid intake, or urination. No tremor. · Hematologic/Lymphatic: No abnormal bruising or bleeding, blood clots or swollen lymph nodes. · Allergic/Immunologic: No nasal congestion or hives. Physical Examination:    Vitals:    05/03/21 1032   BP: 112/70   Pulse: 74   SpO2: 97%        Constitutional and General Appearance: NAD   Respiratory:  · Normal excursion and expansion without use of accessory muscles  · Resp Auscultation: Normal breath sounds without dullness  Cardiovascular:  · The apical impulses not displaced  · Heart tones are crisp and normal  · Cervical veins are not engorged  · The carotid upstroke is normal in amplitude and contour without delay or bruit  · Normal S1S2, No S3, 1/6  Murmur  · Peripheral pulses are symmetrical and full  · There is no clubbing, cyanosis of the extremities.   · trace edema  · Femoral Arteries: 2+ and equal  · Pedal Pulses: 2+ and equal   Abdomen:  · No masses or tenderness  · Liver/Spleen: No Abnormalities Noted  Neurological/Psychiatric:  · Alert and oriented in all spheres  · Moves all extremities well  · Exhibits normal gait balance and coordination  · No abnormalities of mood, affect, memory, mentation, or behavior are noted      Echocardiogram 2010 stable, will monitor for now  Hyperlipidemia   Hypothyroidism  Murmur - unchanged   Peripheral edema - multifactorial due to MAXIMO, obesity, inactivity, HTN. Unchanged   Sleep apnea     Plan:  Continue current medications   Check blood pressure at home weekly  Regular exercise and following a healthy diet encouraged   Follow up with me in 6 months and me 1 year     The scribes documentation has been prepared under my direction and personally reviewed by me in its entirety. I confirm that the note above accurately reflects all work, treatment, procedures, and medical decision making performed by me. Dr. Bishop Mare MD    Scribe's attestation: This note was scribed in the presence of Dr. Bishop Mare LAI By Lukas Kessler RN      Thank you for allowing me to participate in the care of this individual.      Isaac Polk.  Myra Kunz M.D., Lawrence Alvarez

## 2021-07-06 ENCOUNTER — VIRTUAL VISIT (OUTPATIENT)
Dept: PULMONOLOGY | Age: 71
End: 2021-07-06
Payer: MEDICARE

## 2021-07-06 ENCOUNTER — TELEPHONE (OUTPATIENT)
Dept: PULMONOLOGY | Age: 71
End: 2021-07-06

## 2021-07-06 DIAGNOSIS — G47.33 OSA (OBSTRUCTIVE SLEEP APNEA): Primary | ICD-10-CM

## 2021-07-06 DIAGNOSIS — G25.81 RLS (RESTLESS LEGS SYNDROME): ICD-10-CM

## 2021-07-06 PROCEDURE — 3017F COLORECTAL CA SCREEN DOC REV: CPT | Performed by: INTERNAL MEDICINE

## 2021-07-06 PROCEDURE — 1123F ACP DISCUSS/DSCN MKR DOCD: CPT | Performed by: INTERNAL MEDICINE

## 2021-07-06 PROCEDURE — 1090F PRES/ABSN URINE INCON ASSESS: CPT | Performed by: INTERNAL MEDICINE

## 2021-07-06 PROCEDURE — G8399 PT W/DXA RESULTS DOCUMENT: HCPCS | Performed by: INTERNAL MEDICINE

## 2021-07-06 PROCEDURE — G8417 CALC BMI ABV UP PARAM F/U: HCPCS | Performed by: INTERNAL MEDICINE

## 2021-07-06 PROCEDURE — 4040F PNEUMOC VAC/ADMIN/RCVD: CPT | Performed by: INTERNAL MEDICINE

## 2021-07-06 PROCEDURE — G8427 DOCREV CUR MEDS BY ELIG CLIN: HCPCS | Performed by: INTERNAL MEDICINE

## 2021-07-06 PROCEDURE — 99214 OFFICE O/P EST MOD 30 MIN: CPT | Performed by: INTERNAL MEDICINE

## 2021-07-06 PROCEDURE — 1036F TOBACCO NON-USER: CPT | Performed by: INTERNAL MEDICINE

## 2021-07-06 ASSESSMENT — SLEEP AND FATIGUE QUESTIONNAIRES
HOW LIKELY ARE YOU TO NOD OFF OR FALL ASLEEP WHILE SITTING AND TALKING TO SOMEONE: 0
ESS TOTAL SCORE: 8
HOW LIKELY ARE YOU TO NOD OFF OR FALL ASLEEP WHILE SITTING AND READING: 2
HOW LIKELY ARE YOU TO NOD OFF OR FALL ASLEEP WHILE SITTING QUIETLY AFTER LUNCH WITHOUT ALCOHOL: 0
HOW LIKELY ARE YOU TO NOD OFF OR FALL ASLEEP WHILE LYING DOWN TO REST IN THE AFTERNOON WHEN CIRCUMSTANCES PERMIT: 2
HOW LIKELY ARE YOU TO NOD OFF OR FALL ASLEEP WHILE SITTING INACTIVE IN A PUBLIC PLACE: 0
HOW LIKELY ARE YOU TO NOD OFF OR FALL ASLEEP IN A CAR, WHILE STOPPED FOR A FEW MINUTES IN TRAFFIC: 1
HOW LIKELY ARE YOU TO NOD OFF OR FALL ASLEEP WHEN YOU ARE A PASSENGER IN A CAR FOR AN HOUR WITHOUT A BREAK: 2
HOW LIKELY ARE YOU TO NOD OFF OR FALL ASLEEP WHILE WATCHING TV: 1

## 2021-07-06 NOTE — PROGRESS NOTES
CC: MAXIMO    HPI  . Interval History:  Had titration, started on 17/13 (request per reading was 13/7). No difference in RLS or sleep. Still waking up. Sometimes feels like too little pressure. Changed to wellbutrin and okay from anxiety perspective, but no improvement in RLS. Very dry mouth, but can't tolerate humidifier due to warm air temperature. HPI: 61 yo with many year h/o obesity s/p gastric sleeve, known mild MAXIMO, AHI of 10, diagnosed by PSG at "[employer] in 2012, tried CPAP but poorly tolerant, now having palpitations and snoring and waking with headache, poor sleep quality. Problems with CPAP in the past and did not like having face covered. Review of Systems      Objective:   Physical Exam  not currently breastfeeding.' back  VIRTUAL  Constitutional:  NAD, NCAT  Eyes: EOM intact. Anicteric. HENT: Nose, ears, neck normal, trachea midline   Respiratory: No ASM, no obvious wheezing  Cardiovascular: No obvious peripheral edema. Skin: No visible rash or nodule on visible face, upper thorax    Psychiatric: No anxiety or agitation.    Neuro: A&O to P/P/E, judgement and insight intact    PSG @ [facility] with CPAP titration 6-14-12, AHI of 10, CPAP of 10 resulted in AHI of zero  HST 3/16/21 AHI of 14  Titration 4/21/21 looked best at 13/7    STRESS 1/19/21   Normal myocardial perfusion study with normal left ventricular function,    size, and wall motion.    The estimated left ventricular function is 68%     12/19/20 CXR no focal airspace disease     Assessment:      · Mild sleep apnea   · Severe Restless legs syndrome, poorly controlled with neurontin and requip, not better with change to wellbutrin from lexapro  · Anxiety, on wellbutrin      Plan:      · Requip 3 mg HS, Neurontin 600 mg BID -- both are taken 30 minutes prior to bedtime  · Remain off SSRI, on wellbutrin  · Change BiPAP 13/7 per titration report (from 17/13)  · Download in 30 days   · See me in 6 months      Heydi Adam is a 66 y.o. female being evaluated by a Virtual Visit (audiovideo visit) encounter to address concerns as mentioned above. A caregiver was present when appropriate. Due to this being a TeleHealth encounter (During IFXBO-91 public health emergency), evaluation of the following organ systems was limited: Vitals/Constitutional/EENT/Resp/CV/GI//MS/Neuro/Skin/Heme-Lymph-Imm. Pursuant to the emergency declaration under the [address] [place] [address], [address] [place] [address] authority and the [employer] Act, this Virtual Visit was conducted with patient's (and/or legal guardian's) consent, to reduce the patient's risk of exposure to COVID-19 and provide necessary medical care. The patient (and/or legal guardian) is advised to contact this office for worsening conditions or problems, and to seek emergency medical treatment and/or call 911 for urgent concerns. Services were provided through a audiovideo synchronous discussion virtually to substitute for in-person clinic visit. Patient was located outside the office/hospital, in home or usual environment; provider was located in his office.     --Arjun Martines MD on 7/6/2021 at 4:49 PM

## 2021-07-21 ENCOUNTER — OFFICE VISIT (OUTPATIENT)
Dept: FAMILY MEDICINE CLINIC | Age: 71
End: 2021-07-21
Payer: MEDICARE

## 2021-07-21 VITALS
HEIGHT: 64 IN | WEIGHT: 252.8 LBS | SYSTOLIC BLOOD PRESSURE: 116 MMHG | DIASTOLIC BLOOD PRESSURE: 86 MMHG | BODY MASS INDEX: 43.16 KG/M2 | OXYGEN SATURATION: 96 % | HEART RATE: 73 BPM

## 2021-07-21 DIAGNOSIS — N39.45 CONTINUOUS LEAKAGE OF URINE: Primary | ICD-10-CM

## 2021-07-21 DIAGNOSIS — R15.1 FECAL SMEARING: ICD-10-CM

## 2021-07-21 DIAGNOSIS — E03.9 ACQUIRED HYPOTHYROIDISM: ICD-10-CM

## 2021-07-21 LAB
BILIRUBIN, POC: NORMAL
BLOOD URINE, POC: NORMAL
CLARITY, POC: CLEAR
COLOR, POC: YELLOW
GLUCOSE URINE, POC: NORMAL
KETONES, POC: NORMAL
LEUKOCYTE EST, POC: NORMAL
NITRITE, POC: NORMAL
PH, POC: 5.5
PROTEIN, POC: NORMAL
SPECIFIC GRAVITY, POC: >=1.03
TSH SERPL DL<=0.05 MIU/L-ACNC: 0.39 UIU/ML (ref 0.27–4.2)
UROBILINOGEN, POC: 0.2

## 2021-07-21 PROCEDURE — 1036F TOBACCO NON-USER: CPT | Performed by: FAMILY MEDICINE

## 2021-07-21 PROCEDURE — G8427 DOCREV CUR MEDS BY ELIG CLIN: HCPCS | Performed by: FAMILY MEDICINE

## 2021-07-21 PROCEDURE — G8399 PT W/DXA RESULTS DOCUMENT: HCPCS | Performed by: FAMILY MEDICINE

## 2021-07-21 PROCEDURE — G8417 CALC BMI ABV UP PARAM F/U: HCPCS | Performed by: FAMILY MEDICINE

## 2021-07-21 PROCEDURE — 1123F ACP DISCUSS/DSCN MKR DOCD: CPT | Performed by: FAMILY MEDICINE

## 2021-07-21 PROCEDURE — 1090F PRES/ABSN URINE INCON ASSESS: CPT | Performed by: FAMILY MEDICINE

## 2021-07-21 PROCEDURE — 99213 OFFICE O/P EST LOW 20 MIN: CPT | Performed by: FAMILY MEDICINE

## 2021-07-21 PROCEDURE — 0509F URINE INCON PLAN DOCD: CPT | Performed by: FAMILY MEDICINE

## 2021-07-21 PROCEDURE — 81002 URINALYSIS NONAUTO W/O SCOPE: CPT | Performed by: FAMILY MEDICINE

## 2021-07-21 PROCEDURE — 3017F COLORECTAL CA SCREEN DOC REV: CPT | Performed by: FAMILY MEDICINE

## 2021-07-21 PROCEDURE — 4040F PNEUMOC VAC/ADMIN/RCVD: CPT | Performed by: FAMILY MEDICINE

## 2021-07-21 RX ORDER — OXYBUTYNIN CHLORIDE 5 MG/1
5 TABLET ORAL 2 TIMES DAILY
Qty: 30 TABLET | Refills: 0 | Status: SHIPPED | OUTPATIENT
Start: 2021-07-21 | End: 2022-05-05 | Stop reason: SDUPTHER

## 2021-07-21 ASSESSMENT — ENCOUNTER SYMPTOMS
BLOOD IN STOOL: 0
RECTAL PAIN: 0
CONSTIPATION: 0
VOMITING: 0
DIARRHEA: 1
ABDOMINAL PAIN: 0

## 2021-07-21 NOTE — PROGRESS NOTES
Subjective:      Patient ID: Ita Day is a 66 y.o. female. HPI  Is essentially 2 problems to discussurinary and GI. For the last 2 months she has had urinary frequency urge incontinence sometimes continuous leakage with no control and does use pads all day. No burning no bloodhas had infections in the past.  GImultiple bowel movements per day usually very loose to watery and fairly frequent fecal incontinenceat least once a dayand this also is onset 2 months. She states she did have a compression fracture prior to this but that was back in November or December of last year. She states she is due for colonoscopy. Prior to Visit Medications :  Medication oxybutynin (DITROPAN) 5 MG tablet, Sig Take 1 tablet by mouth 2 times daily, Taking? Yes, Authorizing Provider Mauri Bunch, DO    Medication traZODone (DESYREL) 150 MG tablet, Sig TAKE 1 TABLET EVERY NIGHT, Taking? Yes, Authorizing Provider Mauri Bunch, DO    Medication levothyroxine (SYNTHROID) 175 MCG tablet, Sig TAKE 1 TABLET DAILY, Taking? Yes, Authorizing Provider Mauri Bunch, DO    Medication rOPINIRole (REQUIP) 1 MG tablet, Sig TAKE 1 TABLET THREE TIMES DAILY, Taking? Yes, Authorizing Provider Mauri Bunch, DO    Medication QUEtiapine (SEROQUEL XR) 150 MG TB24 extended release tablet, Sig TAKE 1 TABLET EVERY DAY, Taking? Yes, Authorizing Provider Mauri Bunch, DO    Medication metoprolol succinate (TOPROL XL) 25 MG extended release tablet, Sig Take 1 tablet by mouth daily, Taking? Yes, Authorizing Provider Yogesh Yung MD    Medication buPROPion (WELLBUTRIN XL) 150 MG extended release tablet, Sig 2 tabs daily, Taking? Yes, Authorizing Provider Sonny Goodwin MD    Medication omeprazole (PRILOSEC) 40 MG delayed release capsule, Sig TAKE 1 CAPSULE EVERY DAY, Taking? Yes, Authorizing Provider Mauri Bunch, DO    Medication verapamil (VERELAN) 360 MG extended release capsule, Sig TAKE 1 CAPSULE EVERY NIGHT, Taking?  Yes, Authorizing Provider Michael Bunch, DO    Medication furosemide (LASIX) 40 MG tablet, Sig TAKE 1 TABLET EVERY DAY, Taking? Yes, Authorizing Provider Mauri Bunch, DO    Medication lovastatin (MEVACOR) 20 MG tablet, Sig TAKE 1 TABLET EVERY NIGHT, Taking? Yes, Authorizing Provider Mauri Bunch, DO    Medication diclofenac (VOLTAREN) 50 MG EC tablet, Sig Take 1 tablet by mouth 2 times daily, Taking? Yes, Authorizing Provider Gaston Chapman APRN - CNP    Medication diclofenac sodium 1 % GEL, Sig APPLY 4 GRAMS 4 TIMES DAILY, Taking? Yes, Authorizing Provider Jake Siddiqi, DPM    Medication albuterol sulfate HFA (PROVENTIL HFA) 108 (90 Base) MCG/ACT inhaler, Sig Inhale 2 puffs into the lungs every 6 hours as needed for Wheezing, Taking?  Yes, Authorizing Provider Mauri Bunch, DO    Medication gabapentin (NEURONTIN) 600 MG tablet, Sig TAKE 1 TABLET TWICE DAILY, Taking? , Authorizing Provider Mauri Bunch, DO    Medication lidocaine (LIDODERM) 5 %, Sig Place 1 patch onto the skin daily 12 hours on, 12 hours off., Taking? , Authorizing Provider SANDOR Light CNP    Medication escitalopram (LEXAPRO) 20 MG tablet, Sig TAKE 1 TABLET EVERY DAY, Taking? , Authorizing Provider Mauri Bunch, DO    Medication aspirin 81 MG EC tablet, Sig Take 81 mg by mouth daily Indications: OTC Takes at times  Patient not taking: Reported on 7/6/2021, Taking? , Authorizing Provider Historical Provider, MD      Past Medical History:  No date: Arthritis  No date: Bipolar disorder ([facility] [place] [address]      Comment:  questionable dx  No date: Cellulitis      Comment:  L ankle, recurrent; over area of prior surgeries  No date: Chronic back pain  1995: Closed fracture of left fibula      Comment:  s/p fall; surgery x 6  1995: Closed fracture of left tibia      Comment:  s/p fall on ice; hx of surgery x 6  No date: Colon polyps  No date: Depression  1982: Fibroid (bleeding) (uterine)  No date: Fibromyalgia  No date: GERD (gastroesophageal normal.         Behavior: Behavior normal.         Thought Content: Thought content normal.         Judgment: Judgment normal.         Assessment:       Diagnosis Orders   1. Continuous leakage of urine  POCT Urinalysis no Micro   2. Fecal smearing     3. Acquired hypothyroidism  TSH without Reflex         Plan:      Leslie Oliva was seen today for other. Diagnoses and all orders for this visit:    Continuous leakage of urine  -     POCT Urinalysis no Micro  Dipstick urinalysis today was negative. Prescription sent for oxybutynin 5 mg twice a daycall me 1 week. We will consider urology referral.  Fecal smearing  Told to start taking Metamucil powder1 scoop twice a day and call me in 1 weekwe will consider colonoscopy. Acquired hypothyroidism  -     TSH without Reflex  Thyroid was adjusted 6 months ago and she is due for repeat TSH. Other orders  -     oxybutynin (DITROPAN) 5 MG tablet;  Take 1 tablet by mouth 2 times daily            Mauri Bunch, DO

## 2021-07-21 NOTE — PATIENT INSTRUCTIONS
Continuous leakage of urine  -     POCT Urinalysis no Micro  Dipstick urinalysis today was negative. Prescription sent for oxybutynin 5 mg twice a daycall me 1 week. We will consider urology referral.  Fecal smearing  Told to start taking Metamucil powder1 scoop twice a day and call me in 1 weekwe will consider colonoscopy. Acquired hypothyroidism  -     TSH without Reflex  Thyroid was adjusted 6 months ago and she is due for repeat TSH. Other orders  -     oxybutynin (DITROPAN) 5 MG tablet;  Take 1 tablet by mouth 2 times daily            Mauri Bunch, DO

## 2021-08-25 DIAGNOSIS — M54.50 ACUTE LOW BACK PAIN WITHOUT SCIATICA, UNSPECIFIED BACK PAIN LATERALITY: Primary | ICD-10-CM

## 2021-08-25 LAB
BILIRUBIN, POC: NORMAL
BLOOD URINE, POC: NORMAL
CLARITY, POC: NORMAL
COLOR, POC: NORMAL
GLUCOSE URINE, POC: NORMAL
KETONES, POC: NORMAL
LEUKOCYTE EST, POC: NORMAL
NITRITE, POC: NORMAL
PH, POC: 5.5
PROTEIN, POC: NORMAL
SPECIFIC GRAVITY, POC: >=1.03
UROBILINOGEN, POC: 0.2

## 2021-08-25 PROCEDURE — 81002 URINALYSIS NONAUTO W/O SCOPE: CPT | Performed by: FAMILY MEDICINE

## 2021-08-27 ENCOUNTER — HOSPITAL ENCOUNTER (OUTPATIENT)
Dept: WOMENS IMAGING | Age: 71
Discharge: HOME | End: 2021-08-27
Payer: MEDICARE

## 2021-08-27 VITALS — WEIGHT: 240 LBS | BODY MASS INDEX: 40.97 KG/M2 | HEIGHT: 64 IN

## 2021-08-27 DIAGNOSIS — Z12.31 VISIT FOR SCREENING MAMMOGRAM: ICD-10-CM

## 2021-08-27 PROCEDURE — 77067 SCR MAMMO BI INCL CAD: CPT

## 2021-08-30 ENCOUNTER — HOSPITAL ENCOUNTER (OUTPATIENT)
Age: 71
Discharge: HOME | End: 2021-08-30
Payer: MEDICARE

## 2021-08-30 ENCOUNTER — OFFICE VISIT (OUTPATIENT)
Dept: FAMILY MEDICINE CLINIC | Age: 71
End: 2021-08-30
Payer: MEDICARE

## 2021-08-30 ENCOUNTER — HOSPITAL ENCOUNTER (OUTPATIENT)
Dept: GENERAL RADIOLOGY | Age: 71
Discharge: HOME | End: 2021-08-30
Payer: MEDICARE

## 2021-08-30 VITALS
HEIGHT: 64 IN | SYSTOLIC BLOOD PRESSURE: 125 MMHG | OXYGEN SATURATION: 96 % | HEART RATE: 69 BPM | BODY MASS INDEX: 42 KG/M2 | DIASTOLIC BLOOD PRESSURE: 80 MMHG | WEIGHT: 246 LBS

## 2021-08-30 DIAGNOSIS — R06.02 SOB (SHORTNESS OF BREATH) ON EXERTION: Primary | ICD-10-CM

## 2021-08-30 DIAGNOSIS — R06.02 SOB (SHORTNESS OF BREATH) ON EXERTION: ICD-10-CM

## 2021-08-30 DIAGNOSIS — R07.89 CHEST TIGHTNESS: ICD-10-CM

## 2021-08-30 DIAGNOSIS — F33.0 MAJOR DEPRESSIVE DISORDER, RECURRENT EPISODE, MILD (HCC): ICD-10-CM

## 2021-08-30 DIAGNOSIS — M79.89 NODULE OF SOFT TISSUE: ICD-10-CM

## 2021-08-30 DIAGNOSIS — E78.2 MIXED HYPERLIPIDEMIA: ICD-10-CM

## 2021-08-30 PROCEDURE — 4040F PNEUMOC VAC/ADMIN/RCVD: CPT | Performed by: FAMILY MEDICINE

## 2021-08-30 PROCEDURE — G8399 PT W/DXA RESULTS DOCUMENT: HCPCS | Performed by: FAMILY MEDICINE

## 2021-08-30 PROCEDURE — G8417 CALC BMI ABV UP PARAM F/U: HCPCS | Performed by: FAMILY MEDICINE

## 2021-08-30 PROCEDURE — 1090F PRES/ABSN URINE INCON ASSESS: CPT | Performed by: FAMILY MEDICINE

## 2021-08-30 PROCEDURE — 1036F TOBACCO NON-USER: CPT | Performed by: FAMILY MEDICINE

## 2021-08-30 PROCEDURE — 1123F ACP DISCUSS/DSCN MKR DOCD: CPT | Performed by: FAMILY MEDICINE

## 2021-08-30 PROCEDURE — 71046 X-RAY EXAM CHEST 2 VIEWS: CPT

## 2021-08-30 PROCEDURE — 3017F COLORECTAL CA SCREEN DOC REV: CPT | Performed by: FAMILY MEDICINE

## 2021-08-30 PROCEDURE — G8427 DOCREV CUR MEDS BY ELIG CLIN: HCPCS | Performed by: FAMILY MEDICINE

## 2021-08-30 PROCEDURE — 99213 OFFICE O/P EST LOW 20 MIN: CPT | Performed by: FAMILY MEDICINE

## 2021-08-30 ASSESSMENT — ENCOUNTER SYMPTOMS
CHEST TIGHTNESS: 1
SHORTNESS OF BREATH: 1
ABDOMINAL PAIN: 0
VOICE CHANGE: 1
COUGH: 1
BLOOD IN STOOL: 0

## 2021-08-30 NOTE — PATIENT INSTRUCTIONS
SOB (shortness of breath) on exertion  -     Full PFT Study With Bronchodilator; Future  -     XR CHEST (2 VW); Future  Patient sent for chest x-ray hopefully todayorder sent in for pulmonary function test  Chest tightness  -     XR CHEST (2 VW); Future  As above  Major depressive disorder, recurrent episode, mild ([address]  Later on we will probably gradually decrease her Wellbutrin. I told her to decrease her gabapentin 600 mg to 1 a day for about a week and then stop. Nodule of soft tissue  -     Rudi Manley MD, General Surgery, [facility]  Refer to general surgeon for evaluation.     This is been approximately a 20 to 25-minute visit with the patient    Tanvir Figueredo DO

## 2021-08-30 NOTE — PROGRESS NOTES
Subjective:      Patient ID: Avni Biswas is a 66 y.o. female. HPI  Patient in for checkup of several medical issues. Anxiety and depressionshe is on medication but really does not feel like it is doing anythingher  passed several years ago and she lives alone and its been very difficult. For the last 2 weeks she has had a dry cough chest congestion intermittent hoarseness. During this time she has also had shortness of breath and tightness in the chest when she is moving around and when she stops doing what she is doing it helps. She also states she has a very small lump in her left armpit. Prior to Visit Medications :  Medication oxybutynin (DITROPAN) 5 MG tablet, Sig Take 1 tablet by mouth 2 times daily, Taking? Yes, Authorizing Provider Mauri Bunch, DO    Medication traZODone (DESYREL) 150 MG tablet, Sig TAKE 1 TABLET EVERY NIGHT, Taking? Yes, Authorizing Provider Mauri Bunch, DO    Medication levothyroxine (SYNTHROID) 175 MCG tablet, Sig TAKE 1 TABLET DAILY, Taking? Yes, Authorizing Provider Muari Bunch, DO    Medication rOPINIRole (REQUIP) 1 MG tablet, Sig TAKE 1 TABLET THREE TIMES DAILY, Taking? Yes, Authorizing Provider Mauri Bunch, DO    Medication QUEtiapine (SEROQUEL XR) 150 MG TB24 extended release tablet, Sig TAKE 1 TABLET EVERY DAY, Taking? Yes, Authorizing Provider Mauri Bunch, DO    Medication metoprolol succinate (TOPROL XL) 25 MG extended release tablet, Sig Take 1 tablet by mouth daily, Taking? Yes, Authorizing Provider John Hill MD    Medication buPROPion (WELLBUTRIN XL) 150 MG extended release tablet, Sig 2 tabs daily, Taking? Yes, Authorizing Provider Marquez Mercedes MD    Medication omeprazole (PRILOSEC) 40 MG delayed release capsule, Sig TAKE 1 CAPSULE EVERY DAY, Taking? Yes, Authorizing Provider Mauri Bunch, DO    Medication verapamil (VERELAN) 360 MG extended release capsule, Sig TAKE 1 CAPSULE EVERY NIGHT, Taking?  Yes, Authorizing Provider Shon Green Alivia, DO    Medication furosemide (LASIX) 40 MG tablet, Sig TAKE 1 TABLET EVERY DAY, Taking? Yes, Authorizing Provider Mauri Bunch, DO    Medication lovastatin (MEVACOR) 20 MG tablet, Sig TAKE 1 TABLET EVERY NIGHT, Taking? Yes, Authorizing Provider Mauri Bunch, DO    Medication diclofenac (VOLTAREN) 50 MG EC tablet, Sig Take 1 tablet by mouth 2 times daily, Taking? Yes, Authorizing Provider Norma Rodríguez APRN - CNP    Medication diclofenac sodium 1 % GEL, Sig APPLY 4 GRAMS 4 TIMES DAILY, Taking? Yes, Authorizing Provider Bria Doty DPM    Medication albuterol sulfate HFA (PROVENTIL HFA) 108 (90 Base) MCG/ACT inhaler, Sig Inhale 2 puffs into the lungs every 6 hours as needed for Wheezing, Taking? Yes, Authorizing Provider Mauri Bunch, DO    Medication aspirin 81 MG EC tablet, Sig Take 81 mg by mouth daily Indications: OTC Takes at times, Taking? Yes, Authorizing Provider Historical Provider, MD    Medication gabapentin (NEURONTIN) 600 MG tablet, Sig TAKE 1 TABLET TWICE DAILY, Taking? , Authorizing Provider Katerina Marks DO      Past Medical History:  No date: Arthritis  No date: [facility] disorder ([facility]      Comment:  questionable dx  No date: Cellulitis      Comment:  L ankle, recurrent; over area of prior surgeries  No date: Chronic back pain  1995: Closed fracture of left fibula      Comment:  s/p fall; surgery x 6  1995: Closed fracture of left tibia      Comment:  s/p fall on ice; hx of surgery x 6  No date: Colon polyps  No date: Depression  1982: Fibroid (bleeding) (uterine)  No date: Fibromyalgia  No date: GERD (gastroesophageal reflux disease)  No date: Hyperlipidemia  No date: Hypertension      Comment:  Myoview Lexiscan WNL on 5/17/12  No date: Hypothyroidism  01/31/2020: Influenza A  No date: Migraine  No date: Miscarriage  No date:  Morbid obesity ([facility]  2011: Neuropathy      Comment:  seen initially by neuro., Dr. Max Campbell, on 8/11/10; EMG                7/16/10 showed abn.'s c/w L5 radiculopathy & periph.                neuropathy; pt. reports sx in hands and feet ; dx'd by                rheum. 06/07/2012: MAXIMO (obstructive sleep apnea)      Comment:  mild-does not require appliance  No date: RLS (restless legs syndrome)  No date: Urinary incontinence  No date: Urine incontinence      Comment:  saw urology, Dr. Zeno Hodgkin, on 7/21/11 for microscopic                hematuria & hematuria; was started on Vesicare &                scheduled for cystoscopy  2/27/12: Vitamin D deficiency      Comment:  22 ng/mL        Review of Systems    Review of Systems   Constitutional: Positive for fatigue. HENT: Positive for congestion, postnasal drip and voice change. Eyes: Negative for visual disturbance. Respiratory: Positive for cough, chest tightness and shortness of breath. Cardiovascular: Negative for palpitations and leg swelling. Gastrointestinal: Negative for abdominal pain and blood in stool. Genitourinary: Negative for dysuria and hematuria. Musculoskeletal: Positive for arthralgias. Psychiatric/Behavioral: Positive for confusion, dysphoric mood and sleep disturbance. The patient is nervous/anxious. She also states that she is having problem with memory issues and most of it at is recent like she can forget what she wants to say when she is talking to someone. Objective:   Physical Exam      Physical Exam  Constitutional:       General: She is not in acute distress. Appearance: Normal appearance. She is not ill-appearing. HENT:      Mouth/Throat:      Mouth: Mucous membranes are moist.      Pharynx: Oropharynx is clear. Eyes:      Conjunctiva/sclera: Conjunctivae normal.   Cardiovascular:      Rate and Rhythm: Normal rate and regular rhythm. Heart sounds: Normal heart sounds. Pulmonary:      Breath sounds: Normal breath sounds. Abdominal:      Palpations: Abdomen is soft. Tenderness: There is no abdominal tenderness.    Lymphadenopathy:      Cervical: No cervical adenopathy. Skin:     Comments: Very small subcutaneous nodule left axilla which appears to be no more than 2 or 3 mm. No overlying erythema or bruising and it is nontender   Psychiatric:         Behavior: Behavior normal.         Thought Content: Thought content normal.         Judgment: Judgment normal.      Comments: Her mood does seem somewhat down. Assessment:       Diagnosis Orders   1. SOB (shortness of breath) on exertion  Full PFT Study With Bronchodilator    XR CHEST (2 VW)   2. Chest tightness  XR CHEST (2 VW)   3. Major depressive disorder, recurrent episode, mild ([address]     4. Nodule of soft tissue  [facility] - Ana Williamson MD, General Surgery, [facility]         Plan:      Jn Clements was seen today for other and mass. Diagnoses and all orders for this visit:    SOB (shortness of breath) on exertion  -     Full PFT Study With Bronchodilator; Future  -     XR CHEST (2 VW); Future  Patient sent for chest x-ray hopefully todayorder sent in for pulmonary function test  Chest tightness  -     XR CHEST (2 VW); Future  As above  Major depressive disorder, recurrent episode, mild ([address]  Later on we will probably gradually decrease her Wellbutrin. I told her to decrease her gabapentin 600 mg to 1 a day for about a week and then stop. Nodule of soft tissue  -     Berta Fuentes MD, General Surgery, [facility]  Refer to general surgeon for evaluation.     This is been approximately a 20 to 25-minute visit with the patient    Guido Cerrato DO

## 2021-08-31 RX ORDER — BUPROPION HYDROCHLORIDE 150 MG/1
TABLET ORAL
Qty: 180 TABLET | Refills: 1 | Status: SHIPPED | OUTPATIENT
Start: 2021-08-31 | End: 2022-01-03 | Stop reason: SDUPTHER

## 2021-08-31 NOTE — TELEPHONE ENCOUNTER
Refill Request     Last Seen: Last Seen Department: 8/30/2021  Last Seen by PCP: 8/30/2021    Last Written: 5/7/21 90 tablet 1 refill                           1/29/21 180 tablet 1 refill                            3/17/20 90 tablet 1 refill     Next Appointment: 1/19/22       Future Appointments   Date Time Provider Berry Claudio   1/19/2022  4:00 PM MD TANIA [facility]       Message to  to schedule appointment.          Requested Prescriptions     Pending Prescriptions Disp Refills    levothyroxine (SYNTHROID) 150 MCG tablet [Pharmacy Med Name: LEVOTHYROXINE SODIUM 150 MCG Tablet] 90 tablet      Sig: TAKE 1 TABLET EVERY DAY    levothyroxine (SYNTHROID) 175 MCG tablet [Pharmacy Med Name: LEVOTHYROXINE SODIUM 175 MCG Tablet] 90 tablet 1     Sig: TAKE 1 TABLET EVERY DAY    gabapentin (NEURONTIN) 600 MG tablet [Pharmacy Med Name: GABAPENTIN 600 MG Tablet] 180 tablet 1     Sig: TAKE 1 TABLET TWICE DAILY    lovastatin (MEVACOR) 20 MG tablet [Pharmacy Med Name: LOVASTATIN 20 MG Tablet] 90 tablet 1     Sig: TAKE 1 TABLET EVERY NIGHT

## 2021-08-31 NOTE — TELEPHONE ENCOUNTER
Refill request for wellbutrin medication.      Name of [address] [place] [address]      Last visit - 7-6-21     Pending visit - 1-19-22    Last refill -7-6-21      Medication Contract signed -   Last Carolann hanson-         Additional Comments

## 2021-09-01 RX ORDER — GABAPENTIN 600 MG/1
TABLET ORAL
Qty: 180 TABLET | Refills: 1 | Status: SHIPPED | OUTPATIENT
Start: 2021-09-01 | End: 2022-01-03 | Stop reason: SDUPTHER

## 2021-09-01 RX ORDER — LEVOTHYROXINE SODIUM 175 UG/1
TABLET ORAL
Qty: 90 TABLET | Refills: 1 | Status: SHIPPED | OUTPATIENT
Start: 2021-09-01 | End: 2022-01-03 | Stop reason: SDUPTHER

## 2021-09-01 RX ORDER — LOVASTATIN 20 MG/1
TABLET ORAL
Qty: 90 TABLET | Refills: 1 | Status: SHIPPED | OUTPATIENT
Start: 2021-09-01 | End: 2022-01-03 | Stop reason: SDUPTHER

## 2021-09-01 RX ORDER — LEVOTHYROXINE SODIUM 0.15 MG/1
TABLET ORAL
Qty: 90 TABLET | OUTPATIENT
Start: 2021-09-01

## 2021-09-03 ENCOUNTER — HOSPITAL ENCOUNTER (OUTPATIENT)
Dept: PULMONOLOGY | Age: 71
Discharge: HOME | End: 2021-09-03
Payer: MEDICARE

## 2021-09-03 DIAGNOSIS — R06.02 SOB (SHORTNESS OF BREATH) ON EXERTION: ICD-10-CM

## 2021-09-03 LAB
DLCO %PRED: 75 %
DLCO PRED: NORMAL
DLCO/VA %PRED: NORMAL
DLCO/VA PRED: NORMAL
DLCO/VA: NORMAL
DLCO: NORMAL
EXPIRATORY TIME-POST: NORMAL
EXPIRATORY TIME: NORMAL
FEF 25-75% %CHNG: NORMAL
FEF 25-75% %PRED-POST: NORMAL
FEF 25-75% %PRED-PRE: NORMAL
FEF 25-75% PRED: NORMAL
FEF 25-75%-POST: NORMAL
FEF 25-75%-PRE: NORMAL
FEV1 %PRED-POST: 90 %
FEV1 %PRED-PRE: 83 %
FEV1 PRED: NORMAL
FEV1-POST: NORMAL
FEV1-PRE: NORMAL
FEV1/FVC %PRED-POST: NORMAL
FEV1/FVC %PRED-PRE: NORMAL
FEV1/FVC PRED: NORMAL
FEV1/FVC-POST: 82 %
FEV1/FVC-PRE: 79 %
FVC %PRED-POST: NORMAL
FVC %PRED-PRE: NORMAL
FVC PRED: NORMAL
FVC-POST: NORMAL
FVC-PRE: NORMAL
GAW %PRED: NORMAL
GAW PRED: NORMAL
GAW: NORMAL
IC %PRED: NORMAL
IC PRED: NORMAL
IC: NORMAL
MEP: NORMAL
MIP: NORMAL
MVV %PRED-PRE: NORMAL
MVV PRED: NORMAL
MVV-PRE: NORMAL
PEF %PRED-POST: NORMAL
PEF %PRED-PRE: NORMAL
PEF PRED: NORMAL
PEF%CHNG: NORMAL
PEF-POST: NORMAL
PEF-PRE: NORMAL
RAW %PRED: NORMAL
RAW PRED: NORMAL
RAW: NORMAL
RV %PRED: NORMAL
RV PRED: NORMAL
RV: NORMAL
SVC %PRED: NORMAL
SVC PRED: NORMAL
SVC: NORMAL
TLC %PRED: 80 %
TLC PRED: NORMAL
TLC: NORMAL
VA %PRED: NORMAL
VA PRED: NORMAL
VA: NORMAL
VTG %PRED: NORMAL
VTG PRED: NORMAL
VTG: NORMAL

## 2021-09-03 PROCEDURE — 94726 PLETHYSMOGRAPHY LUNG VOLUMES: CPT

## 2021-09-03 PROCEDURE — 6370000000 HC RX 637 (ALT 250 FOR IP): Performed by: FAMILY MEDICINE

## 2021-09-03 PROCEDURE — 94760 N-INVAS EAR/PLS OXIMETRY 1: CPT

## 2021-09-03 PROCEDURE — 94060 EVALUATION OF WHEEZING: CPT

## 2021-09-03 PROCEDURE — 94729 DIFFUSING CAPACITY: CPT

## 2021-09-03 RX ORDER — ALBUTEROL SULFATE 90 UG/1
4 AEROSOL, METERED RESPIRATORY (INHALATION) ONCE
Status: COMPLETED | OUTPATIENT
Start: 2021-09-03 | End: 2021-09-03

## 2021-09-03 RX ADMIN — Medication 4 PUFF: at 12:53

## 2021-09-03 ASSESSMENT — PULMONARY FUNCTION TESTS
FEV1_PERCENT_PREDICTED_PRE: 83
FEV1/FVC_POST: 82
FEV1/FVC_PRE: 79
FEV1_PERCENT_PREDICTED_POST: 90

## 2021-09-04 NOTE — PROCEDURES
[facility] [address]                               PULMONARY FUNCTION    PATIENT NAME: Bladimir Moscoso                   :        1950  MED REC NO:   [id number]                          ROOM:  ACCOUNT NO:   [de-identified]                           ADMIT DATE: 2021  PROVIDER:     Michael Queen MD    DATE OF PROCEDURE:  2021    PFT INTERPRETATION    The patient is a 59-year-old female who underwent a PFT for shortness of  breath. Spirometry shows FVC to be 80%, FEV1 to be 83%, FEV1 to FVC  ratio was 104%, FEF25%-75% was 94%. The patient did not have any  significant postbronchodilator improvement in the large airways. Lung  volume shows the total lung capacity was normal.  The patient does not  have any air trapping or hyperinflation. The patient has decrease in  ERV. The patient's diffusion capacity when adjusted for volume was  normal.  The patient's flow-volume loop was suggestive of restrictive  pattern. On the basis of this PFT, the patient has no significant  obstructive or restrictive lung disease, but the patient does have some  changes in the PFT parameters because of body habitus. Please correlate  clinically.         Mar Horan MD    D: 2021 15:57:26       T: 2021 16:00:34     [id number]  Job#: [id number]     Doc#: [id number]    CC:

## 2021-09-07 ENCOUNTER — INITIAL CONSULT (OUTPATIENT)
Dept: SURGERY | Age: 71
End: 2021-09-07
Payer: MEDICARE

## 2021-09-07 VITALS
BODY MASS INDEX: 41.55 KG/M2 | HEIGHT: 64 IN | HEART RATE: 75 BPM | SYSTOLIC BLOOD PRESSURE: 140 MMHG | WEIGHT: 243.4 LBS | DIASTOLIC BLOOD PRESSURE: 86 MMHG

## 2021-09-07 DIAGNOSIS — R22.32 AXILLARY MASS, LEFT: Primary | ICD-10-CM

## 2021-09-07 PROCEDURE — G8417 CALC BMI ABV UP PARAM F/U: HCPCS | Performed by: SURGERY

## 2021-09-07 PROCEDURE — 99202 OFFICE O/P NEW SF 15 MIN: CPT | Performed by: SURGERY

## 2021-09-07 PROCEDURE — G8427 DOCREV CUR MEDS BY ELIG CLIN: HCPCS | Performed by: SURGERY

## 2021-09-07 PROCEDURE — 1090F PRES/ABSN URINE INCON ASSESS: CPT | Performed by: SURGERY

## 2021-09-08 NOTE — PROGRESS NOTES
1 tablet by mouth 2 times daily 30 tablet 0    diclofenac sodium 1 % GEL APPLY 4 GRAMS 4 TIMES DAILY 500 g 0    albuterol sulfate HFA (PROVENTIL HFA) 108 (90 Base) MCG/ACT inhaler Inhale 2 puffs into the lungs every 6 hours as needed for Wheezing 3 Inhaler 3    aspirin 81 MG EC tablet Take 81 mg by mouth daily Indications: OTC Takes at times         Past Medical History:   Diagnosis Date    Arthritis     Bipolar disorder ([facility] [address]     questionable dx    Cellulitis     L ankle, recurrent; over area of prior surgeries    Chronic back pain     Closed fracture of left fibula 1995    s/p fall; surgery x 6    Closed fracture of left tibia 1995    s/p fall on ice; hx of surgery x 6    Colon polyps     Depression     Fibroid (bleeding) (uterine) 1982    Fibromyalgia     GERD (gastroesophageal reflux disease)     Hyperlipidemia     Hypertension     Myoview Lexiscan WNL on 5/17/12    Hypothyroidism     Influenza A 01/31/2020    Migraine     Miscarriage     Morbid obesity ([facility] [address]     Neuropathy 2011    seen initially by neuro., Dr. Agatha Green, on 8/11/10; EMG 7/16/10 showed abn.'s c/w L5 radiculopathy & periph. neuropathy; pt. reports sx in hands and feet ; dx'd by rheum.      MAXIMO (obstructive sleep apnea) 06/07/2012    mild-does not require appliance    RLS (restless legs syndrome)     Urinary incontinence     Urine incontinence     saw urology, Dr. Gianni Guillory, on 7/21/11 for microscopic hematuria & hematuria; was started on Vesicare & scheduled for cystoscopy    Vitamin D deficiency 2/27/12    22 ng/mL     Past Surgical History:   Procedure Laterality Date    BARIATRIC SURGERY  11/14/12    Laparoscopic Sleeve Gastrectomy, Lap Hiatal hernia repair    CHOLECYSTECTOMY  1985    COLONOSCOPY  2010     colon polyps; Dr. Sal Mukherjee Left 08/31/2018    phaco with IOL    HIATAL HERNIA REPAIR      lap    HYSTERECTOMY  1982    TAHBSO for fibroids and DUB    LEG SURGERY  1995    L; s/p tibia and fibular fx's; has uma in L tibia; surgery x 6    OVARY REMOVAL      AR XCAPSL CTRC RMVL INSJ IO LENS PROSTH W/O ECP Left 8/31/2018    PHACOEMULSIFICATION OF CATARACT LEFT EYE WITH INTRAOCULAR LENS IMPLANT performed by Patel Hare MD at [address] W/O ECP Right 9/14/2018    PHACOEMULSIFICATION OF CATARACT RIGHT  EYE WITH INTRAOCULAR LENS IMPLANT performed by Patel Hare MD at [address]  2010    Dr. Stephanie Fishman UPPER GASTROINTESTINAL ENDOSCOPY  4-27-12    WNL     Family History   Problem Relation Age of Onset    Arthritis Mother     Asthma Mother     Diabetes Mother     High Blood Pressure Mother     Depression Mother     Heart Disease Mother 48        CHF    High Cholesterol Mother     Stroke Mother     Mental Illness Mother     Arthritis Father     Cancer Father 36        colon    Stroke Father 48        x 2    Substance Abuse Father         alcohol    Kidney Disease Sister     Cancer Sister 50        urethral CA    Cancer Sister 48        lymphoma    Cancer Maternal Uncle         prostate    Cancer Maternal Uncle         prostate    Cancer Maternal Uncle         prostate    Breast Cancer Maternal Aunt     Breast Cancer Maternal Aunt     Emphysema Neg Hx     Heart Failure Neg Hx     Hypertension Neg Hx      Social History     Socioeconomic History    Marital status:      Spouse name: Not on file    Number of children: Not on file    Years of education: Not on file    Highest education level: Not on file   Occupational History    Not on file   Tobacco Use    Smoking status: Never Smoker    Smokeless tobacco: Never Used   Substance and Sexual Activity    Alcohol use:  Yes     Alcohol/week: 0.0 standard drinks     Comment: 1 beer twice a year    Drug use: No    Sexual activity: Yes   Other Topics Concern    Not on file   Social History Narrative    Not on file     Social Determinants of Health     Financial Resource Strain:     Difficulty of Paying Living Expenses:    Food Insecurity:     Worried About Running Out of Food in the Last Year:     [address] in the Last Year:    Transportation Needs:     Lack of Transportation (Medical):  Lack of Transportation (Non-Medical):    Physical Activity:     Days of Exercise per Week:     Minutes of Exercise per Session:    Stress:     Feeling of Stress :    Social Connections:     Frequency of Communication with Friends and Family:     Frequency of Social Gatherings with Friends and Family:     Attends [employer] Services:     Active Member of Clubs or Organizations:     Attends Club or Organization Meetings:     Marital Status:    Intimate Partner Violence:     Fear of Current or Ex-Partner:     Emotionally Abused:     Physically Abused:     Sexually Abused:           A review of the patient's record including allergies, medication list, tobacco history, family history, problem list, medical history and social history has been completed and updates made to the patient's EMR where indicated. Vitals:    09/07/21 1332 09/07/21 1334   BP: (!) 146/85 (!) 140/86   Site: Right Wrist Right Wrist   Position: Sitting Sitting   Cuff Size: Medium Adult Medium Adult   Pulse: 73 75   Weight: 243 lb 6.4 oz (110.4 kg)    Height: 5' 4.02\" (1.626 m)      Body mass index is 41.76 kg/m².      Wt Readings from Last 3 Encounters:   09/07/21 243 lb 6.4 oz (110.4 kg)   08/30/21 246 lb (111.6 kg)   08/27/21 240 lb (108.9 kg)     BP Readings from Last 3 Encounters:   09/07/21 (!) 140/86   08/30/21 125/80   07/21/21 116/86          REVIEW OF SYSTEMS:   · All other systems reviewed; please refer to HPI with pertinent positives, all other ROS are negative    PHYSICAL EXAM:    CONSTITUTIONAL:  awake, alert, no apparent distress and morbidly obese  ENT:  normocepalic, without obvious abnormality  NECK:  supple, symmetrical, trachea midline   LUNGS:  Resp easy and unlabored  CARDIOVASCULAR:    ABDOMEN: soft, non-distended,  MUSCULOSKELETAL: No edema  NEUROLOGIC:  Mental Status Exam:  Level of Alertness:   awake  Orientation:   person, place, time    L axilla:  No palpable cutaneous masses or axillary adenopathy at area of pt's prior concern, non-tender. DATA:      ASSESSMENT:     Diagnosis Orders   1. Axillary mass, left       No evidence of any pathology lesion at this time. PLAN:    Monitor for any recurrence in the mass/swelling/lump of the axilla. If it recurs, please notify our office to return for re-examination. Pt indicates she understands, asks appropriate questions, and agrees with the plan.        Gisella Gold MD

## 2021-09-10 ENCOUNTER — TELEPHONE (OUTPATIENT)
Dept: PULMONOLOGY | Age: 71
End: 2021-09-10

## 2021-09-13 ENCOUNTER — E-VISIT (OUTPATIENT)
Dept: FAMILY MEDICINE CLINIC | Age: 71
End: 2021-09-13
Payer: MEDICARE

## 2021-09-13 DIAGNOSIS — J40 BRONCHITIS: ICD-10-CM

## 2021-09-13 DIAGNOSIS — R06.2 WHEEZING: ICD-10-CM

## 2021-09-13 DIAGNOSIS — R05.9 COUGH: ICD-10-CM

## 2021-09-13 PROCEDURE — 99422 OL DIG E/M SVC 11-20 MIN: CPT | Performed by: NURSE PRACTITIONER

## 2021-09-13 RX ORDER — BENZONATATE 100 MG/1
CAPSULE ORAL
Qty: 30 CAPSULE | Refills: 0 | Status: SHIPPED | OUTPATIENT
Start: 2021-09-13

## 2021-09-13 RX ORDER — DOXYCYCLINE HYCLATE 100 MG/1
100 CAPSULE ORAL 2 TIMES DAILY
Qty: 14 CAPSULE | Refills: 0 | Status: SHIPPED | OUTPATIENT
Start: 2021-09-13 | End: 2022-06-02 | Stop reason: SDUPTHER

## 2021-09-13 RX ORDER — METHYLPREDNISOLONE 4 MG/1
TABLET ORAL
Qty: 1 KIT | Refills: 0 | Status: SHIPPED | OUTPATIENT
Start: 2021-09-13 | End: 2021-09-19

## 2021-09-13 RX ORDER — ALBUTEROL SULFATE 90 UG/1
2 AEROSOL, METERED RESPIRATORY (INHALATION) EVERY 6 HOURS PRN
Qty: 1 EACH | Refills: 1 | Status: SHIPPED | OUTPATIENT
Start: 2021-09-13 | End: 2022-01-03 | Stop reason: SDUPTHER

## 2021-09-13 ASSESSMENT — LIFESTYLE VARIABLES: SMOKING_STATUS: NO, I HAVE NEVER SMOKED

## 2021-09-21 ENCOUNTER — HOSPITAL ENCOUNTER (OUTPATIENT)
Age: 71
Discharge: HOME | End: 2021-09-21
Payer: MEDICARE

## 2021-09-21 ENCOUNTER — OFFICE VISIT (OUTPATIENT)
Dept: FAMILY MEDICINE CLINIC | Age: 71
End: 2021-09-21
Payer: MEDICARE

## 2021-09-21 ENCOUNTER — HOSPITAL ENCOUNTER (OUTPATIENT)
Dept: GENERAL RADIOLOGY | Age: 71
Discharge: HOME | End: 2021-09-21
Payer: MEDICARE

## 2021-09-21 VITALS
WEIGHT: 235 LBS | BODY MASS INDEX: 40.32 KG/M2 | DIASTOLIC BLOOD PRESSURE: 84 MMHG | OXYGEN SATURATION: 98 % | SYSTOLIC BLOOD PRESSURE: 124 MMHG | HEART RATE: 90 BPM

## 2021-09-21 DIAGNOSIS — R06.02 SOB (SHORTNESS OF BREATH): ICD-10-CM

## 2021-09-21 DIAGNOSIS — R06.02 SOB (SHORTNESS OF BREATH): Primary | ICD-10-CM

## 2021-09-21 LAB
BASOPHILS ABSOLUTE: 0 K/UL (ref 0–0.2)
BASOPHILS RELATIVE PERCENT: 0.5 %
EOSINOPHILS ABSOLUTE: 0.1 K/UL (ref 0–0.6)
EOSINOPHILS RELATIVE PERCENT: 1.7 %
HCT VFR BLD CALC: 42.3 % (ref 36–48)
HEMOGLOBIN: 13.8 G/DL (ref 12–16)
LYMPHOCYTES ABSOLUTE: 2.1 K/UL (ref 1–5.1)
LYMPHOCYTES RELATIVE PERCENT: 37.6 %
MCH RBC QN AUTO: 28.1 PG (ref 26–34)
MCHC RBC AUTO-ENTMCNC: 32.7 G/DL (ref 31–36)
MCV RBC AUTO: 85.8 FL (ref 80–100)
MONOCYTES ABSOLUTE: 0.4 K/UL (ref 0–1.3)
MONOCYTES RELATIVE PERCENT: 7.4 %
NEUTROPHILS ABSOLUTE: 2.9 K/UL (ref 1.7–7.7)
NEUTROPHILS RELATIVE PERCENT: 52.8 %
PDW BLD-RTO: 15.5 % (ref 12.4–15.4)
PLATELET # BLD: 194 K/UL (ref 135–450)
PMV BLD AUTO: 9.9 FL (ref 5–10.5)
RBC # BLD: 4.93 M/UL (ref 4–5.2)
WBC # BLD: 5.5 K/UL (ref 4–11)

## 2021-09-21 PROCEDURE — 1123F ACP DISCUSS/DSCN MKR DOCD: CPT | Performed by: NURSE PRACTITIONER

## 2021-09-21 PROCEDURE — G8399 PT W/DXA RESULTS DOCUMENT: HCPCS | Performed by: NURSE PRACTITIONER

## 2021-09-21 PROCEDURE — 1090F PRES/ABSN URINE INCON ASSESS: CPT | Performed by: NURSE PRACTITIONER

## 2021-09-21 PROCEDURE — 3017F COLORECTAL CA SCREEN DOC REV: CPT | Performed by: NURSE PRACTITIONER

## 2021-09-21 PROCEDURE — 71046 X-RAY EXAM CHEST 2 VIEWS: CPT

## 2021-09-21 PROCEDURE — G8417 CALC BMI ABV UP PARAM F/U: HCPCS | Performed by: NURSE PRACTITIONER

## 2021-09-21 PROCEDURE — 1036F TOBACCO NON-USER: CPT | Performed by: NURSE PRACTITIONER

## 2021-09-21 PROCEDURE — 99213 OFFICE O/P EST LOW 20 MIN: CPT | Performed by: NURSE PRACTITIONER

## 2021-09-21 PROCEDURE — G8427 DOCREV CUR MEDS BY ELIG CLIN: HCPCS | Performed by: NURSE PRACTITIONER

## 2021-09-21 PROCEDURE — 4040F PNEUMOC VAC/ADMIN/RCVD: CPT | Performed by: NURSE PRACTITIONER

## 2021-09-21 ASSESSMENT — ENCOUNTER SYMPTOMS
SORE THROAT: 0
EYES NEGATIVE: 1
VOICE CHANGE: 1
SHORTNESS OF BREATH: 1
CHEST TIGHTNESS: 1
SINUS PAIN: 0
DIARRHEA: 0
VOMITING: 0
SINUS PRESSURE: 0
NAUSEA: 1
WHEEZING: 1
TROUBLE SWALLOWING: 0
COUGH: 1
RHINORRHEA: 1

## 2021-09-21 NOTE — PROGRESS NOTES
2021     Pawan Evans (:  1950) is a 66 y.o. female, here for evaluation of the following medical concerns:    HPI  Pt seen in  [facility]. C/o voice being hoarse, feeling shaky, weak, dizzy for the past week. Had an E-Visit with Turner Dias, [facility] 21 and was prescribed Doxycycline, steroids and an inhaler for a sinus infection and bronchitis. COVID-19 test 3 days ago was negative. Pt states that she took the medications as prescribed. Still c/o cough, chest tightness, SOB and wheezing. Denies known fever, but is having episodes of chills and sweating. States that she has lost 20 lbs in the last 3 weeks d/t poor appetite. Denies recent surgeries or travel. Review of Systems   Constitutional: Positive for activity change, appetite change, chills, diaphoresis, fatigue and unexpected weight change. HENT: Positive for rhinorrhea and voice change. Negative for congestion, ear pain, postnasal drip, sinus pressure, sinus pain, sneezing, sore throat and trouble swallowing. Eyes: Negative. Respiratory: Positive for cough, chest tightness, shortness of breath and wheezing. Cardiovascular: Negative. Gastrointestinal: Positive for nausea. Negative for diarrhea and vomiting. Genitourinary: Negative for dysuria, frequency and urgency. Skin: Negative. Neurological: Positive for dizziness and headaches. Negative for syncope and numbness. Prior to Visit Medications    Medication Sig Taking? Authorizing Provider   albuterol sulfate HFA (PROVENTIL HFA) 108 (90 Base) MCG/ACT inhaler Inhale 2 puffs into the lungs every 6 hours as needed for Wheezing or Shortness of Breath Yes SANDOR Whiting CNP   benzonatate (TESSALON PERLES) 100 MG capsule Take one to two caps by mouth as needed for cough three times a day.  Yes SANDOR Whiting CNP   levothyroxine (SYNTHROID) 175 MCG tablet TAKE 1 TABLET EVERY DAY Yes SANDOR Whiting CNP   gabapentin (NEURONTIN) 600 MG tablet TAKE 1 TABLET TWICE DAILY Yes SANDOR Ellington CNP   lovastatin (MEVACOR) 20 MG tablet TAKE 1 TABLET EVERY NIGHT Yes SANDOR Ellington CNP   buPROPion (WELLBUTRIN XL) 150 MG extended release tablet TAKE 2 TABLETS EVERY DAY Yes Sonny Goodwin MD   oxybutynin (DITROPAN) 5 MG tablet Take 1 tablet by mouth 2 times daily Yes Mauri Bunch DO   traZODone (DESYREL) 150 MG tablet TAKE 1 TABLET EVERY NIGHT Yes Mauri Bunch DO   rOPINIRole (REQUIP) 1 MG tablet TAKE 1 TABLET THREE TIMES DAILY Yes Dinah Bunch DO   QUEtiapine (SEROQUEL XR) 150 MG TB24 extended release tablet TAKE 1 TABLET EVERY DAY Yes Mauri Bunch DO   metoprolol succinate (TOPROL XL) 25 MG extended release tablet Take 1 tablet by mouth daily Yes Yogesh Yung MD   omeprazole (PRILOSEC) 40 MG delayed release capsule TAKE 1 CAPSULE EVERY DAY Yes Mauri Bunch DO   verapamil (VERELAN) 360 MG extended release capsule TAKE 1 CAPSULE EVERY NIGHT Yes Mauri Bunch DO   diclofenac (VOLTAREN) 50 MG EC tablet Take 1 tablet by mouth 2 times daily Yes SANDOR Light CNP   diclofenac sodium 1 % GEL APPLY 4 GRAMS 4 TIMES DAILY Yes Jake Siddiqi DPM   aspirin 81 MG EC tablet Take 81 mg by mouth daily Indications: OTC Takes at times Yes Historical Provider, MD   furosemide (LASIX) 40 MG tablet TAKE 1 TABLET EVERY DAY  Patient not taking: Reported on 9/21/2021  Micah Hassan DO        Social History     Tobacco Use    Smoking status: Never Smoker    Smokeless tobacco: Never Used   Substance Use Topics    Alcohol use:  Yes     Alcohol/week: 0.0 standard drinks     Comment: 1 beer twice a year        Vitals:    09/21/21 1517 09/21/21 1534 09/21/21 1539   BP: 126/82 122/80 124/84   Site: Right Upper Arm Right Upper Arm Right Upper Arm   Position: Sitting Supine Standing   Cuff Size: Large Adult Large Adult Large Adult   Pulse: 75 72 90   SpO2: 98%     Weight: 235 lb (106.6 kg)       Estimated body mass index is 40.32 kg/m² as calculated from the following:    Height as of 9/7/21: 5' 4.02\" (1.626 m). Weight as of this encounter: 235 lb (106.6 kg). Physical Exam  Constitutional:       General: She is not in acute distress. Appearance: Normal appearance. Comments: Generalized weakness   HENT:      Head: Normocephalic. Cardiovascular:      Rate and Rhythm: Normal rate and regular rhythm. Pulses: Normal pulses. Heart sounds: Normal heart sounds. Pulmonary:      Effort: Pulmonary effort is normal.      Breath sounds: Decreased air movement present. No wheezing, rhonchi or rales. Musculoskeletal:         General: Normal range of motion. Cervical back: Normal range of motion. Skin:     General: Skin is warm and dry. Capillary Refill: Capillary refill takes less than 2 seconds. Neurological:      General: No focal deficit present. Mental Status: She is alert and oriented to person, place, and time. Psychiatric:         Mood and Affect: Mood normal.         Behavior: Behavior normal.         Thought Content: Thought content normal.         Judgment: Judgment normal.         ASSESSMENT/PLAN:  1. SOB (shortness of breath)  See HPI. Orthostatics done - no change in BP, 18 point increase in HR. Will check STAT chest xray and labs. Advised pt that if her dizziness worsened and felt like she was going to pass out to call 911 or go to the nearest ER.      - CBC Auto Differential  - Comprehensive Metabolic Panel w/ Reflex to MG  - XR CHEST (2 VW)      Return if symptoms worsen or fail to improve. An electronic signature was used to authenticate this note.     --SANDOR Jones - CNP on 9/21/2021 at 3:45 PM

## 2021-09-22 DIAGNOSIS — N18.30 STAGE 3 CHRONIC KIDNEY DISEASE, UNSPECIFIED WHETHER STAGE 3A OR 3B CKD (HCC): Primary | ICD-10-CM

## 2021-09-22 LAB
A/G RATIO: 1.6 (ref 1.1–2.2)
ALBUMIN SERPL-MCNC: 4 G/DL (ref 3.4–5)
ALP BLD-CCNC: 99 U/L (ref 40–129)
ALT SERPL-CCNC: 43 U/L (ref 10–40)
ANION GAP SERPL CALCULATED.3IONS-SCNC: 13 MMOL/L (ref 3–16)
AST SERPL-CCNC: 30 U/L (ref 15–37)
BILIRUB SERPL-MCNC: 0.6 MG/DL (ref 0–1)
BUN BLDV-MCNC: 20 MG/DL (ref 7–20)
CALCIUM SERPL-MCNC: 9.2 MG/DL (ref 8.3–10.6)
CHLORIDE BLD-SCNC: 102 MMOL/L (ref 99–110)
CO2: 24 MMOL/L (ref 21–32)
CREAT SERPL-MCNC: 1.5 MG/DL (ref 0.6–1.2)
GFR AFRICAN AMERICAN: 41
GFR NON-AFRICAN AMERICAN: 34
GLOBULIN: 2.5 G/DL
GLUCOSE BLD-MCNC: 84 MG/DL (ref 70–99)
POTASSIUM REFLEX MAGNESIUM: 4.5 MMOL/L (ref 3.5–5.1)
SODIUM BLD-SCNC: 139 MMOL/L (ref 136–145)
TOTAL PROTEIN: 6.5 G/DL (ref 6.4–8.2)

## 2021-09-23 ENCOUNTER — HOSPITAL ENCOUNTER (OUTPATIENT)
Dept: ULTRASOUND IMAGING | Age: 71
Discharge: HOME | End: 2021-09-23
Payer: MEDICARE

## 2021-09-23 DIAGNOSIS — N18.30 STAGE 3 CHRONIC KIDNEY DISEASE, UNSPECIFIED WHETHER STAGE 3A OR 3B CKD (HCC): ICD-10-CM

## 2021-09-23 PROCEDURE — 76770 US EXAM ABDO BACK WALL COMP: CPT

## 2021-09-30 ENCOUNTER — VIRTUAL VISIT (OUTPATIENT)
Dept: PULMONOLOGY | Age: 71
End: 2021-09-30
Payer: MEDICARE

## 2021-09-30 DIAGNOSIS — G47.33 OSA (OBSTRUCTIVE SLEEP APNEA): Primary | ICD-10-CM

## 2021-09-30 DIAGNOSIS — G25.81 RLS (RESTLESS LEGS SYNDROME): Primary | ICD-10-CM

## 2021-09-30 PROCEDURE — G8417 CALC BMI ABV UP PARAM F/U: HCPCS | Performed by: INTERNAL MEDICINE

## 2021-09-30 PROCEDURE — 4040F PNEUMOC VAC/ADMIN/RCVD: CPT | Performed by: INTERNAL MEDICINE

## 2021-09-30 PROCEDURE — 1123F ACP DISCUSS/DSCN MKR DOCD: CPT | Performed by: INTERNAL MEDICINE

## 2021-09-30 PROCEDURE — 1090F PRES/ABSN URINE INCON ASSESS: CPT | Performed by: INTERNAL MEDICINE

## 2021-09-30 PROCEDURE — 99214 OFFICE O/P EST MOD 30 MIN: CPT | Performed by: INTERNAL MEDICINE

## 2021-09-30 PROCEDURE — G8399 PT W/DXA RESULTS DOCUMENT: HCPCS | Performed by: INTERNAL MEDICINE

## 2021-09-30 PROCEDURE — G8427 DOCREV CUR MEDS BY ELIG CLIN: HCPCS | Performed by: INTERNAL MEDICINE

## 2021-09-30 PROCEDURE — 1036F TOBACCO NON-USER: CPT | Performed by: INTERNAL MEDICINE

## 2021-09-30 PROCEDURE — 3017F COLORECTAL CA SCREEN DOC REV: CPT | Performed by: INTERNAL MEDICINE

## 2021-09-30 NOTE — PROGRESS NOTES
CC: MAXIMO    HPI   Interval History 2:   Not using BiPAP regularly as prescribed. Having trouble with it making her too dry. Had illness - URI - and had to miss work. Feels BiPAP is counterproductive. RLS symptoms are not well controlled. .Interval History:  Had titration, started on 17/13 (request per reading was 13/7). No difference in RLS or sleep. Still waking up. Sometimes feels like too little pressure. Changed to wellbutrin and okay from anxiety perspective, but no improvement in RLS. Very dry mouth, but can't tolerate humidifier due to warm air temperature. HPI: 59 yo with many year h/o obesity s/p gastric sleeve, known mild MAXIMO, AHI of 10, diagnosed by PSG at [facility] in 2012, tried CPAP but poorly tolerant, now having palpitations and snoring and waking with headache, poor sleep quality. Problems with CPAP in the past and did not like having face covered. Review of Systems      Objective:   Physical Exam  not currently breastfeeding. VIRTUAL  Constitutional:  NAD, NCAT  Eyes: EOM intact. Anicteric. HENT: Nose, ears, neck normal, trachea midline   Respiratory: No ASM, no obvious wheezing  Cardiovascular: No obvious peripheral edema. Skin: No visible rash or nodule on visible face, upper thorax    Psychiatric: No anxiety or agitation.    Neuro: A&O to P/P/E, judgement and insight intact    PSG @ [facility] with CPAP titration 6-14-12, AHI of 10, CPAP of 10 resulted in AHI of zero  HST 3/16/21 AHI of 14  Titration 4/21/21 looked best at 13/7    STRESS 1/19/21   Normal myocardial perfusion study with normal left ventricular function,    size, and wall motion.    The estimated left ventricular function is 68%     12/19/20 CXR no focal airspace disease     Assessment:      · Mild sleep apnea   · Severe Restless legs syndrome, poorly controlled with neurontin and requip, not better with change to wellbutrin from lexapro  · Anxiety, on wellbutrin      Plan:      · Requip 3 mg HS, Neurontin 600 mg BID -- both are taken 30 minutes prior to bedtime  · Remain off SSRI, on wellbutrin  · D/C BiPAP   · Add on iron studies to blood work in lab  · See me as previously scheduled      Carlos A Nixon is a 66 y.o. female being evaluated by a Virtual Visit (audiovideo visit) encounter to address concerns as mentioned above. A caregiver was present when appropriate. Due to this being a TeleHealth encounter (During SXXFB-36 public health emergency), evaluation of the following organ systems was limited: Vitals/Constitutional/EENT/Resp/CV/GI//MS/Neuro/Skin/Heme-Lymph-Imm. Pursuant to the emergency declaration under the [address] [place] [address], [address] [place] [address] authority and the needmade and [employer] Act, this Virtual Visit was conducted with patient's (and/or legal guardian's) consent, to reduce the patient's risk of exposure to COVID-19 and provide necessary medical care. The patient (and/or legal guardian) is advised to contact this office for worsening conditions or problems, and to seek emergency medical treatment and/or call 911 for urgent concerns. Services were provided through a audiovideo synchronous discussion virtually to substitute for in-person clinic visit. Patient was located outside the office/hospital, in home or usual environment; provider was located in his office.     --Joshua Brown MD on 9/30/2021 at 1:11 PM

## 2021-12-28 ENCOUNTER — TELEPHONE (OUTPATIENT)
Dept: FAMILY MEDICINE CLINIC | Age: 71
End: 2021-12-28

## 2022-01-03 ENCOUNTER — OFFICE VISIT (OUTPATIENT)
Dept: FAMILY MEDICINE CLINIC | Age: 72
End: 2022-01-03
Payer: MEDICARE

## 2022-01-03 VITALS
HEART RATE: 70 BPM | DIASTOLIC BLOOD PRESSURE: 76 MMHG | WEIGHT: 234 LBS | SYSTOLIC BLOOD PRESSURE: 118 MMHG | OXYGEN SATURATION: 97 % | BODY MASS INDEX: 39.95 KG/M2 | HEIGHT: 64 IN | TEMPERATURE: 98.3 F

## 2022-01-03 DIAGNOSIS — F32.89 OTHER DEPRESSION: ICD-10-CM

## 2022-01-03 DIAGNOSIS — F51.01 PRIMARY INSOMNIA: ICD-10-CM

## 2022-01-03 DIAGNOSIS — I10 ESSENTIAL HYPERTENSION: ICD-10-CM

## 2022-01-03 DIAGNOSIS — J40 BRONCHITIS: ICD-10-CM

## 2022-01-03 DIAGNOSIS — E78.2 MIXED HYPERLIPIDEMIA: ICD-10-CM

## 2022-01-03 DIAGNOSIS — G25.81 RLS (RESTLESS LEGS SYNDROME): ICD-10-CM

## 2022-01-03 DIAGNOSIS — K21.9 GASTROESOPHAGEAL REFLUX DISEASE WITHOUT ESOPHAGITIS: ICD-10-CM

## 2022-01-03 PROCEDURE — 99213 OFFICE O/P EST LOW 20 MIN: CPT | Performed by: NURSE PRACTITIONER

## 2022-01-03 RX ORDER — ALBUTEROL SULFATE 90 UG/1
2 AEROSOL, METERED RESPIRATORY (INHALATION) EVERY 6 HOURS PRN
Qty: 1 EACH | Refills: 1 | Status: SHIPPED | OUTPATIENT
Start: 2022-01-03 | End: 2022-05-05 | Stop reason: SDUPTHER

## 2022-01-03 RX ORDER — OMEPRAZOLE 40 MG/1
CAPSULE, DELAYED RELEASE ORAL
Qty: 90 CAPSULE | Refills: 0 | Status: SHIPPED | OUTPATIENT
Start: 2022-01-03 | End: 2022-05-05 | Stop reason: SDUPTHER

## 2022-01-03 RX ORDER — LEVOTHYROXINE SODIUM 175 UG/1
TABLET ORAL
Qty: 90 TABLET | Refills: 1 | Status: SHIPPED | OUTPATIENT
Start: 2022-01-03 | End: 2022-05-05 | Stop reason: SDUPTHER

## 2022-01-03 RX ORDER — VERAPAMIL HYDROCHLORIDE 360 MG/1
CAPSULE, DELAYED RELEASE PELLETS ORAL
Qty: 90 CAPSULE | Refills: 0 | Status: SHIPPED | OUTPATIENT
Start: 2022-01-03 | End: 2022-01-05 | Stop reason: RX

## 2022-01-03 RX ORDER — GABAPENTIN 600 MG/1
TABLET ORAL
Qty: 180 TABLET | Refills: 0 | Status: ON HOLD | OUTPATIENT
Start: 2022-01-03 | End: 2022-02-10 | Stop reason: HOSPADM

## 2022-01-03 RX ORDER — ROPINIROLE 1 MG/1
TABLET, FILM COATED ORAL
Qty: 270 TABLET | Refills: 1 | Status: SHIPPED | OUTPATIENT
Start: 2022-01-03 | End: 2022-05-05 | Stop reason: SDUPTHER

## 2022-01-03 RX ORDER — LOVASTATIN 20 MG/1
TABLET ORAL
Qty: 90 TABLET | Refills: 1 | Status: ON HOLD | OUTPATIENT
Start: 2022-01-03 | End: 2022-02-10 | Stop reason: HOSPADM

## 2022-01-03 RX ORDER — QUETIAPINE 150 MG/1
TABLET, FILM COATED, EXTENDED RELEASE ORAL
Qty: 90 TABLET | Refills: 1 | Status: SHIPPED | OUTPATIENT
Start: 2022-01-03 | End: 2022-05-05 | Stop reason: SDUPTHER

## 2022-01-03 RX ORDER — TRAZODONE HYDROCHLORIDE 150 MG/1
TABLET ORAL
Qty: 90 TABLET | Refills: 1 | Status: SHIPPED | OUTPATIENT
Start: 2022-01-03 | End: 2022-05-05 | Stop reason: SDUPTHER

## 2022-01-03 RX ORDER — METOPROLOL SUCCINATE 25 MG/1
25 TABLET, EXTENDED RELEASE ORAL DAILY
Qty: 90 TABLET | Refills: 3 | Status: SHIPPED | OUTPATIENT
Start: 2022-01-03 | End: 2022-05-05 | Stop reason: SDUPTHER

## 2022-01-03 RX ORDER — BUPROPION HYDROCHLORIDE 150 MG/1
TABLET ORAL
Qty: 180 TABLET | Refills: 1 | Status: SHIPPED | OUTPATIENT
Start: 2022-01-03 | End: 2022-05-05 | Stop reason: SDUPTHER

## 2022-01-03 NOTE — PROGRESS NOTES
1/3/2022  Christy Potts (: 1950)  66 y.o.    ASSESSMENT and PLAN:  Prasanth Weaver was seen today for medication refill. Diagnoses and all orders for this visit:    Mixed hyperlipidemia  -     lovastatin (MEVACOR) 20 MG tablet; TAKE 1 TABLET EVERY NIGHT  -Stable, continue current regimen. Primary insomnia  -     traZODone (DESYREL) 150 MG tablet; TAKE 1 TABLET EVERY NIGHT  -Did not recommend any medication changes today. Already on significant amount of medications, and would be at risk for over-sedation with addition of any other sleep aid.   -Recommend developing a sleep routine, discussed sleep hygiene. Can keep sleep log   -Avoid drinking large quantities of water or caffeine after 3pm.   -Decrease screen time. RLS (restless legs syndrome)  -     rOPINIRole (REQUIP) 1 MG tablet; TAKE 1 TABLET THREE TIMES DAILY  -Stable, continue current regimen. Other depression  -     QUEtiapine (SEROQUEL XR) 150 MG TB24 extended release tablet; TAKE 1 TABLET EVERY DAY  -Acute stressor currently due to having to put dog down today.   -has support system. Gastroesophageal reflux disease without esophagitis  -     omeprazole (PRILOSEC) 40 MG delayed release capsule; TAKE 1 CAPSULE EVERY DAY  -Stable, continue current regimen. Essential hypertension  -     Discontinue: verapamil (VERELAN) 360 MG extended release capsule; TAKE 1 CAPSULE EVERY NIGHT  Stable, continue current regimen. No follow-ups on file. HPI  Patient is presenting today for f/u on chronic conditions. Insomnia-much worse lately. Currently taking Trazodone 150 mg, and Seroquel 150 mg. Drinking multiple cups of caffeine. Feels like she gets up multiple times during the night due to need to urinate, also since her dog has been ill she is waking her up more as well. Doesn't have difficultly falling asleep, but once awake during the middle of the night, finds it difficult to go back to sleep.     Depression-Visibly upset today during visit. She is putting dog down today due to old age, has been under a lot of stress due to that. Feels at peace with decision. Has support system. Denies si/hi. BP-Stable, taking Verapamil, and Metoprolol. Denies s/e. Denies cp, sob, dizziness. HLD-On Lovastatin. No current exercise or specific diet regimen. Review of Systems   Constitutional: Negative for activity change, appetite change, fatigue, fever and unexpected weight change. Respiratory: Negative for cough, chest tightness, shortness of breath and wheezing. Cardiovascular: Negative for chest pain, palpitations and leg swelling. Gastrointestinal: Negative for constipation, diarrhea, nausea and vomiting. Genitourinary: Negative. Negative for difficulty urinating and dysuria. Musculoskeletal: Negative. Negative for gait problem. Neurological: Negative. Negative for dizziness, syncope, weakness, light-headedness, numbness and headaches. Psychiatric/Behavioral: Negative. Allergies, past medical history, family history, and social history reviewed and unchanged from previous encounter.      Current Outpatient Medications   Medication Sig Dispense Refill    albuterol sulfate HFA (PROVENTIL HFA) 108 (90 Base) MCG/ACT inhaler Inhale 2 puffs into the lungs every 6 hours as needed for Wheezing or Shortness of Breath 1 each 1    levothyroxine (SYNTHROID) 175 MCG tablet TAKE 1 TABLET EVERY DAY 90 tablet 1    lovastatin (MEVACOR) 20 MG tablet TAKE 1 TABLET EVERY NIGHT 90 tablet 1    buPROPion (WELLBUTRIN XL) 150 MG extended release tablet TAKE 2 TABLETS EVERY  tablet 1    traZODone (DESYREL) 150 MG tablet TAKE 1 TABLET EVERY NIGHT 90 tablet 1    rOPINIRole (REQUIP) 1 MG tablet TAKE 1 TABLET THREE TIMES DAILY 270 tablet 1    QUEtiapine (SEROQUEL XR) 150 MG TB24 extended release tablet TAKE 1 TABLET EVERY DAY 90 tablet 1    metoprolol succinate (TOPROL XL) 25 MG extended release tablet Take 1 tablet by mouth daily 90 tablet 3    omeprazole (PRILOSEC) 40 MG delayed release capsule TAKE 1 CAPSULE EVERY DAY 90 capsule 0    verapamil (VERELAN) 360 MG extended release capsule TAKE 1 CAPSULE EVERY NIGHT 90 capsule 0    benzonatate (TESSALON PERLES) 100 MG capsule Take one to two caps by mouth as needed for cough three times a day. (Patient not taking: Reported on 9/30/2021) 30 capsule 0    gabapentin (NEURONTIN) 600 MG tablet TAKE 1 TABLET TWICE DAILY 180 tablet 1    oxybutynin (DITROPAN) 5 MG tablet Take 1 tablet by mouth 2 times daily (Patient not taking: Reported on 9/30/2021) 30 tablet 0    diclofenac sodium 1 % GEL APPLY 4 GRAMS 4 TIMES DAILY (Patient not taking: Reported on 9/30/2021) 500 g 0    aspirin 81 MG EC tablet Take 81 mg by mouth daily Indications: OTC Takes at times (Patient not taking: Reported on 9/30/2021)       No current facility-administered medications for this visit. Vitals:    01/03/22 1342   BP: 118/76   Site: Right Upper Arm   Position: Sitting   Cuff Size: Medium Adult   Pulse: 70   Temp: 98.3 °F (36.8 °C)   TempSrc: Oral   SpO2: 97%   Weight: 234 lb (106.1 kg)   Height: 5' 3.5\" (1.613 m)     Estimated body mass index is 40.8 kg/m² as calculated from the following:    Height as of this encounter: 5' 3.5\" (1.613 m). Weight as of this encounter: 234 lb (106.1 kg). Physical Exam  Vitals reviewed. Constitutional:       Appearance: Normal appearance. She is normal weight. HENT:      Head: Normocephalic and atraumatic. Nose: Nose normal.   Eyes:      Conjunctiva/sclera: Conjunctivae normal.   Cardiovascular:      Rate and Rhythm: Normal rate and regular rhythm. Pulses: Normal pulses. Heart sounds: Normal heart sounds. Pulmonary:      Effort: Pulmonary effort is normal.      Breath sounds: Normal breath sounds. Abdominal:      General: Abdomen is flat. Bowel sounds are normal.      Palpations: Abdomen is soft. Tenderness: There is no abdominal tenderness.  There is no guarding. Musculoskeletal:         General: Normal range of motion. Cervical back: Normal range of motion and neck supple. Skin:     General: Skin is warm and dry. Capillary Refill: Capillary refill takes less than 2 seconds. Neurological:      General: No focal deficit present. Mental Status: She is alert and oriented to person, place, and time. Mental status is at baseline. Psychiatric:         Mood and Affect: Mood normal.         Behavior: Behavior normal.         Thought Content:  Thought content normal.         Judgment: Judgment normal.

## 2022-01-04 ENCOUNTER — TELEPHONE (OUTPATIENT)
Dept: FAMILY MEDICINE CLINIC | Age: 72
End: 2022-01-04

## 2022-01-04 NOTE — TELEPHONE ENCOUNTER
Fax received from [facility] letting provider know they are unable to get Verapamil  can they change to Verapamil

## 2022-01-05 RX ORDER — VERAPAMIL HYDROCHLORIDE 180 MG/1
360 CAPSULE, EXTENDED RELEASE ORAL NIGHTLY
Qty: 180 CAPSULE | Refills: 1 | Status: SHIPPED | OUTPATIENT
Start: 2022-01-05 | End: 2022-05-05 | Stop reason: SDUPTHER

## 2022-01-16 ASSESSMENT — ENCOUNTER SYMPTOMS
NAUSEA: 0
WHEEZING: 0
CHEST TIGHTNESS: 0
SHORTNESS OF BREATH: 0
COUGH: 0
VOMITING: 0
DIARRHEA: 0
CONSTIPATION: 0

## 2022-01-19 ENCOUNTER — TELEPHONE (OUTPATIENT)
Dept: ORTHOPEDIC SURGERY | Age: 72
End: 2022-01-19

## 2022-01-26 ENCOUNTER — TELEPHONE (OUTPATIENT)
Dept: ORTHOPEDIC SURGERY | Age: 72
End: 2022-01-26

## 2022-01-26 NOTE — TELEPHONE ENCOUNTER
LVM for patient regarding the [address] [place] [address] Orthopedic joint pain program. Patient can call [phone] for more information or to schedule an appointment with a joint pain specialist.

## 2022-02-09 ENCOUNTER — NURSE TRIAGE (OUTPATIENT)
Dept: OTHER | Facility: CLINIC | Age: 72
End: 2022-02-09

## 2022-02-09 ENCOUNTER — APPOINTMENT (OUTPATIENT)
Dept: MRI IMAGING | Age: 72
DRG: 176 | End: 2022-02-09
Payer: MEDICARE

## 2022-02-09 ENCOUNTER — APPOINTMENT (OUTPATIENT)
Dept: CT IMAGING | Age: 72
DRG: 176 | End: 2022-02-09
Payer: MEDICARE

## 2022-02-09 ENCOUNTER — HOSPITAL ENCOUNTER (INPATIENT)
Age: 72
LOS: 2 days | Discharge: HOME HEALTH | DRG: 176 | End: 2022-02-11
Attending: EMERGENCY MEDICINE | Admitting: INTERNAL MEDICINE
Payer: MEDICARE

## 2022-02-09 DIAGNOSIS — R47.81 SLURRED SPEECH: Primary | ICD-10-CM

## 2022-02-09 DIAGNOSIS — N18.9 CHRONIC KIDNEY DISEASE, UNSPECIFIED CKD STAGE: ICD-10-CM

## 2022-02-09 PROBLEM — I26.99 ACUTE PULMONARY EMBOLISM WITHOUT ACUTE COR PULMONALE (HCC): Status: ACTIVE | Noted: 2022-02-09

## 2022-02-09 PROBLEM — R42 DIZZINESS: Status: ACTIVE | Noted: 2022-02-09

## 2022-02-09 LAB
A/G RATIO: 1.7 (ref 1.1–2.2)
ALBUMIN SERPL-MCNC: 4 G/DL (ref 3.4–5)
ALP BLD-CCNC: 109 U/L (ref 40–129)
ALT SERPL-CCNC: 15 U/L (ref 10–40)
AMPHETAMINE SCREEN, URINE: NORMAL
ANION GAP SERPL CALCULATED.3IONS-SCNC: 11 MMOL/L (ref 3–16)
APTT: 32.1 SEC (ref 26.2–38.6)
AST SERPL-CCNC: 16 U/L (ref 15–37)
BARBITURATE SCREEN URINE: NORMAL
BASOPHILS ABSOLUTE: 0 K/UL (ref 0–0.2)
BASOPHILS RELATIVE PERCENT: 0.7 %
BENZODIAZEPINE SCREEN, URINE: NORMAL
BILIRUB SERPL-MCNC: 0.3 MG/DL (ref 0–1)
BILIRUBIN URINE: NEGATIVE
BLOOD, URINE: ABNORMAL
BUN BLDV-MCNC: 23 MG/DL (ref 7–20)
CALCIUM SERPL-MCNC: 9.3 MG/DL (ref 8.3–10.6)
CANNABINOID SCREEN URINE: NORMAL
CHLORIDE BLD-SCNC: 103 MMOL/L (ref 99–110)
CHP ED QC CHECK: YES
CLARITY: CLEAR
CO2: 23 MMOL/L (ref 21–32)
COCAINE METABOLITE SCREEN URINE: NORMAL
COLOR: YELLOW
CREAT SERPL-MCNC: 1.4 MG/DL (ref 0.6–1.2)
D DIMER: 1031 NG/ML DDU (ref 0–229)
EKG ATRIAL RATE: 68 BPM
EKG DIAGNOSIS: NORMAL
EKG P AXIS: 15 DEGREES
EKG P-R INTERVAL: 188 MS
EKG Q-T INTERVAL: 392 MS
EKG QRS DURATION: 80 MS
EKG QTC CALCULATION (BAZETT): 416 MS
EKG R AXIS: 10 DEGREES
EKG T AXIS: 18 DEGREES
EKG VENTRICULAR RATE: 68 BPM
EOSINOPHILS ABSOLUTE: 0.1 K/UL (ref 0–0.6)
EOSINOPHILS RELATIVE PERCENT: 1.7 %
EPITHELIAL CELLS, UA: NORMAL /HPF (ref 0–5)
GFR AFRICAN AMERICAN: 45
GFR NON-AFRICAN AMERICAN: 37
GLUCOSE BLD-MCNC: 109 MG/DL
GLUCOSE BLD-MCNC: 109 MG/DL (ref 70–99)
GLUCOSE BLD-MCNC: 99 MG/DL (ref 70–99)
GLUCOSE URINE: NEGATIVE MG/DL
HCT VFR BLD CALC: 39.1 % (ref 36–48)
HEMOGLOBIN: 12.9 G/DL (ref 12–16)
INR BLD: 0.91 (ref 0.88–1.12)
KETONES, URINE: NEGATIVE MG/DL
LEUKOCYTE ESTERASE, URINE: NEGATIVE
LYMPHOCYTES ABSOLUTE: 2.3 K/UL (ref 1–5.1)
LYMPHOCYTES RELATIVE PERCENT: 32.3 %
Lab: NORMAL
MCH RBC QN AUTO: 29 PG (ref 26–34)
MCHC RBC AUTO-ENTMCNC: 33 G/DL (ref 31–36)
MCV RBC AUTO: 87.8 FL (ref 80–100)
METHADONE SCREEN, URINE: NORMAL
MICROSCOPIC EXAMINATION: YES
MONOCYTES ABSOLUTE: 0.6 K/UL (ref 0–1.3)
MONOCYTES RELATIVE PERCENT: 8.3 %
NEUTROPHILS ABSOLUTE: 4 K/UL (ref 1.7–7.7)
NEUTROPHILS RELATIVE PERCENT: 57 %
NITRITE, URINE: NEGATIVE
OPIATE SCREEN URINE: NORMAL
OXYCODONE URINE: NORMAL
PDW BLD-RTO: 15.1 % (ref 12.4–15.4)
PERFORMED ON: ABNORMAL
PH UA: 6
PH UA: 6 (ref 5–8)
PHENCYCLIDINE SCREEN URINE: NORMAL
PLATELET # BLD: 246 K/UL (ref 135–450)
PMV BLD AUTO: 8.3 FL (ref 5–10.5)
POTASSIUM REFLEX MAGNESIUM: 4.2 MMOL/L (ref 3.5–5.1)
PRO-BNP: 364 PG/ML (ref 0–124)
PROPOXYPHENE SCREEN: NORMAL
PROTEIN UA: NEGATIVE MG/DL
PROTHROMBIN TIME: 10.3 SEC (ref 9.9–12.7)
RBC # BLD: 4.45 M/UL (ref 4–5.2)
RBC UA: NORMAL /HPF (ref 0–4)
SARS-COV-2, NAAT: NOT DETECTED
SODIUM BLD-SCNC: 137 MMOL/L (ref 136–145)
SPECIFIC GRAVITY UA: <=1.005 (ref 1–1.03)
TOTAL PROTEIN: 6.3 G/DL (ref 6.4–8.2)
TROPONIN: <0.01 NG/ML
URINE REFLEX TO CULTURE: ABNORMAL
URINE TYPE: ABNORMAL
UROBILINOGEN, URINE: 0.2 E.U./DL
WBC # BLD: 7.1 K/UL (ref 4–11)
WBC UA: NORMAL /HPF (ref 0–5)

## 2022-02-09 PROCEDURE — 85730 THROMBOPLASTIN TIME PARTIAL: CPT

## 2022-02-09 PROCEDURE — 81001 URINALYSIS AUTO W/SCOPE: CPT

## 2022-02-09 PROCEDURE — 70496 CT ANGIOGRAPHY HEAD: CPT

## 2022-02-09 PROCEDURE — 84484 ASSAY OF TROPONIN QUANT: CPT

## 2022-02-09 PROCEDURE — 2580000003 HC RX 258: Performed by: INTERNAL MEDICINE

## 2022-02-09 PROCEDURE — 99284 EMERGENCY DEPT VISIT MOD MDM: CPT

## 2022-02-09 PROCEDURE — 6360000002 HC RX W HCPCS: Performed by: NURSE PRACTITIONER

## 2022-02-09 PROCEDURE — 85379 FIBRIN DEGRADATION QUANT: CPT

## 2022-02-09 PROCEDURE — 1200000000 HC SEMI PRIVATE

## 2022-02-09 PROCEDURE — 93010 ELECTROCARDIOGRAM REPORT: CPT | Performed by: INTERNAL MEDICINE

## 2022-02-09 PROCEDURE — 93005 ELECTROCARDIOGRAM TRACING: CPT | Performed by: EMERGENCY MEDICINE

## 2022-02-09 PROCEDURE — 85610 PROTHROMBIN TIME: CPT

## 2022-02-09 PROCEDURE — 83880 ASSAY OF NATRIURETIC PEPTIDE: CPT

## 2022-02-09 PROCEDURE — 80053 COMPREHEN METABOLIC PANEL: CPT

## 2022-02-09 PROCEDURE — 2580000003 HC RX 258: Performed by: NURSE PRACTITIONER

## 2022-02-09 PROCEDURE — 70551 MRI BRAIN STEM W/O DYE: CPT

## 2022-02-09 PROCEDURE — 6360000002 HC RX W HCPCS: Performed by: EMERGENCY MEDICINE

## 2022-02-09 PROCEDURE — 6360000004 HC RX CONTRAST MEDICATION: Performed by: NURSE PRACTITIONER

## 2022-02-09 PROCEDURE — 6370000000 HC RX 637 (ALT 250 FOR IP): Performed by: INTERNAL MEDICINE

## 2022-02-09 PROCEDURE — 85520 HEPARIN ASSAY: CPT

## 2022-02-09 PROCEDURE — 80307 DRUG TEST PRSMV CHEM ANLYZR: CPT

## 2022-02-09 PROCEDURE — 70450 CT HEAD/BRAIN W/O DYE: CPT

## 2022-02-09 PROCEDURE — 85025 COMPLETE CBC W/AUTO DIFF WBC: CPT

## 2022-02-09 PROCEDURE — 87635 SARS-COV-2 COVID-19 AMP PRB: CPT

## 2022-02-09 PROCEDURE — 36415 COLL VENOUS BLD VENIPUNCTURE: CPT

## 2022-02-09 PROCEDURE — 6360000002 HC RX W HCPCS: Performed by: INTERNAL MEDICINE

## 2022-02-09 PROCEDURE — 6370000000 HC RX 637 (ALT 250 FOR IP): Performed by: NURSE PRACTITIONER

## 2022-02-09 RX ORDER — SODIUM CHLORIDE 9 MG/ML
INJECTION, SOLUTION INTRAVENOUS CONTINUOUS
Status: ACTIVE | OUTPATIENT
Start: 2022-02-09 | End: 2022-02-10

## 2022-02-09 RX ORDER — SODIUM CHLORIDE 9 MG/ML
25 INJECTION, SOLUTION INTRAVENOUS PRN
Status: DISCONTINUED | OUTPATIENT
Start: 2022-02-09 | End: 2022-02-11 | Stop reason: HOSPADM

## 2022-02-09 RX ORDER — BUTALBITAL, ACETAMINOPHEN AND CAFFEINE 50; 325; 40 MG/1; MG/1; MG/1
1 TABLET ORAL EVERY 4 HOURS PRN
Status: DISCONTINUED | OUTPATIENT
Start: 2022-02-09 | End: 2022-02-10

## 2022-02-09 RX ORDER — METOPROLOL SUCCINATE 25 MG/1
25 TABLET, EXTENDED RELEASE ORAL DAILY
Status: DISCONTINUED | OUTPATIENT
Start: 2022-02-10 | End: 2022-02-11 | Stop reason: HOSPADM

## 2022-02-09 RX ORDER — POLYETHYLENE GLYCOL 3350 17 G/17G
17 POWDER, FOR SOLUTION ORAL DAILY PRN
Status: DISCONTINUED | OUTPATIENT
Start: 2022-02-09 | End: 2022-02-11 | Stop reason: HOSPADM

## 2022-02-09 RX ORDER — ATORVASTATIN CALCIUM 10 MG/1
10 TABLET, FILM COATED ORAL NIGHTLY
Status: DISCONTINUED | OUTPATIENT
Start: 2022-02-09 | End: 2022-02-09

## 2022-02-09 RX ORDER — BUPROPION HYDROCHLORIDE 150 MG/1
150 TABLET ORAL DAILY
Status: DISCONTINUED | OUTPATIENT
Start: 2022-02-10 | End: 2022-02-11 | Stop reason: HOSPADM

## 2022-02-09 RX ORDER — LEVOTHYROXINE SODIUM 175 UG/1
1 TABLET ORAL DAILY
Status: DISCONTINUED | OUTPATIENT
Start: 2022-02-10 | End: 2022-02-09 | Stop reason: CLARIF

## 2022-02-09 RX ORDER — GABAPENTIN 300 MG/1
300 CAPSULE ORAL 3 TIMES DAILY
Status: DISCONTINUED | OUTPATIENT
Start: 2022-02-09 | End: 2022-02-11 | Stop reason: HOSPADM

## 2022-02-09 RX ORDER — QUETIAPINE FUMARATE 50 MG/1
150 TABLET, EXTENDED RELEASE ORAL NIGHTLY
Status: DISCONTINUED | OUTPATIENT
Start: 2022-02-09 | End: 2022-02-11 | Stop reason: HOSPADM

## 2022-02-09 RX ORDER — ONDANSETRON 2 MG/ML
4 INJECTION INTRAMUSCULAR; INTRAVENOUS EVERY 6 HOURS PRN
Status: DISCONTINUED | OUTPATIENT
Start: 2022-02-09 | End: 2022-02-11 | Stop reason: HOSPADM

## 2022-02-09 RX ORDER — ACETAMINOPHEN 325 MG/1
650 TABLET ORAL EVERY 6 HOURS PRN
Status: DISCONTINUED | OUTPATIENT
Start: 2022-02-09 | End: 2022-02-11 | Stop reason: HOSPADM

## 2022-02-09 RX ORDER — HEPARIN SODIUM 10000 [USP'U]/100ML
1330 INJECTION, SOLUTION INTRAVENOUS CONTINUOUS
Status: DISCONTINUED | OUTPATIENT
Start: 2022-02-09 | End: 2022-02-09 | Stop reason: CLARIF

## 2022-02-09 RX ORDER — SODIUM CHLORIDE 0.9 % (FLUSH) 0.9 %
5-40 SYRINGE (ML) INJECTION PRN
Status: DISCONTINUED | OUTPATIENT
Start: 2022-02-09 | End: 2022-02-11 | Stop reason: HOSPADM

## 2022-02-09 RX ORDER — HEPARIN SODIUM 1000 [USP'U]/ML
3000 INJECTION, SOLUTION INTRAVENOUS; SUBCUTANEOUS PRN
Status: DISCONTINUED | OUTPATIENT
Start: 2022-02-09 | End: 2022-02-11 | Stop reason: ALTCHOICE

## 2022-02-09 RX ORDER — FENTANYL CITRATE 50 UG/ML
25 INJECTION, SOLUTION INTRAMUSCULAR; INTRAVENOUS
Status: DISCONTINUED | OUTPATIENT
Start: 2022-02-09 | End: 2022-02-11 | Stop reason: HOSPADM

## 2022-02-09 RX ORDER — SODIUM CHLORIDE 0.9 % (FLUSH) 0.9 %
5-40 SYRINGE (ML) INJECTION EVERY 12 HOURS SCHEDULED
Status: DISCONTINUED | OUTPATIENT
Start: 2022-02-09 | End: 2022-02-11 | Stop reason: HOSPADM

## 2022-02-09 RX ORDER — HEPARIN SODIUM 10000 [USP'U]/100ML
1330 INJECTION, SOLUTION INTRAVENOUS CONTINUOUS
Status: DISCONTINUED | OUTPATIENT
Start: 2022-02-09 | End: 2022-02-10

## 2022-02-09 RX ORDER — ACETAMINOPHEN 650 MG/1
650 SUPPOSITORY RECTAL EVERY 6 HOURS PRN
Status: DISCONTINUED | OUTPATIENT
Start: 2022-02-09 | End: 2022-02-11 | Stop reason: HOSPADM

## 2022-02-09 RX ORDER — TRAZODONE HYDROCHLORIDE 50 MG/1
150 TABLET ORAL NIGHTLY
Status: DISCONTINUED | OUTPATIENT
Start: 2022-02-09 | End: 2022-02-11 | Stop reason: HOSPADM

## 2022-02-09 RX ORDER — ONDANSETRON 4 MG/1
4 TABLET, ORALLY DISINTEGRATING ORAL EVERY 8 HOURS PRN
Status: DISCONTINUED | OUTPATIENT
Start: 2022-02-09 | End: 2022-02-11 | Stop reason: HOSPADM

## 2022-02-09 RX ORDER — PANTOPRAZOLE SODIUM 40 MG/1
40 TABLET, DELAYED RELEASE ORAL
Status: DISCONTINUED | OUTPATIENT
Start: 2022-02-10 | End: 2022-02-11 | Stop reason: HOSPADM

## 2022-02-09 RX ORDER — HEPARIN SODIUM 1000 [USP'U]/ML
5900 INJECTION, SOLUTION INTRAVENOUS; SUBCUTANEOUS ONCE
Status: COMPLETED | OUTPATIENT
Start: 2022-02-09 | End: 2022-02-09

## 2022-02-09 RX ORDER — 0.9 % SODIUM CHLORIDE 0.9 %
1000 INTRAVENOUS SOLUTION INTRAVENOUS ONCE
Status: COMPLETED | OUTPATIENT
Start: 2022-02-09 | End: 2022-02-09

## 2022-02-09 RX ORDER — QUETIAPINE FUMARATE 50 MG/1
150 TABLET, EXTENDED RELEASE ORAL DAILY
Status: DISCONTINUED | OUTPATIENT
Start: 2022-02-10 | End: 2022-02-09

## 2022-02-09 RX ORDER — ATORVASTATIN CALCIUM 40 MG/1
40 TABLET, FILM COATED ORAL NIGHTLY
Status: DISCONTINUED | OUTPATIENT
Start: 2022-02-10 | End: 2022-02-11 | Stop reason: HOSPADM

## 2022-02-09 RX ORDER — HEPARIN SODIUM 1000 [USP'U]/ML
5900 INJECTION, SOLUTION INTRAVENOUS; SUBCUTANEOUS PRN
Status: DISCONTINUED | OUTPATIENT
Start: 2022-02-10 | End: 2022-02-11 | Stop reason: ALTCHOICE

## 2022-02-09 RX ORDER — ROPINIROLE 0.5 MG/1
1 TABLET, FILM COATED ORAL 3 TIMES DAILY
Status: DISCONTINUED | OUTPATIENT
Start: 2022-02-09 | End: 2022-02-11 | Stop reason: HOSPADM

## 2022-02-09 RX ADMIN — HEPARIN SODIUM 5900 UNITS: 1000 INJECTION INTRAVENOUS; SUBCUTANEOUS at 19:31

## 2022-02-09 RX ADMIN — QUETIAPINE FUMARATE 150 MG: 50 TABLET, EXTENDED RELEASE ORAL at 22:08

## 2022-02-09 RX ADMIN — ROPINIROLE HYDROCHLORIDE 1 MG: 0.5 TABLET, FILM COATED ORAL at 22:08

## 2022-02-09 RX ADMIN — FENTANYL CITRATE 25 MCG: 50 INJECTION, SOLUTION INTRAMUSCULAR; INTRAVENOUS at 22:27

## 2022-02-09 RX ADMIN — Medication 1330 UNITS/HR: at 19:37

## 2022-02-09 RX ADMIN — ONDANSETRON 4 MG: 2 INJECTION INTRAMUSCULAR; INTRAVENOUS at 22:27

## 2022-02-09 RX ADMIN — SODIUM CHLORIDE: 9 INJECTION, SOLUTION INTRAVENOUS at 19:37

## 2022-02-09 RX ADMIN — GABAPENTIN 300 MG: 300 CAPSULE ORAL at 22:08

## 2022-02-09 RX ADMIN — Medication 10 ML: at 22:09

## 2022-02-09 RX ADMIN — SODIUM CHLORIDE 1000 ML: 9 INJECTION, SOLUTION INTRAVENOUS at 17:01

## 2022-02-09 RX ADMIN — BUTALBITAL, ACETAMINOPHEN, AND CAFFEINE 1 TABLET: 50; 325; 40 TABLET ORAL at 19:27

## 2022-02-09 RX ADMIN — VERAPAMIL HYDROCHLORIDE 180 MG: 180 TABLET, FILM COATED, EXTENDED RELEASE ORAL at 22:08

## 2022-02-09 RX ADMIN — TRAZODONE HYDROCHLORIDE 150 MG: 50 TABLET ORAL at 22:08

## 2022-02-09 RX ADMIN — IOPAMIDOL 75 ML: 755 INJECTION, SOLUTION INTRAVENOUS at 16:49

## 2022-02-09 ASSESSMENT — PAIN SCALES - GENERAL
PAINLEVEL_OUTOF10: 9
PAINLEVEL_OUTOF10: 9
PAINLEVEL_OUTOF10: 10

## 2022-02-09 ASSESSMENT — ENCOUNTER SYMPTOMS
COLOR CHANGE: 0
ABDOMINAL PAIN: 0
RHINORRHEA: 0
SHORTNESS OF BREATH: 0
SORE THROAT: 0

## 2022-02-09 ASSESSMENT — PAIN DESCRIPTION - LOCATION: LOCATION: HEAD

## 2022-02-09 ASSESSMENT — PAIN DESCRIPTION - FREQUENCY: FREQUENCY: CONTINUOUS

## 2022-02-09 ASSESSMENT — PAIN DESCRIPTION - DESCRIPTORS: DESCRIPTORS: ACHING

## 2022-02-09 ASSESSMENT — PAIN DESCRIPTION - ONSET: ONSET: ON-GOING

## 2022-02-09 ASSESSMENT — PAIN DESCRIPTION - ORIENTATION: ORIENTATION: LEFT

## 2022-02-09 NOTE — ED NOTES
@2039 Called  Stroke team Per Amando ELLIOTT  RE:slurred speech started at 1000 but last normal was 1 week  @1518 Josue Vargas MD called back and spoke to Amando ELLIOTT

## 2022-02-09 NOTE — ED PROVIDER NOTES
[facility] B3 - MED SURG  EMERGENCY DEPARTMENT ENCOUNTER        Pt Name: Steven Clements  MRN: [id number]  [place] 1950  Date of evaluation: 2/9/2022  Provider: SANDOR Siddiqui CNP  PCP: Rajesh Lee DO  ED Attending: No att. providers found    [address] [place] [address]       Chief Complaint   Patient presents with    Altered Mental Status     patient states that she was at work this AM and couldn't count the change. Patient c/o dizziness and weakness for the last 3 days with some confusion. HISTORY OF PRESENT ILLNESS   (Location/Symptom, Timing/Onset, Context/Setting, Quality, Duration, Modifying Factors, Severity)  Note limiting factors. Steven Clements is a 66 y.o. female for slurred speech. Onset was 10 AM which is greater than 4 hours of onset prior to arrival.  Context includes patient reports that she has not felt well for the last week. She states that today at 10 AM she started having slurred speech while at work. She states that she was unable to count the money while she was working. Patient reports that her last known well was 1 week ago. Patient reports generalized weakness but no focal weakness. Alleviating factors include nothing. Aggravating factors include nothing. Pain is */10. Nothing has been used for pain today. Nursing Notes were all reviewed and agreed with or any disagreements were addressed  in the HPI. REVIEW OF SYSTEMS  (2-9 systems for level 4, 10 or more for level 5)     Review of Systems   Constitutional: Negative for fever. HENT: Negative for congestion, rhinorrhea and sore throat. Respiratory: Negative for shortness of breath. Cardiovascular: Negative for chest pain. Gastrointestinal: Negative for abdominal pain. Genitourinary: Negative for decreased urine volume and difficulty urinating. Musculoskeletal: Negative for arthralgias and myalgias. Skin: Negative for color change and rash.    Neurological: Positive for speech difficulty and weakness. Negative for dizziness and light-headedness. Psychiatric/Behavioral: Negative for agitation. All other systems reviewed and are negative. Positivesand Pertinent negatives as per HPI. Except as noted above in the ROS, all other systems were reviewed and negative. PAST MEDICAL HISTORY     Past Medical History:   Diagnosis Date    Arthritis     Bipolar disorder ([facility]     questionable dx    Cellulitis     L ankle, recurrent; over area of prior surgeries    Chronic back pain     Closed fracture of left fibula 1995    s/p fall; surgery x 6    Closed fracture of left tibia 1995    s/p fall on ice; hx of surgery x 6    Colon polyps     Depression     Fibroid (bleeding) (uterine) 1982    Fibromyalgia     GERD (gastroesophageal reflux disease)     Hyperlipidemia     Hypertension     Myoview Lexiscan WNL on 5/17/12    Hypothyroidism     Influenza A 01/31/2020    Migraine     Miscarriage     Morbid obesity ([facility]     Neuropathy 2011    seen initially by neuro., Dr. Chao Aaron, on 8/11/10; EMG 7/16/10 showed abn.'s c/w L5 radiculopathy & periph. neuropathy; pt. reports sx in hands and feet ; dx'd by rheum.      MAXIMO (obstructive sleep apnea) 06/07/2012    mild-does not require appliance    RLS (restless legs syndrome)     Urinary incontinence     Urine incontinence     saw urology, Dr. Keaton Mckeon, on 7/21/11 for microscopic hematuria & hematuria; was started on Vesicare & scheduled for cystoscopy    Vitamin D deficiency 2/27/12    22 ng/mL         SURGICAL HISTORY       Past Surgical History:   Procedure Laterality Date    BARIATRIC SURGERY  11/14/12    Laparoscopic Sleeve Gastrectomy, Lap Hiatal hernia repair    CHOLECYSTECTOMY  1985    COLONOSCOPY  2010     colon polyps; Dr. Anita Jesus Left 08/31/2018    phaco with IOL    HIATAL HERNIA REPAIR      lap    HYSTERECTOMY  1982    TAHBSO for fibroids and DUB    LEG SURGERY  1995    L; s/p tibia and fibular fx's; has uma in L tibia; surgery x 6    OVARY REMOVAL      [place] XCAPSL CTRC RMVL INSJ IO LENS PROSTH W/O ECP Left 8/31/2018    PHACOEMULSIFICATION OF CATARACT LEFT EYE WITH INTRAOCULAR LENS IMPLANT performed by Audelia Bryant MD at [address] W/O ECP Right 9/14/2018    PHACOEMULSIFICATION OF CATARACT RIGHT  EYE WITH INTRAOCULAR LENS IMPLANT performed by Audelia Bryant MD at [address]  2010    Dr. Ashley Martinez  4-27-12    WNL         CURRENT MEDICATIONS       Discharge Medication List as of 2/11/2022  9:42 AM      CONTINUE these medications which have NOT CHANGED    Details   verapamil (VERELAN) 180 MG extended release capsule Take 2 capsules by mouth nightly, Disp-180 capsule, R-1Normal      albuterol sulfate HFA (PROVENTIL HFA) 108 (90 Base) MCG/ACT inhaler Inhale 2 puffs into the lungs every 6 hours as needed for Wheezing or Shortness of Breath, Disp-1 each, R-1Normal      levothyroxine (SYNTHROID) 175 MCG tablet TAKE 1 TABLET EVERY DAY, Disp-90 tablet, R-1Normal      buPROPion (WELLBUTRIN XL) 150 MG extended release tablet TAKE 2 TABLETS EVERY DAY, Disp-180 tablet, R-1Normal      traZODone (DESYREL) 150 MG tablet TAKE 1 TABLET EVERY NIGHT, Disp-90 tablet, R-1Normal      rOPINIRole (REQUIP) 1 MG tablet TAKE 1 TABLET THREE TIMES DAILY, Disp-270 tablet, R-1Normal      QUEtiapine (SEROQUEL XR) 150 MG TB24 extended release tablet TAKE 1 TABLET EVERY DAY, Disp-90 tablet, R-1Normal      metoprolol succinate (TOPROL XL) 25 MG extended release tablet Take 1 tablet by mouth daily, Disp-90 tablet, R-3Normal      omeprazole (PRILOSEC) 40 MG delayed release capsule TAKE 1 CAPSULE EVERY DAY, Disp-90 capsule, R-0Normal      benzonatate (TESSALON PERLES) 100 MG capsule Take one to two caps by mouth as needed for cough three times a day., Disp-30 capsule, R-0Normal      oxybutynin (DITROPAN) 5 MG tablet Take 1 tablet by mouth Session: Not on file   Stress:     Feeling of Stress : Not on file   Social Connections:     Frequency of Communication with Friends and Family: Not on file    Frequency of Social Gatherings with Friends and Family: Not on file    Attends Caodaism Services: Not on file    Active Member of [address] [place] [address] or Organizations: Not on file    Attends Club or Organization Meetings: Not on file    Marital Status: Not on file   Intimate Partner Violence:     Fear of Current or Ex-Partner: Not on file    Emotionally Abused: Not on file    Physically Abused: Not on file    Sexually Abused: Not on file   Housing Stability:     Unable to Pay for Housing in the Last Year: Not on file    Number of [place] in the Last Year: Not on file    Unstable Housing in the Last Year: Not on file       SCREENINGS   NIH Stroke Scale  Interval: Reassessment  Level of Consciousness (1a. ): Alert  LOC Questions (1b. ): Answers both correctly  LOC Commands (1c. ): Performs both tasks correctly  Best Gaze (2. ): Normal  Visual (3. ): No visual loss  Facial Palsy (4. ): Normal symmetrical movement  Motor Arm, Left (5a. ): No drift  Motor Arm, Right (5b. ): No drift  Motor Leg, Left (6a. ): No drift  Motor Leg, Right (6b. ): No drift  Limb Ataxia (7. ): Absent  Sensory (8. ): Normal  Best Language (9. ): Mild to moderate aphasia (slurred speach at times. Hard to get out words at times.)  Dysarthria (10. ): Normal  Extinction and Inattention (11): No abnormality  Total: 1Glasgow Coma Scale  Eye Opening: Spontaneous  Best Verbal Response: Oriented  Best Motor Response: Obeys commands  [place] Coma Scale Score: 15        PHYSICAL EXAM    (up to 7 for level 4, 8 ormore for level 5)     ED Triage Vitals [02/09/22 1447]   BP Temp Temp Source Pulse Resp SpO2 Height Weight   (!) 147/89 97 °F (36.1 °C) Oral 74 18 99 % 5' 3\" (1.6 m) 234 lb (106.1 kg)       Physical Exam  Constitutional:       Appearance: She is well-developed.    HENT:      Head: Normocephalic and atraumatic. Cardiovascular:      Rate and Rhythm: Normal rate. Pulmonary:      Effort: Pulmonary effort is normal. No respiratory distress. Abdominal:      General: There is no distension. Palpations: Abdomen is soft. Tenderness: There is no abdominal tenderness. Musculoskeletal:         General: Normal range of motion. Cervical back: Normal range of motion. Skin:     General: Skin is warm and dry. Neurological:      Mental Status: She is alert and oriented to person, place, and time. Comments: Symmetrical smile and midline tongue. Shoulder shrug symmetrical however the patient does have slurred speech and difficulty getting her words out. Patient does have equal strength to her hands and bilateral weakness noted to lower extremities. Psychiatric:         Mood and Affect: Affect is tearful.          DIAGNOSTIC RESULTS   LABS:    Labs Reviewed   COMPREHENSIVE METABOLIC PANEL W/ REFLEX TO MG FOR LOW K - Abnormal; Notable for the following components:       Result Value    BUN 23 (*)     CREATININE 1.4 (*)     GFR Non- 37 (*)     GFR  45 (*)     Total Protein 6.3 (*)     All other components within normal limits    Narrative:     Performed at:  [place] [address] [employer]   Phone [phone]   URINE RT REFLEX TO CULTURE - Abnormal; Notable for the following components:    Blood, Urine TRACE-INTACT (*)     All other components within normal limits    Narrative:     Performed at:  [address], [facility] [employer]   Phone [phone] - Abnormal; Notable for the following components:    Pro- (*)     All other components within normal limits    Narrative:     Performed at:  [address], [facility] [employer]   Phone [phone]   D-DIMER, QUANTITATIVE - Abnormal; Notable for the following components:    D-Dimer, Quant 1031 (*)     All other components within normal limits    Narrative:     Performed at:  [address],  [place], [address] [employer]   Phone [phone]   CBC WITH AUTO DIFFERENTIAL - Abnormal; Notable for the following components:    RBC 3.68 (*)     Hemoglobin 10.7 (*)     Hematocrit 31.9 (*)     All other components within normal limits    Narrative:     Performed at:  [address], [address] [employer]   Phone [phone]   APTT - Abnormal; Notable for the following components:    aPTT 172.4 (*)     All other components within normal limits    Narrative:     CALL  [place]  [facility] [phone],  Coag results called to and read back by Daphnie Wright RN, 02/10/2022 00:09, by  [facility]  Performed at:  [address], [address] [employer]   Phone [phone] LEVEL/ANTI-XA - Abnormal; Notable for the following components:    Anti-XA Unfrac Heparin 1.00 (*)     All other components within normal limits    Narrative:     CALL  [place]  ARIK Mcnair [phone],  Coag results called to and read back by Daphnie Wright RN, 02/10/2022 00:30, by  [facility]  Performed at:  [address], [address] [employer]   Phone [phone]   BASIC METABOLIC PANEL - Abnormal; Notable for the following components:    Glucose 105 (*)     GFR Non- 44 (*)     GFR  53 (*)     All other components within normal limits    Narrative:     Performed at:  [address],  [place], [address] [employer]   Phone [phone]   HEPARIN LEVEL/ANTI-XA - Abnormal; Notable for the following components:    Anti-XA Unfrac Heparin 0.22 (*)     All other components within normal limits    Narrative:     Performed at:  [facility]) [address], [facility] [place]   Phone [phone]   POCT GLUCOSE - Abnormal; Notable for the following components:    POC Glucose 109 (*)     All other components within normal limits    Narrative:     Performed at:  [address], [facility] [place]   Phone [phone]   POCT GLUCOSE - Normal   COVID-19, RAPID    Narrative:     Performed at:  [address], [facility] [place]   Phone [phone]   CBC WITH AUTO DIFFERENTIAL    Narrative:     Performed at:  [place] [address] [place]   Phone [phone]   URINE DRUG SCREEN    Narrative:     Performed at:  [place] [address] [place]   Phone [phone]   TROPONIN    Narrative:     Performed at:  [address], [facility] [place]   Phone [phone]   MICROSCOPIC URINALYSIS    Narrative:     Performed at:  [address], [facility] [place]   Phone [phone]   PROTIME-INR    Narrative:     Performed at:  [address], [facility] [place]   Phone [phone]   APTT    Narrative:     Performed at:  [address], [facility] [place]   Phone [phone]   HEMOGLOBIN A1C    Narrative:     Performed at:  [facility]  [address] [place], [place] [address]   Phone [phone]   LIPID PANEL    Narrative:     Performed at:  [facility]  [address] [facility] [address]   Phone [phone]   HEPARIN LEVEL/ANTI-XA    Narrative:     Performed at:  [address] [address]   Phone [phone] LEVEL/ANTI-XA    Narrative:     Performed at:  [facility]) [address],  [place]   Phone [phone]       All other labs were within normal range or not returned as of this dictation. EKG: All EKG's are interpreted by the Emergency Department Physician who either signs or Co-signs this chart in the absence of a cardiologist.  Please see their note for interpretation of EKG. RADIOLOGY:   Head CT interpreted by radiologist for no acute intracranial abnormality. Bifrontal atrophy. CTA head neck with contrast currently pending. Interpretation per the Radiologist below, if available at the time of this note:    CT CHEST PULMONARY EMBOLISM W CONTRAST   Final Result   1. Known acute pulmonary emboli. No right heart strain. 2. Other findings as described. VL Extremity Venous Bilateral   Final Result      MRI BRAIN WO CONTRAST   Final Result   Minor chronic microvascular disease. Otherwise negative acute stroke,   midline shift or mass effect. CT HEAD WO CONTRAST   Final Result   No acute intracranial abnormality. Bifrontal atrophy         CTA HEAD NECK W CONTRAST   Final Result   Negative for large vessel occlusion or hemodynamic stenosis. Mild atherosclerotic disease identified. Bilateral pulmonary emboli identified. Critical results were called by Dr. Hernandez Montero to Dr Leticia Rees on 2/9/2022 at   17:21. No results found.       PROCEDURES   Unless otherwise noted below, none     Procedures    CRITICAL CARE TIME   N/A    CONSULTS:  IP CONSULT TO STROKE TEAM  PHARMACY TO DOSE MEDICATION  IP CONSULT TO PHARMACY  IP CONSULT TO HOME CARE NEEDS      EMERGENCY DEPARTMENT COURSE and DIFFERENTIAL DIAGNOSIS/MDM:   Vitals:    Vitals:    02/10/22 2029 02/10/22 2354 02/11/22 0413 02/11/22 0735   BP: 125/76 114/73 123/71 124/75   Pulse: 74 65 64 70   Resp: 16 16 16 16   Temp: 97.7 °F (36.5 °C) 97.8 °F (36.6 °C) 97.7 °F (36.5 °C) 97.8 °F (36.6 °C)   TempSrc: Oral Oral Oral Oral   SpO2: 96% 94% 92% 96%   Weight:       Height:           Patient was given the following medications:  Medications   0.9 % sodium chloride infusion ( IntraVENous New Bag 2/9/22 1937)   iopamidol (ISOVUE-370) 76 % injection 75 mL (75 mLs IntraVENous Given 2/9/22 1649)   0.9 % sodium chloride bolus (0 mLs IntraVENous Stopped 2/9/22 1804)   heparin (porcine) injection 5,900 Units (5,900 Units IntraVENous Given 2/9/22 1931)   iopamidol (ISOVUE-370) 76 % injection 75 mL (75 mLs IntraVENous Given 2/10/22 1710)   heparin (porcine) injection 3,000 Units (3,000 Units IntraVENous Given 2/11/22 0971)     Patient was seen and evaluated by myself and Dr. Ashely Christine. Patient arrives to the ED for complaints of slurred speech. Patient reports that her symptoms started greater than 4 hours ago therefore a code stroke was not called. CT of her head and a CTA of her head neck with contrast were ordered. Lab values have been ordered as well. Consult was placed to the stroke team who also agrees the patient was outside of the window for TPA but recommended imaging as well. Dr. Ashely Christine to follow-up on CTA of the head and neck and final disposition of the patient. The patient tolerated their visit well. They were seen and evaluated by the attending physician, No att. providers found who agreed with the assessment and plan. The patient and / or the family were informed of the results of any tests, a time was given to answer questions, a plan was proposed and they agreed with plan. FINAL IMPRESSION      1. Slurred speech    2.  Chronic kidney disease, unspecified CKD stage          DISPOSITION/PLAN   DISPOSITION Admitted 02/09/2022 07:24:18 PM      PATIENT REFERRED TO:  [facility]  [address] [address]  [address]  [place] [address]  [phone]    Schedule an appointment as soon as possible for a visit in 1 week  follow up with your PCP      DISCHARGE MEDICATIONS:  Discharge Medication List as of 2/11/2022  9:42 AM      START taking these medications    Details   gabapentin (NEURONTIN) 300 MG capsule Take 1 capsule by mouth 3 times daily for 30 days. , Disp-90 capsule, R-1Normal      atorvastatin (LIPITOR) 40 MG tablet Take 1 tablet by mouth nightly, Disp-30 tablet, R-3Normal      apixaban (ELIQUIS) 5 MG TABS tablet Take 10mg twice a day for 7 days then start 5mg twice a day thereafter, Disp-60 tablet, R-3Normal             DISCONTINUED MEDICATIONS:  Discharge Medication List as of 2/11/2022  9:42 AM      STOP taking these medications       lovastatin (MEVACOR) 20 MG tablet Comments:   Reason for Stopping:         gabapentin (NEURONTIN) 600 MG tablet Comments:   Reason for Stopping:         diclofenac sodium 1 % GEL Comments:   Reason for Stopping:         aspirin 81 MG EC tablet Comments:   Reason for Stopping:                      (Please note that portions of this note were completed with a voice recognition program.  Efforts were made to edit the dictations but occasionally words are mis-transcribed.)    SANDOR Easton CNP (electronically signed)       SANDOR Easton CNP  02/13/22 7294

## 2022-02-09 NOTE — TELEPHONE ENCOUNTER
Received call from [facility] with Red Flag Complaint. Subjective: Caller states \"Confused\"     Current Symptoms: Confused, unsure when it started. Room is spinning. Onset: a few hours ago; sudden    Associated Symptoms: NA    Pain Severity: 0/10; N/A; none    Temperature: denies fever  What has been tried: None    LMP: NA Pregnant: NA    Recommended disposition: Go to ED now    Care advice provided, patient verbalizes understanding; denies any other questions or concerns; instructed to call back for any new or worsening symptoms. Patient/caller proceeding to nearest Emergency Department     Attention Provider: Thank you for allowing me to participate in the care of your patient. The patient was connected to triage in response to information provided to the ECC/PSC. Please do not respond through this encounter as the response is not directed to a shared pool.       Reason for Disposition   SEVERE dizziness (e.g., unable to stand, requires support to walk, feels like passing out now)    Protocols used: DIZZINESS-ADULT-[place]

## 2022-02-09 NOTE — ED NOTES
Bed: 01  Expected date:   Expected time:   Means of arrival:   Comments:  Analisa Simpson 169, RN  02/09/22 9222

## 2022-02-09 NOTE — CONSULTS
High dose Heparin:  Baseline labs pending. Once drawn give a 5,900 unit IV bolus dose and begin the infusion at 1,330 units/hr. Check an Anti-Xa 6hrs after starting protocol. Doses based on ABW of 74kg. Desired Anti-Xa range is 0.3-0.7 IU/mL. Simone Martinez, [address] [facility] PharmD 2/9/2022 5:35 PM    2/9/2022 at 2334  Anti-XA Unfrac Heparin 1.00 High Panic   IU/mL   - Hold Heparin for 1 hour then resume at a decreased rate of _1110_ units/hr. - Recheck Anti-Xa in 6 hours after restarting drip, check level at 0700. Nathaniel Martinez PharmD    2/10/2022 12:42 AM     2/10/2022  anti-Xa level = 0.55 IU/mL at 0624  Continue Heparin infusion rate at 1110 units/hr  Recheck level in 6 hours at Maricarmen Bailey PharmD  2/10/2022 9:12 AM    2/10/2022  anti-Xa level = 0.34 IU/mL at 1151  Continue Heparin infusion rate at 1110 units/hr   Daily level ordered  Marybel Nichols PharmD  2/10/2022 12:49 PM    2/11/2022 at 0608   Anti-XA Unfrac Heparin 0.22 Low  IU/mL   - Heparin _3000_ units IVP bolus and then increase Heparin infusion to _1260_ units/hr. - Recheck Anti-Xa in 6 hours at 1300.    Nathaniel Martinez PharmD    2/11/2022 6:35 AM

## 2022-02-10 ENCOUNTER — APPOINTMENT (OUTPATIENT)
Dept: VASCULAR LAB | Age: 72
DRG: 176 | End: 2022-02-10
Payer: MEDICARE

## 2022-02-10 ENCOUNTER — APPOINTMENT (OUTPATIENT)
Dept: CT IMAGING | Age: 72
DRG: 176 | End: 2022-02-10
Payer: MEDICARE

## 2022-02-10 PROBLEM — G45.9 TIA (TRANSIENT ISCHEMIC ATTACK): Status: ACTIVE | Noted: 2022-02-10

## 2022-02-10 LAB
ANION GAP SERPL CALCULATED.3IONS-SCNC: 8 MMOL/L (ref 3–16)
ANTI-XA UNFRAC HEPARIN: 0.34 IU/ML (ref 0.3–0.7)
ANTI-XA UNFRAC HEPARIN: 0.55 IU/ML (ref 0.3–0.7)
ANTI-XA UNFRAC HEPARIN: 1 IU/ML (ref 0.3–0.7)
APTT: 172.4 SEC (ref 26.2–38.6)
BASOPHILS ABSOLUTE: 0 K/UL (ref 0–0.2)
BASOPHILS RELATIVE PERCENT: 0.6 %
BUN BLDV-MCNC: 16 MG/DL (ref 7–20)
CALCIUM SERPL-MCNC: 8.5 MG/DL (ref 8.3–10.6)
CHLORIDE BLD-SCNC: 104 MMOL/L (ref 99–110)
CHOLESTEROL, TOTAL: 139 MG/DL (ref 0–199)
CO2: 25 MMOL/L (ref 21–32)
CREAT SERPL-MCNC: 1.2 MG/DL (ref 0.6–1.2)
EOSINOPHILS ABSOLUTE: 0.1 K/UL (ref 0–0.6)
EOSINOPHILS RELATIVE PERCENT: 2.5 %
ESTIMATED AVERAGE GLUCOSE: 108.3 MG/DL
GFR AFRICAN AMERICAN: 53
GFR NON-AFRICAN AMERICAN: 44
GLUCOSE BLD-MCNC: 105 MG/DL (ref 70–99)
HBA1C MFR BLD: 5.4 %
HCT VFR BLD CALC: 31.9 % (ref 36–48)
HDLC SERPL-MCNC: 48 MG/DL (ref 40–60)
HEMOGLOBIN: 10.7 G/DL (ref 12–16)
LDL CHOLESTEROL CALCULATED: 78 MG/DL
LV EF: 55 %
LVEF MODALITY: NORMAL
LYMPHOCYTES ABSOLUTE: 2.4 K/UL (ref 1–5.1)
LYMPHOCYTES RELATIVE PERCENT: 51.8 %
MCH RBC QN AUTO: 29.2 PG (ref 26–34)
MCHC RBC AUTO-ENTMCNC: 33.6 G/DL (ref 31–36)
MCV RBC AUTO: 86.8 FL (ref 80–100)
MONOCYTES ABSOLUTE: 0.3 K/UL (ref 0–1.3)
MONOCYTES RELATIVE PERCENT: 7.6 %
NEUTROPHILS ABSOLUTE: 1.7 K/UL (ref 1.7–7.7)
NEUTROPHILS RELATIVE PERCENT: 37.5 %
PDW BLD-RTO: 15.2 % (ref 12.4–15.4)
PLATELET # BLD: 200 K/UL (ref 135–450)
PMV BLD AUTO: 8.7 FL (ref 5–10.5)
POTASSIUM SERPL-SCNC: 4.1 MMOL/L (ref 3.5–5.1)
RBC # BLD: 3.68 M/UL (ref 4–5.2)
SODIUM BLD-SCNC: 137 MMOL/L (ref 136–145)
TRIGL SERPL-MCNC: 64 MG/DL (ref 0–150)
VLDLC SERPL CALC-MCNC: 13 MG/DL
WBC # BLD: 4.6 K/UL (ref 4–11)

## 2022-02-10 PROCEDURE — 6370000000 HC RX 637 (ALT 250 FOR IP): Performed by: INTERNAL MEDICINE

## 2022-02-10 PROCEDURE — 85025 COMPLETE CBC W/AUTO DIFF WBC: CPT

## 2022-02-10 PROCEDURE — 71260 CT THORAX DX C+: CPT

## 2022-02-10 PROCEDURE — 6360000004 HC RX CONTRAST MEDICATION: Performed by: INTERNAL MEDICINE

## 2022-02-10 PROCEDURE — 1200000000 HC SEMI PRIVATE

## 2022-02-10 PROCEDURE — 97116 GAIT TRAINING THERAPY: CPT

## 2022-02-10 PROCEDURE — 2580000003 HC RX 258: Performed by: INTERNAL MEDICINE

## 2022-02-10 PROCEDURE — 97530 THERAPEUTIC ACTIVITIES: CPT

## 2022-02-10 PROCEDURE — 93970 EXTREMITY STUDY: CPT

## 2022-02-10 PROCEDURE — 97535 SELF CARE MNGMENT TRAINING: CPT

## 2022-02-10 PROCEDURE — 83036 HEMOGLOBIN GLYCOSYLATED A1C: CPT

## 2022-02-10 PROCEDURE — 36415 COLL VENOUS BLD VENIPUNCTURE: CPT

## 2022-02-10 PROCEDURE — 97162 PT EVAL MOD COMPLEX 30 MIN: CPT

## 2022-02-10 PROCEDURE — 80061 LIPID PANEL: CPT

## 2022-02-10 PROCEDURE — 97166 OT EVAL MOD COMPLEX 45 MIN: CPT

## 2022-02-10 PROCEDURE — 80048 BASIC METABOLIC PNL TOTAL CA: CPT

## 2022-02-10 PROCEDURE — 6370000000 HC RX 637 (ALT 250 FOR IP): Performed by: NURSE PRACTITIONER

## 2022-02-10 PROCEDURE — 93306 TTE W/DOPPLER COMPLETE: CPT

## 2022-02-10 PROCEDURE — 6360000002 HC RX W HCPCS: Performed by: INTERNAL MEDICINE

## 2022-02-10 PROCEDURE — 85520 HEPARIN ASSAY: CPT

## 2022-02-10 RX ORDER — BUTALBITAL, ACETAMINOPHEN AND CAFFEINE 50; 325; 40 MG/1; MG/1; MG/1
2 TABLET ORAL EVERY 4 HOURS PRN
Status: DISCONTINUED | OUTPATIENT
Start: 2022-02-10 | End: 2022-02-11 | Stop reason: HOSPADM

## 2022-02-10 RX ORDER — ATORVASTATIN CALCIUM 40 MG/1
40 TABLET, FILM COATED ORAL NIGHTLY
Qty: 30 TABLET | Refills: 3 | Status: SHIPPED | OUTPATIENT
Start: 2022-02-10 | End: 2022-03-31 | Stop reason: SDUPTHER

## 2022-02-10 RX ORDER — HEPARIN SODIUM 10000 [USP'U]/100ML
1260 INJECTION, SOLUTION INTRAVENOUS CONTINUOUS
Status: DISCONTINUED | OUTPATIENT
Start: 2022-02-10 | End: 2022-02-11

## 2022-02-10 RX ORDER — GABAPENTIN 300 MG/1
300 CAPSULE ORAL 3 TIMES DAILY
Qty: 90 CAPSULE | Refills: 1 | Status: SHIPPED | OUTPATIENT
Start: 2022-02-10 | End: 2022-03-31 | Stop reason: SDUPTHER

## 2022-02-10 RX ADMIN — ONDANSETRON 4 MG: 2 INJECTION INTRAMUSCULAR; INTRAVENOUS at 04:42

## 2022-02-10 RX ADMIN — BUPROPION HYDROCHLORIDE 150 MG: 150 TABLET, EXTENDED RELEASE ORAL at 07:43

## 2022-02-10 RX ADMIN — QUETIAPINE FUMARATE 150 MG: 50 TABLET, EXTENDED RELEASE ORAL at 21:42

## 2022-02-10 RX ADMIN — IOPAMIDOL 75 ML: 755 INJECTION, SOLUTION INTRAVENOUS at 17:10

## 2022-02-10 RX ADMIN — ROPINIROLE HYDROCHLORIDE 1 MG: 0.5 TABLET, FILM COATED ORAL at 21:42

## 2022-02-10 RX ADMIN — BUTALBITAL, ACETAMINOPHEN, AND CAFFEINE 2 TABLET: 50; 325; 40 TABLET ORAL at 21:46

## 2022-02-10 RX ADMIN — PANTOPRAZOLE SODIUM 40 MG: 40 TABLET, DELAYED RELEASE ORAL at 06:34

## 2022-02-10 RX ADMIN — ROPINIROLE HYDROCHLORIDE 1 MG: 0.5 TABLET, FILM COATED ORAL at 15:09

## 2022-02-10 RX ADMIN — Medication 10 ML: at 21:43

## 2022-02-10 RX ADMIN — GABAPENTIN 300 MG: 300 CAPSULE ORAL at 21:42

## 2022-02-10 RX ADMIN — BUTALBITAL, ACETAMINOPHEN, AND CAFFEINE 1 TABLET: 50; 325; 40 TABLET ORAL at 04:42

## 2022-02-10 RX ADMIN — ROPINIROLE HYDROCHLORIDE 1 MG: 0.5 TABLET, FILM COATED ORAL at 07:43

## 2022-02-10 RX ADMIN — TRAZODONE HYDROCHLORIDE 150 MG: 50 TABLET ORAL at 21:42

## 2022-02-10 RX ADMIN — GABAPENTIN 300 MG: 300 CAPSULE ORAL at 15:09

## 2022-02-10 RX ADMIN — GABAPENTIN 300 MG: 300 CAPSULE ORAL at 07:43

## 2022-02-10 RX ADMIN — BUTALBITAL, ACETAMINOPHEN, AND CAFFEINE 1 TABLET: 50; 325; 40 TABLET ORAL at 09:03

## 2022-02-10 RX ADMIN — HEPARIN SODIUM 1110 UNITS/HR: 5000 INJECTION INTRAVENOUS; SUBCUTANEOUS at 23:25

## 2022-02-10 RX ADMIN — VERAPAMIL HYDROCHLORIDE 180 MG: 180 TABLET, FILM COATED, EXTENDED RELEASE ORAL at 21:43

## 2022-02-10 RX ADMIN — ATORVASTATIN CALCIUM 40 MG: 40 TABLET, FILM COATED ORAL at 21:42

## 2022-02-10 RX ADMIN — Medication 10 ML: at 07:43

## 2022-02-10 RX ADMIN — VERAPAMIL HYDROCHLORIDE 180 MG: 180 TABLET, FILM COATED, EXTENDED RELEASE ORAL at 07:43

## 2022-02-10 RX ADMIN — LEVOTHYROXINE SODIUM 175 MCG: 0.15 TABLET ORAL at 06:34

## 2022-02-10 ASSESSMENT — PAIN DESCRIPTION - LOCATION
LOCATION: HEAD

## 2022-02-10 ASSESSMENT — PAIN SCALES - GENERAL
PAINLEVEL_OUTOF10: 7
PAINLEVEL_OUTOF10: 8
PAINLEVEL_OUTOF10: 7
PAINLEVEL_OUTOF10: 8

## 2022-02-10 ASSESSMENT — PAIN DESCRIPTION - PAIN TYPE
TYPE: ACUTE PAIN

## 2022-02-10 ASSESSMENT — PAIN DESCRIPTION - DESCRIPTORS
DESCRIPTORS: CONSTANT;ACHING
DESCRIPTORS: ACHING
DESCRIPTORS: THROBBING

## 2022-02-10 ASSESSMENT — PAIN DESCRIPTION - ONSET: ONSET: ON-GOING

## 2022-02-10 ASSESSMENT — PAIN DESCRIPTION - FREQUENCY
FREQUENCY: CONTINUOUS
FREQUENCY: CONTINUOUS

## 2022-02-10 NOTE — CARE COORDINATION
CM aware of dc order. CM spoke with pt and daughter. Pt still needs test tonight and needs meds at pharmacy prior to dc. Pt also need rolling walker- [facility]c at [facility] was notified. Pt has home care orders as well. CM called [facility]. They do not accept [facility]. CM called [facility]. They do not have nursing in pt's area. CM called [address] [place] [address] [address]. They do not have nursing in pt's area. CM called [facility]. Let voice mail for intake asking if they can accept pt. Cm spoke with nursing. Pt still needs testing and does not feel pt will be able to be dc'd in time to get medication from pharmacy. 4:35 PM  Cm received call from [facility]. They do not have nursing in pts area.

## 2022-02-10 NOTE — PROGRESS NOTES
PS: Patient just returned from CT. Awaiting results. Will not DC home tonight R/T HHC not set-up and not transitioned to oral anticoag . Note states \"Continue heparin drip pending work-up with plans to transition to DOAC ( possibly eliquis). \" Do you want to transition now or wait until AM?     Awaiting response. 56 MD responded \"Nope  Am - Reg attending Will do. \"

## 2022-02-10 NOTE — PROGRESS NOTES
Patient admitted from ER. Alert and oriented x4. Oriented to call system. Skin assessment completed. X1-2 up with assistance. [address]. Daughter at bedside. C/o HA. BP elevated. Bed lowered and locked. Call light and items within reach.

## 2022-02-10 NOTE — DISCHARGE INSTR - COC
Influenza Virus Vaccine 11/19/2012, 11/21/2013, 11/24/2014, 12/11/2015, 10/17/2016    Influenza, High Dose (Fluzone 65 yrs and older) 11/24/2014, 10/17/2016, 01/21/2019, 01/10/2022    Influenza, Jaquelin Ryana, adjuvanted, 65 yrs +, IM, PF (Fluad) 11/10/2020    Pneumococcal Conjugate 13-valent ([id number]) 12/11/2015    Pneumococcal Polysaccharide (Uroaatitb23) 11/24/2014    Tdap (Boostrix, Adacel) 05/27/2011    Zoster Live (Zostavax) 03/01/2014       Active Problems:  Patient Active Problem List   Diagnosis Code    Insomnia G47.00    Fibromyalgia M79.7    Hyperlipidemia E78.5    Mood disorder F39    Vitamin D deficiency E55.9    Urinary incontinence R32    Chronic back pain M54.9, G89.29    GERD (gastroesophageal reflux disease) K21.9    Acquired hypothyroidism E03.9    Hypertriglyceridemia E78.1    Essential hypertension I10    Leg edema R60.0    RLS (restless legs syndrome) G25.81    S/P bariatric surgery - sleeve Z98.84    Pain, foot, chronic M79.673, G89.29    BMI 40.0-44.9, adult ([facility]) Z68.41    Major depressive disorder, recurrent episode, mild ([facility]) F33.0    Dermatitis L30.9    Localized edema R60.0    Localized osteoarthrosis, lower leg M17.10    Blurred vision, right eye H53.8    Grief F43.21    Influenza A with pneumonia J09. X1    Acute respiratory failure with hypoxemia ([facility]) J96.01    Multifocal pneumonia J18.9    Palpitations R00.2    Acute pulmonary embolism without acute cor pulmonale ([facility]) I26.99    Dizziness R42    TIA (transient ischemic attack) G45.9       Isolation/Infection:   Isolation            No Isolation          Patient Infection Status       Infection Onset Added Last Indicated Last Indicated By Review Planned Expiration Resolved Resolved By    None active    Resolved    COVID-19 (Rule Out) 02/09/22 02/09/22 02/09/22 COVID-19, Rapid (Ordered)   02/09/22 Rule-Out Test Resulted    COVID-19 (Rule Out) 12/19/20 12/19/20 12/19/20 COVID-19 (Ordered)   12/20/20 Rule-Out Test Resulted    INFLUENZA 20 Rapid influenza A/B antigens   20             Nurse Assessment:  Last Vital Signs: /66   Pulse 66   Temp 97.5 °F (36.4 °C) (Oral)   Resp 16   Ht 5' 3\" (1.6 m)   Wt 230 lb (104.3 kg)   SpO2 95%   BMI 40.74 kg/m²     Last documented pain score (0-10 scale): Pain Level: 7  Last Weight:   Wt Readings from Last 1 Encounters:   22 230 lb (104.3 kg)     Mental Status:  oriented and alert    IV Access:  - None    Nursing Mobility/ADLs:  Walking   Assisted  Transfer  Independent  Bathing  Independent  Dressing  Independent  [address]  Independent  Med Delivery   whole    Wound Care Documentation and Therapy:  Incision 18 Eye Left (Active)   Number of days: 2140        Elimination:  Continence: Bowel: Yes  Bladder: Yes  Urinary Catheter: None   Colostomy/Ileostomy/Ileal Conduit: No       Date of Last BM:     Intake/Output Summary (Last 24 hours) at 2/10/2022 1541  Last data filed at 2/10/2022 1115  Gross per 24 hour   Intake 1180 ml   Output 600 ml   Net 580 ml     I/O last 3 completed shifts: In: 1060 [I.V.:10; IV Piggyback:1050]  Out: -     Safety Concerns:     History of Falls (last 30 days) and At Risk for Falls    Impairments/Disabilities:      None    Nutrition Therapy:  Current Nutrition Therapy:   - Oral Diet:  General    Routes of Feeding: Oral  Liquids: No Restrictions  Daily Fluid Restriction: no  Last Modified Barium Swallow with Video (Video Swallowing Test): not done    Treatments at the Time of Hospital Discharge:   Respiratory Treatments:   Oxygen Therapy:  is not on home oxygen therapy. Ventilator:    - No ventilator support    Rehab Therapies: Physical Therapy and Occupational Therapy  Weight Bearing Status/Restrictions: No weight bearing restirctions  Other Medical Equipment (for information only, NOT a DME order):     Other Treatments:     Patient's personal belongings (please select all that are sent with patient):  None    RN SIGNATURE:  Electronically signed by Migdalia Rizo RN on 2/11/22 at 9:08 AM EST    CASE MANAGEMENT/SOCIAL WORK SECTION    Inpatient Status Date: ***    Readmission Risk Assessment Score:  Readmission Risk              Risk of Unplanned Readmission:  18           Discharging to Facility/ [address] [facility]         / signature: Electronically signed by Walter Dejesus RN on 2/11/22 at 9:23 AM EST    PHYSICIAN SECTION    Prognosis: Good    Condition at Discharge: Stable    Rehab Potential (if transferring to Rehab): Good    Recommended Labs or Other Treatments After Discharge:  Per D/c instructions     Physician Certification: I certify the above information and transfer of Duarte Bah  is necessary for the continuing treatment of the diagnosis listed and that she requires Home Care for less 30 days.      Update Admission H&P: No change in H&P    PHYSICIAN SIGNATURE:  Electronically signed by Dimple Mendoza MD on 2/10/22 at 3:41 PM EST

## 2022-02-10 NOTE — PROGRESS NOTES
Physical Therapy    Facility/Department: [facility] B3 - MED SURG  Initial Assessment    NAME: Steven Clements  : 1950  MRN: [id number]    Date of Service: 2/10/2022    Discharge Recommendations:  Continue to assess pending progress,24 hour supervision or assist,Home with Home health PT (CTA - anticipate 24-hr sup/assist & home PT once sx's of dizziness improve)   PT Equipment Recommendations  Other: CTA as pt progresses with PT -- may require RW if balance/gait deficits persist    Assessment   Body structures, Functions, Activity limitations: Decreased functional mobility ; Decreased strength;Decreased endurance;Decreased balance  Assessment: Pt referred for PT evaluation during current hospital stay with dx of dizziness & slurred speech (with physicians currently ruling out TIA). Pt currently functioning below her baseline, requiring assist ranging from min-modA x 1 for gait training 2* intermittent episodes of dizziness. Pt's balance appears much more impaired than usual which is her main limiting factor at present. When dizziness is not present, pt performs mobility at CGA x 1 level. Given pt's current deficits, pt does not appear safe to return home at current time. Anticipate pt being able to return home with 24-hr sup/assist from family & home PT services once her dizziness improves. Will continue to follow and update D/C recs accordingly with pt status. Treatment Diagnosis: Impaired balance, decreased (I) with gait  Specific instructions for Next Treatment: Progress ther ex and mobility as tolerated  Prognosis: Good  Decision Making: Medium Complexity  PT Education: Goals; General Safety;Gait Training;PT Role;Plan of Care;Disease Specific Education; Functional Mobility Training;Equipment;Precautions;Transfer Training; Injury Prevention  Patient Education: Disease-specific education: Pt educated on role of PT, benefits of mobility, safety in transfers/amb with RW - pt verbalizes understanding.   REQUIRES PT FOLLOW UP: Yes  Activity Tolerance: Patient Tolerated treatment well; Other  Activity Tolerance: Pt's standing tolerance limited by c/o dizziness. Vitals while in bed (pre-amb): BP = 121/71, HR = 56 bpm, O2 sat = 97%. Post-amb, BP = 128/77. Patient Diagnosis(es): The primary encounter diagnosis was Slurred speech. A diagnosis of Chronic kidney disease, unspecified CKD stage was also pertinent to this visit. has a past medical history of Arthritis, Bipolar disorder ([address] Cellulitis, Chronic back pain, Closed fracture of left fibula, Closed fracture of left tibia, Colon polyps, Depression, Fibroid (bleeding) (uterine), Fibromyalgia, GERD (gastroesophageal reflux disease), Hyperlipidemia, Hypertension, Hypothyroidism, Influenza A, Migraine, Miscarriage, Morbid obesity ([address] Neuropathy, MAXIMO (obstructive sleep apnea), RLS (restless legs syndrome), Urinary incontinence, Urine incontinence, and Vitamin D deficiency. has a past surgical history that includes Cholecystectomy (1985); Leg Surgery (1995); Hysterectomy (1982); Upper gastrointestinal endoscopy (2010); Colonoscopy (2010 ); Upper gastrointestinal endoscopy (4-27-12);  Bariatric Surgery (11/14/12); hiatal hernia repair; eye surgery (Left, 08/31/2018); pr xcapsl ctrc rmvl insj io lens prosth w/o ecp (Left, 8/31/2018); pr xcapsl ctrc rmvl insj io lens prosth w/o ecp (Right, 9/14/2018); and Ovary removal.    Restrictions  Restrictions/Precautions  Restrictions/Precautions: Fall Risk,Up as Tolerated,General Precautions  Position Activity Restriction  Other position/activity restrictions: IV lines, telemetry  Vision/Hearing  Vision: Impaired  Vision Exceptions: Wears glasses for reading  Hearing: Within functional limits     Subjective  General  Chart Reviewed: Yes  Patient assessed for rehabilitation services?: Yes  Family / Caregiver Present: No  Referring Practitioner: Dr. Vinny Cruz  Referral Date : 02/10/22  Diagnosis: Dizziness, slurred speech, r/o TIA  Follows Commands: Within Functional Limits  General Comment  Comments: Pt resting in bed upon entry of therapy staff  Subjective  Subjective: Pt agreeable to work with PT this morning, pleasant and cooperative. States she feels she's still struggling with dizziness. \"It's been going on for the past few weeks. \"  Pain Screening  Patient Currently in Pain: Yes  Pain Assessment  Pain Assessment: 0-10  Pain Level: 7  Pain Type: Acute pain  Pain Location: Head  Pain Frequency: Continuous  Non-Pharmaceutical Pain Intervention(s): Ambulation/Increased Activity; Emotional support;Repositioned  Intervention List: Patient able to continue with treatment    Orientation  Orientation  Overall Orientation Status: Within Normal Limits     Social/Functional History  Social/Functional History  Lives With: Alone  Type of Home: Apartment  Home Layout: One level  Home Access: Level entry  Bathroom Shower/Tub: Tub/Shower unit  Bathroom Equipment: Grab bars in shower,Shower chair  Home Equipment: Cane  ADL Assistance: Independent  Homemaking Assistance: Independent  Ambulation Assistance: Independent  Transfer Assistance: Independent  Active : Yes  Occupation: Part time employment  Type of occupation: [employer]: sleep    Objective  Observation/Palpation  Posture: Fair  Observation: Increased redness/swelling noted in R lower leg (red spot on anterior surface of mid-lower leg TTP -- informed RN after tx)    RLE AROM: WFL  LLE AROM : WFL  Strength RLE: WFL  Strength LLE: WFL     Bed mobility  Supine to Sit: Stand by assistance (increased time needed to complete, pt using R bedrail, HOB elevated ~45 degrees)  Scooting: Stand by assistance     Transfers  Sit to Stand:  Moderate Assistance (pt with posterior LOB upon first standing, resulting in pt returning quickly to EOB (able to perform 2nd rep of sit>stand more steadily with Kevin x 1))  Stand to sit: Minimal Assistance  Bed to Chair: Minimal assistance (using std. walker)     Ambulation  Surface: level tile  Device: Standard Walker  Assistance: Moderate assistance;Minimal assistance (min-modA x 1 depending on pt's level of dizziness)  Quality of Gait: Pt amb with slow juan antnoio with short, shuffling steps and increased B stance time. Pt had two episodes of dizziness while amb (lasting ~5-10 seconds each), during which her assist level increased from min to modA x 1. Gait Deviations: Slow Juan Antonio; Increased RUSSELL; Decreased step length;Decreased step height  Distance: x 15 feet  Comments: Amb distance limited by c/o dizziness. Balance  Posture: Fair  Sitting - Static: Good  Sitting - Dynamic: Fair;+  Standing - Static: Fair;-  Standing - Dynamic: Fair;-  Comments: Balance is Fair(+) when standing/ambulating without dizziness present, decreasing to Fair(-) during episodes of dizziness. Plan   Times per week: 3-5x/week in acute care  Times per day: Daily  Specific instructions for Next Treatment: Progress ther ex and mobility as tolerated  Current Treatment Recommendations: Strengthening,Balance Training,Endurance Training,Functional Mobility Training,Transfer Training,Gait Training,Home Exercise Program,Safety Education & Training,Patient/Caregiver Education & Training,Equipment Evaluation, Education, & procurement  Safety Devices:  All fall risk precautions in place,Left in chair,Call light within reach,Chair alarm in place,Nurse notified,Gait belt,Patient at risk for falls    AM-PAC Score  AM-PAC Inpatient Mobility without Stair Climbing Raw Score : 14 (02/10/22 1301)  AM-PAC Inpatient without Stair Climbing T-Scale Score : 40.85 (02/10/22 1301)  Mobility Inpatient CMS 0-100% Score: 53.33 (02/10/22 1301)  Mobility Inpatient without Stair CMS G-Code Modifier : CK (02/10/22 1301)    Goals  Short term goals  Time Frame for Short term goals: 1 week, 2/17/22 (unless otherwise specified)  Short term goal 1: Pt will transfer supine <-> sit with modified(I)  Short term goal 2: Pt will transfer sit <-> stand and bed>chair with supervision  Short term goal 3: Pt will ambulate x 100 feet using walker or least AD with supervision  Short term goal 4: By 2/12/22: Pt will tolerate 12-15 reps BLE exercise for strengthening, balance, and endurance  Patient Goals   Patient goals : \"To walk more steadily\"       Therapy Time   Individual Concurrent Group Co-treatment   Time In 6296         Time Out 1221         Minutes 36         Timed Code Treatment Minutes: [address], [place] #[id number]    If pt is unable to be seen after this session, please let this note serve as discharge summary. Please see case management note for discharge disposition. Thank you.

## 2022-02-10 NOTE — H&P
Hospital Medicine History & Physical      PCP: Dollene Cogan, DO    Date of Admission: 2/9/2022    Date of Service: Pt seen/examined on 2/9/2022 and Admitted to Inpatient with expected LOS greater than two midnights due to medical therapy. Chief Complaint: Dizziness, confusion      History Of Present Illness:    66 y.o. female who presented to [facility] with above complaints  Patient reports she has been having intermittent dizziness for the past week. Sometimes finds it difficult to ambulate as she is very unstable on her feet. Today when she was at work she had acute onset dizziness at around 10 AM, felt weak, with speech difficulty, was unable to count the change, she works at [employer] in [employer]. She denied any blurring of vision or double vision. No nausea vomiting or diaphoresis. She ultimately got off work at 11 AM and then went home but had persistent symptoms so decided to come to the ER. She denied any unilateral focal weakness or numbness. She reports she is supposed to be on aspirin but has not been taking it. She reports she was told she was having minor strokes many years ago. Denied any chest pain or palpitations. Patient reports a week back she tripped and fell and hurt her right lower extremity. She developed swelling and redness around the right ankle region.       Past Medical History:          Diagnosis Date    Arthritis     Bipolar disorder ([address]     questionable dx    Cellulitis     L ankle, recurrent; over area of prior surgeries    Chronic back pain     Closed fracture of left fibula 1995    s/p fall; surgery x 6    Closed fracture of left tibia 1995    s/p fall on ice; hx of surgery x 6    Colon polyps     Depression     Fibroid (bleeding) (uterine) 1982    Fibromyalgia     GERD (gastroesophageal reflux disease)     Hyperlipidemia     Hypertension     Myoview Lexiscan WNL on 5/17/12    Hypothyroidism     Influenza A 01/31/2020    Migraine     Miscarriage     [facility]     Neuropathy 2011    seen initially by neuro., Dr. Maria Elena Merino, on 8/11/10; EMG 7/16/10 showed abn.'s c/w L5 radiculopathy & periph. neuropathy; pt. reports sx in hands and feet ; dx'd by rheum.  MAXIMO (obstructive sleep apnea) 06/07/2012    mild-does not require appliance    RLS (restless legs syndrome)     Urinary incontinence     Urine incontinence     saw urology, Dr. Femi Jacob, on 7/21/11 for microscopic hematuria & hematuria; was started on Vesicare & scheduled for cystoscopy    Vitamin D deficiency 2/27/12    22 ng/mL       Past Surgical History:          Procedure Laterality Date    BARIATRIC SURGERY  11/14/12    Laparoscopic Sleeve Gastrectomy, Lap Hiatal hernia repair    CHOLECYSTECTOMY  1985    COLONOSCOPY  2010     colon polyps; Dr. Gilbert Or Left 08/31/2018    phaco with IOL    HIATAL HERNIA REPAIR      lap    HYSTERECTOMY  1982    TAHBSO for fibroids and DUB    LEG SURGERY  1995    L; s/p tibia and fibular fx's; has uma in L tibia; surgery x 6    OVARY REMOVAL      LA XCAPSL CTRC RMVL INSJ IO LENS PROSTH W/O ECP Left 8/31/2018    PHACOEMULSIFICATION OF CATARACT LEFT EYE WITH INTRAOCULAR LENS IMPLANT performed by Rosa Maria Guillory MD at [address] W/O ECP Right 9/14/2018    PHACOEMULSIFICATION OF CATARACT RIGHT  EYE WITH INTRAOCULAR LENS IMPLANT performed by Rosa Maria Guillory MD at [place] [address]  2010    Dr. Jose Montemayor  4-27-12    WNL       Medications Prior to Admission:      Prior to Admission medications    Medication Sig Start Date End Date Taking?  Authorizing Provider   verapamil (VERELAN) 180 MG extended release capsule Take 2 capsules by mouth nightly 1/5/22  Yes RACHAEL Le   albuterol sulfate HFA (PROVENTIL HFA) 108 (90 Base) MCG/ACT inhaler Inhale 2 puffs into the lungs every 6 hours as needed for Wheezing or Shortness of Breath 1/3/22  Yes Divina Leon Tracey, APRN - CNP   levothyroxine (SYNTHROID) 175 MCG tablet TAKE 1 TABLET EVERY DAY 1/3/22  Yes Divina Leon Tracey, APRN - CNP   lovastatin (MEVACOR) 20 MG tablet TAKE 1 TABLET EVERY NIGHT 1/3/22  Yes Divina Leon Rossyoper, APRN - CNP   buPROPion (WELLBUTRIN XL) 150 MG extended release tablet TAKE 2 TABLETS EVERY DAY 1/3/22  Yes Divina Leon Rossyoper, APRN - CNP   traZODone (DESYREL) 150 MG tablet TAKE 1 TABLET EVERY NIGHT 1/3/22  Yes Divina Leon Tracey, APRN - CNP   rOPINIRole (REQUIP) 1 MG tablet TAKE 1 TABLET THREE TIMES DAILY 1/3/22  Yes Divina Leon Tracey, APRN - CNP   QUEtiapine (SEROQUEL XR) 150 MG TB24 extended release tablet TAKE 1 TABLET EVERY DAY 1/3/22  Yes Divina Leon Tracey, APRN - CNP   metoprolol succinate (TOPROL XL) 25 MG extended release tablet Take 1 tablet by mouth daily 1/3/22  Yes Divina Leon Tracey, APRN - CNP   omeprazole (PRILOSEC) 40 MG delayed release capsule TAKE 1 CAPSULE EVERY DAY 1/3/22  Yes Divina Leon Tracey, APRN - CNP   gabapentin (NEURONTIN) 600 MG tablet TAKE 1 TABLET TWICE DAILY 1/3/22 2/3/22  Divina Leon Tracey, APRN - CNP   benzonatate (TESSALON PERLES) 100 MG capsule Take one to two caps by mouth as needed for cough three times a day. Patient not taking: Reported on 9/30/2021 9/13/21   Lazarus Crumbly, APRN - CNP   oxybutynin (DITROPAN) 5 MG tablet Take 1 tablet by mouth 2 times daily  Patient not taking: Reported on 9/30/2021 7/21/21   Mauri Bunch DO   diclofenac sodium 1 % GEL APPLY 4 GRAMS 4 TIMES DAILY  Patient not taking: Reported on 9/30/2021 8/23/19   Fausto Tate, DPM   aspirin 81 MG EC tablet Take 81 mg by mouth daily Indications: OTC Takes at times  Patient not taking: Reported on 9/30/2021    Historical Provider, MD       Allergies:  Patient has no known allergies. Social History:      The patient currently lives at home    TOBACCO:   reports that she has never smoked.  She has never used smokeless No clubbing, cyanosis or edema bilaterally. Full range of motion without deformity. Right leg mild swelling, erythema and tenderness around the right ankle  Skin: Skin color, texture, turgor normal.  No rashes or lesions. Neurologic:    Alert and oriented x4. Speech is normal  Neurovascularly intact without any focal sensory/motor deficits. Cranial nerves: II-XII intact, grossly non-focal.  DTRs 2+ bilaterally  Psychiatric:  Alert and oriented, thought content appropriate, normal insight  Capillary Refill: Brisk,3 seconds, normal  Peripheral Pulses: +2 palpable, equal bilaterally       Labs:     Recent Labs     02/09/22  1450   WBC 7.1   HGB 12.9   HCT 39.1        Recent Labs     02/09/22  1450      K 4.2      CO2 23   BUN 23*   CREATININE 1.4*   CALCIUM 9.3     Recent Labs     02/09/22  1450   AST 16   ALT 15   BILITOT 0.3   ALKPHOS 109     Recent Labs     02/09/22  1728   INR 0.91     Recent Labs     02/09/22  1450   TROPONINI <0.01       Urinalysis:      Lab Results   Component Value Date    NITRU Negative 02/09/2022    WBCUA 0-2 02/09/2022    BACTERIA 2+ 09/28/2021    RBCUA None seen 02/09/2022    BLOODU TRACE-INTACT 02/09/2022    SPECGRAV <=1.005 02/09/2022    GLUCOSEU Negative 02/09/2022       Radiology:     I personally reviewed the MRI brain, CT head, CTA head and neck and reviewed the radiologist reports    EKG:  I have reviewed the EKG with the following interpretation: NSR, rate 68    MRI BRAIN WO CONTRAST   Final Result   Minor chronic microvascular disease. Otherwise negative acute stroke,   midline shift or mass effect. CT HEAD WO CONTRAST   Final Result   No acute intracranial abnormality. Bifrontal atrophy         CTA HEAD NECK W CONTRAST   Final Result   Negative for large vessel occlusion or hemodynamic stenosis. Mild atherosclerotic disease identified. Bilateral pulmonary emboli identified.       Critical results were called by Dr. Jerel Reyes to  Fenton Homans on 2/9/2022 at   17:21. CT CHEST PULMONARY EMBOLISM W CONTRAST    (Results Pending)   VL Extremity Venous Bilateral    (Results Pending)       ASSESSMENT:PLAN:      Dizziness/slurred speech  Transient. Could be TIA. MRI brain effectively ruled out a stroke  CT head, CTA head and neck unremarkable for any acute pathology  Patient was supposed to be on aspirin but had not been taking it. Can restart it once she is off of anticoagulation. Change statin to high intensity  Check A1c, lipid panel in the a.m. Orthostatics checked in the ER was negative  Renally dosed Wellbutrin and gabapentin based on her GFR as patient was on higher doses which can cause some of her symptoms    Acute pulmonary embolism without acute cor pulmonale   Incidental finding. No symptoms of shortness of breath, chest pain, syncope. Patient sustained right lower extremity trauma a week back, may have DVT in the right leg secondary to that with eventual pulmonary embolism  -We will get CTA chest in the a.m. to look for extent of clot burden  -Patient not hypoxic  -2D echocardiogram to assess for RV strain  -Start IV heparin gtt per pharmacy dosing. Can switch to p.o. Eliquis from a.m. explained risks of bleeding to patient  -Doppler venous bilateral legs to rule out DVT  -Rapid Covid negative  -Consider hypercoagulable work-up after period  Of therapeutic anticoagulation    Restless leg syndrome-resume Requip    Bipolar disorder-mood stable, resume home medications    HTN-controlled, resume verapamil and metoprolol    CKD stage III-stable, monitor. Patient got iodine IV contrast.  We will start fluids x1 L    Hypothyroidism-clinically euthyroid, resume Synthroid    HLD-changed to high intensity statin    Neuropathy-resume gabapentin, rechanged dosage to 300mg 3 times daily for GFR and advised patient    DVT Prophylaxis: Heparin drip  Diet: ADULT DIET;  Regular  Code Status: Full Code    PT/OT Eval Status: Ambulatory    Dispo -IP stay       Eric Goyal MD    Thank you Gopi Burdick DO for the opportunity to be involved in this patient's care. If you have any questions or concerns please feel free to contact me at [phone].

## 2022-02-10 NOTE — PROGRESS NOTES
Occupational Therapy   Occupational Therapy Initial Assessment/Treatment  Date: 2/10/2022   Patient Name: Miranda Napier  MRN: [id number]     : 1950    Date of Service: 2/10/2022    Discharge Recommendations:  24 hour supervision or assist       Assessment   Performance deficits / Impairments: Decreased functional mobility ; Decreased balance;Decreased ADL status; Decreased endurance  Patient admitted with slurred speech, which has resolved, BUE AROM/MMT are [facility]. Pt dizzy upon standing, modA for balance with posterior LOB upon standing up to SW. After evaluation, pt found to be presenting with the above mentioned occupational performance deficits which are affecting participation in daily living skills. Pt would benefit from continued skilled occupational therapy to address ADLs, functional mobility, and safety while in acute care. Prognosis: Good  Decision Making: Medium Complexity  OT Education: OT Role;Transfer Training;Equipment;Plan of Care;ADL Adaptive Strategies  Patient Education: Disease Specific: Patient educated on risks and warning signs of stroke. Face  Arm  Speech  Time    Patient verbalized understanding of above education. REQUIRES OT FOLLOW UP: Yes  Activity Tolerance  Activity Tolerance: Patient Tolerated treatment well  Activity Tolerance: Vitals: Pt's standing tolerance limited by c/o dizziness. Vitals while in bed (pre-amb): BP = 121/71, HR = 56 bpm, O2 sat = 97%. Post-amb, BP = 128/77. Safety Devices  Safety Devices in place: Yes  Type of devices: Gait belt;Call light within reach; Left in chair;Chair alarm in place;Nurse notified           Patient Diagnosis(es): The primary encounter diagnosis was Slurred speech. A diagnosis of Chronic kidney disease, unspecified CKD stage was also pertinent to this visit.      has a past medical history of Arthritis, Bipolar disorder ([facility] [address] Cellulitis, Chronic back pain, Closed fracture of left fibula, Closed fracture of left tibia, Colon polyps, Depression, Fibroid (bleeding) (uterine), Fibromyalgia, GERD (gastroesophageal reflux disease), Hyperlipidemia, Hypertension, Hypothyroidism, Influenza A, Migraine, Miscarriage, Morbid obesity ([address] Neuropathy, MAXIMO (obstructive sleep apnea), RLS (restless legs syndrome), Urinary incontinence, Urine incontinence, and Vitamin D deficiency. has a past surgical history that includes Cholecystectomy (1985); Leg Surgery (1995); Hysterectomy (1982); Upper gastrointestinal endoscopy (2010); Colonoscopy (2010 ); Upper gastrointestinal endoscopy (4-27-12); Bariatric Surgery (11/14/12); hiatal hernia repair; eye surgery (Left, 08/31/2018); pr xcapsl ctrc rmvl insj io lens prosth w/o ecp (Left, 8/31/2018); pr xcapsl ctrc rmvl insj io lens prosth w/o ecp (Right, 9/14/2018); and Ovary removal.           Restrictions  Restrictions/Precautions  Restrictions/Precautions: Fall Risk,Up as Tolerated,General Precautions  Position Activity Restriction  Other position/activity restrictions: IV lines, telemetry    Subjective   General  Chart Reviewed: Yes,Orders,Progress Notes,History and Physical,Imaging  Patient assessed for rehabilitation services?: Yes  Family / Caregiver Present: No  Referring Practitioner: Dee Costa MD 2/10/22  Diagnosis: slurred speech  Subjective  Subjective: Pt feeling  better now but has a headache. interested in TTB and raised toilet seat  Patient Currently in Pain: Yes  Pain Assessment  Pain Assessment: 0-10  Pain Level: 7  Pain Type: Acute pain  Pain Location: Head  Pain Descriptors: Throbbing  Pain Frequency: Continuous  Non-Pharmaceutical Pain Intervention(s): Ambulation/Increased Activity; Emotional support;Repositioned  Response to Pain Intervention: Patient Satisfied  Pre Treatment Pain Screening  Intervention List: Patient able to continue with treatment  Vital Signs  Pulse: 56  Heart Rate Source: Monitor  BP: 121/71  BP Location: Left lower arm  Patient Currently in Pain: Yes  Oxygen Therapy  SpO2: 97 %  Social/Functional History  Social/Functional History  Lives With: Alone  Type of Home: Apartment  Home Layout: One level  Home Access: Level entry  Bathroom Shower/Tub: Tub/Shower unit  Bathroom Equipment: Grab bars in [facility] chair  Home Equipment: Cane  ADL Assistance: Independent  Homemaking Assistance: Independent  Ambulation Assistance: Independent  Transfer Assistance: Independent  Active : Yes  Occupation: Part time employment  Type of occupation: [employer]: sleep       Objective   Vision: Impaired  Vision Exceptions: Wears glasses for reading  Hearing: Within functional limits    Orientation  Overall Orientation Status: Within Functional Limits  Observation/Palpation  Posture: Fair  Observation: Increased redness/swelling noted in R lower leg (red spot on anterior surface of mid-lower leg TTP -- informed RN after tx)     Balance  Sitting Balance: Supervision  Standing Balance: Moderate assistance (progressing to Kevin when not dizzy)  Functional Mobility  Functional - Mobility Device: Standard Walker  Activity: Other (in room)  Assist Level: Moderate assistance (to Kevin pending dizziness)     ADL  Grooming: Setup  LE Dressing: Minimal assistance  Toileting: Dependent/Total (purewick; educted pt to use with nursing staff BSC located in her room)     Tone RUE  RUE Tone: Normotonic  Tone LUE  LUE Tone: Normotonic  Coordination  Movements Are Fluid And Coordinated: Yes     Bed mobility  Supine to Sit: Stand by assistance  Sit to Supine: Unable to assess (up to chair at end of session)     Transfers  Sit to stand: Moderate assistance (up to SW, initial posterior LOB, \"breezy\" in standing, swaying untill dizziness passes.)  Stand to sit: Minimal assistance  Transfer Comments: dizziness liastying 5-10 secs, swaying motion in standing  and eyes are out of focus.      Cognition  Overall Cognitive Status: WFL         LUE AROM (degrees)  LUE AROM : WFL  RUE AROM (degrees)  RUE AROM : WFL      BUE strength: WFL         Plan   Plan  Times per week: 3-5x's a week while in acute care           AM-PAC Score        AM-PAC Inpatient Daily Activity Raw Score: 19 (02/10/22 1319)  AM-PAC Inpatient ADL T-Scale Score : 40.22 (02/10/22 1319)  ADL Inpatient CMS 0-100% Score: 42.8 (02/10/22 1319)  ADL Inpatient CMS G-Code Modifier : CK (02/10/22 1319)    Goals  Short term goals  Time Frame for Short term goals: 1 week 2/17  Short term goal 1: Pt will complete toilet transfers with supervision by 2/15  Short term goal 2: Pt will complete LE dressing with Supervision  Short term goal 3: Pt will complete standing level ADLs with supervision for balance  Patient Goals   Patient goals : \"to go home\"       Therapy Time   Individual Concurrent Group Co-treatment   Time In 1664         Time Out 1221         Minutes 36         Timed Code Treatment Minutes: 26 Minutes       Kirsten Hearn OTR/L  If pt is unable to be seen after this session, please let this note serve as discharge summary. Please see case management note for discharge disposition. Thank you.

## 2022-02-10 NOTE — PROGRESS NOTES
Hospitalist Progress Note      PCP: Sandra Preston DO    Date of Admission: 2/9/2022    Chief Complaint: Dizziness, confusion    Hospital Course: H&P reviewed. Patient admitted with TIA, acute PE    Subjective:     Patient reports her speech is now normal with minimal dizziness. Denies any focal weakness, shortness of breath or overt substernal chest pain      Medications:  Reviewed    Infusion Medications    heparin (PORCINE) Infusion 1,110 Units/hr (02/10/22 0125)    sodium chloride       Scheduled Medications    buPROPion  150 mg Oral Daily    gabapentin  300 mg Oral TID    [Held by provider] metoprolol succinate  25 mg Oral Daily    pantoprazole  40 mg Oral QAM AC    rOPINIRole  1 mg Oral TID    traZODone  150 mg Oral Nightly    sodium chloride flush  5-40 mL IntraVENous 2 times per day    levothyroxine  175 mcg Oral Daily    verapamil  180 mg Oral BID    QUEtiapine  150 mg Oral Nightly    atorvastatin  40 mg Oral Nightly     PRN Meds: [Held by provider] heparin (porcine), [Held by provider] heparin (porcine), butalbital-acetaminophen-caffeine, sodium chloride flush, sodium chloride, ondansetron **OR** ondansetron, polyethylene glycol, acetaminophen **OR** acetaminophen, fentanNYL      Intake/Output Summary (Last 24 hours) at 2/10/2022 1016  Last data filed at 2/9/2022 2209  Gross per 24 hour   Intake 1060 ml   Output --   Net 1060 ml       Physical Exam Performed:    /70   Pulse 60   Temp 97.7 °F (36.5 °C) (Oral)   Resp 16   Ht 5' 3\" (1.6 m)   Wt 230 lb (104.3 kg)   SpO2 97%   BMI 40.74 kg/m²     General appearance: No apparent distress, appears stated age and cooperative. HEENT: Pupils equal, round, Conjunctivae/corneas clear. Neck: Supple, with full range of motion. No jugular venous distention. Trachea midline. Respiratory:  Normal respiratory effort. Clear to auscultation, bilaterally without Rales/Wheezes/Rhonchi.   Cardiovascular: Regular rate and rhythm with normal S1/S2 without murmurs, rubs or gallops. Abdomen: Soft, non-tender, non-distended with normal bowel sounds. Musculoskeletal: No clubbing, cyanosis. Mild bilat  LE edema, R>L   Skin: Warm and dry. Bilateral erythema on  bilat shins suggestive of chronic venous stasis with no overt discharge or induration  Neurologic:  Neurovascularly intact without any focal sensory/motor deficits. Cranial nerves: II-XII intact, grossly non-focal.  Psychiatric: Alert and oriented, thought content appropriate, normal insight        Labs:   Recent Labs     02/09/22  1450 02/10/22  0624   WBC 7.1 4.6   HGB 12.9 10.7*   HCT 39.1 31.9*    200     Recent Labs     02/09/22  1450      K 4.2      CO2 23   BUN 23*   CREATININE 1.4*   CALCIUM 9.3     Recent Labs     02/09/22  1450   AST 16   ALT 15   BILITOT 0.3   ALKPHOS 109     Recent Labs     02/09/22  1728   INR 0.91     Recent Labs     02/09/22  1450   TROPONINI <0.01       Urinalysis:      Lab Results   Component Value Date    NITRU Negative 02/09/2022    WBCUA 0-2 02/09/2022    BACTERIA 2+ 09/28/2021    RBCUA None seen 02/09/2022    BLOODU TRACE-INTACT 02/09/2022    SPECGRAV <=1.005 02/09/2022    GLUCOSEU Negative 02/09/2022       Radiology:  MRI BRAIN WO CONTRAST   Final Result   Minor chronic microvascular disease. Otherwise negative acute stroke,   midline shift or mass effect. CT HEAD WO CONTRAST   Final Result   No acute intracranial abnormality. Bifrontal atrophy         CTA HEAD NECK W CONTRAST   Final Result   Negative for large vessel occlusion or hemodynamic stenosis. Mild atherosclerotic disease identified. Bilateral pulmonary emboli identified. Critical results were called by Dr. Letitia Keita to Dr Buffy Dale on 2/9/2022 at   17:21.          CT CHEST PULMONARY EMBOLISM W CONTRAST    (Results Pending)   VL Extremity Venous Bilateral    (Results Pending)           Assessment/Plan:    Active Hospital Problems    Diagnosis     RLS (restless legs syndrome) [G25.81]      Priority: Medium    Essential hypertension [I10]      Priority: Medium    Acquired hypothyroidism [E03.9]      Priority: Medium    Mood disorder [F39]      Priority: Medium    Hyperlipidemia [E78.5]      Priority: Medium    Acute pulmonary embolism without acute cor pulmonale (HCC) [I26.99]     Dizziness [R42]        Dizziness/slurred speech: Likely TIA. Syeems resolved MRI was nonacute. Not compliant with aspirin at home. On heparin drip for acute PE. Can consider re-starting aspirin outpatient when she completes treatment with anticoagulation. LDL 78. Continue statin,         Acute pulmonary embolism without acute cor pulmonale:    Incidental finding on CTA head and neck. .  Patient asymptomatic and on room air. Doppler negative for acute DVT. CTA chest to assess clot burden  Pending. Cardiac echo with no RV strain   Reports she had a normal mammogram last year. Patient has never had colonoscopy and was advised to make sure she has that done outpatient to ensure she has age-appropriate cancer screening done. She will follow up with her PCP for that. Continue heparin drip pending work-up with plans to transition to DOAC ( possibly eliquis) .                Restless leg syndrome-continue Requip     Bipolar disorder-mood stable. Continue home medications     HTN-controlled, continue verapamil and metoprolol with hold parameters     CKD stage III-stable  on admission. Check BMP today     Hypothyroidism-clinically euthyroid, continue Synthroid     HLD-  changed to high intensity statin     Neuropathy-continue gabapentin which was dose adjusted for renal function        DVT Prophylaxis: Heparin drip  Diet: ADULT DIET; Regular  Code Status: Full Code    PT/OT Eval Status: Ordered    Dispo - awaiting CT chest  and  BMP results.   If stable, can likely be d/orion today with [facility] and DOAC     Ajit Roman MD

## 2022-02-10 NOTE — CARE COORDINATION
CASE MANAGEMENT INITIAL ASSESSMENT      Reviewed chart and completed assessment with patient: at bedside  Explained Case Management role/services. Primary contact information:     Health Care Decision Maker :   Primary Decision Maker: Aleksandar Ruff - [phone]          Can this person be reached and be able to respond quickly, such as within a few minutes or hours? Yes       Admit date/status: 2/9/22 Inpatient   Diagnosis: HTn   Is this a Readmission?:  No      Insurance: [facility]   Precert required for SNF: Yes       3 night stay required: No    Living arrangements, Adls, care needs, prior to admission: home alone, IPTA    Transportation: family     Durable Medical Equipment at home:   none    Services in the home and/or outpatient, prior to admission: none      PT/OT recs: pending    Hospital Exemption Notification (HEN): if SNF needed    Barriers to discharge: medical stability    Plan/comments: Cm met with pt and daughter at bedside. From home alone, plans to return. Independent prior to admission. Anticipate no needs for dc at this time.       ECOC on chart for MD signature

## 2022-02-10 NOTE — PROGRESS NOTES
4 Eyes Skin Assessment     NAME:  Nat Mercedes  YOB: 1950  MEDICAL RECORD NUMBER:  [id number]    The patient is being assess for  Admission    I agree that 2 RN's have performed a thorough Head to Toe Skin Assessment on the patient. ALL assessment sites listed below have been assessed. Areas assessed by both nurses:    Head to toe assessed  Red bruised right shin  Red but blanchable buttocks        Does the Patient have a Wound?  No noted wound(s)       Alden Prevention initiated:  Yes   Wound Care Orders initiated:  NA    Pressure Injury (Stage 3,4, Unstageable, DTI, NWPT, and Complex wounds) if present place consult order under [de-identified] NA    New and Established Ostomies if present place consult order under : NA      Nurse 1 eSignature: Electronically signed by eDlla Aaron RN on 2/9/22 at 9:23 PM EST    **SHARE this note so that the co-signing nurse is able to place an eSignature**    Nurse 2 eSignature: Electronically signed by Joey Morel on 2/10/22 at 1:42 AM EST

## 2022-02-11 ENCOUNTER — TELEPHONE (OUTPATIENT)
Dept: FAMILY MEDICINE CLINIC | Age: 72
End: 2022-02-11

## 2022-02-11 VITALS
HEIGHT: 63 IN | OXYGEN SATURATION: 96 % | RESPIRATION RATE: 16 BRPM | TEMPERATURE: 97.8 F | DIASTOLIC BLOOD PRESSURE: 75 MMHG | SYSTOLIC BLOOD PRESSURE: 124 MMHG | WEIGHT: 230 LBS | HEART RATE: 70 BPM | BODY MASS INDEX: 40.75 KG/M2

## 2022-02-11 LAB — ANTI-XA UNFRAC HEPARIN: 0.22 IU/ML (ref 0.3–0.7)

## 2022-02-11 PROCEDURE — 6370000000 HC RX 637 (ALT 250 FOR IP): Performed by: INTERNAL MEDICINE

## 2022-02-11 PROCEDURE — 85520 HEPARIN ASSAY: CPT

## 2022-02-11 PROCEDURE — 6360000002 HC RX W HCPCS: Performed by: INTERNAL MEDICINE

## 2022-02-11 PROCEDURE — 2580000003 HC RX 258: Performed by: INTERNAL MEDICINE

## 2022-02-11 RX ORDER — HEPARIN SODIUM 1000 [USP'U]/ML
3000 INJECTION, SOLUTION INTRAVENOUS; SUBCUTANEOUS ONCE
Status: COMPLETED | OUTPATIENT
Start: 2022-02-11 | End: 2022-02-11

## 2022-02-11 RX ADMIN — ROPINIROLE HYDROCHLORIDE 1 MG: 0.5 TABLET, FILM COATED ORAL at 07:39

## 2022-02-11 RX ADMIN — BUPROPION HYDROCHLORIDE 150 MG: 150 TABLET, EXTENDED RELEASE ORAL at 07:38

## 2022-02-11 RX ADMIN — GABAPENTIN 300 MG: 300 CAPSULE ORAL at 07:38

## 2022-02-11 RX ADMIN — HEPARIN SODIUM 3000 UNITS: 1000 INJECTION INTRAVENOUS; SUBCUTANEOUS at 06:52

## 2022-02-11 RX ADMIN — LEVOTHYROXINE SODIUM 175 MCG: 0.15 TABLET ORAL at 06:50

## 2022-02-11 RX ADMIN — APIXABAN 10 MG: 5 TABLET, FILM COATED ORAL at 08:56

## 2022-02-11 RX ADMIN — PANTOPRAZOLE SODIUM 40 MG: 40 TABLET, DELAYED RELEASE ORAL at 06:50

## 2022-02-11 RX ADMIN — Medication 10 ML: at 07:38

## 2022-02-11 RX ADMIN — VERAPAMIL HYDROCHLORIDE 180 MG: 180 TABLET, FILM COATED, EXTENDED RELEASE ORAL at 07:39

## 2022-02-11 ASSESSMENT — PAIN SCALES - GENERAL
PAINLEVEL_OUTOF10: 0
PAINLEVEL_OUTOF10: 0

## 2022-02-11 NOTE — PROGRESS NOTES
DC instructions given to pt, daughter at bedside and someone on speaker phone. Aware 3 RX located in OP pharmacy to be picked up on way out. IV removed x2 no complications. Tele box removed returned. Lockbox emptied. Pt belongings gathered. Education given for Eliquis. Taken to car via wheelchair.

## 2022-02-11 NOTE — DISCHARGE SUMMARY
Hospital Medicine Discharge Summary    Patient ID: Rebecca Lawrence      Patient's PCP: Kylie Ramos DO    Admit Date: 2/9/2022     Discharge Date: 2/11/2022      Admitting Provider: Alley Macias MD     Discharge Provider: Everett Hagan MD     Discharge Diagnoses: Active Hospital Problems    Diagnosis     Acute pulmonary embolism without acute cor pulmonale (HCC) [I26.99]      Priority: High    RLS (restless legs syndrome) [G25.81]      Priority: Medium    Essential hypertension [I10]      Priority: Medium    Acquired hypothyroidism [E03.9]      Priority: Medium    Mood disorder [F39]      Priority: Medium    Hyperlipidemia [E78.5]      Priority: Medium    TIA (transient ischemic attack) [G45.9]     Dizziness [R42]        The patient was seen and examined on day of discharge and this discharge summary is in conjunction with any daily progress note from day of discharge. HPI :   66 y.o. female who presented to [facility] with above complaints  Patient reports she has been having intermittent dizziness for the past week. Sometimes finds it difficult to ambulate as she is very unstable on her feet. Today when she was at work she had acute onset dizziness at around 10 AM, felt weak, with speech difficulty, was unable to count the change, she works at [employer] in [employer]. She denied any blurring of vision or double vision. No nausea vomiting or diaphoresis. She ultimately got off work at 11 AM and then went home but had persistent symptoms so decided to come to the ER. She denied any unilateral focal weakness or numbness. She reports she is supposed to be on aspirin but has not been taking it. She reports she was told she was having minor strokes many years ago. Denied any chest pain or palpitations. Patient reports a week back she tripped and fell and hurt her right lower extremity. She developed swelling and redness around the right ankle region.       Hospital sounds. Musculoskeletal: No clubbing, cyanosis. Mild bilat  LE edema, R>L   Skin: Warm and dry. Bilateral erythema improved on  bilat shins suggestive of chronic venous stasis with no overt discharge or induration  Neurologic:  Neurovascularly intact without any focal sensory/motor deficits. Cranial nerves: II-XII intact, grossly non-focal.  Psychiatric: Alert and oriented, thought content appropriate, normal insight      Labs: For convenience and continuity at follow-up the following most recent labs are provided:      CBC:    Lab Results   Component Value Date    WBC 4.6 02/10/2022    HGB 10.7 02/10/2022    HCT 31.9 02/10/2022     02/10/2022       Renal:    Lab Results   Component Value Date     02/10/2022    K 4.1 02/10/2022    K 4.2 02/09/2022     02/10/2022    CO2 25 02/10/2022    BUN 16 02/10/2022    CREATININE 1.2 02/10/2022    CALCIUM 8.5 02/10/2022    PHOS 3.0 09/28/2021         Significant Diagnostic Studies    Radiology:   CT CHEST PULMONARY EMBOLISM W CONTRAST   Final Result   1. Known acute pulmonary emboli. No right heart strain. 2. Other findings as described. VL Extremity Venous Bilateral   Final Result       Normal venous duplex study of the bilateral lower extremities. There is no    evidence of deep or superficial venous thrombosis. MRI BRAIN WO CONTRAST   Final Result   Minor chronic microvascular disease. Otherwise negative acute stroke,   midline shift or mass effect. CT HEAD WO CONTRAST   Final Result   No acute intracranial abnormality. Bifrontal atrophy         CTA HEAD NECK W CONTRAST   Final Result   Negative for large vessel occlusion or hemodynamic stenosis. Mild atherosclerotic disease identified. Bilateral pulmonary emboli identified. Critical results were called by Dr. Benigno Simmonds to Dr Mahnaz Caruso on 2/9/2022 at   17:21.            cardiac echo :   Summary   LV systolic function is normal with EF estimated at 55%.    No regional wall motion abnormalities. There is mild concentric left ventricular hypertrophy. Normal diastolic function. The right ventricle is mildly enlarged. Aortic valve appears mildly sclerotic but opens adequately. Mild tricuspid and pulmonic regurgitation. Systolic pulmonary artery pressure (SPAP) is normal estimated at 33 mmHg   (Right atrial pressure of 8 mmHg). Consults:     IP CONSULT TO STROKE TEAM  PHARMACY TO DOSE MEDICATION  IP CONSULT TO PHARMACY  IP CONSULT TO HOME CARE NEEDS    Disposition:  Home with Kristopher De La Torre     Condition at Discharge: Stable    Discharge Instructions/Follow-up:  F/U with PCP in one week     Code Status:  Prior     Activity: activity as tolerated    Diet: cardiac diet      Discharge Medications:     Discharge Medication List as of 2/11/2022  9:42 AM           Details   gabapentin (NEURONTIN) 300 MG capsule Take 1 capsule by mouth 3 times daily for 30 days. , Disp-90 capsule, R-1Normal      atorvastatin (LIPITOR) 40 MG tablet Take 1 tablet by mouth nightly, Disp-30 tablet, R-3Normal      apixaban (ELIQUIS) 5 MG TABS tablet Take 10mg twice a day for 7 days then start 5mg twice a day thereafter, Disp-60 tablet, R-3Normal              Details   verapamil (VERELAN) 180 MG extended release capsule Take 2 capsules by mouth nightly, Disp-180 capsule, R-1Normal      albuterol sulfate HFA (PROVENTIL HFA) 108 (90 Base) MCG/ACT inhaler Inhale 2 puffs into the lungs every 6 hours as needed for Wheezing or Shortness of Breath, Disp-1 each, R-1Normal      levothyroxine (SYNTHROID) 175 MCG tablet TAKE 1 TABLET EVERY DAY, Disp-90 tablet, R-1Normal      buPROPion (WELLBUTRIN XL) 150 MG extended release tablet TAKE 2 TABLETS EVERY DAY, Disp-180 tablet, R-1Normal      traZODone (DESYREL) 150 MG tablet TAKE 1 TABLET EVERY NIGHT, Disp-90 tablet, R-1Normal      rOPINIRole (REQUIP) 1 MG tablet TAKE 1 TABLET THREE TIMES DAILY, Disp-270 tablet, R-1Normal      QUEtiapine (SEROQUEL XR) 150 MG TB24 extended release tablet TAKE 1 TABLET EVERY DAY, Disp-90 tablet, R-1Normal      metoprolol succinate (TOPROL XL) 25 MG extended release tablet Take 1 tablet by mouth daily, Disp-90 tablet, R-3Normal      omeprazole (PRILOSEC) 40 MG delayed release capsule TAKE 1 CAPSULE EVERY DAY, Disp-90 capsule, R-0Normal      benzonatate (TESSALON PERLES) 100 MG capsule Take one to two caps by mouth as needed for cough three times a day., Disp-30 capsule, R-0Normal      oxybutynin (DITROPAN) 5 MG tablet Take 1 tablet by mouth 2 times daily, Disp-30 tablet, R-0Normal             Time Spent on discharge is more than 30 minutes in the examination, evaluation, counseling and review of medications and discharge plan. Signed:    Monica Chanel MD   2/11/2022      Thank you Quinten Murray DO for the opportunity to be involved in this patient's care. If you have any questions or concerns please feel free to contact me at [phone].

## 2022-02-11 NOTE — CARE COORDINATION
CASE MANAGEMENT DISCHARGE SUMMARY      Discharge to: Home w Care connection PT/OT- no Nsg avail in area    Precertification completed: [facility] Exemption Notification (HENS) completed: na    IMM given: (date) admit    New Durable Medical Equipment ordered/agency: [facility]    Transportation:    Family/car:yes       Confirmed discharge plan with:     Patient: yes     Family:  Yes at bedside     Facility/Agency, name:  [facility] faxed   Phone number for report to facility: SHELBIE     RN, name: Arbrayden Colladona    Note: Discharging nurse to complete [facility], reconcile [facility], and place final copy with patient's discharge packet. RN to ensure that written prescriptions for  Level II medications are sent with patient to the facility as per protocol.       Maurice Angelo RN

## 2022-02-12 NOTE — PROGRESS NOTES
Physician Progress Note      PATIENT:               Av Alexandre  CSN #:                  [id number]  :                       1950  ADMIT DATE:       2022 2:40 PM  [address] DATE:        2022 10:24 AM  RESPONDING  PROVIDER #:        Quinn Garcia MD          QUERY TEXT:    Pt admitted with TIA. Pt noted to have a PE and possible DVT. If possible,   please document in progress notes and discharge summary if you are evaluating   and /or treating any of the following: The medical record reflects the following:  Risk Factors: PE, possible DVT, HTN, CKD  Clinical Indicators: Slurred speech, dizziness. Per progress note 2/10   \"Dizziness/slurred speech: Likely TIA. Can consider re-starting aspirin   outpatient when she completes treatment with anticoagulation\". Non compliant   with asa. Treatment: IV heparin, MRI, Statin, serial labs, supportive care. Thank you,  [email or web address]  Options provided:  -- TIA symptoms due to Cerebral Artery Occlusion/Stenosis (without Infarction)  -- TIA symptoms due to other, please specify. -- Other - I will add my own diagnosis  -- Disagree - Not applicable / Not valid  -- Disagree - Clinically unable to determine / Unknown  -- Refer to Clinical Documentation Reviewer    PROVIDER RESPONSE TEXT:    Provider disagreed with this query. No cerebral artery occlusion/stenosis noted on CTA. Work up with no clear   cause of TIA other than her underlying risk factors.     Query created by: Glenis Del Toro on 2022 9:56 AM      Electronically signed by:  Quinn Garcia MD 2022 9:20 AM

## 2022-02-14 ENCOUNTER — TELEPHONE (OUTPATIENT)
Dept: FAMILY MEDICINE CLINIC | Age: 72
End: 2022-02-14

## 2022-02-14 ENCOUNTER — OFFICE VISIT (OUTPATIENT)
Dept: FAMILY MEDICINE CLINIC | Age: 72
End: 2022-02-14
Payer: MEDICARE

## 2022-02-14 VITALS
WEIGHT: 230 LBS | BODY MASS INDEX: 40.74 KG/M2 | HEART RATE: 64 BPM | SYSTOLIC BLOOD PRESSURE: 122 MMHG | DIASTOLIC BLOOD PRESSURE: 82 MMHG | OXYGEN SATURATION: 98 %

## 2022-02-14 DIAGNOSIS — E78.2 MIXED HYPERLIPIDEMIA: ICD-10-CM

## 2022-02-14 DIAGNOSIS — Z12.11 COLON CANCER SCREENING: ICD-10-CM

## 2022-02-14 DIAGNOSIS — D64.9 ANEMIA, UNSPECIFIED TYPE: ICD-10-CM

## 2022-02-14 DIAGNOSIS — Z86.711 HISTORY OF PULMONARY EMBOLUS (PE): ICD-10-CM

## 2022-02-14 DIAGNOSIS — Z86.711 HISTORY OF PULMONARY EMBOLUS (PE): Primary | ICD-10-CM

## 2022-02-14 DIAGNOSIS — G25.81 RLS (RESTLESS LEGS SYNDROME): ICD-10-CM

## 2022-02-14 DIAGNOSIS — K21.9 GASTROESOPHAGEAL REFLUX DISEASE WITHOUT ESOPHAGITIS: ICD-10-CM

## 2022-02-14 DIAGNOSIS — F51.01 PRIMARY INSOMNIA: ICD-10-CM

## 2022-02-14 LAB
A/G RATIO: 1.5 (ref 1.1–2.2)
ALBUMIN SERPL-MCNC: 4 G/DL (ref 3.4–5)
ALP BLD-CCNC: 83 U/L (ref 40–129)
ALT SERPL-CCNC: 19 U/L (ref 10–40)
ANION GAP SERPL CALCULATED.3IONS-SCNC: 15 MMOL/L (ref 3–16)
AST SERPL-CCNC: 21 U/L (ref 15–37)
BILIRUB SERPL-MCNC: 0.3 MG/DL (ref 0–1)
BUN BLDV-MCNC: 18 MG/DL (ref 7–20)
CALCIUM SERPL-MCNC: 9.3 MG/DL (ref 8.3–10.6)
CHLORIDE BLD-SCNC: 102 MMOL/L (ref 99–110)
CO2: 24 MMOL/L (ref 21–32)
CREAT SERPL-MCNC: 1.5 MG/DL (ref 0.6–1.2)
GFR AFRICAN AMERICAN: 41
GFR NON-AFRICAN AMERICAN: 34
GLUCOSE BLD-MCNC: 91 MG/DL (ref 70–99)
HCT VFR BLD CALC: 40 % (ref 36–48)
HEMOGLOBIN: 13.2 G/DL (ref 12–16)
MCH RBC QN AUTO: 29.2 PG (ref 26–34)
MCHC RBC AUTO-ENTMCNC: 33.1 G/DL (ref 31–36)
MCV RBC AUTO: 88.4 FL (ref 80–100)
PDW BLD-RTO: 15.7 % (ref 12.4–15.4)
PLATELET # BLD: 222 K/UL (ref 135–450)
PMV BLD AUTO: 9.4 FL (ref 5–10.5)
POTASSIUM SERPL-SCNC: 4.4 MMOL/L (ref 3.5–5.1)
RBC # BLD: 4.52 M/UL (ref 4–5.2)
SODIUM BLD-SCNC: 141 MMOL/L (ref 136–145)
TOTAL PROTEIN: 6.7 G/DL (ref 6.4–8.2)
WBC # BLD: 6.3 K/UL (ref 4–11)

## 2022-02-14 PROCEDURE — 1111F DSCHRG MED/CURRENT MED MERGE: CPT | Performed by: NURSE PRACTITIONER

## 2022-02-14 PROCEDURE — 99495 TRANSJ CARE MGMT MOD F2F 14D: CPT | Performed by: NURSE PRACTITIONER

## 2022-02-14 NOTE — TELEPHONE ENCOUNTER
[facility] Transitions Initial Follow Up Call    Outreach made within 2 business days of discharge: Yes    Patient: Steven Napoles Patient : 1950   MRN: [id number]  Reason for Admission: There are no discharge diagnoses documented for the most recent discharge. Discharge Date: 22       Spoke with: patient    Discharge department/facility: [facility]     TCM Interactive Patient Contact:  Was patient able to fill all prescriptions: Yes  Was patient instructed to bring all medications to the follow-up visit: Yes  Is patient taking all medications as directed in the discharge summary?  Yes  Does patient understand their discharge instructions: Yes  Does patient have questions or concerns that need addressed prior to 7-14 day follow up office visit: no    Scheduled appointment with PCP within 7-14 days    Follow Up  Future Appointments   Date Time Provider Berry Claudio   2022  2:30 PM SANDOR Churchill - RAY Rascon

## 2022-02-14 NOTE — PROGRESS NOTES
Post-Discharge Transitional Care Management Services or Hospital Follow Up      Edita Anthony   YOB: 1950    Date of Office Visit:  2/14/2022  Date of Hospital Admission: 2/9/22  Date of Hospital Discharge: 2/11/22  Readmission Risk Score(high >=14%. Medium >=10%):Readmission Risk Score: 13.3 ( )      Care management risk score Rising risk (score 2-5) and Complex Care (Scores >=6): 1     Non face to face  following discharge, date last encounter closed (first attempt may have been earlier): 2/14/2022 10:10 AM 2/14/2022 10:10 AM    Call initiated 2 business days of discharge: Yes     Patient Active Problem List   Diagnosis    Insomnia    Fibromyalgia    Hyperlipidemia    Mood disorder    Vitamin D deficiency    Urinary incontinence    Chronic back pain    GERD (gastroesophageal reflux disease)    Acquired hypothyroidism    Hypertriglyceridemia    Essential hypertension    Leg edema    RLS (restless legs syndrome)    S/P bariatric surgery - sleeve    Pain, foot, chronic    BMI 40.0-44.9, adult ([facility] [address]    Major depressive disorder, recurrent episode, mild (HCC)    Dermatitis    Localized edema    Localized osteoarthrosis, lower leg    Blurred vision, right eye    Grief    Influenza A with pneumonia    Acute respiratory failure with hypoxemia (HCC)    Multifocal pneumonia    Palpitations    Acute pulmonary embolism without acute cor pulmonale (HCC)    Dizziness    TIA (transient ischemic attack)       No Known Allergies    Medications listed as ordered at the time of discharge from hospital     Medication List          Accurate as of February 14, 2022 11:59 PM. If you have any questions, ask your nurse or doctor.             CONTINUE taking these medications    albuterol sulfate  (90 Base) MCG/ACT inhaler  Commonly known as: Proventil HFA  Inhale 2 puffs into the lungs every 6 hours as needed for Wheezing or Shortness of Breath     apixaban 5 MG Tabs tablet  Commonly known as: Eliquis  Take 10mg twice a day for 7 days then start 5mg twice a day thereafter     atorvastatin 40 MG tablet  Commonly known as: LIPITOR  Take 1 tablet by mouth nightly     benzonatate 100 MG capsule  Commonly known as: Tessalon Perles  Take one to two caps by mouth as needed for cough three times a day. buPROPion 150 MG extended release tablet  Commonly known as: WELLBUTRIN XL  TAKE 2 TABLETS EVERY DAY     gabapentin 300 MG capsule  Commonly known as: NEURONTIN  Take 1 capsule by mouth 3 times daily for 30 days. levothyroxine 175 MCG tablet  Commonly known as: SYNTHROID  TAKE 1 TABLET EVERY DAY     metoprolol succinate 25 MG extended release tablet  Commonly known as: Toprol XL  Take 1 tablet by mouth daily     omeprazole 40 MG delayed release capsule  Commonly known as: PRILOSEC  TAKE 1 CAPSULE EVERY DAY     oxybutynin 5 MG tablet  Commonly known as: DITROPAN  Take 1 tablet by mouth 2 times daily     QUEtiapine 150 MG Tb24 extended release tablet  Commonly known as: SEROQUEL XR  TAKE 1 TABLET EVERY DAY     rOPINIRole 1 MG tablet  Commonly known as: REQUIP  TAKE 1 TABLET THREE TIMES DAILY     traZODone 150 MG tablet  Commonly known as: DESYREL  TAKE 1 TABLET EVERY NIGHT     verapamil 180 MG extended release capsule  Commonly known as: Verelan  Take 2 capsules by mouth nightly              Medications marked \"taking\" at this time  Outpatient Medications Marked as Taking for the 2/14/22 encounter (Office Visit) with SANDOR Alcocer CNP   Medication Sig Dispense Refill    gabapentin (NEURONTIN) 300 MG capsule Take 1 capsule by mouth 3 times daily for 30 days.  90 capsule 1    atorvastatin (LIPITOR) 40 MG tablet Take 1 tablet by mouth nightly 30 tablet 3    apixaban (ELIQUIS) 5 MG TABS tablet Take 10mg twice a day for 7 days then start 5mg twice a day thereafter 60 tablet 3    verapamil (VERELAN) 180 MG extended release capsule Take 2 capsules by mouth nightly 180 capsule 1    albuterol sulfate HFA (PROVENTIL HFA) 108 (90 Base) MCG/ACT inhaler Inhale 2 puffs into the lungs every 6 hours as needed for Wheezing or Shortness of Breath 1 each 1    levothyroxine (SYNTHROID) 175 MCG tablet TAKE 1 TABLET EVERY DAY 90 tablet 1    buPROPion (WELLBUTRIN XL) 150 MG extended release tablet TAKE 2 TABLETS EVERY  tablet 1    traZODone (DESYREL) 150 MG tablet TAKE 1 TABLET EVERY NIGHT 90 tablet 1    rOPINIRole (REQUIP) 1 MG tablet TAKE 1 TABLET THREE TIMES DAILY 270 tablet 1    QUEtiapine (SEROQUEL XR) 150 MG TB24 extended release tablet TAKE 1 TABLET EVERY DAY 90 tablet 1    metoprolol succinate (TOPROL XL) 25 MG extended release tablet Take 1 tablet by mouth daily 90 tablet 3    omeprazole (PRILOSEC) 40 MG delayed release capsule TAKE 1 CAPSULE EVERY DAY 90 capsule 0    benzonatate (TESSALON PERLES) 100 MG capsule Take one to two caps by mouth as needed for cough three times a day. 30 capsule 0    oxybutynin (DITROPAN) 5 MG tablet Take 1 tablet by mouth 2 times daily 30 tablet 0        Medications patient taking as of now reconciled against medications ordered at time of hospital discharge: YES    Chief Complaint   Patient presents with   [address] from Hospital     Stroke and clots in lungs, needs colonoscopy     HPI  Inpatient course: Discharge summary reviewed- see chart. Interval history/Current status: Started on gabapentin, increased Lipitor, and Eliquis 5 mg. Has Home HHC. Daughter present, helping at home. Dizziness has improved. Has been using a walker to get around. Slurred speech improving. Denies sob, cp. No history of blood clots. Appetite is ok. Strength improving. Echo 3/6/09   LV systolic function is normal with EF estimated at 55%. No regional wall motion abnormalities. There is mild concentric left ventricular hypertrophy. Normal diastolic function. The right ventricle is mildly enlarged. Aortic valve appears mildly sclerotic but opens adequately. Mild tricuspid and pulmonic regurgitation. Systolic pulmonary artery pressure (SPAP) is normal estimated at 33 mmHg   (Right atrial pressure of 8 mmHg). CT chest pulmonary-2/10/22  Known acute pulmonary emboli.  No right heart strain. VL Venous Duplex 2/10/22  Impressions   Right Impression   No evidence of deep vein or superficial vein thrombosis involving the right   lower extremity. Left Impression   No evidence of deep vein or superficial vein thrombosis involving the left   lower extremity. MRI 2/9/22  Minor chronic microvascular disease.  Otherwise negative acute stroke,   midline shift or mass effect     CT Head 2/9  No acute intracranial abnormality. Bifrontal atrophy         CTA Head/Neck 2/9/22  Negative for large vessel occlusion or hemodynamic stenosis.       Mild atherosclerotic disease identified.       Bilateral pulmonary emboli identified. Review of Systems   Constitutional: Negative for activity change, appetite change, fatigue, fever and unexpected weight change. Respiratory: Negative for cough, chest tightness, shortness of breath and wheezing. Cardiovascular: Negative for chest pain, palpitations and leg swelling. Gastrointestinal: Negative for constipation, diarrhea, nausea and vomiting. Genitourinary: Negative. Negative for difficulty urinating and dysuria. Musculoskeletal: Negative. Negative for gait problem. Neurological: Negative. Negative for dizziness, syncope, weakness, light-headedness, numbness and headaches. Psychiatric/Behavioral: Negative. Vitals:    02/14/22 1429   BP: 122/82   Site: Right Upper Arm   Position: Sitting   Cuff Size: Large Adult   Pulse: 64   SpO2: 98%   Weight: 230 lb (104.3 kg)     Body mass index is 40.74 kg/m².    Wt Readings from Last 3 Encounters:   02/09/22 230 lb (104.3 kg)   01/03/22 234 lb (106.1 kg)   09/21/21 235 lb (106.6 kg)     BP Readings from Last 3 Encounters:   02/11/22 124/75   01/03/22 118/76 09/21/21 124/84       Physical Exam  Vitals reviewed. Constitutional:       General: She is not in acute distress. Appearance: Normal appearance. She is normal weight. She is not ill-appearing, toxic-appearing or diaphoretic. HENT:      Head: Normocephalic and atraumatic. Nose: Nose normal.   Eyes:      Conjunctiva/sclera: Conjunctivae normal.   Cardiovascular:      Rate and Rhythm: Normal rate and regular rhythm. Pulses: Normal pulses. Heart sounds: Normal heart sounds. Pulmonary:      Effort: Pulmonary effort is normal. No respiratory distress. Breath sounds: Normal breath sounds. No wheezing or rhonchi. Chest:      Chest wall: No tenderness. Abdominal:      General: Abdomen is flat. Bowel sounds are normal.      Palpations: Abdomen is soft. Tenderness: There is no abdominal tenderness. There is no guarding. Musculoskeletal:         General: Normal range of motion. Cervical back: Normal range of motion and neck supple. Skin:     General: Skin is warm and dry. Capillary Refill: Capillary refill takes less than 2 seconds. Neurological:      General: No focal deficit present. Mental Status: She is alert and oriented to person, place, and time. Mental status is at baseline. Motor: Weakness present. Psychiatric:         Mood and Affect: Mood normal.         Behavior: Behavior normal.         Thought Content: Thought content normal.         Judgment: Judgment normal.             Assessment/Plan:  1. History of pulmonary embolus (PE)  - [place] DISCHARGE MEDS RECONCILED W/ CURRENT OUTPATIENT MED LIST  - CBC; Future  - COMPREHENSIVE METABOLIC PANEL; Future  -Likely would need 3-6 months of anticoagulation. Consider hypercoagulable work-up after period of therapeutic anticoagulation.  -Need to get up to date on cancer screening, colon cancer screening due.     2. Mixed hyperlipidemia  - [place] DISCHARGE MEDS RECONCILED W/ CURRENT OUTPATIENT MED LIST  - CBC; Future  - COMPREHENSIVE METABOLIC PANEL; Future  -Was increased to high intensity statin. 3. Primary insomnia  - PA DISCHARGE MEDS RECONCILED W/ CURRENT OUTPATIENT MED LIST  -Stable, continue current regimen. 4. RLS (restless legs syndrome)  - PA DISCHARGE MEDS RECONCILED W/ CURRENT OUTPATIENT MED LIST  -On Requip, stable    5. Gastroesophageal reflux disease without esophagitis  - PA DISCHARGE MEDS RECONCILED W/ CURRENT OUTPATIENT MED LIST  -On Prilosec    6. Colon cancer screening  - [facility] - Kirk Cisneros MD, Gastroenterology, [facility]  -Need to get cancer screenings up to date. 7. Anemia, unspecified type  - CBC; Future  -Recheck today.  -No signs or symptoms of bleeding.     Medical Decision Making: moderate complexity

## 2022-02-15 ENCOUNTER — TELEPHONE (OUTPATIENT)
Dept: FAMILY MEDICINE CLINIC | Age: 72
End: 2022-02-15

## 2022-02-15 ENCOUNTER — TELEPHONE (OUTPATIENT)
Dept: ORTHOPEDIC SURGERY | Age: 72
End: 2022-02-15

## 2022-02-15 NOTE — TELEPHONE ENCOUNTER
Spoke with patient in regards to the [facility] joint pain program. Patient has been having bilateral knee pain and received visco injections in the past. She believes that she would benefit from an orthopaedic consultation to discuss repeating visco injections. Patient was scheduled for an orthopedic visit on 2/23/2022 as a part of our [facility] program.  The appointment is with Dr. Ludwig Martinez to further evaluate their knee pain and review treatment options.

## 2022-02-15 NOTE — TELEPHONE ENCOUNTER
[facility] [address]) needing verbal orders for PT 2x's week x 30 days.       Phone:  [phone]

## 2022-02-16 DIAGNOSIS — N17.9 AKI (ACUTE KIDNEY INJURY) (HCC): Primary | ICD-10-CM

## 2022-03-03 ENCOUNTER — TELEPHONE (OUTPATIENT)
Dept: FAMILY MEDICINE CLINIC | Age: 72
End: 2022-03-03

## 2022-03-03 NOTE — TELEPHONE ENCOUNTER
This message is being handled by - Angelique Lechuga   Please contact contact for questions or information. Working on receiving [employer] forms for patient. I just spoke with her and am having her resend these forms to our work email, I couldn't find them anywhere. Thanks!

## 2022-03-04 ENCOUNTER — TELEPHONE (OUTPATIENT)
Dept: FAMILY MEDICINE CLINIC | Age: 72
End: 2022-03-04

## 2022-03-04 NOTE — TELEPHONE ENCOUNTER
Diane Crespo from [facility] is needing verbal orders for speech therapy 2x a week for 2 weeks and then 1x a week and also a for the psych nurse to come in for an eval.

## 2022-03-06 ASSESSMENT — ENCOUNTER SYMPTOMS
VOMITING: 0
CONSTIPATION: 0
DIARRHEA: 0
WHEEZING: 0
SHORTNESS OF BREATH: 0
NAUSEA: 0
CHEST TIGHTNESS: 0
COUGH: 0

## 2022-03-07 ENCOUNTER — TELEPHONE (OUTPATIENT)
Dept: ORTHOPEDIC SURGERY | Age: 72
End: 2022-03-07

## 2022-03-07 ENCOUNTER — TELEPHONE (OUTPATIENT)
Dept: FAMILY MEDICINE CLINIC | Age: 72
End: 2022-03-07

## 2022-03-07 NOTE — TELEPHONE ENCOUNTER
Spoke with patient to explain that she has never seen Dr. Naranjo Ep[facility] before and that he may not do injections the first visit and with that being said she said \"well I'm not going to waste my money without knowing that first\" and then hung up on me

## 2022-03-07 NOTE — TELEPHONE ENCOUNTER
Called patient to get more information about her FMLA forms. Pt states the start date is the day she went to the hospital. 2/9/22. She has speech and physical therapy ongoing. There is no return date at this time as she is still very weak. Pt advised if any more questions about the forms to give her a call and thanked the office.

## 2022-03-07 NOTE — TELEPHONE ENCOUNTER
General Question     Subject: PATIENT STATES SHE IS TAKING ELIQUIS AND WOULD LIKE TO KNOW IF THAT WILL AFFECT THE INJECTION - Lit Pat. PLEASE ADVISE.       Patient:  Nicol Yang  Contact Number: [phone]

## 2022-03-08 NOTE — TELEPHONE ENCOUNTER
Faxed and scanned. LMOM informing pt. Originals placed at [facility] desk just in case there are future needs.

## 2022-03-09 ENCOUNTER — OFFICE VISIT (OUTPATIENT)
Dept: ORTHOPEDIC SURGERY | Age: 72
End: 2022-03-09
Payer: MEDICARE

## 2022-03-09 VITALS — WEIGHT: 230 LBS | BODY MASS INDEX: 40.75 KG/M2 | HEIGHT: 63 IN

## 2022-03-09 DIAGNOSIS — M17.0 PRIMARY OSTEOARTHRITIS OF BOTH KNEES: Primary | ICD-10-CM

## 2022-03-09 PROCEDURE — 20610 DRAIN/INJ JOINT/BURSA W/O US: CPT | Performed by: ORTHOPAEDIC SURGERY

## 2022-03-09 PROCEDURE — 99203 OFFICE O/P NEW LOW 30 MIN: CPT | Performed by: ORTHOPAEDIC SURGERY

## 2022-03-09 RX ORDER — BUPIVACAINE HYDROCHLORIDE 2.5 MG/ML
6 INJECTION, SOLUTION INFILTRATION; PERINEURAL ONCE
Status: COMPLETED | OUTPATIENT
Start: 2022-03-09 | End: 2022-03-09

## 2022-03-09 RX ORDER — METHYLPREDNISOLONE ACETATE 40 MG/ML
80 INJECTION, SUSPENSION INTRA-ARTICULAR; INTRALESIONAL; INTRAMUSCULAR; SOFT TISSUE ONCE
Status: COMPLETED | OUTPATIENT
Start: 2022-03-09 | End: 2022-03-09

## 2022-03-09 RX ADMIN — METHYLPREDNISOLONE ACETATE 80 MG: 40 INJECTION, SUSPENSION INTRA-ARTICULAR; INTRALESIONAL; INTRAMUSCULAR; SOFT TISSUE at 14:29

## 2022-03-09 RX ADMIN — BUPIVACAINE HYDROCHLORIDE 15 MG: 2.5 INJECTION, SOLUTION INFILTRATION; PERINEURAL at 14:29

## 2022-03-11 ENCOUNTER — TELEPHONE (OUTPATIENT)
Dept: FAMILY MEDICINE CLINIC | Age: 72
End: 2022-03-11

## 2022-03-11 NOTE — TELEPHONE ENCOUNTER
Called to give update on pt. Pt called Physical Therapy assistant to let them know she wasn't going to be home all day today so they are not seeing the pt today.

## 2022-03-14 ENCOUNTER — TELEPHONE (OUTPATIENT)
Dept: FAMILY MEDICINE CLINIC | Age: 72
End: 2022-03-14

## 2022-03-14 NOTE — TELEPHONE ENCOUNTER
Wants to see if they can get Verbal orders to see pt once a week for 6 weeks? Also wants dr to consider tapering her off Wellbutrin due to it counteracting with Seroquel. pt is in a high manic mode right now.

## 2022-03-15 NOTE — TELEPHONE ENCOUNTER
Called spoke with America Gibson from [facility] connections gave the verbal ok and the directions per dr ambrocio tapering her off the medications

## 2022-03-18 NOTE — PROGRESS NOTES
ORTHOPAEDIC SURGERY INITIAL EVALUATION NOTE  Chief Complaint   Patient presents with    New Patient     slivia knee       HISTORY OF PRESENT ILLNESS:  76-year-old female presents for evaluation of both knees. She has had longstanding history of pain in bilateral knees. She has been managing this conservatively with over-the-counter oral anti-inflammatories. She has also received the occasional injections. She has in the past had both corticosteroid and viscosupplementation. She feels that both have been successful for her. She had prior treatment plan which included corticosteroid injection followed by viscosupplementation and this seemed to work out well for her. She indicates that she has had daily pain. She rates her pain a 6 out of 10 in severity. She denies sensations of instability. She will occasionally get the clicking/popping sensation. She feels that at times her right knee is worse and at other times the left knee is worse. She has prior history of intramedullary nailing of the left tibia. Her pain is primarily over the anterior and medial knee. Past Medical History:   Diagnosis Date    Arthritis     Bipolar disorder ([address]     questionable dx    Cellulitis     L ankle, recurrent; over area of prior surgeries    Chronic back pain     Closed fracture of left fibula 1995    s/p fall; surgery x 6    Closed fracture of left tibia 1995    s/p fall on ice; hx of surgery x 6    Colon polyps     Depression     Fibroid (bleeding) (uterine) 1982    Fibromyalgia     GERD (gastroesophageal reflux disease)     Hyperlipidemia     Hypertension     Myoview Lexiscan WNL on 5/17/12    Hypothyroidism     Influenza A 01/31/2020    Migraine     Miscarriage     Morbid obesity ([address]     Neuropathy 2011    seen initially by neuro., Dr. Yaneli Rios, on 8/11/10; EMG 7/16/10 showed abn.'s c/w L5 radiculopathy & periph. neuropathy; pt. reports sx in hands and feet ; dx'd by rheum.      MAXIMO (obstructive sleep apnea) 06/07/2012    mild-does not require appliance    RLS (restless legs syndrome)     Urinary incontinence     Urine incontinence     saw urology, Dr. Keaton Mckeon, on 7/21/11 for microscopic hematuria & hematuria; was started on Vesicare & scheduled for cystoscopy    Vitamin D deficiency 2/27/12    22 ng/mL       Current Outpatient Medications   Medication Sig Dispense Refill    apixaban (ELIQUIS) 5 MG TABS tablet 1 bid 60 tablet 3    gabapentin (NEURONTIN) 300 MG capsule Take 1 capsule by mouth 3 times daily for 30 days. 90 capsule 1    atorvastatin (LIPITOR) 40 MG tablet Take 1 tablet by mouth nightly 30 tablet 3    verapamil (VERELAN) 180 MG extended release capsule Take 2 capsules by mouth nightly 180 capsule 1    albuterol sulfate HFA (PROVENTIL HFA) 108 (90 Base) MCG/ACT inhaler Inhale 2 puffs into the lungs every 6 hours as needed for Wheezing or Shortness of Breath 1 each 1    levothyroxine (SYNTHROID) 175 MCG tablet TAKE 1 TABLET EVERY DAY 90 tablet 1    buPROPion (WELLBUTRIN XL) 150 MG extended release tablet TAKE 2 TABLETS EVERY  tablet 1    traZODone (DESYREL) 150 MG tablet TAKE 1 TABLET EVERY NIGHT 90 tablet 1    rOPINIRole (REQUIP) 1 MG tablet TAKE 1 TABLET THREE TIMES DAILY 270 tablet 1    QUEtiapine (SEROQUEL XR) 150 MG TB24 extended release tablet TAKE 1 TABLET EVERY DAY 90 tablet 1    metoprolol succinate (TOPROL XL) 25 MG extended release tablet Take 1 tablet by mouth daily 90 tablet 3    omeprazole (PRILOSEC) 40 MG delayed release capsule TAKE 1 CAPSULE EVERY DAY 90 capsule 0    benzonatate (TESSALON PERLES) 100 MG capsule Take one to two caps by mouth as needed for cough three times a day. 30 capsule 0    oxybutynin (DITROPAN) 5 MG tablet Take 1 tablet by mouth 2 times daily 30 tablet 0     No current facility-administered medications for this visit.         Past Surgical History:   Procedure Laterality Date    BARIATRIC SURGERY  11/14/12    Laparoscopic Sleeve Gastrectomy, Lap Hiatal hernia repair    CHOLECYSTECTOMY  1985    COLONOSCOPY  2010     colon polyps; Dr. Kimberly Martines Left 08/31/2018    phaco with IOL    HIATAL HERNIA REPAIR      lap    HYSTERECTOMY  1982    TAHBSO for fibroids and DUB    LEG SURGERY  1995    L; s/p tibia and fibular fx's; has uma in L tibia; surgery x 6    OVARY REMOVAL      LA XCAPSL CTRC RMVL INSJ IO LENS PROSTH W/O ECP Left 8/31/2018    PHACOEMULSIFICATION OF CATARACT LEFT EYE WITH INTRAOCULAR LENS IMPLANT performed by Sean Jones MD at [address] W/O ECP Right 9/14/2018    PHACOEMULSIFICATION OF CATARACT RIGHT  EYE WITH INTRAOCULAR LENS IMPLANT performed by Sean Jones MD at [address]  2010    Dr. Estephania Peterson  4-27-12    WNL       No Known Allergies    Family History   Problem Relation Age of Onset    Arthritis Mother     Asthma Mother     Diabetes Mother     High Blood Pressure Mother     Depression Mother     Heart Disease Mother 48        CHF    High Cholesterol Mother     Stroke Mother     Mental Illness Mother     Arthritis Father     Cancer Father 36        colon    Stroke Father 48        x 2    Substance Abuse Father         alcohol    Kidney Disease Sister     Cancer Sister 50        urethral CA    Cancer Sister 48        lymphoma    Cancer Maternal Uncle         prostate    Cancer Maternal Uncle         prostate    Cancer Maternal Uncle         prostate    Breast Cancer Maternal Aunt     Breast Cancer Maternal Aunt     Emphysema Neg Hx     Heart Failure Neg Hx     Hypertension Neg Hx        Social History     Socioeconomic History    Marital status:       Spouse name: Not on file    Number of children: Not on file    Years of education: Not on file    Highest education level: Not on file   Occupational History    Not on file   Tobacco Use    Smoking status: Never Smoker    Smokeless tobacco: Never Used   Substance and Sexual Activity    Alcohol use: Yes     Alcohol/week: 0.0 standard drinks     Comment: 1 beer twice a year    Drug use: No    Sexual activity: Yes   Other Topics Concern    Not on file   Social History Narrative    Not on file     Social Determinants of Health     Financial Resource Strain:     Difficulty of Paying Living Expenses: Not on file   Food Insecurity:     Worried About Running Out of Food in the Last Year: Not on file    Venancio of Food in the Last Year: Not on file   Transportation Needs:     Lack of Transportation (Medical): Not on file    Lack of Transportation (Non-Medical): Not on file   Physical Activity:     Days of Exercise per Week: Not on file    Minutes of Exercise per Session: Not on file   Stress:     Feeling of Stress : Not on file   Social Connections:     Frequency of Communication with Friends and Family: Not on file    Frequency of Social Gatherings with Friends and Family: Not on file    Attends Voodoo Services: Not on file    Active Member of [address] [place] [address] or Organizations: Not on file    Attends Club or Organization Meetings: Not on file    Marital Status: Not on file   Intimate Partner Violence:     Fear of Current or Ex-Partner: Not on file    Emotionally Abused: Not on file    Physically Abused: Not on file    Sexually Abused: Not on file   Housing Stability:     Unable to Pay for Housing in the Last Year: Not on file    Number of [place] in the Last Year: Not on file    Unstable Housing in the Last Year: Not on file     Review of Systems: Please see today's intake form for complete review of systems    PHYSICAL EXAM  Gen: awake, alert, oriented  HEENT: atraumatic, normocephalic  Neck: supple  Resp: equal chest rise bilaterally, no audible wheezes, no accessory muscle use. CV: Lower extremities warm and well perfused. Abd: soft, nontender   Focused examination of the left knee:   There are no cuts, open wounds, or abrasions. Prior surgical scar of the anterior knee is maturely healed. There is no effusion. There is no signs of dehiscence. No warmth or erythema. Knee range of motion is full extension to 110 degrees of flexion without difficulty. There is no appreciable instability to varus or valgus stress at 0 or 30 degrees of flexion. There is negative anterior posterior drawer test.  Negative Lachman. There is pain reproduced with Melania exam which localizes to both medial and lateral joint lines. There is medial and lateral joint line tenderness. There is subpatellar crepitus during range of motion. There is tenderness to palpation about the anterior patella. Sensation is intact to light touch in deep peroneal, superficial peroneal, tibial, sural, and saphenous nerve distributions. Motor function is intact to EHL, FHL, tibialis anterior, and gastroc. There is brisk capillary refill to the toes and a strong palpable dorsalis pedis pulse. Compartments are soft and compressible. There is no calf tenderness and a negative Homans' sign. Focused examination of the right knee: There are no cuts, open wounds, or abrasions. No surgical scars. There is no effusion. There is no signs of dehiscence. No warmth or erythema. Knee range of motion is full extension to 110 degrees of flexion without difficulty. There is no appreciable instability to varus or valgus stress at 0 or 30 degrees of flexion. There is negative anterior posterior drawer test.  Negative Lachman. There is pain reproduced with Melania exam which localizes to both medial and lateral joint lines. There is medial and lateral joint line tenderness. There is subpatellar crepitus during range of motion. There is tenderness to palpation about the anterior patella. Sensation is intact to light touch in deep peroneal, superficial peroneal, tibial, sural, and saphenous nerve distributions.   Motor function is intact to EHL, FHL, tibialis anterior, and gastroc. There is brisk capillary refill to the toes and a strong palpable dorsalis pedis pulse. Compartments are soft and compressible. There is no calf tenderness and a negative Homans' sign. LABS  Lab Results   Component Value Date/Time    LABA1C 5.4 02/10/2022 06:24 AM    LABALBU 4.0 02/14/2022 03:05 PM    INR 0.91 02/09/2022 05:28 PM      RADIOGRAPHIC EXAM:  4 views of each knee including weightbearing AP, tunnel, lateral, and sunrise x-rays demonstrate intramedullary nail device in unchanged position from previous x-rays. There is osteoarthritic changes involving both knees. Is primarily involving the lateral compartments bilaterally. There is marginal osteophyte formation about the femoral and tibial joint lines. There is spurring about the superior and inferior pole of the patella bilaterally. There is no appreciable effusion. Patellar tracking for both knees is slightly lateral subluxation. There is significant osteoarthritic changes primarily involving the lateral facet. ASSESSEMENT AND PLAN    66 y.o. female with the following orthopaedic surgery problems:    1. Bilateral knee osteoarthritis    I had a discussion regarding treatments of osteoarthritis. The patient had good success with corticosteroid injection followed by viscosupplementation in the past.  She would like to proceed with this. Therefore, each knee was flexed to 90 degrees. It was sterilely prepped using Betadine. An injection mixture of Depo-Medrol 40 mg mixed with 3 cc Marcaine quarter percent plain was administered using 25-gauge needle using an anterolateral approach. She tolerated this well. The skin was anesthetized with ethyl chloride spray prior to injection. She tolerated the injections well. These were dressed with a Band-Aid. We will submit for prior authorization for viscosupplementation series as well.   She was also counseled on weight loss and the use of oral anti-inflammatories and activity modification for symptomatic treatment of knee osteoarthritis. She has not had a point where she will consider surgical intervention for her knees.     Amina Armenta MD

## 2022-03-19 ENCOUNTER — APPOINTMENT (OUTPATIENT)
Dept: GENERAL RADIOLOGY | Age: 72
End: 2022-03-19
Payer: MEDICARE

## 2022-03-19 ENCOUNTER — HOSPITAL ENCOUNTER (EMERGENCY)
Age: 72
Discharge: HOME | End: 2022-03-20
Payer: MEDICARE

## 2022-03-19 DIAGNOSIS — S80.02XA CONTUSION OF LEFT KNEE, INITIAL ENCOUNTER: ICD-10-CM

## 2022-03-19 DIAGNOSIS — Z92.29 HISTORY OF ANTICOAGULANT THERAPY: ICD-10-CM

## 2022-03-19 DIAGNOSIS — S09.90XA CLOSED HEAD INJURY, INITIAL ENCOUNTER: Primary | ICD-10-CM
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
## 2022-03-19 PROCEDURE — 99284 EMERGENCY DEPT VISIT MOD MDM: CPT

## 2022-03-19 PROCEDURE — 73562 X-RAY EXAM OF KNEE 3: CPT

## 2022-03-19 ASSESSMENT — PAIN DESCRIPTION - PAIN TYPE: TYPE: ACUTE PAIN

## 2022-03-19 ASSESSMENT — PAIN SCALES - GENERAL: PAINLEVEL_OUTOF10: 7

## 2022-03-19 ASSESSMENT — PAIN - FUNCTIONAL ASSESSMENT: PAIN_FUNCTIONAL_ASSESSMENT: 0-10

## 2022-03-19 ASSESSMENT — PAIN DESCRIPTION - ORIENTATION: ORIENTATION: LEFT

## 2022-03-19 ASSESSMENT — PAIN DESCRIPTION - LOCATION: LOCATION: KNEE

## 2022-03-20 ENCOUNTER — APPOINTMENT (OUTPATIENT)
Dept: CT IMAGING | Age: 72
End: 2022-03-20
Payer: MEDICARE

## 2022-03-20 VITALS
TEMPERATURE: 98.4 F | OXYGEN SATURATION: 100 % | BODY MASS INDEX: 40.74 KG/M2 | RESPIRATION RATE: 18 BRPM | DIASTOLIC BLOOD PRESSURE: 55 MMHG | WEIGHT: 230 LBS | HEART RATE: 67 BPM | SYSTOLIC BLOOD PRESSURE: 109 MMHG

## 2022-03-20 PROCEDURE — 6370000000 HC RX 637 (ALT 250 FOR IP): Performed by: PHYSICIAN ASSISTANT

## 2022-03-20 PROCEDURE — 70450 CT HEAD/BRAIN W/O DYE: CPT

## 2022-03-20 PROCEDURE — 72125 CT NECK SPINE W/O DYE: CPT

## 2022-03-20 RX ORDER — ACETAMINOPHEN 325 MG/1
650 TABLET ORAL ONCE
Status: COMPLETED | OUTPATIENT
Start: 2022-03-20 | End: 2022-03-20

## 2022-03-20 RX ADMIN — ACETAMINOPHEN 650 MG: 325 TABLET ORAL at 01:22

## 2022-03-20 ASSESSMENT — PAIN SCALES - GENERAL
PAINLEVEL_OUTOF10: 7
PAINLEVEL_OUTOF10: 7

## 2022-03-22 ENCOUNTER — TELEPHONE (OUTPATIENT)
Dept: FAMILY MEDICINE CLINIC | Age: 72
End: 2022-03-22

## 2022-03-22 NOTE — TELEPHONE ENCOUNTER
Pt and home health called together to schedule and ED follow up. The call then changed as the patient now has possible cellulitis on her lower left foot and then reported blood in her stool. Dr. Jaycee Ryan and Dr. Marisa Pagan did not have an ER follow up slot in a timely slot as pt has blood and stool and cellulitis which is urgent. Scheduled with Dr. Taya Brunner on Thursday. FYI to both providers.

## 2022-03-23 ENCOUNTER — OFFICE VISIT (OUTPATIENT)
Dept: ORTHOPEDIC SURGERY | Age: 72
End: 2022-03-23
Payer: MEDICARE

## 2022-03-23 DIAGNOSIS — M17.0 PRIMARY OSTEOARTHRITIS OF BOTH KNEES: Primary | ICD-10-CM

## 2022-03-23 PROCEDURE — 99999 PR OFFICE/OUTPT VISIT,PROCEDURE ONLY: CPT | Performed by: ORTHOPAEDIC SURGERY

## 2022-03-23 PROCEDURE — 20610 DRAIN/INJ JOINT/BURSA W/O US: CPT | Performed by: ORTHOPAEDIC SURGERY

## 2022-03-23 RX ORDER — HYALURONATE SODIUM 10 MG/ML
20 SYRINGE (ML) INTRAARTICULAR ONCE
Status: COMPLETED | OUTPATIENT
Start: 2022-03-23 | End: 2022-03-23

## 2022-03-23 RX ADMIN — Medication 20 MG: at 15:31

## 2022-03-23 RX ADMIN — Medication 20 MG: at 15:32

## 2022-03-24 ENCOUNTER — TELEPHONE (OUTPATIENT)
Dept: FAMILY MEDICINE CLINIC | Age: 72
End: 2022-03-24

## 2022-03-24 ENCOUNTER — OFFICE VISIT (OUTPATIENT)
Dept: FAMILY MEDICINE CLINIC | Age: 72
End: 2022-03-24
Payer: MEDICARE

## 2022-03-24 VITALS — OXYGEN SATURATION: 99 % | DIASTOLIC BLOOD PRESSURE: 88 MMHG | HEART RATE: 76 BPM | SYSTOLIC BLOOD PRESSURE: 110 MMHG

## 2022-03-24 DIAGNOSIS — R60.0 BILATERAL LOWER EXTREMITY EDEMA: Primary | ICD-10-CM

## 2022-03-24 DIAGNOSIS — K62.5 RECTAL BLEEDING: ICD-10-CM

## 2022-03-24 LAB
HCT VFR BLD CALC: 36.9 % (ref 36–48)
HEMOGLOBIN: 12 G/DL (ref 12–16)
MCH RBC QN AUTO: 29 PG (ref 26–34)
MCHC RBC AUTO-ENTMCNC: 32.6 G/DL (ref 31–36)
MCV RBC AUTO: 89.1 FL (ref 80–100)
PDW BLD-RTO: 14.2 % (ref 12.4–15.4)
PLATELET # BLD: 209 K/UL (ref 135–450)
PMV BLD AUTO: 9 FL (ref 5–10.5)
RBC # BLD: 4.14 M/UL (ref 4–5.2)
WBC # BLD: 5.5 K/UL (ref 4–11)

## 2022-03-24 PROCEDURE — 99213 OFFICE O/P EST LOW 20 MIN: CPT | Performed by: STUDENT IN AN ORGANIZED HEALTH CARE EDUCATION/TRAINING PROGRAM

## 2022-03-24 RX ORDER — FUROSEMIDE 20 MG/1
20 TABLET ORAL EVERY OTHER DAY
Qty: 3 TABLET | Refills: 0 | Status: SHIPPED | OUTPATIENT
Start: 2022-03-24 | End: 2022-06-02 | Stop reason: DRUGHIGH

## 2022-03-24 SDOH — ECONOMIC STABILITY: HOUSING INSECURITY: IN THE LAST 12 MONTHS, HOW MANY PLACES HAVE YOU LIVED?: 1

## 2022-03-24 SDOH — ECONOMIC STABILITY: TRANSPORTATION INSECURITY
IN THE PAST 12 MONTHS, HAS LACK OF TRANSPORTATION KEPT YOU FROM MEETINGS, WORK, OR FROM GETTING THINGS NEEDED FOR DAILY LIVING?: NO

## 2022-03-24 SDOH — ECONOMIC STABILITY: TRANSPORTATION INSECURITY
IN THE PAST 12 MONTHS, HAS THE LACK OF TRANSPORTATION KEPT YOU FROM MEDICAL APPOINTMENTS OR FROM GETTING MEDICATIONS?: NO

## 2022-03-24 SDOH — ECONOMIC STABILITY: FOOD INSECURITY: WITHIN THE PAST 12 MONTHS, YOU WORRIED THAT YOUR FOOD WOULD RUN OUT BEFORE YOU GOT MONEY TO BUY MORE.: NEVER TRUE

## 2022-03-24 SDOH — ECONOMIC STABILITY: INCOME INSECURITY: IN THE LAST 12 MONTHS, WAS THERE A TIME WHEN YOU WERE NOT ABLE TO PAY THE MORTGAGE OR RENT ON TIME?: NO

## 2022-03-24 SDOH — ECONOMIC STABILITY: HOUSING INSECURITY
IN THE LAST 12 MONTHS, WAS THERE A TIME WHEN YOU DID NOT HAVE A STEADY PLACE TO SLEEP OR SLEPT IN A SHELTER (INCLUDING NOW)?: NO

## 2022-03-24 SDOH — ECONOMIC STABILITY: FOOD INSECURITY: WITHIN THE PAST 12 MONTHS, THE FOOD YOU BOUGHT JUST DIDN'T LAST AND YOU DIDN'T HAVE MONEY TO GET MORE.: NEVER TRUE

## 2022-03-24 ASSESSMENT — ANXIETY QUESTIONNAIRES
7. FEELING AFRAID AS IF SOMETHING AWFUL MIGHT HAPPEN: 1-SEVERAL DAYS
2. NOT BEING ABLE TO STOP OR CONTROL WORRYING: 2-OVER HALF THE DAYS
5. BEING SO RESTLESS THAT IT IS HARD TO SIT STILL: 1-SEVERAL DAYS
1. FEELING NERVOUS, ANXIOUS, OR ON EDGE: 3-NEARLY EVERY DAY
6. BECOMING EASILY ANNOYED OR IRRITABLE: 2-OVER HALF THE DAYS
GAD7 TOTAL SCORE: 12
3. WORRYING TOO MUCH ABOUT DIFFERENT THINGS: 2-OVER HALF THE DAYS
4. TROUBLE RELAXING: 1-SEVERAL DAYS

## 2022-03-24 ASSESSMENT — PATIENT HEALTH QUESTIONNAIRE - PHQ9
DEPRESSION UNABLE TO ASSESS: NO
DEPRESSION UNABLE TO ASSESS: PT REFUSES

## 2022-03-24 ASSESSMENT — SOCIAL DETERMINANTS OF HEALTH (SDOH): HOW HARD IS IT FOR YOU TO PAY FOR THE VERY BASICS LIKE FOOD, HOUSING, MEDICAL CARE, AND HEATING?: NOT HARD AT ALL

## 2022-03-24 NOTE — TELEPHONE ENCOUNTER
We have received several things from [facility]. In Dr. Ascencio Like green folder to sign.

## 2022-03-24 NOTE — PROGRESS NOTES
ORTHOPAEDIC SURGERY FOLLOWUP VISIT    CHIEF COMPLAINT:  Bilateral knee pain    DIAGNOSIS: Bilateral knee osteoarthritis  DATE OF SURGERY: N/A    HISTORY OF PRESENT ILLNESS:  79-year-old female with bilateral knee pain presents for repeat evaluation of her knees. She presents for Euflexxa injection today. She had a corticosteroid injection at her last visit. She indicates she had transient relief. She did have another mechanical fall onto her anterior knee. She indicates that it has improved from that time. She has been icing the knees. She maintains ability to ambulate without difficulty. PHYSICAL EXAM:  Focused examination of the left knee:  Small abrasion over anterior lateral knee. No open wounds. Prior surgical scar of the anterior knee is maturely healed. There is no effusion. There is no signs of dehiscence. No warmth or erythema. Knee range of motion is full extension to 110 degrees of flexion without difficulty. There is no appreciable instability to varus or valgus stress at 0 or 30 degrees of flexion. There is negative anterior posterior drawer test.  Negative Lachman. There is pain reproduced with Melania exam which localizes to both medial and lateral joint lines. There is medial and lateral joint line tenderness. There is subpatellar crepitus during range of motion. There is tenderness to palpation about the anterior patella. Sensation is intact to light touch in deep peroneal, superficial peroneal, tibial, sural, and saphenous nerve distributions.  Motor function is intact to EHL, FHL, tibialis anterior, and gastroc.  There is brisk capillary refill to the toes and a strong palpable dorsalis pedis pulse.  Compartments are soft and compressible. There is no calf tenderness and a negative Homans' sign.     Focused examination of the right knee: There are no cuts, open wounds, or abrasions. No surgical scars. There is no effusion. There is no signs of dehiscence.   No warmth or erythema. Knee range of motion is full extension to 110 degrees of flexion without difficulty. There is no appreciable instability to varus or valgus stress at 0 or 30 degrees of flexion. There is negative anterior posterior drawer test.  Negative Lachman. There is pain reproduced with Melania exam which localizes to both medial and lateral joint lines. There is medial and lateral joint line tenderness. There is subpatellar crepitus during range of motion. There is tenderness to palpation about the anterior patella. Sensation is intact to light touch in deep peroneal, superficial peroneal, tibial, sural, and saphenous nerve distributions.  Motor function is intact to EHL, FHL, tibialis anterior, and gastroc.  There is brisk capillary refill to the toes and a strong palpable dorsalis pedis pulse.  Compartments are soft and compressible. There is no calf tenderness and a negative Homans' sign    RADIOGRAPHIC EXAM:  No new x-rays obtained today. ASSESSMENT AND PLAN:    69-year-old female with bilateral knee osteoarthritis    Each knee was flexed to 90 degrees. The anterolateral aspect of each knee was prepped with Betadine. Ethyl chloride spray was used to anesthetize the skin. Euflexxa 2 cc was injected via an anterolateral approach using 22-gauge needle. She tolerated this well. It was dressed with a Band-Aid. The same technique was repeated for each knee. She was given postinjection instructions. She will continue activities as tolerated and use temperature modalities as necessary. Return next week for Euflexxa injection #2 of 3.     Kera Conteh MD

## 2022-03-24 NOTE — PROGRESS NOTES
Patient: Yakov Santana is a 67 y.o. female who presents today with the following Chief Complaint(s):  Chief Complaint   Patient presents with    Fall     fell 3/19/2022 has had cortisone shots since and is dealing with swelling and cellulitis     Rectal Bleeding     blood in her stool during constipation for the past two weeks          HPI     Patient evaluated in ED on 3/19 for mechanical fall and concerned about knee pain following fall. CT head and neck without acute abnormality  Xray left knee \"mild osteoarthritic changes medially in the knee and postop changes along the proximal tibia which is unchanged with no acute abnormality. She is also noted to have moderate patellofemoral degenerative changes and small suprapatellar effusion which is unchanged. \"  Saw Dr. Payton De La Fuente with ortho yesterday and had bilateral knee injections    Redness on bilateral shins, increased swelling since falling, has previously needed to use diuretics  Has compression stockings but not using them  Echo 1 month ago with HF, no HF symptoms    Blood in stool  3 weeks did have severe constipation that required digital extraction which caused bleeding  Now notices that she has blood every time that she wipes on the toilet paper and sometimes notices a drop in the toilet. Has never been significant enough to notice on underwear. No pain. Not improving over last 3 weeks. Now having regular bowel movements.      Current Outpatient Medications   Medication Sig Dispense Refill    furosemide (LASIX) 20 MG tablet Take 1 tablet by mouth every other day 3 tablet 0    apixaban (ELIQUIS) 5 MG TABS tablet 1 bid 60 tablet 3    atorvastatin (LIPITOR) 40 MG tablet Take 1 tablet by mouth nightly 30 tablet 3    verapamil (VERELAN) 180 MG extended release capsule Take 2 capsules by mouth nightly 180 capsule 1    albuterol sulfate HFA (PROVENTIL HFA) 108 (90 Base) MCG/ACT inhaler Inhale 2 puffs into the lungs every 6 hours as needed for Wheezing or Shortness of Breath 1 each 1    levothyroxine (SYNTHROID) 175 MCG tablet TAKE 1 TABLET EVERY DAY 90 tablet 1    traZODone (DESYREL) 150 MG tablet TAKE 1 TABLET EVERY NIGHT 90 tablet 1    rOPINIRole (REQUIP) 1 MG tablet TAKE 1 TABLET THREE TIMES DAILY 270 tablet 1    QUEtiapine (SEROQUEL XR) 150 MG TB24 extended release tablet TAKE 1 TABLET EVERY DAY 90 tablet 1    metoprolol succinate (TOPROL XL) 25 MG extended release tablet Take 1 tablet by mouth daily 90 tablet 3    omeprazole (PRILOSEC) 40 MG delayed release capsule TAKE 1 CAPSULE EVERY DAY 90 capsule 0    benzonatate (TESSALON PERLES) 100 MG capsule Take one to two caps by mouth as needed for cough three times a day. 30 capsule 0    oxybutynin (DITROPAN) 5 MG tablet Take 1 tablet by mouth 2 times daily 30 tablet 0    gabapentin (NEURONTIN) 300 MG capsule Take 1 capsule by mouth 3 times daily for 30 days. 90 capsule 1    buPROPion (WELLBUTRIN XL) 150 MG extended release tablet TAKE 2 TABLETS EVERY DAY (Patient not taking: Reported on 3/24/2022) 180 tablet 1     No current facility-administered medications for this visit. Patient's past medical history, surgical history, family history, medications,  andallergies  were all reviewed and updated as appropriate today. Review of Systems  All other systems reviewed and negative    Physical Exam     Bilateral LE edema with mild tenderness to palpation. Vitals:    03/24/22 1033   BP: 110/88   Pulse: 76   SpO2: 99%       Assessment:  Encounter Diagnoses   Name Primary?  Bilateral lower extremity edema Yes    Rectal bleeding        Plan:  1. Bilateral lower extremity edema  Reports previously utilizing lasix to decrease dependent edema. Patient with CKD 3b. Will use 1 pill every other day for 3 pills. Also instructed to elevate and use compression stockings at home. Ace bandage applied in office today  - furosemide (LASIX) 20 MG tablet;  Take 1 tablet by mouth every other day  Dispense: 3 tablet; Refill: 0    2. Rectal bleeding  Patient with severe constipation requiring digital disimpaction which prompted small amount of rectal bleeding. Will check CBC. If normal than likely bleeding secondary to digital trauama and constipation. Will start metamucil and increased fluid intake to help with constipation. If worsening will call. Also referred to GI.   - [facility] - Gerber Martin MD, Gastroenterology, [facility]  - [facility]        No follow-ups on file.

## 2022-03-30 ENCOUNTER — TELEPHONE (OUTPATIENT)
Dept: FAMILY MEDICINE CLINIC | Age: 72
End: 2022-03-30

## 2022-03-30 ENCOUNTER — OFFICE VISIT (OUTPATIENT)
Dept: ORTHOPEDIC SURGERY | Age: 72
End: 2022-03-30
Payer: MEDICARE

## 2022-03-30 DIAGNOSIS — M17.0 PRIMARY OSTEOARTHRITIS OF BOTH KNEES: Primary | ICD-10-CM

## 2022-03-30 PROCEDURE — 20610 DRAIN/INJ JOINT/BURSA W/O US: CPT | Performed by: FAMILY MEDICINE

## 2022-03-30 PROCEDURE — 99999 PR OFFICE/OUTPT VISIT,PROCEDURE ONLY: CPT | Performed by: FAMILY MEDICINE

## 2022-03-30 RX ORDER — HYALURONATE SODIUM 10 MG/ML
40 SYRINGE (ML) INTRAARTICULAR ONCE
Status: COMPLETED | OUTPATIENT
Start: 2022-03-30 | End: 2022-03-30

## 2022-03-30 RX ADMIN — Medication 40 MG: at 14:38

## 2022-03-30 NOTE — TELEPHONE ENCOUNTER
Alvenia Apley from [facility] called wanting to know if the patient should still be taking the Lovastatin, please advise and give Alvenia Apley a call back [phone] can lvm.

## 2022-03-30 NOTE — TELEPHONE ENCOUNTER
Refill Request     Last Seen: Last Seen Department: 3/24/2022    Last Seen by PCP: 2021    Last Written: 3/8/22 60 tablet 3 refills    Next Appointment: 22   Future Appointments   Date Time Provider Berry Claudio   2022  1:00 PM MD ERIKA Rodriguez [facility]   2022 10:30 AM DO JESSICA Blevins  [place] [facility]       Future appointment scheduled      Requested Prescriptions     Pending Prescriptions Disp Refills    apixaban (ELIQUIS) 5 MG TABS tablet 60 tablet 3     Si bid

## 2022-03-30 NOTE — PROGRESS NOTES
CC:  FU Knee Osteoarthritis with Viscosupplementation      16-year-old female with bilateral knee pain presents for repeat evaluation of her knees. She presents for Euflexxa injection today for underlying knee osteoarthritis with patellofemoral arthropathy. .  She had a corticosteroid injection at her last visit. She indicates she had transient relief. She did have another mechanical fall onto her anterior knee. She indicates that it has improved from that time. She has been icing the knees. She maintains ability to ambulate without difficulty. Prasanth Weaver is a very pleasant 16-year-old white female with known history of osteoarthritis who is being seen today as a courtesy check for Dr. Wendie Zamora for continuation of her Euflexxa injections to her knees bilaterally. She presents back today stating that she has noticed a substantial 70% improvement overall knee pain symptoms and is here today for her second Euflexxa injection. She is having less pain positional changes and getting up from a seated position as well as walking. Denies locking catching or true instability symptoms. PE: no substantial change in exam.    Assessment:  Knee Osteoarthritis with viscosupplementation      PROCEDURE NOTE:    PRE-PROCEDURE DIAGNOSIS: DJD knee    POST-PROCEDURE DIAGNOSIS: DJD knee    Injection #2 of Euflexxa bilaterally. PROCEDURE:  With the patient's permission, her bilaterally knee was prepped in standard sterile fashion with Betadine and Alcohol and the prefilled 2cc injection of Euflexxa was injected intra-articularly into the bilaterally knee via a superolateral approach without difficulty. The patient tolerated this well without difficulty. A band-aid was applied. POST-PROCEDURE INSTRUCTIONS GIVEN TO PATIENT: The patient was advised to ice the knee for 15-20 minutes to relieve any injection site related pain. FOLLOW-UP: as directed.   She will continue with her exercise program.  She does take occasional Tylenol for breakthrough pain and cannot be on oral anti-inflammatories as she is on Eliquis with her history of TIA and pulmonary embolus. She will see Dr. Jaguar Wise back in the office next week to finish up her Euflexxa series. She may contact us in the interim with questions or concerns.

## 2022-03-30 NOTE — TELEPHONE ENCOUNTER
Can you sign your note on this encounter ? That way I can sign and clear out the encounter thank you!

## 2022-03-31 DIAGNOSIS — Z86.73 STATUS POST CVA: Primary | ICD-10-CM

## 2022-03-31 DIAGNOSIS — E78.2 MIXED HYPERLIPIDEMIA: ICD-10-CM

## 2022-03-31 RX ORDER — GABAPENTIN 300 MG/1
300 CAPSULE ORAL 3 TIMES DAILY
Qty: 90 CAPSULE | Refills: 1 | Status: SHIPPED | OUTPATIENT
Start: 2022-03-31 | End: 2022-05-05 | Stop reason: SDUPTHER

## 2022-03-31 RX ORDER — LOVASTATIN 20 MG/1
TABLET ORAL
Qty: 90 TABLET | Refills: 1 | OUTPATIENT
Start: 2022-03-31

## 2022-03-31 RX ORDER — ATORVASTATIN CALCIUM 40 MG/1
40 TABLET, FILM COATED ORAL NIGHTLY
Qty: 90 TABLET | Refills: 1 | Status: SHIPPED | OUTPATIENT
Start: 2022-03-31 | End: 2022-05-05 | Stop reason: SDUPTHER

## 2022-03-31 NOTE — TELEPHONE ENCOUNTER
Refill Request     Last Seen: Last Seen Department: 3/24/2022  Last Seen by PCP: 8/30/2021    Last Written: Wants send to [facility].  Per Nurse Ana Chase from [facility] [phone]    Next Appointment:   Future Appointments   Date Time Provider Berry Claudio   4/8/2022  1:00 PM Rajani Hudson MD [facility]   5/31/2022 10:30 AM [facility]  [place] [facility] [place]       Future appointment scheduled      Requested Prescriptions     Pending Prescriptions Disp Refills    atorvastatin (LIPITOR) 40 MG tablet 30 tablet 3     Sig: Take 1 tablet by mouth nightly    gabapentin (NEURONTIN) 300 MG capsule 90 capsule 1     Sig: Take 1 capsule by mouth 3 times daily for 30 days.     lovastatin (MEVACOR) 20 MG tablet 90 tablet 1     Sig: TAKE 1 TABLET EVERY NIGHT

## 2022-04-08 ENCOUNTER — OFFICE VISIT (OUTPATIENT)
Dept: ORTHOPEDIC SURGERY | Age: 72
End: 2022-04-08
Payer: MEDICARE

## 2022-04-08 DIAGNOSIS — M17.0 PRIMARY OSTEOARTHRITIS OF BOTH KNEES: Primary | ICD-10-CM

## 2022-04-08 PROCEDURE — 20610 DRAIN/INJ JOINT/BURSA W/O US: CPT | Performed by: ORTHOPAEDIC SURGERY

## 2022-04-08 PROCEDURE — 99999 PR OFFICE/OUTPT VISIT,PROCEDURE ONLY: CPT | Performed by: ORTHOPAEDIC SURGERY

## 2022-04-08 RX ORDER — HYALURONATE SODIUM 10 MG/ML
20 SYRINGE (ML) INTRAARTICULAR ONCE
Status: COMPLETED | OUTPATIENT
Start: 2022-04-08 | End: 2022-04-08

## 2022-04-08 RX ADMIN — Medication 20 MG: at 13:14

## 2022-04-08 NOTE — PROGRESS NOTES
ORTHOPAEDIC SURGERY FOLLOWUP VISIT     CHIEF COMPLAINT:  Bilateral knee pain     DIAGNOSIS: Bilateral knee osteoarthritis  DATE OF SURGERY: N/A     HISTORY OF PRESENT ILLNESS:  56-year-old female with bilateral knee pain presents for repeat evaluation of her knees. She presents for Euflexxa injection today. She had a Euflexxa injection administered by Dr. Naldo Montes in my absence last week. She indicates that she continues to have improvement of her knee pain with each subsequent injection. She is very happy with her progress with these.     PHYSICAL EXAM:  Focused examination of the left knee:Small abrasion over anterior lateral knee. No open wounds. Prior surgical scar of the anterior knee is maturely healed.  There is no effusion. Griselda Virginia is no signs of dehiscence.  No warmth or erythema.  Knee range of motion is full extension to 110 degrees of flexion without difficulty. Remains neurovascular intact     Focused examination of the right knee: There are no cuts, open wounds, or abrasions.  No surgical scars.  There is no effusion. Griselda Virginia is no signs of dehiscence.  No warmth or erythema.  Knee range of motion is full extension to 110 degrees of flexion without difficulty.  Remains neurovascularly intact.     RADIOGRAPHIC EXAM:  No new x-rays obtained today.     ASSESSMENT AND PLAN:     56-year-old female with bilateral knee osteoarthritis     Each knee was flexed to 90 degrees. The anterolateral aspect of each knee was prepped with Betadine. Ethyl chloride spray was used to anesthetize the skin. Euflexxa 2 cc was injected via an anterolateral approach using 22-gauge needle. She tolerated this well. It was dressed with a Band-Aid. The same technique was repeated for each knee. She was given postinjection instructions. She will continue activities as tolerated and use temperature modalities as necessary. I advised her that the series could be repeated in 6 months if she continues with good relief.   I would see her back on an as-needed basis moving forward.  Cele Pate MD

## 2022-04-12 NOTE — TELEPHONE ENCOUNTER
Refill Request     Last Seen: Last Seen Department: 3/24/2022  Last Seen by PCP: 21     Last Written: 3/30/22 60 tablet 3 refill     Next Appointment: 22   Future Appointments   Date Time Provider Berry Claudio   2022 10:30 AM DO JESSICA Blevins [place] - [place]       Future appointment scheduled      Requested Prescriptions     Pending Prescriptions Disp Refills    apixaban (ELIQUIS) 5 MG TABS tablet 180 tablet 1     Si bid

## 2022-04-29 ENCOUNTER — PATIENT MESSAGE (OUTPATIENT)
Dept: FAMILY MEDICINE CLINIC | Age: 72
End: 2022-04-29

## 2022-04-29 DIAGNOSIS — J40 BRONCHITIS: ICD-10-CM

## 2022-04-29 DIAGNOSIS — F32.89 OTHER DEPRESSION: ICD-10-CM

## 2022-04-29 DIAGNOSIS — F51.01 PRIMARY INSOMNIA: ICD-10-CM

## 2022-04-29 DIAGNOSIS — G25.81 RLS (RESTLESS LEGS SYNDROME): ICD-10-CM

## 2022-04-29 DIAGNOSIS — K21.9 GASTROESOPHAGEAL REFLUX DISEASE WITHOUT ESOPHAGITIS: ICD-10-CM

## 2022-04-29 NOTE — TELEPHONE ENCOUNTER
Refill Request     Last Seen: Last Seen Department: 3/24/2022  Last Seen by PCP: 2021    Last Written: 3/30/2022    Next Appointment:   Future Appointments   Date Time Provider Berry Claudio   2022 10:30 AM DO JESSICA Moeller [place] - [place]       Future appointment scheduled      Requested Prescriptions     Pending Prescriptions Disp Refills    apixaban (ELIQUIS) 5 MG TABS tablet 60 tablet 3     Si bid

## 2022-04-29 NOTE — TELEPHONE ENCOUNTER
Refill Request     Last Seen: Last Seen Department: 3/24/2022  Last Seen by PCP: 8/30/2021    Last Written: 3/30/2022    Next Appointment:   Future Appointments   Date Time Provider Berry Claudio   6/2/2022 10:30 AM DO JESSICA Moeller [place] - [place]       Future appointment scheduled      Requested Prescriptions      No prescriptions requested or ordered in this encounter

## 2022-05-05 RX ORDER — BUPROPION HYDROCHLORIDE 150 MG/1
TABLET ORAL
Qty: 180 TABLET | Refills: 1 | Status: SHIPPED | OUTPATIENT
Start: 2022-05-05

## 2022-05-05 RX ORDER — ROPINIROLE 1 MG/1
TABLET, FILM COATED ORAL
Qty: 270 TABLET | Refills: 1 | Status: SHIPPED | OUTPATIENT
Start: 2022-05-05 | End: 2022-07-04

## 2022-05-05 RX ORDER — QUETIAPINE 150 MG/1
TABLET, FILM COATED, EXTENDED RELEASE ORAL
Qty: 90 TABLET | Refills: 1 | Status: SHIPPED | OUTPATIENT
Start: 2022-05-05 | End: 2022-07-04

## 2022-05-05 RX ORDER — ALBUTEROL SULFATE 90 UG/1
2 AEROSOL, METERED RESPIRATORY (INHALATION) EVERY 6 HOURS PRN
Qty: 1 EACH | Refills: 1 | Status: SHIPPED | OUTPATIENT
Start: 2022-05-05

## 2022-05-05 RX ORDER — OXYBUTYNIN CHLORIDE 5 MG/1
5 TABLET ORAL 2 TIMES DAILY
Qty: 180 TABLET | Refills: 1 | Status: SHIPPED | OUTPATIENT
Start: 2022-05-05

## 2022-05-05 RX ORDER — OMEPRAZOLE 40 MG/1
CAPSULE, DELAYED RELEASE ORAL
Qty: 90 CAPSULE | Refills: 0 | Status: SHIPPED | OUTPATIENT
Start: 2022-05-05 | End: 2022-09-27 | Stop reason: SDUPTHER

## 2022-05-05 RX ORDER — LEVOTHYROXINE SODIUM 175 UG/1
TABLET ORAL
Qty: 90 TABLET | Refills: 1 | Status: SHIPPED | OUTPATIENT
Start: 2022-05-05 | End: 2022-07-04

## 2022-05-05 RX ORDER — ATORVASTATIN CALCIUM 40 MG/1
40 TABLET, FILM COATED ORAL NIGHTLY
Qty: 90 TABLET | Refills: 1 | Status: SHIPPED | OUTPATIENT
Start: 2022-05-05 | End: 2022-07-11

## 2022-05-05 RX ORDER — GABAPENTIN 300 MG/1
300 CAPSULE ORAL 3 TIMES DAILY
Qty: 90 CAPSULE | Refills: 1 | Status: SHIPPED | OUTPATIENT
Start: 2022-05-05 | End: 2022-06-04

## 2022-05-05 RX ORDER — TRAZODONE HYDROCHLORIDE 150 MG/1
TABLET ORAL
Qty: 90 TABLET | Refills: 1 | Status: SHIPPED | OUTPATIENT
Start: 2022-05-05 | End: 2022-07-04

## 2022-05-05 RX ORDER — VERAPAMIL HYDROCHLORIDE 180 MG/1
360 CAPSULE, EXTENDED RELEASE ORAL NIGHTLY
Qty: 180 CAPSULE | Refills: 1 | Status: SHIPPED | OUTPATIENT
Start: 2022-05-05 | End: 2022-07-07

## 2022-05-05 RX ORDER — METOPROLOL SUCCINATE 25 MG/1
25 TABLET, EXTENDED RELEASE ORAL DAILY
Qty: 90 TABLET | Refills: 1 | Status: SHIPPED | OUTPATIENT
Start: 2022-05-05

## 2022-05-09 ENCOUNTER — TELEMEDICINE (OUTPATIENT)
Dept: FAMILY MEDICINE CLINIC | Age: 72
End: 2022-05-09
Payer: MEDICARE

## 2022-05-09 DIAGNOSIS — Z00.00 MEDICARE ANNUAL WELLNESS VISIT, SUBSEQUENT: Primary | ICD-10-CM

## 2022-05-09 PROCEDURE — G0439 PPPS, SUBSEQ VISIT: HCPCS | Performed by: FAMILY MEDICINE

## 2022-05-09 ASSESSMENT — PATIENT HEALTH QUESTIONNAIRE - PHQ9
SUM OF ALL RESPONSES TO PHQ QUESTIONS 1-9: 9
SUM OF ALL RESPONSES TO PHQ9 QUESTIONS 1 & 2: 2
1. LITTLE INTEREST OR PLEASURE IN DOING THINGS: 1
8. MOVING OR SPEAKING SO SLOWLY THAT OTHER PEOPLE COULD HAVE NOTICED. OR THE OPPOSITE, BEING SO FIGETY OR RESTLESS THAT YOU HAVE BEEN MOVING AROUND A LOT MORE THAN USUAL: 0
SUM OF ALL RESPONSES TO PHQ QUESTIONS 1-9: 9
7. TROUBLE CONCENTRATING ON THINGS, SUCH AS READING THE NEWSPAPER OR WATCHING TELEVISION: 2
5. POOR APPETITE OR OVEREATING: 0
SUM OF ALL RESPONSES TO PHQ QUESTIONS 1-9: 9
4. FEELING TIRED OR HAVING LITTLE ENERGY: 3
6. FEELING BAD ABOUT YOURSELF - OR THAT YOU ARE A FAILURE OR HAVE LET YOURSELF OR YOUR FAMILY DOWN: 0
10. IF YOU CHECKED OFF ANY PROBLEMS, HOW DIFFICULT HAVE THESE PROBLEMS MADE IT FOR YOU TO DO YOUR WORK, TAKE CARE OF THINGS AT HOME, OR GET ALONG WITH OTHER PEOPLE: 1
SUM OF ALL RESPONSES TO PHQ QUESTIONS 1-9: 9
9. THOUGHTS THAT YOU WOULD BE BETTER OFF DEAD, OR OF HURTING YOURSELF: 0
2. FEELING DOWN, DEPRESSED OR HOPELESS: 1
3. TROUBLE FALLING OR STAYING ASLEEP: 2

## 2022-05-09 ASSESSMENT — LIFESTYLE VARIABLES
HOW MANY STANDARD DRINKS CONTAINING ALCOHOL DO YOU HAVE ON A TYPICAL DAY: 1 OR 2
HOW OFTEN DO YOU HAVE A DRINK CONTAINING ALCOHOL: MONTHLY OR LESS

## 2022-05-09 NOTE — PATIENT INSTRUCTIONS
Personalized Preventive Plan for Elpidio Arango - 5/9/2022  Medicare offers a range of preventive health benefits. Some of the tests and screenings are paid in full while other may be subject to a deductible, co-insurance, and/or copay. Some of these benefits include a comprehensive review of your medical history including lifestyle, illnesses that may run in your family, and various assessments and screenings as appropriate. After reviewing your medical record and screening and assessments performed today your provider may have ordered immunizations, labs, imaging, and/or referrals for you. A list of these orders (if applicable) as well as your Preventive Care list are included within your After Visit Summary for your review. Other Preventive Recommendations:    · A preventive eye exam performed by an eye specialist is recommended every 1-2 years to screen for glaucoma; cataracts, macular degeneration, and other eye disorders. · A preventive dental visit is recommended every 6 months. · Try to get at least 150 minutes of exercise per week or 10,000 steps per day on a pedometer . · Order or download the FREE \"Exercise & Physical Activity: Your Everyday Guide\" from [employer]. Call 7-613.515.8088 or search [employer] online. · You need 9876-9392 mg of calcium and 2215-3988 IU of vitamin D per day. It is possible to meet your calcium requirement with diet alone, but a vitamin D supplement is usually necessary to meet this goal.  · When exposed to the sun, use a sunscreen that protects against both UVA and UVB radiation with an SPF of 30 or greater. Reapply every 2 to 3 hours or after sweating, drying off with a towel, or swimming. · Always wear a seat belt when traveling in a car. Always wear a helmet when riding a bicycle or motorcycle.   Patient Education        Preventing Falls: Care Instructions  Your Care Instructions     Getting around your home safely can be a challenge if you have injuries or health problems that make it easy for you to fall. Loose rugs and furniture in walkways are among the dangers for many older people who have problems walking or who have poor eyesight. People who have conditions such as arthritis,osteoporosis, or dementia also have to be careful not to fall. You can make your home safer with a few simple measures. Follow-up care is a key part of your treatment and safety. Be sure to make and go to all appointments, and call your doctor if you are having problems. It's also a good idea to know your test results and keep alist of the medicines you take. How can you care for yourself at home? Taking care of yourself  Exercise regularly to improve your strength, muscle tone, and balance. Walk if you can. Swimming may be a good choice if you cannot walk easily. Have your vision and hearing checked each year or any time you notice a change. If you have trouble seeing and hearing, you might not be able to avoid objects and could lose your balance. Know the side effects of the medicines you take. Ask your doctor or pharmacist whether the medicines you take can affect your balance. Sleeping pills or sedatives can affect your balance. Limit the amount of alcohol you drink. Alcohol can impair your balance and other senses. Ask your doctor whether calluses or corns on your feet need to be removed. If you wear loose-fitting shoes because of calluses or corns, you can lose your balance and fall. Talk to your doctor if you have numbness in your feet. You may get dizzy if you do not drink enough water. To prevent dehydration, drink plenty of fluids. Choose water and other clear liquids. If you have kidney, heart, or liver disease and have to limit fluids, talk with your doctor before you increase the amount of fluids you drink. Preventing falls at home  Remove raised doorway thresholds, throw rugs, and clutter.  Repair loose carpet or raised areas in the floor. Move furniture and electrical cords to keep them out of walking paths. Use nonskid floor wax, and wipe up spills right away, especially on ceramic tile floors. If you use a walker or cane, put rubber tips on it. If you use crutches, clean the bottoms of them regularly with an abrasive pad, such as steel wool. Keep your house well lit, especially stairways, porches, and outside walkways. Use night-lights in areas such as hallways and bathrooms. Add extra light switches or use remote switches (such as switches that go on or off when you clap your hands) to make it easier to turn lights on if you have to get up during the night. Install sturdy handrails on stairways. Move items in your cabinets so that the things you use a lot are on the lower shelves (about waist level). Keep a cordless phone and a flashlight with new batteries by your bed. If possible, put a phone in each of the main rooms of your house, or carry a cell phone in case you fall and cannot reach a phone. Or, you can wear a device around your neck or wrist. You push a button that sends a signal for help. Wear low-heeled shoes that fit well and give your feet good support. Use footwear with nonskid soles. Check the heels and soles of your shoes for wear. Repair or replace worn heels or soles. Do not wear socks without shoes on wood floors. Walk on the grass when the sidewalks are slippery. If you live in an area that gets snow and ice in the winter, sprinkle salt on slippery steps and sidewalks. Or ask a family member or friend to do this for you. Preventing falls in the bath  Install grab bars and nonskid mats inside and outside your shower or tub and near the toilet and sinks. Use shower chairs and bath benches. Use a hand-held shower head that will allow you to sit while showering.   Get into a tub or shower by putting the weaker leg in first. Get out of a tub or shower with your strong side first.  Repair loose toilet seats and consider installing a raised toilet seat to make getting on and off the toilet easier. Keep your bathroom door unlocked while you are in the shower. Where can you learn more? Go to [email or web address] and sign in to your [employer] account. Enter [phone] in the [facility] box to learn more about \"Preventing Falls: Care Instructions. \"     If you do not have an account, please click on the \"Sign Up Now\" link. Current as of: September 8, 2021               Content Version: 13.2  © 8795-3309 [employer]. Care instructions adapted under license by [employer]. If you have questions about a medical condition or this instruction, always ask your healthcare professional. [address] any warranty or liability for your use of this information.

## 2022-05-09 NOTE — PROGRESS NOTES
Medicare Annual Wellness Visit    Carlos A Nixon is here for Medicare AWV    Assessment & Plan   Medicare annual wellness visit, subsequent      Recommendations for Preventive Services Due: see orders and patient instructions/AVS.  Recommended screening schedule for the next 5-10 years is provided to the patient in written form: see Patient Instructions/AVS.     No follow-ups on file. Subjective       Patient's complete Health Risk Assessment and screening values have been reviewed and are found in Flowsheets. The following problems were reviewed today and where indicated follow up appointments were made and/or referrals ordered.     Positive Risk Factor Screenings with Interventions:    Fall Risk:  Do you feel unsteady or are you worried about falling? : (!) yes  2 or more falls in past year?: (!) yes  Fall with injury in past year?: (!) yes     Fall Risk Interventions:    · Home safety tips provided     Depression:  PHQ-2 Score: 2  PHQ-9 Total Score: 9    Severity:1-4 = minimal depression, 5-9 = mild depression, 10-14 = moderate depression, 15-19 = moderately severe depression, 20-27 = severe depression    Depression Interventions:  · Patient advised to follow-up in this office for further evaluation and treatment within 1 month(s)          General Health and ACP:  General  In general, how would you say your health is?: Fair  In the past 7 days, have you experienced any of the following: New or Increased Pain, New or Increased Fatigue, Loneliness, Social Isolation, Stress or Anger?: (!) Yes  Select all that apply: (!) Loneliness,Social Isolation,Stress,Anger  Do you get the social and emotional support that you need?: (!) No  Do you have a Living Will?: Yes    Advance Directives     Power of  Living Will ACP-Advance Directive ACP-Power of     Not on File Not on File Not on File Not on File      General Health Risk Interventions:  · No Living Will: ACP documents already completed- patient asked to provide copy to the office    Health Habits/Nutrition:     Physical Activity: Unknown    Days of Exercise per Week: 0 days    Minutes of Exercise per Session: Not on file     Have you lost any weight without trying in the past 3 months?: No     Have you seen the dentist within the past year?: N/A - wear dentures    Health Habits/Nutrition Interventions:  · Dental exam overdue:  patient encouraged to make appointment with his/her dentist    Hearing/Vision:  Do you or your family notice any trouble with your hearing that hasn't been managed with hearing aids?: (!) Yes  Do you have difficulty driving, watching TV, or doing any of your daily activities because of your eyesight?: No  Have you had an eye exam within the past year?: (!) No  No exam data present    Hearing/Vision Interventions:  · Hearing concerns:  patient declines any further evaluation/treatment for hearing issues  · Vision concerns:  ophthalmology/optometry referral provided            Objective      Patient-Reported Vitals  Patient-Reported Systolic (Top): 837 mmHg  Patient-Reported Diastolic (Bottom): 75 mmHg  Patient-Reported Weight: 230lb  Patient-Reported Height: 5' 3\"              No Known Allergies  Prior to Visit Medications    Medication Sig Taking? Authorizing Provider   apixaban (ELIQUIS) 5 MG TABS tablet 1 bid  Mauri Bunch DO   atorvastatin (LIPITOR) 40 MG tablet Take 1 tablet by mouth nightly  Mauri Bunch DO   gabapentin (NEURONTIN) 300 MG capsule Take 1 capsule by mouth 3 times daily for 30 days.   Mauri Bunch DO   verapamil (VERELAN) 180 MG extended release capsule Take 2 capsules by mouth nightly  Mauri Bunch DO   levothyroxine (SYNTHROID) 175 MCG tablet TAKE 1 TABLET EVERY DAY  Mauri Bunch DO   albuterol sulfate HFA (PROVENTIL HFA) 108 (90 Base) MCG/ACT inhaler Inhale 2 puffs into the lungs every 6 hours as needed for Wheezing or Shortness of Breath  Mauri Bunch DO   buPROPion (WELLBUTRIN XL) 150 MG extended release tablet TAKE 2 TABLETS EVERY DAY  Mauri Bunch DO   traZODone (DESYREL) 150 MG tablet TAKE 1 TABLET EVERY NIGHT  Mauri Bunch DO   rOPINIRole (REQUIP) 1 MG tablet TAKE 1 TABLET THREE TIMES DAILY  Mauri Bunch DO   QUEtiapine (SEROQUEL XR) 150 MG TB24 extended release tablet TAKE 1 TABLET EVERY DAY  Mauri Bunch DO   metoprolol succinate (TOPROL XL) 25 MG extended release tablet Take 1 tablet by mouth daily  Mauri Bunch DO   omeprazole (PRILOSEC) 40 MG delayed release capsule TAKE 1 CAPSULE EVERY DAY  Mauri Bunch DO   oxybutynin (DITROPAN) 5 MG tablet Take 1 tablet by mouth 2 times daily  Mauri Bunch DO   furosemide (LASIX) 20 MG tablet Take 1 tablet by mouth every other day  Carlos Villarreal DO   benzonatate (TESSALON PERLES) 100 MG capsule Take one to two caps by mouth as needed for cough three times a day. SANDOR [place] - [facility] (Including outside providers/suppliers regularly involved in providing care):   Patient Care Team:  Chuy Hooks DO as PCP - General (Family Medicine)  Chuy Hooks DO as PCP - [facility]  Ray Dukes MD as Consulting Physician (Pulmonology)    Reviewed and updated this visit:  Tobacco  Allergies  Meds  Med Hx  Surg Hx  Soc Hx  Fam Hx           Pawan Spies, was evaluated through a synchronous (real-time) audio-video encounter. The patient (or guardian if applicable) is aware that this is a billable service, which includes applicable co-pays. This Virtual Visit was conducted with patient's (and/or legal guardian's) consent. The visit was conducted pursuant to the emergency declaration under the [facility], [address] [place] [address] authority and the [employer] General Act. Patient identification was verified, and a caregiver was present when appropriate. The patient was located at home in a state where the provider was licensed to provide care.     I, Lainey Carrillo LPN, 9/3/2794, performed the documented evaluation under the direct supervision of the attending physician. This encounter was performed under my, Bentley Cole, direct supervision, 5/9/2022.

## 2022-05-13 ENCOUNTER — HOSPITAL ENCOUNTER (OUTPATIENT)
Age: 72
Discharge: HOME | End: 2022-05-13
Payer: MEDICARE

## 2022-05-13 DIAGNOSIS — N17.9 AKI (ACUTE KIDNEY INJURY) (HCC): ICD-10-CM

## 2022-05-13 PROCEDURE — 80069 RENAL FUNCTION PANEL: CPT

## 2022-05-13 PROCEDURE — 36415 COLL VENOUS BLD VENIPUNCTURE: CPT

## 2022-05-14 LAB
ALBUMIN SERPL-MCNC: 3.9 G/DL (ref 3.4–5)
ANION GAP SERPL CALCULATED.3IONS-SCNC: 11 MMOL/L (ref 3–16)
BUN BLDV-MCNC: 20 MG/DL (ref 7–20)
CALCIUM SERPL-MCNC: 9.5 MG/DL (ref 8.3–10.6)
CHLORIDE BLD-SCNC: 105 MMOL/L (ref 99–110)
CO2: 25 MMOL/L (ref 21–32)
CREAT SERPL-MCNC: 1.3 MG/DL (ref 0.6–1.2)
GFR AFRICAN AMERICAN: 49
GFR NON-AFRICAN AMERICAN: 40
GLUCOSE BLD-MCNC: 91 MG/DL (ref 70–99)
PHOSPHORUS: 3.4 MG/DL (ref 2.5–4.9)
POTASSIUM SERPL-SCNC: 4.5 MMOL/L (ref 3.5–5.1)
SODIUM BLD-SCNC: 141 MMOL/L (ref 136–145)

## 2022-06-02 ENCOUNTER — OFFICE VISIT (OUTPATIENT)
Dept: FAMILY MEDICINE CLINIC | Age: 72
End: 2022-06-02
Payer: MEDICARE

## 2022-06-02 VITALS
DIASTOLIC BLOOD PRESSURE: 70 MMHG | SYSTOLIC BLOOD PRESSURE: 124 MMHG | BODY MASS INDEX: 40.74 KG/M2 | OXYGEN SATURATION: 98 % | WEIGHT: 230 LBS | HEART RATE: 72 BPM

## 2022-06-02 DIAGNOSIS — E03.9 ACQUIRED HYPOTHYROIDISM: ICD-10-CM

## 2022-06-02 DIAGNOSIS — R60.0 BILATERAL LOWER EXTREMITY EDEMA: ICD-10-CM

## 2022-06-02 DIAGNOSIS — Z86.711 HISTORY OF PULMONARY EMBOLUS (PE): ICD-10-CM

## 2022-06-02 DIAGNOSIS — L03.115 CELLULITIS OF BOTH LOWER EXTREMITIES: ICD-10-CM

## 2022-06-02 DIAGNOSIS — I10 ESSENTIAL HYPERTENSION: Primary | ICD-10-CM

## 2022-06-02 DIAGNOSIS — L03.116 CELLULITIS OF BOTH LOWER EXTREMITIES: ICD-10-CM

## 2022-06-02 DIAGNOSIS — E78.2 MIXED HYPERLIPIDEMIA: ICD-10-CM

## 2022-06-02 PROCEDURE — 1123F ACP DISCUSS/DSCN MKR DOCD: CPT | Performed by: FAMILY MEDICINE

## 2022-06-02 PROCEDURE — 99214 OFFICE O/P EST MOD 30 MIN: CPT | Performed by: FAMILY MEDICINE

## 2022-06-02 RX ORDER — DOXYCYCLINE HYCLATE 100 MG/1
100 CAPSULE ORAL 2 TIMES DAILY
Qty: 14 CAPSULE | Refills: 0 | Status: SHIPPED | OUTPATIENT
Start: 2022-06-02 | End: 2022-06-09

## 2022-06-02 RX ORDER — FUROSEMIDE 20 MG/1
20 TABLET ORAL DAILY PRN
Qty: 30 TABLET | Refills: 0 | Status: SHIPPED | OUTPATIENT
Start: 2022-06-02 | End: 2022-06-06 | Stop reason: SDUPTHER

## 2022-06-02 NOTE — PROGRESS NOTES
Lois Reinoso is a 67 y.o. female    Chief Complaint   Patient presents with    Other     Results of 2/9/2022 Echo     Hypertension    Hyperlipidemia    Hypothyroidism       HPI:    Hypertension  This is a chronic problem. The current episode started more than 1 year ago. The problem is unchanged. The problem is controlled. Risk factors for coronary artery disease include post-menopausal state. Past treatments include calcium channel blockers and beta blockers. The current treatment provides significant improvement. There are no compliance problems. Hypertensive end-organ damage includes CVA. Hyperlipidemia  This is a chronic problem. The current episode started more than 1 year ago. The problem is controlled. Recent lipid tests were reviewed and are normal. Exacerbating diseases include obesity. She has no history of diabetes. Pertinent negatives include no myalgias. Current antihyperlipidemic treatment includes statins. The current treatment provides significant improvement of lipids. There are no compliance problems. Risk factors for coronary artery disease include hypertension and post-menopausal.     Acquired hypothyroidism. This is a chronic issue. The patient takes her medicine in the morning on an empty stomach. Her recent TSH was within normal limits. History of pulmonary embolism. She was admitted in the hospital in February 2022. Her bilateral leg ultrasounds came back negative for DVT. She did have right ankle pain at that time. She never had a hypercoagulability work-up. ROS:    Review of Systems   Musculoskeletal: Negative for myalgias. /70   Pulse 72   Wt 230 lb (104.3 kg)   SpO2 98%   BMI 40.74 kg/m²     Physical Exam:    Physical Exam  Constitutional:       General: She is not in acute distress. Appearance: She is well-developed. She is obese. She is not toxic-appearing. HENT:      Head: Normocephalic. Neurological:      Mental Status: She is alert. Psychiatric:         Mood and Affect: Mood normal.         Behavior: Behavior normal.         Thought Content: Thought content normal.     Bilateral leg swelling with some mild erythema and warmth in the anterior legs. Current Outpatient Medications   Medication Sig Dispense Refill    furosemide (LASIX) 20 MG tablet Take 1 tablet by mouth daily as needed For swelling 30 tablet 0    doxycycline hyclate (VIBRAMYCIN) 100 mg capsule Take 1 capsule by mouth 2 times daily for 7 days 14 capsule 0    apixaban (ELIQUIS) 5 MG TABS tablet 1 bid 180 tablet 1    atorvastatin (LIPITOR) 40 MG tablet Take 1 tablet by mouth nightly 90 tablet 1    gabapentin (NEURONTIN) 300 MG capsule Take 1 capsule by mouth 3 times daily for 30 days. 90 capsule 1    verapamil (VERELAN) 180 MG extended release capsule Take 2 capsules by mouth nightly 180 capsule 1    levothyroxine (SYNTHROID) 175 MCG tablet TAKE 1 TABLET EVERY DAY 90 tablet 1    albuterol sulfate HFA (PROVENTIL HFA) 108 (90 Base) MCG/ACT inhaler Inhale 2 puffs into the lungs every 6 hours as needed for Wheezing or Shortness of Breath 1 each 1    buPROPion (WELLBUTRIN XL) 150 MG extended release tablet TAKE 2 TABLETS EVERY  tablet 1    traZODone (DESYREL) 150 MG tablet TAKE 1 TABLET EVERY NIGHT 90 tablet 1    rOPINIRole (REQUIP) 1 MG tablet TAKE 1 TABLET THREE TIMES DAILY 270 tablet 1    QUEtiapine (SEROQUEL XR) 150 MG TB24 extended release tablet TAKE 1 TABLET EVERY DAY 90 tablet 1    metoprolol succinate (TOPROL XL) 25 MG extended release tablet Take 1 tablet by mouth daily 90 tablet 1    omeprazole (PRILOSEC) 40 MG delayed release capsule TAKE 1 CAPSULE EVERY DAY 90 capsule 0    oxybutynin (DITROPAN) 5 MG tablet Take 1 tablet by mouth 2 times daily 180 tablet 1    benzonatate (TESSALON PERLES) 100 MG capsule Take one to two caps by mouth as needed for cough three times a day. 30 capsule 0     No current facility-administered medications for this visit. Assessment:    1. Essential hypertension    2. Mixed hyperlipidemia    3. Acquired hypothyroidism    4. History of pulmonary embolus (PE)    5. Bilateral lower extremity edema    6. Cellulitis of both lower extremities        Plan:    1. Essential hypertension  Stable. Continue current medications. 2. Mixed hyperlipidemia  Stable. Continue current medications. 3. Acquired hypothyroidism  Stable. Continue current medications. 4. History of pulmonary embolus (PE)  I would recommend seeing heme-onc for hypercoagulability work-up. It appeared to be a provoked PE.  - [facility] - Seven Marinelli MD, Oncology, [facility]    5. Bilateral lower extremity edema  Refills given. - furosemide (LASIX) 20 MG tablet; Take 1 tablet by mouth daily as needed For swelling  Dispense: 30 tablet; Refill: 0    6. Cellulitis of both lower extremities  I noticed some mild erythema and warmth in the lower extremities bilaterally. We will try some doxycycline to see if it helps. - doxycycline hyclate (VIBRAMYCIN) 100 mg capsule; Take 1 capsule by mouth 2 times daily for 7 days  Dispense: 14 capsule; Refill: 0      Return in about 6 months (around 12/2/2022) for Hypertension, Hyperlipidemia, Hypothyroidism.

## 2022-06-06 DIAGNOSIS — R60.0 BILATERAL LOWER EXTREMITY EDEMA: ICD-10-CM

## 2022-06-06 RX ORDER — FUROSEMIDE 20 MG/1
20 TABLET ORAL DAILY PRN
Qty: 30 TABLET | Refills: 0 | Status: SHIPPED | OUTPATIENT
Start: 2022-06-06 | End: 2022-06-29

## 2022-06-13 NOTE — TELEPHONE ENCOUNTER
Refill Request     CONFIRM preferrred pharmacy with the patient. If Mail Order Rx - Pend for 90 day refill.       Last Seen: Last Seen Department: 2022  Last Seen by PCP: 2022    Last Written: 2022 180 with 1     Next Appointment:   Future Appointments   Date Time Provider Berry Claudio   2022  1:50 PM Claderon Carvajal MD [facility] ENT [facility]   2022  2:30 PM Alejandra Ann AND AUDIO [facility]   2022 10:30 AM DO JESSICA Blevins - SANDI   2023  1:30 PM SCHEDULE, [facility] AWV [facility] Keli - SANDI       Future appointment scheduled      Requested Prescriptions     Pending Prescriptions Disp Refills    apixaban (ELIQUIS) 5 MG TABS tablet 180 tablet 1     Si bid

## 2022-06-14 NOTE — TELEPHONE ENCOUNTER
Refill Request     CONFIRM preferrred pharmacy with the patient. If Mail Order Rx - Pend for 90 day refill.       Last Seen: Last Seen Department: 2022  Last Seen by PCP: 2022    Last Written: 22 180 with 0  Pharmacy requesting 90 day prescription supply     Next Appointment:   Future Appointments   Date Time Provider Berry Claudio   2022  1:50 PM Marialuisa Azar MD [facility] ENT [facility]   2022  2:30 PM Alejandra Alfonso AND AUDIO [facility]   2022 10:30 AM DO JESSICA Blevins - S[facility]I   2023  1:30 PM SCHEDULE, MHCX JESSICA  AWV LPLILA [facility] Keli - SANDI       Future appointment scheduled      Requested Prescriptions     Pending Prescriptions Disp Refills    apixaban (ELIQUIS) 5 MG TABS tablet 180 tablet 1     Si bid

## 2022-06-23 ENCOUNTER — PROCEDURE VISIT (OUTPATIENT)
Dept: AUDIOLOGY | Age: 72
End: 2022-06-23
Payer: MEDICARE

## 2022-06-23 ENCOUNTER — OFFICE VISIT (OUTPATIENT)
Dept: ENT CLINIC | Age: 72
End: 2022-06-23
Payer: MEDICARE

## 2022-06-23 VITALS — RESPIRATION RATE: 16 BRPM | TEMPERATURE: 97.7 F | HEIGHT: 64 IN | WEIGHT: 230 LBS | BODY MASS INDEX: 39.27 KG/M2

## 2022-06-23 DIAGNOSIS — H90.3 SENSORINEURAL HEARING LOSS, BILATERAL: Primary | ICD-10-CM

## 2022-06-23 DIAGNOSIS — R49.8 VOICE STRAIN: ICD-10-CM

## 2022-06-23 DIAGNOSIS — H93.13 TINNITUS, BILATERAL: ICD-10-CM

## 2022-06-23 DIAGNOSIS — R49.0 HOARSENESS OF VOICE: Primary | ICD-10-CM

## 2022-06-23 PROCEDURE — 31575 DIAGNOSTIC LARYNGOSCOPY: CPT | Performed by: OTOLARYNGOLOGY

## 2022-06-23 PROCEDURE — 99204 OFFICE O/P NEW MOD 45 MIN: CPT | Performed by: OTOLARYNGOLOGY

## 2022-06-23 PROCEDURE — 1123F ACP DISCUSS/DSCN MKR DOCD: CPT | Performed by: OTOLARYNGOLOGY

## 2022-06-23 PROCEDURE — 92557 COMPREHENSIVE HEARING TEST: CPT | Performed by: AUDIOLOGIST

## 2022-06-23 PROCEDURE — 92567 TYMPANOMETRY: CPT | Performed by: AUDIOLOGIST

## 2022-06-23 NOTE — PROGRESS NOTES
Daymon Aschoff   1950, 67 y.o. female   [id number]       Referring Provider: Yahir Scott DO   Referral Type: In an order in [address] [place] [address]    Reason for Visit: Evaluation of suspected change in hearing and tinnitus    ADULT AUDIOLOGIC EVALUATION      Daymon Aschoff is a 67 y.o. female seen today, 6/23/2022 , for an initial audiologic evaluation. Patient was seen by Jordana Lancaster MD prior to today's evaluation. AUDIOLOGIC AND OTHER PERTINENT MEDICAL HISTORY:      Daymon Aschoff noted a perceived gradual decline in hearing, bilaterally- states family members reported increased hearing difficulty. She notes long-standing history of tinnitus, bilaterally and family history of hearing loss in father. No additional significant otologic or medical history was reported. Daymon Aschoff denied otalgia, aural fullness, otorrhea, dizziness, imbalance, history of falls, history of occupational/recreational noise exposure, history of head trauma and history of ear surgery. Date: 6/23/2022     IMPRESSIONS:      Today's results revealed a symmetric sensorineural hearing loss with excellent word recognition, bilaterally. Hearing loss significant enough to create hearing difficulty in at least some listening situations. Discussed benefits of amplification. Recommended patient contact insurance to determine possible hearing aid benefit. Follow medical recommendations of Jordana Lancaster MD.     ASSESSMENT AND FINDINGS:     Otoscopy revealed: Clear ear canals bilaterally    RIGHT EAR:  Hearing Sensitivity: Mild sloping to moderately-severe sensorineural hearing loss. Speech Recognition Threshold: 30 dB HL  Word Recognition: Excellent (96%), based on NU-6 25-word list at 70 dBHL using recorded speech stimuli. Tympanometry: Normal peak pressure and compliance, Type A tympanogram, consistent with normal middle ear function.     LEFT EAR:  Hearing Sensitivity: Mild sloping to moderately-severe sensorineural hearing loss.   Speech Recognition Threshold: 25 dB HL  Word Recognition: Excellent (96%), based on NU-6 25-word list at 70 dBHL using recorded speech stimuli. Tympanometry: Normal peak pressure and compliance, Type A tympanogram, consistent with normal middle ear function. Reliability: Good   Transducer: Inserts    See scanned audiogram dated 6/23/2022  for results. PATIENT EDUCATION:       The following items were discussed with the patient:   - Good Communication Strategies  - Hearing Loss and Hearing Aids  - Tinnitus Management Strategies      Educational information was shared in the After Visit Summary. RECOMMENDATIONS:                                                                                                                                                                                                                                                            The following items are recommended based on patient report and results from today's appointment:   - Continue medical follow-up with He Daily DO .   - Retest hearing as medically indicated and/or sooner if a change in hearing is noted. - If desired, schedule a Hearing Aid Evaluation (HAE) appointment to discuss hearing aid options. - Utilize \"Good Communication Strategies\" as discussed to assist in speech understanding with communication partners. - Maintain a sound enriched environment to assist in the management of tinnitus symptoms.        Genet Roberts  Audiologist    Chart CC'd to: He Daily DO       Degree of   Hearing Sensitivity dB Range   Within Normal Limits (WNL) 0 - 20   Mild 20 - 40   Moderate 40 - 55   Moderately-Severe 55 - 70   Severe 70 - 90   Profound 90 +

## 2022-06-23 NOTE — Clinical Note
Dr. Miriam Naylor,    Thank you for your referral for audiometric testing on this patient. Today's results revealed a symmetric sensorineural hearing loss with excellent word recognition, bilaterally. Hearing loss significant enough to create hearing difficulty in at least some listening situations. Discussed benefits of amplification. Recommended patient contact insurance to determine possible hearing aid benefit. Please see the scanned audiogram (under \"Media\" tab) and encounter note for details. If you have any questions, or if there is anything else you need, please let me know.       Alejandra Amos  Audiologist  ---  [address]  [facility] - Audiology

## 2022-06-23 NOTE — PROGRESS NOTES
Sushil Alfaro [address], [address], [facility] Sipseyjacklyn Escobedo  P: [phone]       Patient     Eduardo Lomas  1950    Chief Concern     Chief Complaint   Patient presents with    New Patient     patient here today for decreased hearing    Other     Patient see's SLP- States SLP told her to come here for scratchy voice       Assessment and Plan      Diagnosis Orders   1. Hoarseness of voice  [address] - Speech Therapy - [facility]     Small glottic gap noted on phonation (spindle shaped) - will refer to SLP    No follow-ups on file. History of Present Illness     History of pulmonary embolism (on apixaban), depression and fibromyalgia, hyperlipidemia/hypertension, hypothyroidism (on levothyroxine), on gabapentin for radiculopathy. Concern for voice changes per her SLP - had TIA 02/2022, acute PE at that time, symptoms resolved - MRI nonacute. She states voice has gotten worse since that time even with speech therapy. Occasional difficulty swallowing - recently had UGI endoscopy per Dr. Jennifer Villalobos. States a small amount of post-nasal drip, occasional acid reflux - on prilosec, drinks 4 cups of coffee daily, no caffeinated soda, no tobacco use.      Past Medical History     Past Medical History:   Diagnosis Date    Arthritis     Bipolar disorder ([address]     questionable dx    Cellulitis     L ankle, recurrent; over area of prior surgeries    Chronic back pain     Closed fracture of left fibula 1995    s/p fall; surgery x 6    Closed fracture of left tibia 1995    s/p fall on ice; hx of surgery x 6    Colon polyps     Depression     Fibroid (bleeding) (uterine) 1982    Fibromyalgia     GERD (gastroesophageal reflux disease)     Hyperlipidemia     Hypertension     Myoview Lexiscan WNL on 5/17/12    Hypothyroidism     Influenza A 01/31/2020    Migraine     Miscarriage     Morbid obesity ([address]     Neuropathy 2011    seen initially by neuro., Dr. Zheng Saldana, on 8/11/10; EMG 7/16/10 showed abn.'s c/w L5 radiculopathy & periph. neuropathy; pt. reports sx in hands and feet ; dx'd by rheum.      MAXIMO (obstructive sleep apnea) 06/07/2012    mild-does not require appliance    RLS (restless legs syndrome)     Urinary incontinence     Urine incontinence     saw urology, Dr. Kirsten Mendes, on 7/21/11 for microscopic hematuria & hematuria; was started on Vesicare & scheduled for cystoscopy    Vitamin D deficiency 2/27/12    22 ng/mL       Past Surgical History     Past Surgical History:   Procedure Laterality Date    BARIATRIC SURGERY  11/14/12    Laparoscopic Sleeve Gastrectomy, Lap Hiatal hernia repair    CHOLECYSTECTOMY  1985    COLONOSCOPY  2010     colon polyps; Dr. Clare Alicea Left 08/31/2018    phaco with IOL    HIATAL HERNIA REPAIR      lap    HYSTERECTOMY (CERVIX STATUS UNKNOWN)  1982    TAHBSO for fibroids and DUB    LEG SURGERY  1995    L; s/p tibia and fibular fx's; has uma in L tibia; surgery x 6    OVARY REMOVAL      WA XCAPSL CTRC RMVL INSJ IO LENS PROSTH W/O ECP Left 8/31/2018    PHACOEMULSIFICATION OF CATARACT LEFT EYE WITH INTRAOCULAR LENS IMPLANT performed by Fortunato Nelson MD at [facility]  RMVL INSJ IO LENS PROSTH W/O ECP Right 9/14/2018    PHACOEMULSIFICATION OF CATARACT RIGHT  EYE WITH INTRAOCULAR LENS IMPLANT performed by Fortunato Nelson MD at [facility] AT [place] [address]      \"Cleaned out\"    TONSILLECTOMY      UPPER GASTROINTESTINAL ENDOSCOPY  2010    Dr. Joycelyn Jones UPPER GASTROINTESTINAL ENDOSCOPY  4-27-12    WNL       Family History     Family History   Problem Relation Age of Onset    Arthritis Mother     Asthma Mother     Diabetes Mother     High Blood Pressure Mother     Depression Mother     Heart Disease Mother 48        CHF    High Cholesterol Mother     Stroke Mother     Mental Illness Mother     Arthritis Father     Cancer Father 36        colon    Stroke Father 48        x 2    Substance Abuse Father alcohol    Kidney Disease Sister     Cancer Sister 50        urethral CA    Cancer Sister 48        lymphoma    Cancer Maternal Uncle         prostate    Cancer Maternal Uncle         prostate    Cancer Maternal Uncle         prostate    Breast Cancer Maternal Aunt     Breast Cancer Maternal Aunt     Emphysema Neg Hx     Heart Failure Neg Hx     Hypertension Neg Hx        Social History     Social History     Tobacco Use    Smoking status: Never Smoker    Smokeless tobacco: Never Used   Vaping Use    Vaping Use: Never used   Substance Use Topics    Alcohol use:  Yes     Alcohol/week: 0.0 standard drinks     Comment: 1 beer twice a year    Drug use: No        Allergies     No Known Allergies    Medications     Current Outpatient Medications   Medication Sig Dispense Refill    apixaban (ELIQUIS) 5 MG TABS tablet 1 bid 180 tablet 1    furosemide (LASIX) 20 MG tablet Take 1 tablet by mouth daily as needed (Swelling in the legs) For swelling 30 tablet 0    atorvastatin (LIPITOR) 40 MG tablet Take 1 tablet by mouth nightly 90 tablet 1    verapamil (VERELAN) 180 MG extended release capsule Take 2 capsules by mouth nightly 180 capsule 1    levothyroxine (SYNTHROID) 175 MCG tablet TAKE 1 TABLET EVERY DAY 90 tablet 1    albuterol sulfate HFA (PROVENTIL HFA) 108 (90 Base) MCG/ACT inhaler Inhale 2 puffs into the lungs every 6 hours as needed for Wheezing or Shortness of Breath 1 each 1    buPROPion (WELLBUTRIN XL) 150 MG extended release tablet TAKE 2 TABLETS EVERY  tablet 1    traZODone (DESYREL) 150 MG tablet TAKE 1 TABLET EVERY NIGHT 90 tablet 1    rOPINIRole (REQUIP) 1 MG tablet TAKE 1 TABLET THREE TIMES DAILY 270 tablet 1    QUEtiapine (SEROQUEL XR) 150 MG TB24 extended release tablet TAKE 1 TABLET EVERY DAY 90 tablet 1    metoprolol succinate (TOPROL XL) 25 MG extended release tablet Take 1 tablet by mouth daily 90 tablet 1    omeprazole (PRILOSEC) 40 MG delayed release capsule TAKE 1 CAPSULE EVERY DAY 90 capsule 0    oxybutynin (DITROPAN) 5 MG tablet Take 1 tablet by mouth 2 times daily 180 tablet 1    benzonatate (TESSALON PERLES) 100 MG capsule Take one to two caps by mouth as needed for cough three times a day. 30 capsule 0    gabapentin (NEURONTIN) 300 MG capsule Take 1 capsule by mouth 3 times daily for 30 days. 90 capsule 1     No current facility-administered medications for this visit. Review of Systems     Review of Systems   Constitutional: Negative for activity change, chills, diaphoresis, fatigue and fever. HENT: Positive for voice change. Negative for congestion and hearing loss. Eyes: Negative for visual disturbance. Respiratory: Negative for shortness of breath and wheezing. Cardiovascular: Negative for chest pain. Gastrointestinal: Negative for abdominal pain. Musculoskeletal: Negative for neck pain and neck stiffness. Skin: Negative for rash. Neurological: Negative for dizziness, light-headedness and headaches. Hematological: Negative for adenopathy. PhysicalExam     Vitals:    06/23/22 1356   Resp: 16   Temp: 97.7 °F (36.5 °C)   TempSrc: Infrared   Weight: 230 lb (104.3 kg)   Height: 5' 3.75\" (1.619 m)       Physical Exam  Vitals reviewed. Constitutional:       Appearance: Normal appearance. Comments: Intermittent voice hoarseness, slight strain and asthenia   HENT:      Head: Normocephalic and atraumatic. Right Ear: Tympanic membrane, ear canal and external ear normal. There is no impacted cerumen. Left Ear: Tympanic membrane, ear canal and external ear normal. There is no impacted cerumen. Ears:      Card exam findings: does not lateralize. Right Rinne: AC > BC. Left Rinne: AC > BC. Nose: No congestion or rhinorrhea. Mouth/Throat:      Lips: Pink. No lesions. Mouth: Mucous membranes are moist. No oral lesions. Tongue: No lesions. Tongue does not deviate from midline.       Palate: No mass and lesions. Pharynx: Oropharynx is clear. Uvula midline. No oropharyngeal exudate or posterior oropharyngeal erythema. Eyes:      Extraocular Movements: Extraocular movements intact. Pupils: Pupils are equal, round, and reactive to light. Musculoskeletal:      Cervical back: Normal range of motion and neck supple. Lymphadenopathy:      Cervical: No cervical adenopathy. Neurological:      Mental Status: She is alert. Cranial Nerves: No cranial nerve deficit. Deep Tendon Reflexes: Reflexes abnormal.           Data Review        Procedure     Flexible Laryngoscopy    Pre op: Dysphonia   Post op: Glottic gap   Procedure : Flexible Nasopharyngolaryngoscopy  Surgeon: MARK Arroyo  Anesthesia: Afrin with 2% lidocaine  Estimated Blood Loss: None    Procedure:   Flexible Laryngoscopy     After obtaining consent, the patient was placed in the examination chair in the upright position.   Decongestant and topical anesthetic was sprayed in the After allowing adequate time for hemostatic effect, the flexible 4 mm laryngoscope was passed via the right nasal passage    Nasal Septum: No acute septal deviation, no septal hematoma or perforations noted   Nasal Findings: No active hemorrhage, no nasal masses appreciated   Nasopharynx: Clear, no masses or inflammation  Oropharynx: Bilateral tonsillar tissue absent, no exophytic masses  Base of Tongue: Lingual tonsils within normal limits, vallecula without effacement  Epiglottis: Upright, in normal anatomical position  True Vocal Cord: Anatomically normal, no masses or inflammation, normal abduction however spindle shaped glottic gap at the junction of the anterior and middle 1/3s of the vocal cords  False Vocal Cord: normal   Hypopharynx Mucosa: No masses or inflammation of the piriform sinuses or postcricoid area  Arytenoids: Normal mucosa, no dislocation appreciated     * Patient tolerated the procedure well with no complications   * Patient was instructed not to eat for 30 minutes following procedure. * Patient was instructed that they may notice minor bleeding. Attestation:   I was present for the entire viewing, including introduction and removal of the scope. Jeff Lilly MD  6/24/22    Portions of this note were dictated using Dragon.  There may be linguistic errors secondary to the use of this program.

## 2022-06-23 NOTE — PATIENT INSTRUCTIONS

## 2022-06-24 ASSESSMENT — ENCOUNTER SYMPTOMS
ABDOMINAL PAIN: 0
SHORTNESS OF BREATH: 0
WHEEZING: 0
VOICE CHANGE: 1

## 2022-06-29 DIAGNOSIS — R60.0 BILATERAL LOWER EXTREMITY EDEMA: ICD-10-CM

## 2022-06-29 RX ORDER — FUROSEMIDE 20 MG/1
20 TABLET ORAL DAILY PRN
Qty: 30 TABLET | Refills: 0 | Status: SHIPPED | OUTPATIENT
Start: 2022-06-29 | End: 2022-08-02

## 2022-06-29 NOTE — TELEPHONE ENCOUNTER
.  Refill Request     CONFIRM preferrred pharmacy with the patient. If Mail Order Rx - Pend for 90 day refill.       Last Seen: Last Seen Department: 6/2/2022  Last Seen by PCP: 5/9/2022    Last Written: 6-6-22 30 with 0     Next Appointment:   Future Appointments   Date Time Provider Berry Claudio   7/19/2022  1:00 PM Dwain Leone, MAGNOLIA AND SLP APARNA   12/5/2022 10:30 AM Mauri Bunch DO [facility] [place] - [place]   5/22/2023  1:30 PM SCHEDULE, MHCX [facility] AWV LPLILA [facility] - [place]       Future appointment scheduled      Requested Prescriptions     Pending Prescriptions Disp Refills    furosemide (LASIX) 20 MG tablet [Pharmacy Med Name: FUROSEMIDE 20 MG TABLET] 30 tablet 0     Sig: TAKE 1 TABLET BY MOUTH DAILY AS NEEDED (SWELLING IN THE LEGS) FOR SWELLING

## 2022-07-03 DIAGNOSIS — F32.89 OTHER DEPRESSION: ICD-10-CM

## 2022-07-03 DIAGNOSIS — F51.01 PRIMARY INSOMNIA: ICD-10-CM

## 2022-07-03 DIAGNOSIS — G25.81 RLS (RESTLESS LEGS SYNDROME): ICD-10-CM

## 2022-07-03 NOTE — TELEPHONE ENCOUNTER
.  Refill Request     CONFIRM preferrred pharmacy with the patient. If Mail Order Rx - Pend for 90 day refill.       Last Seen: Last Seen Department: 6/2/2022  Last Seen by PCP: 2/14/2022    Last Written: 5/5/22 90 with 1     Next Appointment:   Future Appointments   Date Time Provider Berry Claudio   7/19/2022  1:00 PM Susana Wood, SLP AND SLP APARNA   12/5/2022 10:30 AM Mauri Bunch DO [facility] [place] - [place]   5/22/2023  1:30 PM SCHEDULE, [facility] AWV LPN [facility] [place] - [place]         Requested Prescriptions     Pending Prescriptions Disp Refills    QUEtiapine (SEROQUEL XR) 150 MG TB24 extended release tablet [Pharmacy Med Name: QUETIAPINE  MG TABLET] 90 tablet 1     Sig: TAKE 1 TABLET BY MOUTH EVERY DAY    traZODone (DESYREL) 150 MG tablet [Pharmacy Med Name: TRAZODONE 150 MG TABLET] 90 tablet 1     Sig: TAKE 1 TABLET BY MOUTH EVERY DAY AT NIGHT    levothyroxine (SYNTHROID) 175 MCG tablet [Pharmacy Med Name: LEVOTHYROXINE 175 MCG TABLET] 90 tablet 1     Sig: TAKE 1 TABLET BY MOUTH EVERY DAY    rOPINIRole (REQUIP) 1 MG tablet [Pharmacy Med Name: ROPINIROLE HCL 1 MG TABLET] 270 tablet 1     Sig: TAKE 1 TABLET BY MOUTH THREE TIMES DAILY

## 2022-07-04 RX ORDER — QUETIAPINE 150 MG/1
TABLET, FILM COATED, EXTENDED RELEASE ORAL
Qty: 90 TABLET | Refills: 1 | Status: SHIPPED | OUTPATIENT
Start: 2022-07-04

## 2022-07-04 RX ORDER — LEVOTHYROXINE SODIUM 175 UG/1
TABLET ORAL
Qty: 90 TABLET | Refills: 1 | Status: SHIPPED | OUTPATIENT
Start: 2022-07-04

## 2022-07-04 RX ORDER — ROPINIROLE 1 MG/1
TABLET, FILM COATED ORAL
Qty: 270 TABLET | Refills: 1 | Status: SHIPPED | OUTPATIENT
Start: 2022-07-04

## 2022-07-04 RX ORDER — TRAZODONE HYDROCHLORIDE 150 MG/1
TABLET ORAL
Qty: 90 TABLET | Refills: 1 | Status: SHIPPED | OUTPATIENT
Start: 2022-07-04

## 2022-07-07 RX ORDER — VERAPAMIL HYDROCHLORIDE 180 MG/1
CAPSULE, EXTENDED RELEASE ORAL
Qty: 180 CAPSULE | Refills: 1 | Status: SHIPPED | OUTPATIENT
Start: 2022-07-07

## 2022-07-07 NOTE — TELEPHONE ENCOUNTER
Refill Request     CONFIRM preferrred pharmacy with the patient. If Mail Order Rx - Pend for 90 day refill.       Last Seen: Last Seen Department: 6/2/2022  Last Seen by PCP: Visit date not found    Last Written: 05/05/2022 180 Capsule 1 Refill    Next Appointment:   Future Appointments   Date Time Provider Berry Claudio   7/19/2022  1:00 PM MAGNOLIA Grullon AND SLP MMA   12/5/2022 10:30 AM Mauri Bunch DO [facility] [place] - [place]   5/22/2023  1:30 PM SCHEDULE, [facility] AWV MANOLO [facility] - [place]       Future appointment scheduled      Requested Prescriptions     Pending Prescriptions Disp Refills    verapamil (VERELAN) 180 MG extended release capsule [Pharmacy Med Name: VERAPAMIL  MG CAPSULE] 180 capsule 1     Sig: TAKE 2 CAPSULES BY MOUTH EVERY DAY AT NIGHT

## 2022-07-11 RX ORDER — ATORVASTATIN CALCIUM 40 MG/1
40 TABLET, FILM COATED ORAL NIGHTLY
Qty: 90 TABLET | Refills: 1 | Status: SHIPPED | OUTPATIENT
Start: 2022-07-11

## 2022-07-11 NOTE — TELEPHONE ENCOUNTER
.  Refill Request     CONFIRM preferrred pharmacy with the patient. If Mail Order Rx - Pend for 90 day refill.       Last Seen: Last Seen Department: 6/2/2022  Last Seen by PCP: 5/9/2022    Last Written: 5-5-22 90 with 1     Next Appointment:   Future Appointments   Date Time Provider Berry Claudio   7/19/2022  1:00 PM Corinne Bell, MAGNOLIA AND SLP APARNA   12/5/2022 10:30 AM Mauri Bunch DO [facility] [place] - [place]   5/22/2023  1:30 PM SCHEDULE, [facility] AWV LPLILA [facility] - [place]       Future appointment scheduled      Requested Prescriptions     Pending Prescriptions Disp Refills    atorvastatin (LIPITOR) 40 MG tablet [Pharmacy Med Name: ATORVASTATIN 40 MG TABLET] 90 tablet 1     Sig: TAKE 1 TABLET BY MOUTH NIGHTLY

## 2022-07-19 DIAGNOSIS — Z86.711 HISTORY OF PULMONARY EMBOLUS (PE): Primary | ICD-10-CM

## 2022-07-29 ENCOUNTER — OFFICE VISIT (OUTPATIENT)
Dept: ORTHOPEDIC SURGERY | Age: 72
End: 2022-07-29
Payer: MEDICARE

## 2022-07-29 VITALS — WEIGHT: 230 LBS | BODY MASS INDEX: 40.75 KG/M2 | HEIGHT: 63 IN

## 2022-07-29 DIAGNOSIS — M79.671 FOOT PAIN, RIGHT: ICD-10-CM

## 2022-07-29 DIAGNOSIS — M17.0 PRIMARY OSTEOARTHRITIS OF BOTH KNEES: Primary | ICD-10-CM

## 2022-07-29 PROCEDURE — 20610 DRAIN/INJ JOINT/BURSA W/O US: CPT | Performed by: ORTHOPAEDIC SURGERY

## 2022-07-29 RX ORDER — BUPIVACAINE HYDROCHLORIDE 5 MG/ML
6 INJECTION, SOLUTION PERINEURAL ONCE
Status: COMPLETED | OUTPATIENT
Start: 2022-07-29 | End: 2022-07-29

## 2022-07-29 RX ORDER — TRIAMCINOLONE ACETONIDE 40 MG/ML
80 INJECTION, SUSPENSION INTRA-ARTICULAR; INTRAMUSCULAR ONCE
Status: COMPLETED | OUTPATIENT
Start: 2022-07-29 | End: 2022-07-29

## 2022-07-29 RX ADMIN — TRIAMCINOLONE ACETONIDE 80 MG: 40 INJECTION, SUSPENSION INTRA-ARTICULAR; INTRAMUSCULAR at 15:01

## 2022-07-29 RX ADMIN — BUPIVACAINE HYDROCHLORIDE 30 MG: 5 INJECTION, SOLUTION PERINEURAL at 15:00

## 2022-07-29 NOTE — PROGRESS NOTES
ORTHOPAEDIC SURGERY FOLLOWUP VISIT     CHIEF COMPLAINT:  Bilateral knee pain, right foot pain x10 days     DIAGNOSIS: Bilateral knee osteoarthritis  DATE OF SURGERY: N/A     HISTORY OF PRESENT ILLNESS:  76-year-old female with bilateral knee pain presents for repeat evaluation of her knees. She received Euflexxa series of injections in April. She indicates that she got excellent relief from this, but her relief is waning. Her pain is increasing gradually. She rates it an 8 out of 10 to bilateral knees. She started back to work and feels as though this has increased her pain. She is interested in repeat corticosteroid injection. As a separate complaint today, the patient stubbed her right foot against an object approximately 10 days ago. She has attempted to self manage this with jose taping and limiting her activity. She has remained ambulatory. She feels as though it has not improved significantly, and inquires whether it might be broken. PHYSICAL EXAM:  Focused examination of the left knee:  No open wounds. Prior surgical scar of the anterior knee is maturely healed. There is no effusion. There is no signs of dehiscence. No warmth or erythema. Knee range of motion is full extension to 110 degrees of flexion without difficulty. Remains neurovascular intact     Focused examination of the right knee: There are no cuts, open wounds, or abrasions. No surgical scars. There is no effusion. There is no signs of dehiscence. No warmth or erythema. Knee range of motion is full extension to 110 degrees of flexion without difficulty. Remains neurovascularly intact. Focused examination of the right foot: There is no cuts, open wound, or abrasion over the right foot. The small toe is warm and reddened. There is tenderness to palpation diffusely about the MTP and distally. She can wiggle it without difficulty. There is no ecchymosis. The foot is neurovascular intact.      RADIOGRAPHIC EXAM:  No new knee x-rays obtained today. 3 views of the right foot including AP, oblique, and lateral projections demonstrate a fracture of the fifth proximal phalanx in the metaphyseal region. There is minor displacement. There is minor angulation with apex medial.  Overall alignment is maintained. No other fractures are noted. There is degenerative changes involving the hallux MTP and hallux IP joint. No significant soft tissue swelling. ASSESSMENT AND PLAN:     79-year-old female with:    Bilateral knee osteoarthritis     Each knee was flexed to 90 degrees. The anterolateral aspect of each knee was prepped with chlorhexadine. Ethyl chloride spray was used to anesthetize the skin. A 25-gauge needle was used to administer intra-articularly a mixture of Kenalog 40 mg mixed with 3 cc bupivacaine 0.5% plain. She tolerated this well. It was dressed with a Band-Aid. The same technique was repeated for each knee. She was given postinjection instructions. She will continue activities as tolerated and use temperature modalities as necessary. She was informed that she would be eligible for repeat injection in 3 months, corticosteroid or viscosupplementation if desired. 2.  Right foot fifth proximal phalanx fracture, minimally displaced    I reviewed the x-rays with her in detail. I recommended weightbearing as tolerated with a stiff soled shoe. She can also buddy tape for comfort. She will use over-the-counter medications for pain and modify her activity as appropriate. I counseled her on temperature modalities. Repeat x-rays could be performed if pain does not dissipate as expected. Recommend nonsurgical management. I would see her back on an as-needed basis going forward.      Jabari Morales MD

## 2022-08-02 DIAGNOSIS — R60.0 BILATERAL LOWER EXTREMITY EDEMA: ICD-10-CM

## 2022-08-02 RX ORDER — FUROSEMIDE 20 MG/1
TABLET ORAL
Qty: 30 TABLET | Refills: 1 | Status: SHIPPED | OUTPATIENT
Start: 2022-08-02

## 2022-08-02 NOTE — TELEPHONE ENCOUNTER
Refill Request     CONFIRM preferrred pharmacy with the patient. If Mail Order Rx - Pend for 90 day refill.       Last Seen: Last Seen Department: 6/2/2022  Last Seen by PCP: 5/9/2022    Last Written: 06/29/2022 30 tablet 0 refills    Next Appointment:   Future Appointments   Date Time Provider Berry Claudio   10/18/2022  3:00 PM Gerardo Soliz, SLP AND SLP MMA   12/5/2022 10:30 AM Mauri Bunch DO [facility] [place] - [place]   5/22/2023  1:30 PM SCHEDULE, [facility] AWV LPN [facility] - [place]       Future appointment scheduled      Requested Prescriptions     Pending Prescriptions Disp Refills    furosemide (LASIX) 20 MG tablet [Pharmacy Med Name: FUROSEMIDE 20 MG TABLET] 30 tablet 0     Sig: TAKE 1 TABLET BY MOUTH DAILY AS NEEDED FOR SWELLING (IN THE LEGS)

## 2022-08-19 ENCOUNTER — OFFICE VISIT (OUTPATIENT)
Dept: VASCULAR SURGERY | Age: 72
End: 2022-08-19
Payer: MEDICARE

## 2022-08-19 VITALS
SYSTOLIC BLOOD PRESSURE: 110 MMHG | WEIGHT: 232 LBS | HEIGHT: 63 IN | DIASTOLIC BLOOD PRESSURE: 70 MMHG | BODY MASS INDEX: 41.11 KG/M2

## 2022-08-19 DIAGNOSIS — Z86.711 PERSONAL HISTORY OF PULMONARY EMBOLISM: Primary | ICD-10-CM

## 2022-08-19 PROCEDURE — 99203 OFFICE O/P NEW LOW 30 MIN: CPT | Performed by: SURGERY

## 2022-08-19 NOTE — PROGRESS NOTES
Outpatient Consultation / H&P    Date of Consultation:  8/19/2022    PCP:  Ligia Dimas DO     Referring Provider:  Dr. Zeinab Edwards     Chief Complaint:   Chief Complaint   Patient presents with    Other     Patient was ref by Dee Abbasi for HX of PE.pamlr        History of Present Illness: We are asked to see this patient in consultation by Dr. Zeinab Edwards regarding prior PE. Yamil Romo is a 67 y.o. female who was in hospital in February with TIA like symptoms. At that time she was found to have bilateral PE and was started on anticoagulation. Duplex of lower extremities showed no evidence of DVT. Etiology of PE thus a bit unclear. She has been treated for past 6 months with anticoagulation but has now stopped- she says because of expense. She denies any CP, SOB, or leg pain/swelling. Past Medical History:  Past Medical History:   Diagnosis Date    Arthritis     Bipolar disorder ([facility] [address]     questionable dx    Cellulitis     L ankle, recurrent; over area of prior surgeries    Chronic back pain     Closed fracture of left fibula 1995    s/p fall; surgery x 6    Closed fracture of left tibia 1995    s/p fall on ice; hx of surgery x 6    Colon polyps     Depression     Fibroid (bleeding) (uterine) 1982    Fibromyalgia     GERD (gastroesophageal reflux disease)     Hyperlipidemia     Hypertension     Myoview Lexiscan WNL on 5/17/12    Hypothyroidism     Influenza A 01/31/2020    Migraine     Miscarriage     Morbid obesity ([facility] [address]     Neuropathy 2011    seen initially by neuro., Dr. Christiana Garduno, on 8/11/10; EMG 7/16/10 showed abn.'s c/w L5 radiculopathy & periph. neuropathy; pt. reports sx in hands and feet ; dx'd by rheum.      MAXIMO (obstructive sleep apnea) 06/07/2012    mild-does not require appliance    RLS (restless legs syndrome)     Urinary incontinence     Urine incontinence     saw urology, Dr. Brooke Boyd, on 7/21/11 for microscopic hematuria & hematuria; was started on Vesicare & scheduled for cystoscopy Vitamin D deficiency 2/27/12    22 ng/mL       Past Surgical History:  Past Surgical History:   Procedure Laterality Date    BARIATRIC SURGERY  11/14/12    Laparoscopic Sleeve Gastrectomy, Lap Hiatal hernia repair    CHOLECYSTECTOMY  1985    COLONOSCOPY  2010     colon polyps; Dr. Panchito Arias Left 08/31/2018    phaco with IOL    HIATAL HERNIA REPAIR      lap    HYSTERECTOMY (CERVIX STATUS UNKNOWN)  1982    TAHBSO for fibroids and DUB    LEG SURGERY  1995    L; s/p tibia and fibular fx's; has uma in L tibia; surgery x 6    OVARY REMOVAL      AK XCAPSL CTRC RMVL INSJ IO LENS PROSTH W/O ECP Left 8/31/2018    PHACOEMULSIFICATION OF CATARACT LEFT EYE WITH INTRAOCULAR LENS IMPLANT performed by Rj Daily MD at [facility] W/O ECP Right 9/14/2018    PHACOEMULSIFICATION OF CATARACT RIGHT  EYE WITH INTRAOCULAR LENS IMPLANT performed by Rj Daily MD at [facility] out\"    TONSILLECTOMY      UPPER GASTROINTESTINAL ENDOSCOPY  2010    Dr. Deric Lugo ENDOSCOPY  4-27-12    WNL       Home Medications:   Prior to Admission medications    Medication Sig Start Date End Date Taking?  Authorizing Provider   furosemide (LASIX) 20 MG tablet TAKE 1 TABLET BY MOUTH DAILY AS NEEDED FOR SWELLING (IN THE LEGS) 8/2/22  Yes Mauri Bunch, DO   atorvastatin (LIPITOR) 40 MG tablet TAKE 1 TABLET BY MOUTH NIGHTLY 7/11/22  Yes Lindsay Espinosa, DO   verapamil (VERELAN) 180 MG extended release capsule TAKE 2 CAPSULES BY MOUTH EVERY DAY AT NIGHT 7/7/22  Yes Mauri Bunch DO   QUEtiapine (SEROQUEL XR) 150 MG TB24 extended release tablet TAKE 1 TABLET BY MOUTH EVERY DAY 7/4/22  Yes Mauri Bunch DO   traZODone (DESYREL) 150 MG tablet TAKE 1 TABLET BY MOUTH EVERY DAY AT NIGHT 7/4/22  Yes Mauri Bunch DO   levothyroxine (SYNTHROID) 175 MCG tablet TAKE 1 TABLET BY MOUTH EVERY DAY 7/4/22  Yes Mauri Bunch DO   rOPINIRole (REQUIP) 1 MG tablet TAKE 1 TABLET BY MOUTH THREE TIMES DAILY 7/4/22  Yes Ambika Bunch DO   albuterol sulfate HFA (PROVENTIL HFA) 108 (90 Base) MCG/ACT inhaler Inhale 2 puffs into the lungs every 6 hours as needed for Wheezing or Shortness of Breath 5/5/22  Yes Mauri Bunch DO   metoprolol succinate (TOPROL XL) 25 MG extended release tablet Take 1 tablet by mouth daily 5/5/22  Yes Mauri Bunch DO   omeprazole (PRILOSEC) 40 MG delayed release capsule TAKE 1 CAPSULE EVERY DAY 5/5/22  Yes Mauri Bunch DO   benzonatate (TESSALON PERLES) 100 MG capsule Take one to two caps by mouth as needed for cough three times a day. 9/13/21  Yes SANDOR Anderson CNP   apixaban (ELIQUIS) 5 MG TABS tablet 1 bid 6/14/22   Mauri Bunch DO   gabapentin (NEURONTIN) 300 MG capsule Take 1 capsule by mouth 3 times daily for 30 days. 5/5/22 6/4/22  Mauri Bunch DO   buPROPion (WELLBUTRIN XL) 150 MG extended release tablet TAKE 2 TABLETS EVERY DAY 5/5/22   Mauri Bunch DO   oxybutynin (DITROPAN) 5 MG tablet Take 1 tablet by mouth 2 times daily 5/5/22   Mauri Bunch DO        Allergies:  Patient has no known allergies. Social History:      Social History     Socioeconomic History    Marital status:      Spouse name: Not on file    Number of children: Not on file    Years of education: Not on file    Highest education level: Not on file   Occupational History    Not on file   Tobacco Use    Smoking status: Never    Smokeless tobacco: Never   Vaping Use    Vaping Use: Never used   Substance and Sexual Activity    Alcohol use:  Yes     Alcohol/week: 0.0 standard drinks     Comment: 1 beer twice a year    Drug use: No    Sexual activity: Yes   Other Topics Concern    Not on file   Social History Narrative    Not on file     Social Determinants of Health     Financial Resource Strain: Low Risk     Difficulty of Paying Living Expenses: Not hard at all   Food Insecurity: No Food Insecurity    Worried About [address] in the Last Year: Never true    [address] in the Last Year: Never true   Transportation Needs: No Transportation Needs    Lack of Transportation (Medical): No    Lack of Transportation (Non-Medical): No   Physical Activity: Unknown    Days of Exercise per Week: 0 days    Minutes of Exercise per Session: Not on file   Stress: Not on file   Social Connections: Not on file   Intimate Partner Violence: Not on file   Housing Stability: Low Risk     Unable to Pay for Housing in the Last Year: No    Number of Places Lived in the Last Year: 1    Unstable Housing in the Last Year: No       Family History:        Problem Relation Age of Onset    Arthritis Mother     Asthma Mother     Diabetes Mother     High Blood Pressure Mother     Depression Mother     Heart Disease Mother 48        CHF    High Cholesterol Mother     Stroke Mother     Mental Illness Mother     Arthritis Father     Cancer Father 36        colon    Stroke Father 48        x 2    Substance Abuse Father         alcohol    Kidney Disease Sister     Cancer Sister 50        urethral CA    Cancer Sister 48        lymphoma    Cancer Maternal Uncle         prostate    Cancer Maternal Uncle         prostate    Cancer Maternal Uncle         prostate    Breast Cancer Maternal Aunt     Breast Cancer Maternal Aunt     Emphysema Neg Hx     Heart Failure Neg Hx     Hypertension Neg Hx        Review of Systems:  A 14 point review of systems was completed. Pertinent positives identified in the HPI, all other review of systems negative. Physical Examination:    /70 (Site: Right Upper Arm)   Ht 5' 3\" (1.6 m)   Wt 232 lb (105.2 kg)   BMI 41.10 kg/m²     Weight: 232 lb (105.2 kg)       General appearance: NAD  Eyes: PERRLA  Neck: no JVD, no lymphadenopathy. Respiratory: effort is unlabored, no crackles, wheezes or rubs. Cardiovascular: regular, no murmur. No carotid bruits. GI: abdomen soft, nondistended, no organomegaly.   Musculoskeletal: strength and tone

## 2022-09-27 DIAGNOSIS — K21.9 GASTROESOPHAGEAL REFLUX DISEASE WITHOUT ESOPHAGITIS: ICD-10-CM

## 2022-09-27 RX ORDER — OMEPRAZOLE 40 MG/1
CAPSULE, DELAYED RELEASE ORAL
Qty: 90 CAPSULE | Refills: 0 | Status: SHIPPED | OUTPATIENT
Start: 2022-09-27

## 2022-09-27 NOTE — TELEPHONE ENCOUNTER
Refill Request     CONFIRM preferrred pharmacy with the patient. If Mail Order Rx - Pend for 90 day refill. Last Seen: Last Seen Department: 6/2/2022  Last Seen by PCP: 5/9/2022    Last Written: 05/05/2022 90 capsule 0 refills     If no future appointment scheduled, route STAFF MESSAGE with patient name to the [facility] for scheduling. Next Appointment:   Future Appointments   Date Time Provider Berry Claudio   10/18/2022  3:00 PM Connie Gann SLP AND SLP MMA   12/5/2022 10:30 AM [facility] [place] [facility] [place]   5/22/2023  1:30 PM SCHEDULE, MHCX [facility] AWV LPN [facility] [place]       Message sent to [employer] to schedule appt with patient?   NO      Requested Prescriptions     Pending Prescriptions Disp Refills    omeprazole (PRILOSEC) 40 MG delayed release capsule 90 capsule 0     Sig: TAKE 1 CAPSULE EVERY DAY

## 2022-11-20 DIAGNOSIS — F32.89 OTHER DEPRESSION: ICD-10-CM

## 2022-11-20 DIAGNOSIS — F51.01 PRIMARY INSOMNIA: ICD-10-CM

## 2022-11-20 DIAGNOSIS — G25.81 RLS (RESTLESS LEGS SYNDROME): ICD-10-CM

## 2022-11-20 DIAGNOSIS — R05.9 COUGH: ICD-10-CM

## 2022-11-20 DIAGNOSIS — R60.0 BILATERAL LOWER EXTREMITY EDEMA: ICD-10-CM

## 2022-11-21 RX ORDER — TRAZODONE HYDROCHLORIDE 150 MG/1
TABLET ORAL
Qty: 90 TABLET | Refills: 1 | Status: SHIPPED | OUTPATIENT
Start: 2022-11-21

## 2022-11-21 RX ORDER — ATORVASTATIN CALCIUM 40 MG/1
40 TABLET, FILM COATED ORAL NIGHTLY
Qty: 90 TABLET | Refills: 1 | Status: SHIPPED | OUTPATIENT
Start: 2022-11-21

## 2022-11-21 RX ORDER — LEVOTHYROXINE SODIUM 175 UG/1
TABLET ORAL
Qty: 90 TABLET | Refills: 1 | Status: SHIPPED | OUTPATIENT
Start: 2022-11-21

## 2022-11-21 RX ORDER — METOPROLOL SUCCINATE 25 MG/1
25 TABLET, EXTENDED RELEASE ORAL DAILY
Qty: 90 TABLET | Refills: 1 | Status: SHIPPED | OUTPATIENT
Start: 2022-11-21

## 2022-11-21 RX ORDER — BENZONATATE 100 MG/1
CAPSULE ORAL
Qty: 30 CAPSULE | Refills: 0 | Status: SHIPPED | OUTPATIENT
Start: 2022-11-21

## 2022-11-21 RX ORDER — QUETIAPINE 150 MG/1
TABLET, FILM COATED, EXTENDED RELEASE ORAL
Qty: 90 TABLET | Refills: 1 | Status: SHIPPED | OUTPATIENT
Start: 2022-11-21

## 2022-11-21 RX ORDER — ROPINIROLE 1 MG/1
TABLET, FILM COATED ORAL
Qty: 270 TABLET | Refills: 1 | Status: SHIPPED | OUTPATIENT
Start: 2022-11-21

## 2022-11-21 RX ORDER — VERAPAMIL HYDROCHLORIDE 180 MG/1
180 CAPSULE, EXTENDED RELEASE ORAL NIGHTLY
Qty: 180 CAPSULE | Refills: 1 | Status: SHIPPED | OUTPATIENT
Start: 2022-11-21

## 2022-11-21 RX ORDER — FUROSEMIDE 20 MG/1
TABLET ORAL
Qty: 90 TABLET | Refills: 1 | Status: SHIPPED | OUTPATIENT
Start: 2022-11-21

## 2022-11-21 NOTE — TELEPHONE ENCOUNTER
.Refill Request     CONFIRM preferrred pharmacy with the patient. If Mail Order Rx - Pend for 90 day refill. Last Seen: Last Seen Department: 6/2/2022  Last Seen by PCP: 5/9/2022    Last Written: 7-11-22 90 with 1     If no future appointment scheduled, route STAFF MESSAGE with patient name to the [facility] for scheduling. Next Appointment:   Future Appointments   Date Time Provider Berry Claudio   12/5/2022 10:30 AM Mauri Bunch DO [facility] [place] - [place]   5/22/2023  1:30 PM SCHEDULE, MHCX [facility] AWV LPN [facility] [place] - [place]       Message sent to [address] [place] [address] to schedule appt with patient?   N/A      Requested Prescriptions     Pending Prescriptions Disp Refills    metoprolol succinate (TOPROL XL) 25 MG extended release tablet 90 tablet 1     Sig: Take 1 tablet by mouth daily    atorvastatin (LIPITOR) 40 MG tablet 90 tablet 1     Sig: Take 1 tablet by mouth nightly    furosemide (LASIX) 20 MG tablet 90 tablet 1     Sig: TAKE 1 TAB BY MOUTH DAILY AS NEEDED FOR SWELLING

## 2022-11-21 NOTE — TELEPHONE ENCOUNTER
Refill Request     CONFIRM preferrred pharmacy with the patient. If Mail Order Rx - Pend for 90 day refill. Last Seen: Last Seen Department: 6/2/2022  Last Seen by PCP: 2/14/2022    Last Written:   Seroquel- 07/04/2022 90 tablet 1 refills   Trazadone- 07/04/2022 90 tablet 1 refills   Synthroid- 07/04/2022 90 tablet 1 refills   Requip-07/04/2022 270 tablet 1 refills     If no future appointment scheduled, route STAFF MESSAGE with patient name to the [facility] for scheduling. Next Appointment:   Future Appointments   Date Time Provider Berry Claudio   12/5/2022 10:30 AM DO JESSICA Blevins  [place] - [place]   5/22/2023  1:30 PM SCHEDULE, YENI [facility] CHARLAV MANOLO [facility] [place]       Message sent to [address] [place] [address] to schedule appt with patient?   NO      Requested Prescriptions     Pending Prescriptions Disp Refills    QUEtiapine (SEROQUEL XR) 150 MG TB24 extended release tablet 90 tablet 1     Sig: TAKE 1 TABLET BY MOUTH EVERY DAY    traZODone (DESYREL) 150 MG tablet 90 tablet 1     Sig: TAKE 1 TABLET BY MOUTH EVERY DAY AT NIGHT    levothyroxine (SYNTHROID) 175 MCG tablet 90 tablet 1     Sig: TAKE 1 TABLET BY MOUTH EVERY DAY    rOPINIRole (REQUIP) 1 MG tablet 270 tablet 1     Sig: TAKE 1 TABLET BY MOUTH THREE TIMES DAILY

## 2022-11-21 NOTE — TELEPHONE ENCOUNTER
Refill Request     CONFIRM preferrred pharmacy with the patient. If Mail Order Rx - Pend for 90 day refill. Last Seen: Last Seen Department: 6/2/2022  Last Seen by PCP: 9/13/2021    Last Written: 09/31/2021 30 capsule 0 refills     If no future appointment scheduled, route STAFF MESSAGE with patient name to the [facility] for scheduling. Next Appointment:   Future Appointments   Date Time Provider Berry Claudio   12/5/2022 10:30 AM Mauri Bunch DO [facility] [place]   5/22/2023  1:30 PM SCHEDULE, MHCX [facility] AWV ALIEN [facility] [place] - [place]       Message sent to [address] [place] [address] to schedule appt with patient? NO      Requested Prescriptions     Pending Prescriptions Disp Refills    benzonatate (TESSALON PERLES) 100 MG capsule 30 capsule 0     Sig: Take one to two caps by mouth as needed for cough three times a day.

## 2022-11-21 NOTE — TELEPHONE ENCOUNTER
Refill Request     CONFIRM preferrred pharmacy with the patient. If Mail Order Rx - Pend for 90 day refill. Last Seen: Last Seen Department: 6/2/2022  Last Seen by PCP: Visit date not found    Last Written: 07/07/2022 180 capsule 1 refills     If no future appointment scheduled, route STAFF MESSAGE with patient name to the [facility] for scheduling. Next Appointment:   Future Appointments   Date Time Provider Berry Claudio   12/5/2022 10:30 AM Mauri Bunch DO [facility] [place] - [place]   5/22/2023  1:30 PM SCHEDULE, MHCX [facility] AWV ALIEN [facility] [place]       Message sent to [address] [place] [address] to schedule appt with patient?   NO      Requested Prescriptions     Pending Prescriptions Disp Refills    verapamil (VERELAN) 180 MG extended release capsule 180 capsule 1

## 2022-11-23 ENCOUNTER — HOSPITAL ENCOUNTER (EMERGENCY)
Age: 72
Discharge: HOME | End: 2022-11-23
Payer: MEDICARE

## 2022-11-23 ENCOUNTER — APPOINTMENT (OUTPATIENT)
Dept: GENERAL RADIOLOGY | Age: 72
End: 2022-11-23
Payer: MEDICARE

## 2022-11-23 VITALS
RESPIRATION RATE: 16 BRPM | SYSTOLIC BLOOD PRESSURE: 154 MMHG | TEMPERATURE: 98.9 F | BODY MASS INDEX: 37.56 KG/M2 | DIASTOLIC BLOOD PRESSURE: 64 MMHG | WEIGHT: 220 LBS | OXYGEN SATURATION: 95 % | HEART RATE: 66 BPM | HEIGHT: 64 IN

## 2022-11-23 DIAGNOSIS — M23.91 INTERNAL DERANGEMENT OF RIGHT KNEE: Primary | ICD-10-CM

## 2022-11-23 PROCEDURE — 6370000000 HC RX 637 (ALT 250 FOR IP): Performed by: PHYSICIAN ASSISTANT

## 2022-11-23 PROCEDURE — 96372 THER/PROPH/DIAG INJ SC/IM: CPT

## 2022-11-23 PROCEDURE — 73560 X-RAY EXAM OF KNEE 1 OR 2: CPT

## 2022-11-23 PROCEDURE — 99284 EMERGENCY DEPT VISIT MOD MDM: CPT

## 2022-11-23 PROCEDURE — 6360000002 HC RX W HCPCS: Performed by: PHYSICIAN ASSISTANT

## 2022-11-23 RX ORDER — KETOROLAC TROMETHAMINE 30 MG/ML
15 INJECTION, SOLUTION INTRAMUSCULAR; INTRAVENOUS ONCE
Status: COMPLETED | OUTPATIENT
Start: 2022-11-23 | End: 2022-11-23

## 2022-11-23 RX ORDER — OXYCODONE HYDROCHLORIDE AND ACETAMINOPHEN 5; 325 MG/1; MG/1
2 TABLET ORAL ONCE
Status: COMPLETED | OUTPATIENT
Start: 2022-11-23 | End: 2022-11-23

## 2022-11-23 RX ORDER — OXYCODONE HYDROCHLORIDE AND ACETAMINOPHEN 5; 325 MG/1; MG/1
1 TABLET ORAL EVERY 6 HOURS PRN
Qty: 10 TABLET | Refills: 0 | Status: SHIPPED | OUTPATIENT
Start: 2022-11-23 | End: 2022-11-26

## 2022-11-23 RX ORDER — OXYCODONE HYDROCHLORIDE 5 MG/1
5 TABLET ORAL ONCE
Status: COMPLETED | OUTPATIENT
Start: 2022-11-23 | End: 2022-11-23

## 2022-11-23 RX ADMIN — OXYCODONE 5 MG: 5 TABLET ORAL at 21:06

## 2022-11-23 RX ADMIN — OXYCODONE HYDROCHLORIDE AND ACETAMINOPHEN 2 TABLET: 5; 325 TABLET ORAL at 18:47

## 2022-11-23 RX ADMIN — KETOROLAC TROMETHAMINE 15 MG: 30 INJECTION, SOLUTION INTRAMUSCULAR; INTRAVENOUS at 19:46

## 2022-11-23 ASSESSMENT — PAIN SCALES - GENERAL
PAINLEVEL_OUTOF10: 6
PAINLEVEL_OUTOF10: 9
PAINLEVEL_OUTOF10: 9
PAINLEVEL_OUTOF10: 4

## 2022-11-23 ASSESSMENT — PAIN DESCRIPTION - PAIN TYPE: TYPE: ACUTE PAIN

## 2022-11-23 ASSESSMENT — PAIN DESCRIPTION - DESCRIPTORS: DESCRIPTORS: ACHING

## 2022-11-23 ASSESSMENT — PAIN - FUNCTIONAL ASSESSMENT: PAIN_FUNCTIONAL_ASSESSMENT: 0-10

## 2022-11-23 ASSESSMENT — PAIN DESCRIPTION - LOCATION: LOCATION: KNEE

## 2022-11-23 ASSESSMENT — PAIN DESCRIPTION - FREQUENCY: FREQUENCY: CONTINUOUS

## 2022-11-23 ASSESSMENT — PAIN DESCRIPTION - ONSET: ONSET: ON-GOING

## 2022-11-23 ASSESSMENT — PAIN DESCRIPTION - ORIENTATION: ORIENTATION: RIGHT

## 2022-11-24 NOTE — ED NOTES
Pt request something else for pain to right knee. Mary CANO made aware.      Leonel Grace, MANOLO  16/02/07 [id number]

## 2022-11-24 NOTE — DISCHARGE INSTRUCTIONS
X-ray was negative. Initially suspect patellar lateral dislocation which was not the case. Is able to extend the knee. Patella in the right position. Uncertain as to the exact cause your pain. I would consider internal derangement. X-ray was negative for fracture. Soft wrap applied. Use walker. I did give you Percocet 5 mg #2 early in visit and at discharge Roxicodone 5 mg p.o. I also gave you Toradol 15 mg IM. This seemed to help your pain. I have sent prescription for Percocet 5/325 #10 to your local [facility] in [employer]. I would like you to contact orthopedic Friday for an appointment on Friday, Monday at the latest.  Apply ice and rest the leg.

## 2022-11-24 NOTE — ED PROVIDER NOTES
[address] [facility]  ED  EMERGENCY DEPARTMENT ENCOUNTER        Pt Name: Lata Bingham  MRN: [id number]  Armsjenngfryan 1950  Date of evaluation: 11/23/2022  Provider: Valeriano Cardenas PA-C  PCP: Marilin Huerta DO  Note Started: 8:56 PM EST 11/23/22          TYLER. I have evaluated this patient. My supervising physician was available for consultation. CHIEF COMPLAINT       Chief Complaint   Patient presents with    Knee Pain     Pt was sitting at 1630 and her right knee \"locked up\". Happened 2 times before, more severe pain this time. HISTORY OF PRESENT ILLNESS   (Location, Timing/Onset, Context/Setting, Quality, Duration, Modifying Factors, Severity, Associated Signs and Symptoms)  Note limiting factors. Chief Complaint: Right knee pain    Lata Bingham is a 67 y.o. female who presents patient indicating approximately 4:30 PM this afternoon she was walking kind of pivoted and sat in her right knee locked up had trouble extending the knee. Has not happened previously. No prior surgery on this knee. She indicates no pain about the ankle or hip. She did not fall to the ground. This is a twisting mechanism with inability to fully extend the knee. Nursing Notes were all reviewed and agreed with or any disagreements were addressed in the HPI. REVIEW OF SYSTEMS    (2-9 systems for level 4, 10 or more for level 5)     Review of Systems    Positives and Pertinent negatives as per HPI. Except as noted above in the ROS, all other systems were reviewed and negative.        PAST MEDICAL HISTORY     Past Medical History:   Diagnosis Date    Arthritis     Bipolar disorder ([facility] [address]     questionable dx    Cellulitis     L ankle, recurrent; over area of prior surgeries    Chronic back pain     Closed fracture of left fibula 1995    s/p fall; surgery x 6    Closed fracture of left tibia 1995    s/p fall on ice; hx of surgery x 6    Colon polyps     Depression     Fibroid (bleeding) (uterine) 1982 Fibromyalgia     GERD (gastroesophageal reflux disease)     Hyperlipidemia     Hypertension     Myoview Lexiscan WNL on 5/17/12    Hypothyroidism     Influenza A 01/31/2020    Migraine     Miscarriage     Morbid obesity ([address]     Neuropathy 2011    seen initially by neuro., Dr. Brook Gibson, on 8/11/10; EMG 7/16/10 showed abn.'s c/w L5 radiculopathy & periph. neuropathy; pt. reports sx in hands and feet ; dx'd by rheum.      MAXIMO (obstructive sleep apnea) 06/07/2012    mild-does not require appliance    RLS (restless legs syndrome)     Urinary incontinence     Urine incontinence     saw urology, Dr. Dario Avila, on 7/21/11 for microscopic hematuria & hematuria; was started on Vesicare & scheduled for cystoscopy    Vitamin D deficiency 2/27/12    22 ng/mL         SURGICAL HISTORY     Past Surgical History:   Procedure Laterality Date    BARIATRIC SURGERY  11/14/12    Laparoscopic Sleeve Gastrectomy, Lap Hiatal hernia repair    CHOLECYSTECTOMY  1985    COLONOSCOPY  2010     colon polyps; Dr. Verba Heimlich Left 08/31/2018    phaco with IOL    HIATAL HERNIA REPAIR      lap    HYSTERECTOMY (CERVIX STATUS UNKNOWN)  1982    TAHBSO for fibroids and DUB    LEG SURGERY  1995    L; s/p tibia and fibular fx's; has uma in L tibia; surgery x 6    OVARY REMOVAL      AL XCAPSL CTRC RMVL INSJ IO LENS PROSTH W/O ECP Left 8/31/2018    PHACOEMULSIFICATION OF CATARACT LEFT EYE WITH INTRAOCULAR LENS IMPLANT performed by Kelly Garzon MD at [address] W/O ECP Right 9/14/2018    PHACOEMULSIFICATION OF CATARACT RIGHT  EYE WITH INTRAOCULAR LENS IMPLANT performed by Kelly Garzon MD at [facility] out\"    TONSILLECTOMY      UPPER GASTROINTESTINAL ENDOSCOPY  2010    Dr. Christa Ricardo  4-27-12    WNL         CURRENTMEDICATIONS       Previous Medications    ALBUTEROL SULFATE HFA (PROVENTIL HFA) 108 (90 BASE) MCG/ACT INHALER    Inhale 2 puffs into the lungs every 6 hours as needed for Wheezing or Shortness of Breath    APIXABAN (ELIQUIS) 5 MG TABS TABLET    1 bid    ATORVASTATIN (LIPITOR) 40 MG TABLET    Take 1 tablet by mouth nightly    BENZONATATE (TESSALON PERLES) 100 MG CAPSULE    Take one to two caps by mouth as needed for cough three times a day. BUPROPION (WELLBUTRIN XL) 150 MG EXTENDED RELEASE TABLET    TAKE 2 TABLETS EVERY DAY    FUROSEMIDE (LASIX) 20 MG TABLET    TAKE 1 TAB BY MOUTH DAILY AS NEEDED FOR SWELLING    GABAPENTIN (NEURONTIN) 300 MG CAPSULE    Take 1 capsule by mouth 3 times daily for 30 days. LEVOTHYROXINE (SYNTHROID) 175 MCG TABLET    TAKE 1 TABLET BY MOUTH EVERY DAY    METOPROLOL SUCCINATE (TOPROL XL) 25 MG EXTENDED RELEASE TABLET    Take 1 tablet by mouth daily    OMEPRAZOLE (PRILOSEC) 40 MG DELAYED RELEASE CAPSULE    TAKE 1 CAPSULE EVERY DAY    OXYBUTYNIN (DITROPAN) 5 MG TABLET    Take 1 tablet by mouth 2 times daily    QUETIAPINE (SEROQUEL XR) 150 MG TB24 EXTENDED RELEASE TABLET    TAKE 1 TABLET BY MOUTH EVERY DAY    ROPINIROLE (REQUIP) 1 MG TABLET    TAKE 1 TABLET BY MOUTH THREE TIMES DAILY    TRAZODONE (DESYREL) 150 MG TABLET    TAKE 1 TABLET BY MOUTH EVERY DAY AT NIGHT    VERAPAMIL (VERELAN) 180 MG EXTENDED RELEASE CAPSULE    Take 1 capsule by mouth nightly         ALLERGIES     Patient has no known allergies.     FAMILYHISTORY       Family History   Problem Relation Age of Onset    Arthritis Mother     Asthma Mother     Diabetes Mother     High Blood Pressure Mother     Depression Mother     Heart Disease Mother 48        CHF    High Cholesterol Mother     Stroke Mother     Mental Illness Mother     Arthritis Father     Cancer Father 36        colon    Stroke Father 48        x 2    Substance Abuse Father         alcohol    Kidney Disease Sister     Cancer Sister 50        urethral CA    Cancer Sister 48        lymphoma    Cancer Maternal Uncle         prostate    Cancer Maternal Uncle         prostate    Cancer Maternal Uncle prostate    Breast Cancer Maternal Aunt     Breast Cancer Maternal Aunt     Emphysema Neg Hx     Heart Failure Neg Hx     Hypertension Neg Hx           SOCIAL HISTORY       Social History     Tobacco Use    Smoking status: Never    Smokeless tobacco: Never   Vaping Use    Vaping Use: Never used   Substance Use Topics    Alcohol use: Yes     Alcohol/week: 0.0 standard drinks     Comment: 1 beer twice a year    Drug use: No       SCREENINGS    Rinard Coma Scale  Eye Opening: Spontaneous  Best Verbal Response: Oriented  Best Motor Response: Obeys commands  Rinard Coma Scale Score: 15        PHYSICAL EXAM    (up to 7 for level 4, 8 or more for level 5)     ED Triage Vitals [11/23/22 1819]   BP Temp Temp Source Heart Rate Resp SpO2 Height Weight   (!) 165/85 98.9 °F (37.2 °C) Oral 72 14 100 % 5' 4\" (1.626 m) 220 lb (99.8 kg)       Physical Exam  Vitals and nursing note reviewed. Constitutional:       Appearance: Normal appearance. She is well-developed. She is obese. HENT:      Head: Normocephalic and atraumatic. Right Ear: External ear normal.      Left Ear: External ear normal.   Eyes:      General: No scleral icterus. Right eye: No discharge. Left eye: No discharge. Conjunctiva/sclera: Conjunctivae normal.   Cardiovascular:      Rate and Rhythm: Normal rate. Pulmonary:      Effort: Pulmonary effort is normal.   Abdominal:      General: Abdomen is flat. Bowel sounds are normal.      Palpations: Abdomen is soft. Tenderness: There is no abdominal tenderness. Musculoskeletal:         General: Swelling and tenderness present. No deformity or signs of injury. Cervical back: Normal range of motion and neck supple. Right lower leg: No edema. Left lower leg: No edema. Comments: Patient exhibits tenderness on the lateral aspect of the knee. It is slightly flexed position.   Patella seems somewhat lateral though difficult to fully evaluate due to the patient's size.  Will obtain imaging. Skin:     General: Skin is warm and dry. Neurological:      General: No focal deficit present. Mental Status: She is alert and oriented to person, place, and time. Mental status is at baseline. Psychiatric:         Mood and Affect: Mood normal.         Behavior: Behavior normal.         Thought Content: Thought content normal.         Judgment: Judgment normal.       DIAGNOSTIC RESULTS   LABS:    Labs Reviewed - No data to display    When ordered only abnormal lab results are displayed. All other labs were within normal range or not returned as of this dictation. EKG: When ordered, EKG's are interpreted by the Emergency Department Physician in the absence of a cardiologist.  Please see their note for interpretation of EKG. RADIOLOGY:   Non-plain film images such as CT, Ultrasound and MRI are read by the radiologist. Plain radiographic images are visualized and preliminarily interpreted by the ED Provider with the below findings:        Interpretation per the Radiologist below, if available at the time of this note:    XR KNEE RIGHT (1-2 VIEWS)   Final Result   Moderate osteoarthritic changes in the knee and along the patellofemoral   joint with no acute bony abnormality. Small suprapatellar effusion. XR KNEE RIGHT (1-2 VIEWS)    Result Date: 11/23/2022  EXAMINATION: 2 XRAY VIEWS OF THE RIGHT KNEE 11/23/2022 6:41 pm COMPARISON: None. HISTORY: ORDERING SYSTEM PROVIDED HISTORY: Severe pain, consider patellar dislocation TECHNOLOGIST PROVIDED HISTORY: Reason for exam:->Severe pain, consider patellar dislocation Reason for Exam: severe pain, concern for patella dislocation FINDINGS: There is moderate joint space narrowing in the knee with mild osteophytes throughout. No fracture or dislocation is seen. There is moderate narrowing of the patellofemoral joint with a small suprapatellar effusion. No fracture or dislocation is seen.      Moderate osteoarthritic changes in the knee and along the patellofemoral joint with no acute bony abnormality. Small suprapatellar effusion. PROCEDURES     Soft cast applied by staff. This consist of 6 inch cast padding and 6 inch Ace wraps. This provide compression. Procedures    CRITICAL CARE TIME       CONSULTS:  None      EMERGENCY DEPARTMENT COURSE and DIFFERENTIAL DIAGNOSIS/MDM:   Vitals:    Vitals:    11/23/22 1819 11/23/22 2013   BP: (!) 165/85 (!) 154/64   Pulse: 72 66   Resp: 14 16   Temp: 98.9 °F (37.2 °C)    TempSrc: Oral    SpO2: 100% 95%   Weight: 220 lb (99.8 kg)    Height: 5' 4\" (1.626 m)        Patient was given the following medications:  Medications   oxyCODONE (ROXICODONE) immediate release tablet 5 mg (has no administration in time range)   oxyCODONE-acetaminophen (PERCOCET) 5-325 MG per tablet 2 tablet (2 tablets Oral Given 11/23/22 1847)   ketorolac (TORADOL) injection 15 mg (15 mg IntraMUSCular Given 11/23/22 1946)         Is this patient to be included in the SEP-1 Core Measure due to severe sepsis or septic shock? No   Exclusion criteria - the patient is NOT to be included for SEP-1 Core Measure due to: Infection is not suspected    Patient with a twisting mechanism injury to the right knee. Initially suspected patellar lateral dislocation. Patient given Percocet 5 mg #2 and Toradol 15 mg IM x1. Pain improves. I am able to extend the knee. Patellar is in the right position. X-ray negative. Soft cast applied. She will use walker at home. Prior to discharge she is given oxycodone 5 mg p.o. I did send prescription of Percocet 5/325 #10 to her local pharmacy. They will contact orthopedic for follow-up on Friday, Monday at the latest.  Ice application and rest.  The patient and daughter both expressed understanding of the diagnosis and the treatment plan. FINAL IMPRESSION      1.  Internal derangement of right knee          DISPOSITION/PLAN   DISPOSITION Decision To Discharge 11/23/2022 08:59:47 PM      PATIENT REFERRED TO:  Kenisha Dc [address]  [address]  [phone]    Schedule an appointment as soon as possible for a visit in 2 days      DO Valentín [address] [phone] [address]    Schedule an appointment as soon as possible for a visit   As needed    [facility]  ED  [address] [facility] [address]  Go to   If symptoms worsen    DISCHARGE MEDICATIONS:  New Prescriptions    OXYCODONE-ACETAMINOPHEN (PERCOCET) 5-325 MG PER TABLET    Take 1 tablet by mouth every 6 hours as needed for Pain for up to 3 days. WARNING:  May cause drowsiness. May impair ability to operate vehicles or machinery. Do not use in combination with alcohol. DISCONTINUED MEDICATIONS:  Discontinued Medications    No medications on file              (Please note that portions of this note were completed with a voice recognition program.  Efforts were made to edit the dictations but occasionally words are mis-transcribed. )    Mynor Ross PA-C (electronically signed)            Mynor Ross PA-C  11/23/22 [id number]

## 2022-11-24 NOTE — ED NOTES
Pt right knee applied with 6in gauze x2/6in strapping ace wrap x2 for comfort/pt placed in pair of pajama bottoms for comfort.      Dax Kruger, MANOLO  97/99/11 [id number]

## 2022-11-24 NOTE — ED NOTES
Pt refused crutches. Pt states \"I feel safer with using my walker at home\". [place] [facility] PA made aware.      Naomi Hall LPN  27/64/12 8225

## 2022-11-24 NOTE — ED NOTES
Pt scripts x1 instructed to follow up with [facility]. Assessed per Maulik CANO.      Gaby Velasquez LPN  20/85/21 106

## 2022-11-25 ENCOUNTER — OFFICE VISIT (OUTPATIENT)
Dept: ORTHOPEDIC SURGERY | Age: 72
End: 2022-11-25

## 2022-11-25 ENCOUNTER — TELEPHONE (OUTPATIENT)
Dept: ORTHOPEDIC SURGERY | Age: 72
End: 2022-11-25

## 2022-11-25 VITALS — BODY MASS INDEX: 37.56 KG/M2 | HEIGHT: 64 IN | WEIGHT: 220 LBS

## 2022-11-25 DIAGNOSIS — M17.0 PRIMARY OSTEOARTHRITIS OF BOTH KNEES: Primary | ICD-10-CM

## 2022-11-25 RX ORDER — BUPIVACAINE HYDROCHLORIDE 5 MG/ML
3 INJECTION, SOLUTION PERINEURAL ONCE
Status: COMPLETED | OUTPATIENT
Start: 2022-11-25 | End: 2022-11-25

## 2022-11-25 RX ORDER — TRIAMCINOLONE ACETONIDE 40 MG/ML
80 INJECTION, SUSPENSION INTRA-ARTICULAR; INTRAMUSCULAR ONCE
Status: COMPLETED | OUTPATIENT
Start: 2022-11-25 | End: 2022-11-25

## 2022-11-25 RX ADMIN — BUPIVACAINE HYDROCHLORIDE 15 MG: 5 INJECTION, SOLUTION PERINEURAL at 12:05

## 2022-11-25 RX ADMIN — TRIAMCINOLONE ACETONIDE 80 MG: 40 INJECTION, SUSPENSION INTRA-ARTICULAR; INTRAMUSCULAR at 12:06

## 2022-11-25 NOTE — PROGRESS NOTES
ORTHOPAEDIC SURGERY FOLLOWUP VISIT    CHIEF COMPLAINT:  Right knee injury    DATE OF INJURY: 11/23/2022    HISTORY OF PRESENT ILLNESS:  74-year-old female presents for evaluation of acute on chronic right knee pain. She indicates that 2 days ago, she went to sit down and her knee locked up. She had previously been managing her arthritic right knee with occasional intra-articular injections. She indicates that her last injection was in July and was functioning very well. She was functioning without significant pain on a day-to-day basis. Today, she rates her pain a 7 out of 10. Its over the anterolateral aspect of the knee. She indicates that she has had trouble bending it. She has been icing it. She believes that her mobility has improved slightly from the time of her ER visit. PHYSICAL EXAM:  General: Well-appearing female of stated age. No acute distress. Focused examination of right knee: No cuts, open wounds, or abrasions to the right knee. Moderate swelling is present. No obvious effusion. There is tenderness palpation about the anterolateral aspect of the knee along the lateral joint line. Knee range of motion is full extension to 60 degrees of flexion within the limits of pain. There is negative anterior posterior drawer test.  Negative Lachman. There is pain reproduced with any movement of the knee. There is pain with patellar ballottement. Patellar tracking is appropriate. Sensation is intact to light touch in deep peroneal, superficial peroneal, tibial, sural, and saphenous nerve distributions. Motor function is intact to EHL, FHL, tibialis anterior, and gastroc. There is brisk capillary refill to the toes and a strong palpable dorsalis pedis pulse. Compartments are soft and compressible. There is no calf tenderness and a negative Homans' sign. RADIOGRAPHIC EXAM:  Weightbearing tunnel view and sunrise x-rays demonstrate no evidence of fracture or dislocation.   There is moderate to severe osteoarthritis involving the right knee, primarily of the lateral compartment, with valgus malalignment. Sunrise x-ray demonstrates lateral patellar tracking with bone to bone arthritic change in the lateral facet. There is marginal osteophyte formation. No obvious effusion. No fractures appreciated. ASSESSMENT AND PLAN:  69-year-old female with right knee acute on chronic pain    Reviewed the x-rays in detail with the patient. She has a significantly arthritic knee. I reviewed that she may have aggravated her osteoarthritis versus sustained some additional meniscal pathology on top of her already degenerative meniscus injury. I recommended that she use conservative management including ice, oral anti-inflammatories, activity modification. She may weight-bear as tolerated on her right knee. I offered an intra-articular corticosteroid injection to calm down the inflammation. She excepted this today. Therefore, the lateral aspect of the knee was sterilely prepped with Betadine. Ethyl chloride spray was used to anesthetize the skin. Kenalog 80 mg mixed with 3 cc Marcaine half percent plain was administered intra-articularly. She tolerated this well. It was dressed with a Band-Aid. She was given verbal postinjection instructions. Additionally, I recommended a low profile economy hinged brace to provide stability and ability for her to progress her weightbearing, which is currently limited. She was fit for a hinged brace. Follow-up on an as-needed basis in the future. I advised her to consider more seriously the possibility of total knee arthroplasty in the future. She will return as needed.     Raul Velasco MD

## 2022-12-05 ENCOUNTER — OFFICE VISIT (OUTPATIENT)
Dept: FAMILY MEDICINE CLINIC | Age: 72
End: 2022-12-05
Payer: MEDICARE

## 2022-12-05 VITALS
WEIGHT: 234.4 LBS | SYSTOLIC BLOOD PRESSURE: 132 MMHG | OXYGEN SATURATION: 98 % | DIASTOLIC BLOOD PRESSURE: 82 MMHG | HEART RATE: 65 BPM | BODY MASS INDEX: 40.23 KG/M2

## 2022-12-05 DIAGNOSIS — I10 ESSENTIAL HYPERTENSION: Primary | ICD-10-CM

## 2022-12-05 DIAGNOSIS — K21.9 GASTROESOPHAGEAL REFLUX DISEASE WITHOUT ESOPHAGITIS: ICD-10-CM

## 2022-12-05 DIAGNOSIS — F33.0 MAJOR DEPRESSIVE DISORDER, RECURRENT EPISODE, MILD (HCC): ICD-10-CM

## 2022-12-05 DIAGNOSIS — G25.81 RLS (RESTLESS LEGS SYNDROME): ICD-10-CM

## 2022-12-05 DIAGNOSIS — E03.9 ACQUIRED HYPOTHYROIDISM: ICD-10-CM

## 2022-12-05 LAB — TSH SERPL DL<=0.05 MIU/L-ACNC: 0.65 UIU/ML (ref 0.27–4.2)

## 2022-12-05 PROCEDURE — 1123F ACP DISCUSS/DSCN MKR DOCD: CPT | Performed by: FAMILY MEDICINE

## 2022-12-05 PROCEDURE — 3078F DIAST BP <80 MM HG: CPT | Performed by: FAMILY MEDICINE

## 2022-12-05 PROCEDURE — 90694 VACC AIIV4 NO PRSRV 0.5ML IM: CPT | Performed by: FAMILY MEDICINE

## 2022-12-05 PROCEDURE — 99214 OFFICE O/P EST MOD 30 MIN: CPT | Performed by: FAMILY MEDICINE

## 2022-12-05 PROCEDURE — 3074F SYST BP LT 130 MM HG: CPT | Performed by: FAMILY MEDICINE

## 2022-12-05 PROCEDURE — G0008 ADMIN INFLUENZA VIRUS VAC: HCPCS | Performed by: FAMILY MEDICINE

## 2022-12-05 RX ORDER — LISINOPRIL 2.5 MG/1
TABLET ORAL
COMMUNITY
Start: 2022-11-28

## 2022-12-05 ASSESSMENT — ENCOUNTER SYMPTOMS
TROUBLE SWALLOWING: 0
ANAL BLEEDING: 0
DIARRHEA: 0
ABDOMINAL DISTENTION: 0
ABDOMINAL PAIN: 0
CHEST TIGHTNESS: 0
WHEEZING: 0
CONSTIPATION: 0
EYE DISCHARGE: 0
SINUS PRESSURE: 0
RHINORRHEA: 0
NAUSEA: 0
BACK PAIN: 0
EYE ITCHING: 0
SORE THROAT: 0
EYE REDNESS: 0
VOICE CHANGE: 0
SHORTNESS OF BREATH: 1
EYE PAIN: 0
VOMITING: 0
COUGH: 0
BLOOD IN STOOL: 0

## 2022-12-05 NOTE — PROGRESS NOTES
2022 - 2023 Flu Vaccine Questionnaire      VIS given -  yes    Have you received any other vaccine within the last 14 days? No  2. Do you currently have an active infectious or acute respiratory illness or fever? No  3. Are you taking steroids or immune suppressive drugs? No  4. Have you ever had a reaction to a flu vaccine? No  5. Are you allergic to eggs, egg products, chicken, Thimerosal (preservative) Gentamycin, polymixin, neomycin or Latex? No  6. Have you ever had Guillian Beaumont Syndrome?   No

## 2022-12-05 NOTE — PROGRESS NOTES
Subjective:      Patient ID: Elby Castleman is a 67 y.o. female. HPI  Patient in for checkup on several medical issues. Overall is doing okay. Hypertension-blood pressure 140/80 or below when checked at home or elsewhere. Hypothyroid-on medication and need to check today. Depression-on medication and is doing okay. GERD-on medication and does very well. BMI of 40-does have a difficult time losing weight because she is not very active due to knee problems and probably getting ready for a replacement. She did have to quit work because of discomfort and the inability to move around very much. Restless leg syndrome-she is on Requip 3 mg close to bedtime and is still having problems. Colonoscopy is up-to-date and probably will need follow-up for at least 5 years. She denies any other issues to discuss. She states she does have intermittent problems with constipation and is currently on about her 6 the day of no bowel movement and does take over-the-counter medication for relief and she states the last time it happened she had to take medication for 3 straight days. Review of Systems    Review of Systems   Constitutional:  Positive for fatigue. Negative for unexpected weight change. HENT:  Negative for congestion, ear pain, hearing loss, nosebleeds, postnasal drip, rhinorrhea, sinus pressure, sneezing, sore throat, tinnitus, trouble swallowing and voice change. Eyes:  Negative for pain, discharge, redness, itching and visual disturbance. Respiratory:  Positive for shortness of breath. Negative for cough, chest tightness and wheezing. Cardiovascular:  Negative for chest pain, palpitations and leg swelling. Gastrointestinal:  Negative for abdominal distention, abdominal pain, anal bleeding, blood in stool, constipation, diarrhea, nausea and vomiting. See HPI   Genitourinary:  Positive for frequency.  Negative for dysuria, flank pain, hematuria, menstrual problem, pelvic pain, urgency and vaginal discharge. Musculoskeletal:  Positive for arthralgias. Negative for back pain, gait problem, joint swelling, myalgias and neck pain. Skin:  Negative for pallor and rash. Neurological:  Negative for dizziness, tremors, seizures, weakness, light-headedness, numbness and headaches. See HPI   Hematological:  Negative for adenopathy. Does not bruise/bleed easily. Psychiatric/Behavioral:  Positive for dysphoric mood and sleep disturbance. Negative for agitation, confusion and decreased concentration. The patient is not nervous/anxious and is not hyperactive. See HPI     Objective:   Physical Exam      Physical Exam  Constitutional:       General: She is not in acute distress. Appearance: Normal appearance. She is well-developed. She is obese. She is not ill-appearing. HENT:      Head: Normocephalic. Mouth/Throat:      Mouth: Mucous membranes are moist.      Pharynx: Oropharynx is clear. Eyes:      General: No scleral icterus. Conjunctiva/sclera: Conjunctivae normal.   Neck:      Thyroid: No thyromegaly. Vascular: No carotid bruit. Cardiovascular:      Rate and Rhythm: Normal rate and regular rhythm. Heart sounds: Normal heart sounds. Pulmonary:      Effort: Pulmonary effort is normal.      Breath sounds: Normal breath sounds. Abdominal:      General: Bowel sounds are normal. There is no distension. Palpations: Abdomen is soft. There is no mass. Tenderness: There is no abdominal tenderness. Musculoskeletal:         General: No tenderness. Cervical back: Neck supple. Comments: Mild discomfort with flexion and extension of the right knee. Lymphadenopathy:      Cervical: No cervical adenopathy. Skin:     General: Skin is warm and dry. Coloration: Skin is not pale. Neurological:      Mental Status: She is alert and oriented to person, place, and time. Motor: No abnormal muscle tone.       Gait: Gait normal.   Psychiatric: Mood and Affect: Mood normal.         Behavior: Behavior normal.         Thought Content: Thought content normal.         Judgment: Judgment normal.       Assessment:       Diagnosis Orders   1. Essential hypertension        2. Acquired hypothyroidism  TSH      3. Major depressive disorder, recurrent episode, mild ([address]        4. Gastroesophageal reflux disease without esophagitis        5. BMI 40.0-44.9, adult ([address]        6. RLS (restless legs syndrome)              Plan:      Mary Horne was seen today for 6 month follow-up. Diagnoses and all orders for this visit:    Essential hypertension  Continue medications and no added salt diet-work on slow weight loss by reducing carbs and increase activity as tolerated-notify me of any persistent elevation of blood pressure. Acquired hypothyroidism  -     TSH  Lab today  Major depressive disorder, recurrent episode, mild (HCC)  Continue medications and notify me of any persistent increase in depressive symptoms. Gastroesophageal reflux disease without esophagitis  Continue medications-limit caffeine and preferably no alcohol. .  Work on slow weight loss as above-notify me of any persistent elevation of heartburn or reflux. BMI 40.0-44.9, adult (HCC)  Slow weight loss as above  RLS (restless legs syndrome)  Increase Requip 1 mg up to 4 mg at night and notify me 1 year medication is close to gone and we will consider an increase in Requip or possibly change to gabapentin. Other orders  -     Influenza, FLUAD, (age 72 y+), IM, Preservative Free, 0.5 mL  Chart reviewed for colonoscopy labs consults.     See me 6 months     Mauri Bunch,

## 2022-12-13 ENCOUNTER — OFFICE VISIT (OUTPATIENT)
Dept: FAMILY MEDICINE CLINIC | Age: 72
End: 2022-12-13
Payer: MEDICARE

## 2022-12-13 VITALS
WEIGHT: 233 LBS | TEMPERATURE: 97.3 F | HEIGHT: 64 IN | HEART RATE: 67 BPM | BODY MASS INDEX: 39.78 KG/M2 | DIASTOLIC BLOOD PRESSURE: 82 MMHG | SYSTOLIC BLOOD PRESSURE: 126 MMHG | OXYGEN SATURATION: 98 %

## 2022-12-13 DIAGNOSIS — L03.119 CELLULITIS OF HAND: Primary | ICD-10-CM

## 2022-12-13 PROCEDURE — 1123F ACP DISCUSS/DSCN MKR DOCD: CPT | Performed by: NURSE PRACTITIONER

## 2022-12-13 PROCEDURE — 3078F DIAST BP <80 MM HG: CPT | Performed by: NURSE PRACTITIONER

## 2022-12-13 PROCEDURE — 99213 OFFICE O/P EST LOW 20 MIN: CPT | Performed by: NURSE PRACTITIONER

## 2022-12-13 PROCEDURE — 3074F SYST BP LT 130 MM HG: CPT | Performed by: NURSE PRACTITIONER

## 2022-12-13 RX ORDER — LEVOTHYROXINE SODIUM 175 UG/1
TABLET ORAL
Qty: 90 TABLET | Refills: 1 | Status: SHIPPED | OUTPATIENT
Start: 2022-12-13

## 2022-12-13 RX ORDER — SULFAMETHOXAZOLE AND TRIMETHOPRIM 800; 160 MG/1; MG/1
1 TABLET ORAL 2 TIMES DAILY
Qty: 14 TABLET | Refills: 0 | Status: SHIPPED | OUTPATIENT
Start: 2022-12-13 | End: 2022-12-20

## 2022-12-13 ASSESSMENT — PATIENT HEALTH QUESTIONNAIRE - PHQ9
2. FEELING DOWN, DEPRESSED OR HOPELESS: 2
1. LITTLE INTEREST OR PLEASURE IN DOING THINGS: 2
SUM OF ALL RESPONSES TO PHQ9 QUESTIONS 1 & 2: 4
3. TROUBLE FALLING OR STAYING ASLEEP: 2
4. FEELING TIRED OR HAVING LITTLE ENERGY: 2
SUM OF ALL RESPONSES TO PHQ QUESTIONS 1-9: 13
9. THOUGHTS THAT YOU WOULD BE BETTER OFF DEAD, OR OF HURTING YOURSELF: 0
SUM OF ALL RESPONSES TO PHQ QUESTIONS 1-9: 13
5. POOR APPETITE OR OVEREATING: 1
6. FEELING BAD ABOUT YOURSELF - OR THAT YOU ARE A FAILURE OR HAVE LET YOURSELF OR YOUR FAMILY DOWN: 1
SUM OF ALL RESPONSES TO PHQ QUESTIONS 1-9: 13
8. MOVING OR SPEAKING SO SLOWLY THAT OTHER PEOPLE COULD HAVE NOTICED. OR THE OPPOSITE, BEING SO FIGETY OR RESTLESS THAT YOU HAVE BEEN MOVING AROUND A LOT MORE THAN USUAL: 1
7. TROUBLE CONCENTRATING ON THINGS, SUCH AS READING THE NEWSPAPER OR WATCHING TELEVISION: 2
SUM OF ALL RESPONSES TO PHQ QUESTIONS 1-9: 13

## 2022-12-13 ASSESSMENT — ENCOUNTER SYMPTOMS
NAUSEA: 0
VOMITING: 0
DIARRHEA: 0

## 2022-12-13 NOTE — TELEPHONE ENCOUNTER
Refill Request     CONFIRM preferrred pharmacy with the patient. If Mail Order Rx - Pend for 90 day refill. Last Seen: Last Seen Department: 12/13/2022  Last Seen by PCP: 12/5/2022    Last Written: 11/21/2022, #90, 1 refill    If no future appointment scheduled, route STAFF MESSAGE with patient name to the [facility] for scheduling. Next Appointment:   Future Appointments   Date Time Provider Berry Claudio   5/22/2023  1:30 PM [facility]EDU[facility]  LUIS STRANGEN [facility] [place] - [place]   6/12/2023 11:00 AM Mauri Bunch,  [facility] [place] - [place]       Message sent to [address] [place] [address] to schedule appt with patient?   NO      Requested Prescriptions     Pending Prescriptions Disp Refills    levothyroxine (SYNTHROID) 175 MCG tablet 90 tablet 1     Sig: TAKE 1 TABLET BY MOUTH EVERY DAY

## 2022-12-13 NOTE — PROGRESS NOTES
Susanne Romberg (:  1950) is a 67 y.o. female,Established patient, here for evaluation of the following chief complaint(s):  Hand Injury (L hand injury )         ASSESSMENT/PLAN:  1. Cellulitis of hand  -     sulfamethoxazole-trimethoprim (BACTRIM DS;SEPTRA DS) 800-160 MG per tablet; Take 1 tablet by mouth 2 times daily for 7 days, Disp-14 tablet, R-0Normal  -     Culture, Wound Aerobic Only  -Discussed x-ray of hand to assess for any bony injury but patient declined at this time.   -Discussed wound care at home, wound dressed in office   -Notify office if symptoms worsen (develop fever or chills, increased swelling, loss of ability to make fist or use hand) or does not improve      No follow-ups on file. Subjective   SUBJECTIVE/OBJECTIVE:  On 12/10/22 patient hit her hand on a doorknob  She reports a yellow/green drainage  Reports pain but describes it as a pressure  Has been washing it at home and using antibiotic ointment on it at home       Hand Injury       Review of Systems   Constitutional:  Negative for chills and fever. Gastrointestinal:  Negative for diarrhea, nausea and vomiting. Musculoskeletal:  Positive for arthralgias and joint swelling. Skin:  Positive for wound. Objective   Physical Exam  Vitals reviewed. Constitutional:       General: She is not in acute distress. Appearance: She is not ill-appearing. HENT:      Head: Normocephalic. Pulmonary:      Effort: Pulmonary effort is normal.   Musculoskeletal:      Right hand: Swelling, laceration and tenderness present. Normal range of motion. Normal strength. Normal sensation. Normal capillary refill. Normal pulse. Arms:       Comments: Top of hand with small amount of swelling, able to milk area for small amount of bloody discharge for culture. Small skin tear. Bruising noted. Tender to palpation. Good  strength, good ROM      Skin:     General: Skin is warm and dry.       Capillary Refill:

## 2022-12-17 LAB
GRAM STAIN RESULT: NORMAL
WOUND/ABSCESS: NORMAL

## 2022-12-19 ENCOUNTER — OFFICE VISIT (OUTPATIENT)
Dept: FAMILY MEDICINE CLINIC | Age: 72
End: 2022-12-19
Payer: MEDICARE

## 2022-12-19 VITALS
TEMPERATURE: 97.9 F | HEIGHT: 64 IN | OXYGEN SATURATION: 95 % | BODY MASS INDEX: 39.27 KG/M2 | SYSTOLIC BLOOD PRESSURE: 118 MMHG | DIASTOLIC BLOOD PRESSURE: 64 MMHG | WEIGHT: 230 LBS | HEART RATE: 74 BPM

## 2022-12-19 DIAGNOSIS — J40 BRONCHITIS: ICD-10-CM

## 2022-12-19 DIAGNOSIS — N18.30 STAGE 3 CHRONIC KIDNEY DISEASE, UNSPECIFIED WHETHER STAGE 3A OR 3B CKD (HCC): ICD-10-CM

## 2022-12-19 DIAGNOSIS — R50.9 FEVER, UNSPECIFIED FEVER CAUSE: ICD-10-CM

## 2022-12-19 DIAGNOSIS — J06.9 VIRAL URI: Primary | ICD-10-CM

## 2022-12-19 DIAGNOSIS — R06.02 SOB (SHORTNESS OF BREATH): ICD-10-CM

## 2022-12-19 DIAGNOSIS — U07.1 COVID-19: ICD-10-CM

## 2022-12-19 LAB
INFLUENZA A ANTIBODY: NEGATIVE
INFLUENZA B ANTIBODY: NEGATIVE

## 2022-12-19 PROCEDURE — 1123F ACP DISCUSS/DSCN MKR DOCD: CPT | Performed by: NURSE PRACTITIONER

## 2022-12-19 PROCEDURE — 99213 OFFICE O/P EST LOW 20 MIN: CPT | Performed by: NURSE PRACTITIONER

## 2022-12-19 PROCEDURE — 3078F DIAST BP <80 MM HG: CPT | Performed by: NURSE PRACTITIONER

## 2022-12-19 PROCEDURE — 3074F SYST BP LT 130 MM HG: CPT | Performed by: NURSE PRACTITIONER

## 2022-12-19 PROCEDURE — 87804 INFLUENZA ASSAY W/OPTIC: CPT | Performed by: NURSE PRACTITIONER

## 2022-12-19 RX ORDER — METHYLPREDNISOLONE 4 MG/1
TABLET ORAL
Qty: 1 KIT | Refills: 0 | Status: SHIPPED | OUTPATIENT
Start: 2022-12-19 | End: 2022-12-25

## 2022-12-19 RX ORDER — BENZONATATE 200 MG/1
200 CAPSULE ORAL 3 TIMES DAILY PRN
Qty: 21 CAPSULE | Refills: 0 | Status: SHIPPED | OUTPATIENT
Start: 2022-12-19 | End: 2022-12-26

## 2022-12-19 RX ORDER — DOXYCYCLINE HYCLATE 100 MG
100 TABLET ORAL 2 TIMES DAILY
Qty: 20 TABLET | Refills: 0 | Status: SHIPPED | OUTPATIENT
Start: 2022-12-19 | End: 2022-12-29

## 2022-12-19 RX ORDER — LISINOPRIL 2.5 MG/1
2.5 TABLET ORAL DAILY
Qty: 30 TABLET | Refills: 0 | Status: SHIPPED | OUTPATIENT
Start: 2022-12-19

## 2022-12-19 RX ORDER — ALBUTEROL SULFATE 90 UG/1
2 AEROSOL, METERED RESPIRATORY (INHALATION) 4 TIMES DAILY PRN
Qty: 18 G | Refills: 5 | Status: SHIPPED | OUTPATIENT
Start: 2022-12-19

## 2022-12-19 ASSESSMENT — ENCOUNTER SYMPTOMS
DIARRHEA: 0
COUGH: 1
SHORTNESS OF BREATH: 1
SORE THROAT: 0
VOMITING: 0
SINUS PRESSURE: 1
SINUS PAIN: 1
NAUSEA: 0
RHINORRHEA: 1
WHEEZING: 0

## 2022-12-19 NOTE — PROGRESS NOTES
Lizeth Garvey (:  1950) is a 67 y.o. female,Established patient, here for evaluation of the following chief complaint(s):  Congestion (f), Fever, and Cough         ASSESSMENT/PLAN:  1. Viral URI  2. Bronchitis  -     doxycycline hyclate (VIBRA-TABS) 100 MG tablet; Take 1 tablet by mouth 2 times daily for 10 days, Disp-20 tablet, R-0Normal  -     albuterol sulfate HFA (VENTOLIN HFA) 108 (90 Base) MCG/ACT inhaler; Inhale 2 puffs into the lungs 4 times daily as needed for Wheezing, Disp-18 g, R-5Normal  -     benzonatate (TESSALON) 200 MG capsule; Take 1 capsule by mouth 3 times daily as needed for Cough, Disp-21 capsule, R-0Normal  -     methylPREDNISolone (MEDROL DOSEPACK) 4 MG tablet; Take by mouth., Disp-1 kit, R-0Normal  3. Fever, unspecified fever cause  -     POCT Influenza A/B  -     COVID-19  4. SOB (shortness of breath)  -     POCT Influenza A/B  -     COVID-19  -     D-Dimer, Quantitative; Future  5. Stage 3 chronic kidney disease, unspecified whether stage 3a or 3b CKD (HCC)  -     lisinopril (PRINIVIL;ZESTRIL) 2.5 MG tablet; Take 1 tablet by mouth daily, Disp-30 tablet, R-0Normal    -Due to patient's history of asymptomatic PE,  and she is no longer on Eliquis or other blood thinner, will get d-dimer. Patient in agreement with this plan and in agreement that if elevated she will go for stat CT PE    -Patient on Lisinopril per her nephrologist, requested refill today. Patient given refill but explained to her that she should contact his office for further refills.  She verbalized understanding   -Rapid Flu negative, Covid pending, explained that if Covid is positive that antibiotics will not help, discussed treatment plan and management of Covid if positive   Drink plenty of fluids   Get plenty of rest  Finish course of antibiotics   Take medications as prescribed   Go to nearest ER if develop shortness of breath, chest pain, pulse ox that stays 90% or below  or significant worsening of symptoms Notify office if symptoms worsen or do not improve   Warm air humidifier or steamy shower   Nasal saline spray   9. Tylenol as needed       Addendum 12/20/22  Spoke with patient regarding her positive Covid test. Discussed Paxlovid as an option for treatment. Reviewed her current medication list with her. Patient would need to hold her Trazodone and Lipitor while she is on the medication and restart them after finishing the course. She would need to monitor herself for any excessive nausea and vomiting, changes in mood or any adverse effects as well as she takes Seroquel. Patient aware of the above information and repeated these instructions back to the writer. She is in agreement with this. Discussed risks and benefits of this medication with the patient. She verbalized understanding. She states she start this medication dependent upon out of pocket costs for her. She reports she feels better today compared to yesterday. Reviewed quarantine instructions with her in regards to [facility] recommendations. She verbalized understanding. Lab results reviewed, based on her most recent GFR and history of CKD, renal dose of Paxlovid will be sent (150/100mg BID dose). Return if symptoms worsen or fail to improve. Subjective   SUBJECTIVE/OBJECTIVE:  Symptoms started 12/15 with rib pain and fever with cough. Tmax 101.8  Taking Tylenol sinus and congestion at home. No other sick contacts at home   Home pulse ox has been around 93%, normally runs 97%    She did get her flu shot this year and did get her Covid vaccine           Review of Systems   Constitutional:  Positive for chills, fatigue and fever. HENT:  Positive for congestion, ear pain, postnasal drip, rhinorrhea, sinus pressure, sinus pain and sneezing. Negative for sore throat. Respiratory:  Positive for cough and shortness of breath. Negative for wheezing.          Occasionally productive of greenish/yellow sputums      Cardiovascular:  Negative for chest pain. Gastrointestinal:  Negative for diarrhea, nausea and vomiting. Musculoskeletal:  Positive for myalgias. Neurological:  Positive for headaches. Objective   Physical Exam  Vitals reviewed. Constitutional:       General: She is not in acute distress. Appearance: She is ill-appearing. HENT:      Head: Normocephalic. Right Ear: Tympanic membrane, ear canal and external ear normal.      Left Ear: Tympanic membrane, ear canal and external ear normal.      Nose: Rhinorrhea present. Rhinorrhea is clear. Right Turbinates: Swollen. Left Turbinates: Swollen. Right Sinus: No frontal sinus tenderness. Left Sinus: No frontal sinus tenderness. Mouth/Throat:      Lips: Pink. Pharynx: Oropharynx is clear. Posterior oropharyngeal erythema present. No pharyngeal swelling, oropharyngeal exudate or uvula swelling. Eyes:      Pupils: Pupils are equal, round, and reactive to light. Cardiovascular:      Rate and Rhythm: Normal rate and regular rhythm. Heart sounds: Normal heart sounds. No murmur heard. No friction rub. No gallop. Pulmonary:      Effort: Pulmonary effort is normal. No respiratory distress. Breath sounds: Normal breath sounds. No stridor. No wheezing, rhonchi or rales. Comments: Coarse breath sounds   Chest:      Chest wall: No tenderness. Musculoskeletal:      Cervical back: Normal range of motion. Lymphadenopathy:      Cervical: No cervical adenopathy. Skin:     General: Skin is warm and dry. Capillary Refill: Capillary refill takes less than 2 seconds. Neurological:      General: No focal deficit present. Mental Status: She is alert and oriented to person, place, and time. This note was generated completely or in part utilizing Dragon dictation speech recognition software. Occasionally, words are mistranscribed and despite editing, the text may contain inaccuracies due to incorrect word recognition.

## 2022-12-19 NOTE — PATIENT INSTRUCTIONS
Drink plenty of fluids   Get plenty of rest  Finish course of antibiotics   Take medications as prescribed   Go to nearest ER if develop shortness of breath, chest pain, pulse ox that stays 90% or below  or significant worsening of symptoms   Notify office if symptoms worsen or do not improve   Warm air humidifier or steamy shower   Nasal saline spray   9.  Tylenol as needed

## 2022-12-20 LAB — SARS-COV-2: DETECTED

## 2023-02-07 DIAGNOSIS — G25.81 RLS (RESTLESS LEGS SYNDROME): ICD-10-CM

## 2023-02-07 DIAGNOSIS — F51.01 PRIMARY INSOMNIA: ICD-10-CM

## 2023-02-07 DIAGNOSIS — F32.89 OTHER DEPRESSION: ICD-10-CM

## 2023-02-07 DIAGNOSIS — N18.30 STAGE 3 CHRONIC KIDNEY DISEASE, UNSPECIFIED WHETHER STAGE 3A OR 3B CKD (HCC): ICD-10-CM

## 2023-02-07 RX ORDER — TRAZODONE HYDROCHLORIDE 150 MG/1
TABLET ORAL
Qty: 90 TABLET | Refills: 1 | Status: SHIPPED | OUTPATIENT
Start: 2023-02-07

## 2023-02-07 RX ORDER — VERAPAMIL HYDROCHLORIDE 180 MG/1
180 CAPSULE, EXTENDED RELEASE ORAL NIGHTLY
Qty: 180 CAPSULE | Refills: 1 | Status: SHIPPED | OUTPATIENT
Start: 2023-02-07 | End: 2023-03-01 | Stop reason: SDUPTHER

## 2023-02-07 RX ORDER — LISINOPRIL 2.5 MG/1
2.5 TABLET ORAL DAILY
Qty: 90 TABLET | Refills: 1 | Status: SHIPPED | OUTPATIENT
Start: 2023-02-07

## 2023-02-07 RX ORDER — ATORVASTATIN CALCIUM 40 MG/1
40 TABLET, FILM COATED ORAL NIGHTLY
Qty: 90 TABLET | Refills: 1 | Status: SHIPPED | OUTPATIENT
Start: 2023-02-07

## 2023-02-07 RX ORDER — ROPINIROLE 1 MG/1
TABLET, FILM COATED ORAL
Qty: 270 TABLET | Refills: 1 | Status: SHIPPED | OUTPATIENT
Start: 2023-02-07

## 2023-02-07 RX ORDER — LEVOTHYROXINE SODIUM 175 UG/1
TABLET ORAL
Qty: 90 TABLET | Refills: 1 | Status: SHIPPED | OUTPATIENT
Start: 2023-02-07

## 2023-02-07 RX ORDER — METOPROLOL SUCCINATE 25 MG/1
25 TABLET, EXTENDED RELEASE ORAL DAILY
Qty: 90 TABLET | Refills: 1 | Status: SHIPPED | OUTPATIENT
Start: 2023-02-07

## 2023-02-07 RX ORDER — QUETIAPINE 150 MG/1
TABLET, FILM COATED, EXTENDED RELEASE ORAL
Qty: 90 TABLET | Refills: 1 | Status: SHIPPED | OUTPATIENT
Start: 2023-02-07

## 2023-02-07 NOTE — TELEPHONE ENCOUNTER
Refill Request     CONFIRM preferrred pharmacy with the patient. If Mail Order Rx - Pend for 90 day refill. Last Seen: Last Seen Department: 12/19/2022  Last Seen by PCP: 12/5/2022    Last Written: 12/19/2022    If no future appointment scheduled, route STAFF MESSAGE with patient name to the [facility] for scheduling. Next Appointment:   Future Appointments   Date Time Provider Berry Claudio   5/22/2023  1:30 PM SCHEDULE, JUVEX [facility] AWV LPN [facility]   6/12/2023 11:00 AM Mauri Bunch DO [facility]       Message sent to [employer] to schedule appt with patient?   NO      Requested Prescriptions     Pending Prescriptions Disp Refills    lisinopril (PRINIVIL;ZESTRIL) 2.5 MG tablet 30 tablet 0     Sig: Take 1 tablet by mouth daily    levothyroxine (SYNTHROID) 175 MCG tablet 90 tablet 1     Sig: TAKE 1 TABLET BY MOUTH EVERY DAY    rOPINIRole (REQUIP) 1 MG tablet 270 tablet 1     Sig: TAKE 1 TABLET BY MOUTH THREE TIMES DAILY    verapamil (VERELAN) 180 MG extended release capsule 180 capsule 1     Sig: Take 1 capsule by mouth nightly    atorvastatin (LIPITOR) 40 MG tablet 90 tablet 1     Sig: Take 1 tablet by mouth nightly    traZODone (DESYREL) 150 MG tablet 90 tablet 1     Sig: TAKE 1 TABLET BY MOUTH EVERY DAY AT NIGHT    metoprolol succinate (TOPROL XL) 25 MG extended release tablet 90 tablet 1     Sig: Take 1 tablet by mouth daily    QUEtiapine (SEROQUEL XR) 150 MG TB24 extended release tablet 90 tablet 1     Sig: TAKE 1 TABLET BY MOUTH EVERY DAY

## 2023-02-10 NOTE — TELEPHONE ENCOUNTER
----- Message from Augusta Roxanna sent at 3/24/2022 11:32 AM EDT -----  Subject: Message to Provider    QUESTIONS  Information for Provider? [address] [place] [address] is calling   to see if the add on for skilled nursing and stop med order were received. They were faxed over twice within the last two weeks. She will re-fax now,   pls give her a call to let her know that it has been received.   ---------------------------------------------------------------------------  --------------  CALL BACK INFO  What is the best way for the office to contact you? OK to leave message on   voicemail  Preferred Call Back Phone Number? [phone]  ---------------------------------------------------------------------------  --------------  SCRIPT ANSWERS  Relationship to Patient? Third Party  Representative Name?  Justus Collins never used

## 2023-02-15 RX ORDER — AMOXICILLIN AND CLAVULANATE POTASSIUM 875; 125 MG/1; MG/1
1 TABLET, FILM COATED ORAL 2 TIMES DAILY
Qty: 14 TABLET | Refills: 0 | Status: SHIPPED | OUTPATIENT
Start: 2023-02-15 | End: 2023-02-22

## 2023-02-16 RX ORDER — VERAPAMIL HYDROCHLORIDE 180 MG/1
CAPSULE, EXTENDED RELEASE ORAL
Qty: 180 CAPSULE | Refills: 1 | OUTPATIENT
Start: 2023-02-16

## 2023-03-01 ENCOUNTER — TELEPHONE (OUTPATIENT)
Dept: FAMILY MEDICINE CLINIC | Age: 73
End: 2023-03-01

## 2023-03-01 RX ORDER — VERAPAMIL HYDROCHLORIDE 180 MG/1
CAPSULE, EXTENDED RELEASE ORAL
Qty: 180 CAPSULE | Refills: 1 | Status: SHIPPED | OUTPATIENT
Start: 2023-03-01

## 2023-03-01 NOTE — TELEPHONE ENCOUNTER
Pharmacy calling to clarify medication order/prescription:    Pharmacy:  [facility] [place]     Medication:  Verapmil 180 MG   Sig: Take 1 capsule by mouth nightly/ SUPPOSE TO SAY take 2 capsules by mouth nightly   Quantity:  180     Comments:  Please change to 2 capsules by mouth nightly

## 2023-03-03 NOTE — TELEPHONE ENCOUNTER
Routing to you, check and see if his encounter is signed now, he said he did it. Render Risk Assessment In Note?: yes Additional Notes: Patient consent was obtained to proceed with the visit and recommended plan of care after discussion of all risks and benefits, including the risks of COVID-19 exposure. Detail Level: Simple

## 2023-05-08 ENCOUNTER — HOSPITAL ENCOUNTER (OUTPATIENT)
Age: 73
Discharge: HOME | End: 2023-05-08
Payer: MEDICARE

## 2023-05-08 ENCOUNTER — HOSPITAL ENCOUNTER (OUTPATIENT)
Dept: GENERAL RADIOLOGY | Age: 73
Discharge: HOME | End: 2023-05-08
Payer: MEDICARE

## 2023-05-08 DIAGNOSIS — R05.1 ACUTE COUGH: ICD-10-CM

## 2023-05-08 DIAGNOSIS — K21.9 GASTROESOPHAGEAL REFLUX DISEASE WITHOUT ESOPHAGITIS: ICD-10-CM

## 2023-05-08 DIAGNOSIS — F51.01 PRIMARY INSOMNIA: ICD-10-CM

## 2023-05-08 DIAGNOSIS — N18.30 STAGE 3 CHRONIC KIDNEY DISEASE, UNSPECIFIED WHETHER STAGE 3A OR 3B CKD (HCC): ICD-10-CM

## 2023-05-08 DIAGNOSIS — G25.81 RLS (RESTLESS LEGS SYNDROME): ICD-10-CM

## 2023-05-08 DIAGNOSIS — R05.1 ACUTE COUGH: Primary | ICD-10-CM

## 2023-05-08 DIAGNOSIS — F32.89 OTHER DEPRESSION: ICD-10-CM

## 2023-05-08 PROCEDURE — 71046 X-RAY EXAM CHEST 2 VIEWS: CPT

## 2023-05-08 RX ORDER — TRAZODONE HYDROCHLORIDE 150 MG/1
TABLET ORAL
Qty: 90 TABLET | Refills: 1 | Status: SHIPPED | OUTPATIENT
Start: 2023-05-08

## 2023-05-08 RX ORDER — OMEPRAZOLE 40 MG/1
CAPSULE, DELAYED RELEASE ORAL
Qty: 90 CAPSULE | Refills: 1 | Status: SHIPPED | OUTPATIENT
Start: 2023-05-08

## 2023-05-08 RX ORDER — LISINOPRIL 2.5 MG/1
2.5 TABLET ORAL DAILY
Qty: 90 TABLET | Refills: 1 | Status: SHIPPED | OUTPATIENT
Start: 2023-05-08

## 2023-05-08 RX ORDER — AZITHROMYCIN 250 MG/1
250 TABLET, FILM COATED ORAL SEE ADMIN INSTRUCTIONS
Qty: 6 TABLET | Refills: 0 | Status: SHIPPED | OUTPATIENT
Start: 2023-05-08 | End: 2023-05-13

## 2023-05-08 RX ORDER — METHYLPREDNISOLONE 4 MG/1
TABLET ORAL
Qty: 21 TABLET | Refills: 0 | Status: SHIPPED | OUTPATIENT
Start: 2023-05-08 | End: 2023-05-14

## 2023-05-08 RX ORDER — ATORVASTATIN CALCIUM 40 MG/1
40 TABLET, FILM COATED ORAL NIGHTLY
Qty: 90 TABLET | Refills: 1 | Status: SHIPPED | OUTPATIENT
Start: 2023-05-08

## 2023-05-08 RX ORDER — LEVOTHYROXINE SODIUM 175 UG/1
TABLET ORAL
Qty: 90 TABLET | Refills: 1 | Status: SHIPPED | OUTPATIENT
Start: 2023-05-08

## 2023-05-08 RX ORDER — VERAPAMIL HYDROCHLORIDE 180 MG/1
CAPSULE, EXTENDED RELEASE ORAL
Qty: 180 CAPSULE | Refills: 1 | Status: SHIPPED | OUTPATIENT
Start: 2023-05-08

## 2023-05-08 RX ORDER — ROPINIROLE 1 MG/1
TABLET, FILM COATED ORAL
Qty: 270 TABLET | Refills: 1 | Status: SHIPPED | OUTPATIENT
Start: 2023-05-08

## 2023-05-08 RX ORDER — QUETIAPINE 150 MG/1
TABLET, FILM COATED, EXTENDED RELEASE ORAL
Qty: 90 TABLET | Refills: 1 | Status: SHIPPED | OUTPATIENT
Start: 2023-05-08

## 2023-05-08 RX ORDER — METOPROLOL SUCCINATE 25 MG/1
25 TABLET, EXTENDED RELEASE ORAL DAILY
Qty: 90 TABLET | Refills: 1 | Status: SHIPPED | OUTPATIENT
Start: 2023-05-08

## 2023-05-09 ENCOUNTER — TELEPHONE (OUTPATIENT)
Dept: ORTHOPEDIC SURGERY | Age: 73
End: 2023-05-09

## 2023-05-09 DIAGNOSIS — M17.0 PRIMARY OSTEOARTHRITIS OF BOTH KNEES: Primary | ICD-10-CM

## 2023-05-09 NOTE — TELEPHONE ENCOUNTER
Order for Euflexxa Authorization placed. Will call patient once approval is received from insurance.

## 2023-05-09 NOTE — TELEPHONE ENCOUNTER
General Question     Subject: [place] [facility]NEE Referral  Patient and /or Facility Request: Neri Cantu  Contact Number: [phone]     Patient calling to request to begin process for Referral Authorization of  *EUFLEXXA RACHANA KNEE INJECTIONS*.        Please advise

## 2023-05-17 ENCOUNTER — OFFICE VISIT (OUTPATIENT)
Dept: ORTHOPEDIC SURGERY | Age: 73
End: 2023-05-17

## 2023-05-17 DIAGNOSIS — M17.0 PRIMARY OSTEOARTHRITIS OF BOTH KNEES: Primary | ICD-10-CM

## 2023-05-17 RX ORDER — TRIAMCINOLONE ACETONIDE 40 MG/ML
160 INJECTION, SUSPENSION INTRA-ARTICULAR; INTRAMUSCULAR ONCE
Status: COMPLETED | OUTPATIENT
Start: 2023-05-17 | End: 2023-05-17

## 2023-05-17 RX ORDER — LIDOCAINE HYDROCHLORIDE 10 MG/ML
6 INJECTION, SOLUTION INFILTRATION; PERINEURAL ONCE
Status: COMPLETED | OUTPATIENT
Start: 2023-05-17 | End: 2023-05-17

## 2023-05-17 RX ADMIN — LIDOCAINE HYDROCHLORIDE 6 ML: 10 INJECTION, SOLUTION INFILTRATION; PERINEURAL at 13:03

## 2023-05-17 RX ADMIN — TRIAMCINOLONE ACETONIDE 160 MG: 40 INJECTION, SUSPENSION INTRA-ARTICULAR; INTRAMUSCULAR at 13:03

## 2023-05-17 NOTE — PROGRESS NOTES
ORTHOPAEDIC SURGERY FOLLOWUP VISIT     CHIEF COMPLAINT:  Right knee injury     DATE OF INJURY: 11/23/2022     HISTORY OF PRESENT ILLNESS:  71-year-old female presents for evaluation of bilateral knee pain. .  She has been managing this conservatively with well-timed intra-articular injections. She has had good success with both corticosteroid and Euflexxa injections in the past.  She indicates that her prior injections have worn off and she is interested in proceeding with repeat injection at this point. She rates her pain a 7 out of 10 to both knees. It is a dull aching type pain. She request corticosteroid injection today. PHYSICAL EXAM:  General: Well-appearing female of stated age. No acute distress. Focused examination of right knee: No cuts, open wounds, or abrasions to the right knee. Moderate swelling is present. No obvious effusion. There is tenderness palpation about the anterolateral aspect of the knee along the lateral joint line. Knee range of motion is full extension to 60 degrees of flexion within the limits of pain. There is negative anterior posterior drawer test.  Negative Lachman. There is pain reproduced with any movement of the knee. There is pain with patellar ballottement. Patellar tracking is appropriate. Sensation is intact to light touch in deep peroneal, superficial peroneal, tibial, sural, and saphenous nerve distributions. Motor function is intact to EHL, FHL, tibialis anterior, and gastroc. There is brisk capillary refill to the toes and a strong palpable dorsalis pedis pulse. Compartments are soft and compressible. There is no calf tenderness and a negative Homans' sign. Focused examination of left knee: No cuts, open wounds, or abrasions to the left knee. Moderate swelling is present. No obvious effusion. There is tenderness palpation about the anterolateral aspect of the knee along the lateral joint line.   Knee range of motion is full extension to 60 degrees There are no Wet Read(s) to document.

## 2023-05-18 ENCOUNTER — TELEPHONE (OUTPATIENT)
Dept: ORTHOPEDIC SURGERY | Age: 73
End: 2023-05-18

## 2023-05-19 DIAGNOSIS — R60.0 BILATERAL LOWER EXTREMITY EDEMA: ICD-10-CM

## 2023-05-19 RX ORDER — FUROSEMIDE 20 MG/1
TABLET ORAL
Qty: 90 TABLET | Refills: 1 | Status: SHIPPED | OUTPATIENT
Start: 2023-05-19

## 2023-05-19 NOTE — TELEPHONE ENCOUNTER
Refill Request     CONFIRM preferred pharmacy with the patient. If Mail Order Rx - Pend for 90 day refill. Last Seen: Last Seen Department: 12/19/2022  Last Seen by PCP: 12/5/2022    Last Written: 11/21/2022 90 tablet 1 refills     If no future appointment scheduled:  Review the last OV with PCP and review information for follow-up visit,  Route STAFF MESSAGE with patient name to the [facility] for scheduling with the following information:            -  Timing of next visit           -  Visit type ie Physical, OV, etc           -  Diagnoses/Reason ie. COPD, HTN - Do not use MEDICATION, Follow-up or CHECK UP - Give reason for visit      Next Appointment:   Future Appointments   Date Time Provider Berry Claudio   5/22/2023  1:30 PM SCHEDULE, YENI LOPEZ  [facility] JUNIOR   6/12/2023 11:00 AM DO JESSICA Blevins       Message sent to [address] [place] [address] to schedule appt with patient?   NO      Requested Prescriptions     Pending Prescriptions Disp Refills    furosemide (LASIX) 20 MG tablet [Pharmacy Med Name: FUROSEMIDE 20 MG TABLET] 90 tablet 1     Sig: TAKE 1 TABLET BY MOUTH DAILY AS NEEDED FOR SWELLING

## 2023-05-31 ENCOUNTER — OFFICE VISIT (OUTPATIENT)
Dept: ORTHOPEDIC SURGERY | Age: 73
End: 2023-05-31

## 2023-05-31 VITALS — HEIGHT: 64 IN | WEIGHT: 230 LBS | BODY MASS INDEX: 39.27 KG/M2

## 2023-05-31 DIAGNOSIS — M17.0 PRIMARY OSTEOARTHRITIS OF BOTH KNEES: Primary | ICD-10-CM

## 2023-05-31 RX ORDER — HYALURONATE SODIUM 10 MG/ML
40 SYRINGE (ML) INTRAARTICULAR ONCE
Status: COMPLETED | OUTPATIENT
Start: 2023-05-31 | End: 2023-05-31

## 2023-05-31 RX ADMIN — Medication 40 MG: at 11:17

## 2023-06-05 ENCOUNTER — TELEMEDICINE (OUTPATIENT)
Dept: FAMILY MEDICINE CLINIC | Age: 73
End: 2023-06-05
Payer: MEDICARE

## 2023-06-05 DIAGNOSIS — Z00.00 MEDICARE ANNUAL WELLNESS VISIT, SUBSEQUENT: Primary | ICD-10-CM

## 2023-06-05 PROCEDURE — G0439 PPPS, SUBSEQ VISIT: HCPCS | Performed by: FAMILY MEDICINE

## 2023-06-05 PROCEDURE — 1123F ACP DISCUSS/DSCN MKR DOCD: CPT | Performed by: FAMILY MEDICINE

## 2023-06-05 SDOH — ECONOMIC STABILITY: INCOME INSECURITY: HOW HARD IS IT FOR YOU TO PAY FOR THE VERY BASICS LIKE FOOD, HOUSING, MEDICAL CARE, AND HEATING?: HARD

## 2023-06-05 SDOH — ECONOMIC STABILITY: FOOD INSECURITY: WITHIN THE PAST 12 MONTHS, YOU WORRIED THAT YOUR FOOD WOULD RUN OUT BEFORE YOU GOT MONEY TO BUY MORE.: NEVER TRUE

## 2023-06-05 SDOH — ECONOMIC STABILITY: FOOD INSECURITY: WITHIN THE PAST 12 MONTHS, THE FOOD YOU BOUGHT JUST DIDN'T LAST AND YOU DIDN'T HAVE MONEY TO GET MORE.: NEVER TRUE

## 2023-06-05 NOTE — PROGRESS NOTES
exam within the past year?: (!) No  No results found. Interventions:   Patient encouraged to make appointment with their eye specialist                      Objective      Patient-Reported Vitals  Patient-Reported Systolic (Top): 428 mmHg  Patient-Reported Diastolic (Bottom): 74 mmHg  Patient-Reported Weight: 230lb  Patient-Reported Height: 5' 4\"              No Known Allergies  Prior to Visit Medications    Medication Sig Taking?  Authorizing Provider   furosemide (LASIX) 20 MG tablet TAKE 1 TABLET BY MOUTH DAILY AS NEEDED FOR SWELLING  Mauri Bunch DO   omeprazole (PRILOSEC) 40 MG delayed release capsule TAKE 1 CAPSULE EVERY DAY  Mauri Bunch DO   lisinopril (PRINIVIL;ZESTRIL) 2.5 MG tablet Take 1 tablet by mouth daily  Mauri Bunch DO   levothyroxine (SYNTHROID) 175 MCG tablet TAKE 1 TABLET BY MOUTH EVERY DAY  Mauri Bunch DO   rOPINIRole (REQUIP) 1 MG tablet TAKE 1 TABLET BY MOUTH THREE TIMES DAILY  Mauri Bunch DO   atorvastatin (LIPITOR) 40 MG tablet Take 1 tablet by mouth nightly  Mauri Bunch DO   traZODone (DESYREL) 150 MG tablet TAKE 1 TABLET BY MOUTH EVERY DAY AT NIGHT  Mauri Bunch DO   metoprolol succinate (TOPROL XL) 25 MG extended release tablet Take 1 tablet by mouth daily  Mauri Bunch DO   QUEtiapine (SEROQUEL XR) 150 MG TB24 extended release tablet TAKE 1 TABLET BY MOUTH EVERY DAY  Mauri Bunch DO   verapamil (VERELAN) 180 MG extended release capsule 2 capsules at bedtime  Mauri Bunch DO   albuterol sulfate HFA (VENTOLIN HFA) 108 (90 Base) MCG/ACT inhaler Inhale 2 puffs into the lungs 4 times daily as needed for Wheezing  SANDOR Pulido - CNP   albuterol sulfate HFA (PROVENTIL HFA) 108 (90 Base) MCG/ACT inhaler Inhale 2 puffs into the lungs every 6 hours as needed for Wheezing or Shortness of Breath  Mauri Bunch DO       CareTeam (Including outside providers/suppliers regularly involved in providing care):   Patient Care Team:  Valentina Thao DO as PCP - General

## 2023-06-05 NOTE — PATIENT INSTRUCTIONS
Personalized Preventive Plan for Anderson Weiner - 6/5/2023  Medicare offers a range of preventive health benefits. Some of the tests and screenings are paid in full while other may be subject to a deductible, co-insurance, and/or copay. Some of these benefits include a comprehensive review of your medical history including lifestyle, illnesses that may run in your family, and various assessments and screenings as appropriate. After reviewing your medical record and screening and assessments performed today your provider may have ordered immunizations, labs, imaging, and/or referrals for you. A list of these orders (if applicable) as well as your Preventive Care list are included within your After Visit Summary for your review. Other Preventive Recommendations:    A preventive eye exam performed by an eye specialist is recommended every 1-2 years to screen for glaucoma; cataracts, macular degeneration, and other eye disorders. A preventive dental visit is recommended every 6 months. Try to get at least 150 minutes of exercise per week or 10,000 steps per day on a pedometer . Order or download the FREE \"Exercise & Physical Activity: Your Everyday Guide\" from [employer]. Call 8-524.977.6906 or search [employer] online. You need 8832-8641 mg of calcium and 0118-2245 IU of vitamin D per day. It is possible to meet your calcium requirement with diet alone, but a vitamin D supplement is usually necessary to meet this goal.  When exposed to the sun, use a sunscreen that protects against both UVA and UVB radiation with an SPF of 30 or greater. Reapply every 2 to 3 hours or after sweating, drying off with a towel, or swimming. Always wear a seat belt when traveling in a car. Always wear a helmet when riding a bicycle or motorcycle.

## 2023-06-06 ENCOUNTER — OFFICE VISIT (OUTPATIENT)
Dept: ORTHOPEDIC SURGERY | Age: 73
End: 2023-06-06

## 2023-06-06 ENCOUNTER — TELEPHONE (OUTPATIENT)
Dept: ORTHOPEDIC SURGERY | Age: 73
End: 2023-06-06

## 2023-06-06 VITALS — HEIGHT: 64 IN | BODY MASS INDEX: 39.27 KG/M2 | WEIGHT: 230 LBS

## 2023-06-06 DIAGNOSIS — M17.0 PRIMARY OSTEOARTHRITIS OF BOTH KNEES: Primary | ICD-10-CM

## 2023-06-06 RX ORDER — HYALURONATE SODIUM 10 MG/ML
40 SYRINGE (ML) INTRAARTICULAR ONCE
Status: COMPLETED | OUTPATIENT
Start: 2023-06-06 | End: 2023-06-06

## 2023-06-06 RX ADMIN — Medication 40 MG: at 12:53

## 2023-06-06 NOTE — TELEPHONE ENCOUNTER
Patient doesn't have a total joint, no need for antibiotics before a dental procedure. Patient has been informed and understands.

## 2023-06-06 NOTE — PROGRESS NOTES
ORTHOPAEDIC SURGERY FOLLOWUP VISIT     CHIEF COMPLAINT:  Right knee injury     DATE OF INJURY: 11/23/2022     HISTORY OF PRESENT ILLNESS:  74-year-old female presents for repeat injection of bilateral knee pain. She has been managing this conservatively with well-timed intra-articular injections. She has had good success with both corticosteroid and Euflexxa injections in the past.  She indicates that she did not have any adverse event from prior injection. Overall, she believes that she has had benefit from her most recent Euflexxa injection. She presents for injection #2 of 3. PHYSICAL EXAM:  General: Well-appearing female of stated age. No acute distress. Focused examination of right knee: No cuts, open wounds, or abrasions to the right knee. Moderate swelling is present. No obvious effusion. There is tenderness palpation about the anterolateral aspect of the knee along the lateral joint line. Knee range of motion is full extension to 60 degrees of flexion within the limits of pain. There is negative anterior posterior drawer test.  Negative Lachman. There is pain reproduced with any movement of the knee. There is pain with patellar ballottement. Patellar tracking is appropriate. Sensation is intact to light touch in deep peroneal, superficial peroneal, tibial, sural, and saphenous nerve distributions. Motor function is intact to EHL, FHL, tibialis anterior, and gastroc. There is brisk capillary refill to the toes and a strong palpable dorsalis pedis pulse. Compartments are soft and compressible. There is no calf tenderness and a negative Homans' sign. Focused examination of left knee: No cuts, open wounds, or abrasions to the left knee. Moderate swelling is present. No obvious effusion. There is tenderness palpation about the anterolateral aspect of the knee along the lateral joint line. Knee range of motion is full extension to 60 degrees of flexion within the limits of pain.   There

## 2023-07-11 ENCOUNTER — TELEPHONE (OUTPATIENT)
Dept: FAMILY MEDICINE CLINIC | Age: 73
End: 2023-07-11

## 2023-07-11 NOTE — TELEPHONE ENCOUNTER
Patient: Liz Osborn Patient : 1950      Patient call back number- [phone]     Spoke with: Patient    Symptom(s) patient is experiencing- Patient states that she her legs and feet are swollen. I asked patient to press down on a swollen part to see if its pitting and she said it was. Patient also states that she is having SOB on exertion. Patient denies cough, or difficulty breathing at rest. Patient also states that she feels lethargic. Symptom onset has been acute for a time period of 1 day(s). Severity is described as mild. Course of her symptoms over time is minimal.      Does anything make the symptom(s) better or worse?- no    Have you experienced this before?-yes - patient has a hx    Any pertinent medical history? HTN, Edema      Have you taken anything (prescriptions or OTC) to help with symptom(s)?- yes - Lasix   - If YES, did it help?- No      Is patient having pain? Yes   Location of the pain- Legs  Describe the pain (sharp, stabbing, aching, burning, dull, etc)-- throbbing  Constant or intermittent- constant  Rate pain on a scale of 1 to 10: 4  Does is it radiate to other areas or just in one spot?- nonradiating      Call Outcome/Determination of next steps for patient- Appointment Scheduled with Essence Blackman  as advised by provider    Advised to call back directly if there are further questions, or if these symptoms fail to improve as anticipated or worsen.       Plan of Care reviewed and discussed with PCP: Yes       Electronically signed by Heena Muñoz LPN on  at 4:52 PM

## 2023-07-12 ENCOUNTER — OFFICE VISIT (OUTPATIENT)
Dept: FAMILY MEDICINE CLINIC | Age: 73
End: 2023-07-12
Payer: MEDICARE

## 2023-07-12 ENCOUNTER — TELEPHONE (OUTPATIENT)
Dept: FAMILY MEDICINE CLINIC | Age: 73
End: 2023-07-12

## 2023-07-12 VITALS
BODY MASS INDEX: 40.51 KG/M2 | WEIGHT: 236 LBS | DIASTOLIC BLOOD PRESSURE: 80 MMHG | HEART RATE: 63 BPM | SYSTOLIC BLOOD PRESSURE: 122 MMHG | OXYGEN SATURATION: 98 %

## 2023-07-12 DIAGNOSIS — R06.09 DOE (DYSPNEA ON EXERTION): ICD-10-CM

## 2023-07-12 DIAGNOSIS — R60.0 LOCALIZED EDEMA: ICD-10-CM

## 2023-07-12 DIAGNOSIS — N18.30 STAGE 3 CHRONIC KIDNEY DISEASE, UNSPECIFIED WHETHER STAGE 3A OR 3B CKD (HCC): ICD-10-CM

## 2023-07-12 DIAGNOSIS — R60.0 LOCALIZED EDEMA: Primary | ICD-10-CM

## 2023-07-12 DIAGNOSIS — R53.83 OTHER FATIGUE: ICD-10-CM

## 2023-07-12 DIAGNOSIS — E55.9 VITAMIN D DEFICIENCY: Primary | ICD-10-CM

## 2023-07-12 LAB
25(OH)D3 SERPL-MCNC: 24.4 NG/ML
ALBUMIN SERPL-MCNC: 3.9 G/DL (ref 3.4–5)
ALBUMIN/GLOB SERPL: 1.9 {RATIO} (ref 1.1–2.2)
ALP SERPL-CCNC: 91 U/L (ref 40–129)
ALT SERPL-CCNC: 15 U/L (ref 10–40)
ANION GAP SERPL CALCULATED.3IONS-SCNC: 11 MMOL/L (ref 3–16)
AST SERPL-CCNC: 13 U/L (ref 15–37)
BASOPHILS # BLD: 0 K/UL (ref 0–0.2)
BASOPHILS NFR BLD: 0.7 %
BILIRUB SERPL-MCNC: 0.5 MG/DL (ref 0–1)
BUN SERPL-MCNC: 22 MG/DL (ref 7–20)
CALCIUM SERPL-MCNC: 9.2 MG/DL (ref 8.3–10.6)
CHLORIDE SERPL-SCNC: 102 MMOL/L (ref 99–110)
CO2 SERPL-SCNC: 24 MMOL/L (ref 21–32)
CREAT SERPL-MCNC: 1.3 MG/DL (ref 0.6–1.2)
DEPRECATED RDW RBC AUTO: 16.7 % (ref 12.4–15.4)
EOSINOPHIL # BLD: 0.1 K/UL (ref 0–0.6)
EOSINOPHIL NFR BLD: 1.9 %
GFR SERPLBLD CREATININE-BSD FMLA CKD-EPI: 43 ML/MIN/{1.73_M2}
GLUCOSE SERPL-MCNC: 98 MG/DL (ref 70–99)
HCT VFR BLD AUTO: 37.6 % (ref 36–48)
HGB BLD-MCNC: 12.8 G/DL (ref 12–16)
LYMPHOCYTES # BLD: 2 K/UL (ref 1–5.1)
LYMPHOCYTES NFR BLD: 31.4 %
MCH RBC QN AUTO: 29 PG (ref 26–34)
MCHC RBC AUTO-ENTMCNC: 34 G/DL (ref 31–36)
MCV RBC AUTO: 85.3 FL (ref 80–100)
MONOCYTES # BLD: 0.5 K/UL (ref 0–1.3)
MONOCYTES NFR BLD: 7.2 %
NEUTROPHILS # BLD: 3.8 K/UL (ref 1.7–7.7)
NEUTROPHILS NFR BLD: 58.8 %
PLATELET # BLD AUTO: 204 K/UL (ref 135–450)
PMV BLD AUTO: 10 FL (ref 5–10.5)
POTASSIUM SERPL-SCNC: 4.6 MMOL/L (ref 3.5–5.1)
PROT SERPL-MCNC: 6 G/DL (ref 6.4–8.2)
RBC # BLD AUTO: 4.41 M/UL (ref 4–5.2)
SODIUM SERPL-SCNC: 137 MMOL/L (ref 136–145)
WBC # BLD AUTO: 6.4 K/UL (ref 4–11)

## 2023-07-12 PROCEDURE — 3079F DIAST BP 80-89 MM HG: CPT | Performed by: NURSE PRACTITIONER

## 2023-07-12 PROCEDURE — 1123F ACP DISCUSS/DSCN MKR DOCD: CPT | Performed by: NURSE PRACTITIONER

## 2023-07-12 PROCEDURE — 99214 OFFICE O/P EST MOD 30 MIN: CPT | Performed by: NURSE PRACTITIONER

## 2023-07-12 PROCEDURE — 3074F SYST BP LT 130 MM HG: CPT | Performed by: NURSE PRACTITIONER

## 2023-07-12 ASSESSMENT — ENCOUNTER SYMPTOMS
SINUS PRESSURE: 0
SHORTNESS OF BREATH: 1
RHINORRHEA: 0
VOMITING: 0
WHEEZING: 0
SINUS PAIN: 0
DIARRHEA: 0
NAUSEA: 0

## 2023-07-12 ASSESSMENT — PATIENT HEALTH QUESTIONNAIRE - PHQ9
SUM OF ALL RESPONSES TO PHQ QUESTIONS 1-9: 19
8. MOVING OR SPEAKING SO SLOWLY THAT OTHER PEOPLE COULD HAVE NOTICED. OR THE OPPOSITE - BEING SO FIDGETY OR RESTLESS THAT YOU HAVE BEEN MOVING AROUND A LOT MORE THAN USUAL: NEARLY EVERY DAY
SUM OF ALL RESPONSES TO PHQ QUESTIONS 1-9: 19
SUM OF ALL RESPONSES TO PHQ9 QUESTIONS 1 & 2: 5
3. TROUBLE FALLING OR STAYING ASLEEP: MORE THAN HALF THE DAYS
1. LITTLE INTEREST OR PLEASURE IN DOING THINGS: 3
10. IF YOU CHECKED OFF ANY PROBLEMS, HOW DIFFICULT HAVE THESE PROBLEMS MADE IT FOR YOU TO DO YOUR WORK, TAKE CARE OF THINGS AT HOME, OR GET ALONG WITH OTHER PEOPLE: 2
9. THOUGHTS THAT YOU WOULD BE BETTER OFF DEAD, OR OF HURTING YOURSELF: NOT AT ALL
8. MOVING OR SPEAKING SO SLOWLY THAT OTHER PEOPLE COULD HAVE NOTICED. OR THE OPPOSITE, BEING SO FIGETY OR RESTLESS THAT YOU HAVE BEEN MOVING AROUND A LOT MORE THAN USUAL: 3
SUM OF ALL RESPONSES TO PHQ QUESTIONS 1-9: 19
5. POOR APPETITE OR OVEREATING: NEARLY EVERY DAY
2. FEELING DOWN, DEPRESSED OR HOPELESS: MORE THAN HALF THE DAYS
6. FEELING BAD ABOUT YOURSELF - OR THAT YOU ARE A FAILURE OR HAVE LET YOURSELF OR YOUR FAMILY DOWN: 0
7. TROUBLE CONCENTRATING ON THINGS, SUCH AS READING THE NEWSPAPER OR WATCHING TELEVISION: 3
6. FEELING BAD ABOUT YOURSELF - OR THAT YOU ARE A FAILURE OR HAVE LET YOURSELF OR YOUR FAMILY DOWN: NOT AT ALL
7. TROUBLE CONCENTRATING ON THINGS, SUCH AS READING THE NEWSPAPER OR WATCHING TELEVISION: NEARLY EVERY DAY
4. FEELING TIRED OR HAVING LITTLE ENERGY: NEARLY EVERY DAY
4. FEELING TIRED OR HAVING LITTLE ENERGY: 3
1. LITTLE INTEREST OR PLEASURE IN DOING THINGS: NEARLY EVERY DAY
3. TROUBLE FALLING OR STAYING ASLEEP: 2
9. THOUGHTS THAT YOU WOULD BE BETTER OFF DEAD, OR OF HURTING YOURSELF: 0
SUM OF ALL RESPONSES TO PHQ QUESTIONS 1-9: 19
10. IF YOU CHECKED OFF ANY PROBLEMS, HOW DIFFICULT HAVE THESE PROBLEMS MADE IT FOR YOU TO DO YOUR WORK, TAKE CARE OF THINGS AT HOME, OR GET ALONG WITH OTHER PEOPLE: VERY DIFFICULT
2. FEELING DOWN, DEPRESSED OR HOPELESS: 2
SUM OF ALL RESPONSES TO PHQ QUESTIONS 1-9: 19
5. POOR APPETITE OR OVEREATING: 3

## 2023-07-12 NOTE — PROGRESS NOTES
Romina Nguyen (:  1950) is a 75 y.o. female,Established patient, here for evaluation of the following chief complaint(s):  Fatigue, Shortness of Breath, and Leg Swelling (Bilateral )         ASSESSMENT/PLAN:  1. Localized edema  -     Comprehensive Metabolic Panel; Future  -     CBC with Auto Differential; Future  2. Stage 3 chronic kidney disease, unspecified whether stage 3a or 3b CKD ([address])  -     Comprehensive Metabolic Panel; Future  -     Vitamin D 25 Hydroxy; Future  -     CBC with Auto Differential; Future  3. MONAHAN (dyspnea on exertion)  -     Comprehensive Metabolic Panel; Future  -     CBC with Auto Differential; Future  4. Other fatigue  -     Comprehensive Metabolic Panel; Future  -     Vitamin D 25 Hydroxy; Future  -     CBC with Auto Differential; Future    -No rales noted on auscultation, patient reports she is not having to use extra pillows to sleep comfortably. SOB only with walking long distances   -Take Lasix 40mg daily until 22, weight yourself daily and keep track. On 22 send message to myself or PCP with weights and how you are doing. Patient reports she has plenty of Lasix at home   -Compression socks, Elevate feet when resting including at night, balance activity and rest    -Limit salt intake, avoid processed foods sodas, fast foods and eating out   -Discussed symptoms of losing too much water such as leg cramping, if this occurs, stop taking lasix and notify office   -Discussed alarm symptoms and when to go to er   -Notify office if symptoms worsen or do not improve   -Case reviewed with PCP   -Labs pending     Return if symptoms worsen or fail to improve. Subjective   SUBJECTIVE/OBJECTIVE:  Patient reports symptoms for one month.    Reports she feels lethargic and weak and has bilateral leg swelling   Patient reports she sees nephrology for stage 3 kidney failure   Has taken \"2-3\" lasix tablets in last week   Reports six pound weight gain in last week  Patient

## 2023-07-12 NOTE — PATIENT INSTRUCTIONS
Compression socks   Elevate your feet when resting   Balance rest and activity  Take Lasix 40mg  (two 20mg tablets) every day until Monday  Weigh yourself every day, preferably naked and at the same time and keep track  On Monday, send a message with your weights and how you are feeling   If you get leg cramps, dizziness, dry mouth or feel you are getting too day, stop taking the Lasix   Monitor your salt intake, avoid eating out, avoid processed foods   Drink water

## 2023-07-12 NOTE — TELEPHONE ENCOUNTER
Patient in today for a visit with Sheri Lance. She would like patient to follow up with you regarding edema, SOB, and fatigue. Where can I schedule her at?  Your next available isn't until mid August.

## 2023-07-13 RX ORDER — ERGOCALCIFEROL 1.25 MG/1
50000 CAPSULE ORAL WEEKLY
Qty: 12 CAPSULE | Refills: 1 | Status: SHIPPED | OUTPATIENT
Start: 2023-07-13

## 2023-07-13 NOTE — TELEPHONE ENCOUNTER
Not sure you got my last note---Put her in 7/19 at 1:00 & move Taylor Vo out few mos-if OK with him,

## 2023-07-19 ENCOUNTER — OFFICE VISIT (OUTPATIENT)
Dept: FAMILY MEDICINE CLINIC | Age: 73
End: 2023-07-19
Payer: MEDICARE

## 2023-07-19 VITALS
SYSTOLIC BLOOD PRESSURE: 122 MMHG | WEIGHT: 238 LBS | BODY MASS INDEX: 40.85 KG/M2 | DIASTOLIC BLOOD PRESSURE: 82 MMHG | OXYGEN SATURATION: 96 % | HEART RATE: 75 BPM

## 2023-07-19 DIAGNOSIS — R60.0 LOCALIZED EDEMA: Primary | ICD-10-CM

## 2023-07-19 DIAGNOSIS — R06.09 DOE (DYSPNEA ON EXERTION): ICD-10-CM

## 2023-07-19 DIAGNOSIS — R53.83 OTHER FATIGUE: ICD-10-CM

## 2023-07-19 PROCEDURE — 1123F ACP DISCUSS/DSCN MKR DOCD: CPT | Performed by: FAMILY MEDICINE

## 2023-07-19 PROCEDURE — 99213 OFFICE O/P EST LOW 20 MIN: CPT | Performed by: FAMILY MEDICINE

## 2023-07-19 PROCEDURE — 3079F DIAST BP 80-89 MM HG: CPT | Performed by: FAMILY MEDICINE

## 2023-07-19 PROCEDURE — 3074F SYST BP LT 130 MM HG: CPT | Performed by: FAMILY MEDICINE

## 2023-07-19 RX ORDER — TORSEMIDE 10 MG/1
10 TABLET ORAL 2 TIMES DAILY
Qty: 30 TABLET | Refills: 0 | Status: SHIPPED | OUTPATIENT
Start: 2023-07-19

## 2023-07-19 ASSESSMENT — ENCOUNTER SYMPTOMS
CHEST TIGHTNESS: 0
SHORTNESS OF BREATH: 1

## 2023-07-19 NOTE — PROGRESS NOTES
Subjective:      Patient ID: Rachel Mosley is a 75 y.o. female. HPI  HPI    Review of Systems    Review of Systems    Objective:   Physical Exam      Physical Exam    Assessment:       Diagnosis Orders   1. Localized edema  External Referral To Home Health            Plan:      Diagnoses and all orders for this visit:    Localized edema  -     External Referral To Home Health  Refer to [address] [place] [address] for assistance with treating edema(Eval & Treat)  Other orders  -     torsemide (DEMADEX) 10 MG tablet; Take 1 tablet by mouth in the morning and 1 tablet in the evening.             Mauri Bunch, DO

## 2023-07-19 NOTE — PROGRESS NOTES
Subjective:      Patient ID: Darcie Ernandez is a 75 y.o. female. HPI  Patient in for follow-up on her fatigue shortness of breath and swelling in her legs. She did take the Lasix 20 mg twice a day for 4 days and she states there was no improvement in her swelling or reduction in her weight. She states that now she has in the central portion of both lower legs erythematous area and she is having leakage of fluid out of this area which is worse overnight. Review of Systems    Review of Systems   Constitutional:  Positive for fatigue. Respiratory:  Positive for shortness of breath. Negative for chest tightness. See HPI   Cardiovascular:  Positive for leg swelling. See HPI   Musculoskeletal:  Positive for arthralgias. Objective:   Physical Exam      Physical Exam  Constitutional:       General: She is not in acute distress. Appearance: Normal appearance. She is obese. She is not ill-appearing. Cardiovascular:      Rate and Rhythm: Normal rate and regular rhythm. Heart sounds: Normal heart sounds. Comments: Pitting edema is present approximately 3+ bilaterally there is some evidence of fluid on the anterior part of both legs. Pulmonary:      Effort: Pulmonary effort is normal.      Breath sounds: Normal breath sounds. Abdominal:      Palpations: Abdomen is soft. Tenderness: There is no abdominal tenderness. Musculoskeletal:      Right lower leg: Edema present. Left lower leg: Edema present. Neurological:      Mental Status: She is alert and oriented to person, place, and time. Psychiatric:         Mood and Affect: Mood normal.         Behavior: Behavior normal.         Thought Content: Thought content normal.         Judgment: Judgment normal.       Assessment:       Diagnosis Orders   1. Localized edema        2. Other fatigue        3. MONAHAN (dyspnea on exertion)              Plan:      Vandana Quintana was seen today for follow-up.     Diagnoses and all orders

## 2023-07-20 ENCOUNTER — TELEPHONE (OUTPATIENT)
Dept: FAMILY MEDICINE CLINIC | Age: 73
End: 2023-07-20

## 2023-07-20 NOTE — TELEPHONE ENCOUNTER
[facility] called in to make us aware that they received the referral that was sent over however the pt's insurance is not in network with them so they cannot see this patient. They do know [facility] takes the [facility] and their number is [phone].  We can also try [facility] [phone]

## 2023-07-25 DIAGNOSIS — R60.0 LOCALIZED EDEMA: Primary | ICD-10-CM

## 2023-07-26 ENCOUNTER — TELEPHONE (OUTPATIENT)
Dept: FAMILY MEDICINE CLINIC | Age: 73
End: 2023-07-26

## 2023-07-26 NOTE — TELEPHONE ENCOUNTER
I got a call from an RN from [address] [place] [address] on this patient. She informed me that without a Dx of cellulitis or CHF they can't get patient approved for home care due to she isn't home bound. They can only go out once a week as of right now and do vitals but that's about it. She will be sending over an order form for you to look at stating what patient would be approved for in detail.

## 2023-08-08 ENCOUNTER — TELEPHONE (OUTPATIENT)
Dept: FAMILY MEDICINE CLINIC | Age: 73
End: 2023-08-08

## 2023-08-08 NOTE — TELEPHONE ENCOUNTER
Got a call from Nurse Villa Jimenez with [address] [place] [address]. She saw patient for a visit today and wanted to let you know she has bilateral swelling in her calves. Wanted to report weights for the past few days. ( 8/6 was 234lbs, 8/7 was 239 lbs, today was 237 lbs). Lastly wanted to report that patient's atrial beat was very faint.

## 2023-08-09 NOTE — TELEPHONE ENCOUNTER
I talked to the patient Wednesday evening and she is apparently still having the same symptoms of swelling and some redness on the front part of her legs and I told her to call the office on Thursday morning as early as she can and see if she can see Dr. Brett Johnson or Keaton Drew for an appointment on either Thursday or Friday for an evaluation.

## 2023-08-10 ENCOUNTER — OFFICE VISIT (OUTPATIENT)
Dept: FAMILY MEDICINE CLINIC | Age: 73
End: 2023-08-10
Payer: MEDICARE

## 2023-08-10 VITALS
SYSTOLIC BLOOD PRESSURE: 126 MMHG | DIASTOLIC BLOOD PRESSURE: 78 MMHG | HEART RATE: 68 BPM | BODY MASS INDEX: 40.85 KG/M2 | TEMPERATURE: 98.1 F | WEIGHT: 238 LBS | OXYGEN SATURATION: 98 %

## 2023-08-10 DIAGNOSIS — L03.116 CELLULITIS OF BOTH LOWER EXTREMITIES: ICD-10-CM

## 2023-08-10 DIAGNOSIS — R07.9 CHEST PAIN, UNSPECIFIED TYPE: Primary | ICD-10-CM

## 2023-08-10 DIAGNOSIS — L03.115 CELLULITIS OF BOTH LOWER EXTREMITIES: ICD-10-CM

## 2023-08-10 LAB
ALBUMIN SERPL-MCNC: 3.9 G/DL (ref 3.4–5)
ALBUMIN/GLOB SERPL: 2 {RATIO} (ref 1.1–2.2)
ALP SERPL-CCNC: 88 U/L (ref 40–129)
ALT SERPL-CCNC: 13 U/L (ref 10–40)
ANION GAP SERPL CALCULATED.3IONS-SCNC: 10 MMOL/L (ref 3–16)
AST SERPL-CCNC: 12 U/L (ref 15–37)
BASOPHILS # BLD: 0 K/UL (ref 0–0.2)
BASOPHILS NFR BLD: 0.7 %
BILIRUB SERPL-MCNC: 0.6 MG/DL (ref 0–1)
BUN SERPL-MCNC: 25 MG/DL (ref 7–20)
CALCIUM SERPL-MCNC: 9.4 MG/DL (ref 8.3–10.6)
CHLORIDE SERPL-SCNC: 106 MMOL/L (ref 99–110)
CO2 SERPL-SCNC: 27 MMOL/L (ref 21–32)
CREAT SERPL-MCNC: 1.1 MG/DL (ref 0.6–1.2)
DEPRECATED RDW RBC AUTO: 16.4 % (ref 12.4–15.4)
EOSINOPHIL # BLD: 0.1 K/UL (ref 0–0.6)
EOSINOPHIL NFR BLD: 1.4 %
GFR SERPLBLD CREATININE-BSD FMLA CKD-EPI: 53 ML/MIN/{1.73_M2}
GLUCOSE SERPL-MCNC: 94 MG/DL (ref 70–99)
HCT VFR BLD AUTO: 37.3 % (ref 36–48)
HGB BLD-MCNC: 12.4 G/DL (ref 12–16)
LYMPHOCYTES # BLD: 1.6 K/UL (ref 1–5.1)
LYMPHOCYTES NFR BLD: 24.6 %
MCH RBC QN AUTO: 28.9 PG (ref 26–34)
MCHC RBC AUTO-ENTMCNC: 33.4 G/DL (ref 31–36)
MCV RBC AUTO: 86.7 FL (ref 80–100)
MONOCYTES # BLD: 0.4 K/UL (ref 0–1.3)
MONOCYTES NFR BLD: 6 %
NEUTROPHILS # BLD: 4.5 K/UL (ref 1.7–7.7)
NEUTROPHILS NFR BLD: 67.3 %
PLATELET # BLD AUTO: 201 K/UL (ref 135–450)
PMV BLD AUTO: 10 FL (ref 5–10.5)
POTASSIUM SERPL-SCNC: 4.6 MMOL/L (ref 3.5–5.1)
PROT SERPL-MCNC: 5.9 G/DL (ref 6.4–8.2)
RBC # BLD AUTO: 4.3 M/UL (ref 4–5.2)
SODIUM SERPL-SCNC: 143 MMOL/L (ref 136–145)
TROPONIN, HIGH SENSITIVITY: 13 NG/L (ref 0–14)
WBC # BLD AUTO: 6.7 K/UL (ref 4–11)

## 2023-08-10 PROCEDURE — 1123F ACP DISCUSS/DSCN MKR DOCD: CPT | Performed by: FAMILY MEDICINE

## 2023-08-10 PROCEDURE — 99214 OFFICE O/P EST MOD 30 MIN: CPT | Performed by: FAMILY MEDICINE

## 2023-08-10 PROCEDURE — 93000 ELECTROCARDIOGRAM COMPLETE: CPT | Performed by: FAMILY MEDICINE

## 2023-08-10 PROCEDURE — 3078F DIAST BP <80 MM HG: CPT | Performed by: FAMILY MEDICINE

## 2023-08-10 PROCEDURE — 3074F SYST BP LT 130 MM HG: CPT | Performed by: FAMILY MEDICINE

## 2023-08-10 RX ORDER — DOXYCYCLINE HYCLATE 100 MG
100 TABLET ORAL 2 TIMES DAILY
Qty: 20 TABLET | Refills: 0 | Status: SHIPPED | OUTPATIENT
Start: 2023-08-10 | End: 2023-08-20

## 2023-08-10 ASSESSMENT — ENCOUNTER SYMPTOMS: SHORTNESS OF BREATH: 1

## 2023-08-25 ENCOUNTER — OFFICE VISIT (OUTPATIENT)
Dept: ORTHOPEDIC SURGERY | Age: 73
End: 2023-08-25

## 2023-08-25 VITALS — BODY MASS INDEX: 40.63 KG/M2 | WEIGHT: 238 LBS | HEIGHT: 64 IN

## 2023-08-25 DIAGNOSIS — S83.421A SPRAIN OF LATERAL COLLATERAL LIGAMENT OF RIGHT KNEE, INITIAL ENCOUNTER: Primary | ICD-10-CM

## 2023-08-25 DIAGNOSIS — M17.11 PRIMARY OSTEOARTHRITIS OF RIGHT KNEE: ICD-10-CM

## 2023-08-25 DIAGNOSIS — M25.561 RIGHT KNEE PAIN, UNSPECIFIED CHRONICITY: ICD-10-CM

## 2023-08-25 RX ORDER — TRIAMCINOLONE ACETONIDE 40 MG/ML
80 INJECTION, SUSPENSION INTRA-ARTICULAR; INTRAMUSCULAR ONCE
Status: COMPLETED | OUTPATIENT
Start: 2023-08-25 | End: 2023-08-25

## 2023-08-25 RX ORDER — LIDOCAINE HYDROCHLORIDE 10 MG/ML
3 INJECTION, SOLUTION INFILTRATION; PERINEURAL ONCE
Status: COMPLETED | OUTPATIENT
Start: 2023-08-25 | End: 2023-08-25

## 2023-08-25 RX ADMIN — TRIAMCINOLONE ACETONIDE 80 MG: 40 INJECTION, SUSPENSION INTRA-ARTICULAR; INTRAMUSCULAR at 13:57

## 2023-08-25 RX ADMIN — LIDOCAINE HYDROCHLORIDE 3 ML: 10 INJECTION, SOLUTION INFILTRATION; PERINEURAL at 13:57

## 2023-08-26 NOTE — PROGRESS NOTES
ORTHOPAEDIC SURGERY FOLLOWUP VISIT    CHIEF COMPLAINT: Right knee pain     DATE OF INJURY: 8/23/2023    HISTORY OF PRESENT ILLNESS:  78-year-old female well-known to me presents for evaluation of her right knee. She has chronic knee osteoarthritis for which she has been managing with injections both corticosteroid and viscosupplementation. Both of these types of injections are effective for her. In June, she had her most recent series of Euflexxa. She only completed 2 of 3 of the series. She reports that she had acute worsening of her pain approximately 3 days ago. She reports crossing her legs in a figure-of-four position and experiencing a sharp pain over the lateral aspect of the knee. Since that time, she has had difficulty with ambulation. She rates her pain an 8 out of 10. This is worse with ambulation. She denies lj symptoms of popping catching or locking. PHYSICAL EXAM:  General: Well-appearing. No distress. Right lower extremity: No cuts, open wounds, abrasions to the lower extremity. There is diffuse lymphedema and lower extremity swelling. There is no warmth or erythema. There is tenderness to palpation diffusely about the lateral knee. Knee range of motion is full extension to 110 degrees of flexion. There is no significant mechanical block. There is mild to moderate subpatellar crepitus. There is no tenderness to palpation about the medial joint line. There is tenderness to palpation laterally. There is pain reproduced with varus stress at 30 degrees experienced over the lateral knee. No pain with valgus stress. Negative anterior posterior drawer test. Sensation is intact to light touch in deep peroneal, superficial peroneal, tibial, sural, and saphenous nerve distributions. Motor function is intact to EHL, FHL, tibialis anterior, and gastroc. There is brisk capillary refill to the toes and a strong palpable dorsalis pedis pulse. Compartments are soft and compressible.

## 2023-08-29 ENCOUNTER — TELEPHONE (OUTPATIENT)
Dept: FAMILY MEDICINE CLINIC | Age: 73
End: 2023-08-29

## 2023-08-29 NOTE — ED PROVIDER NOTES
**ADVANCED PRACTICE PROVIDER, I HAVE EVALUATED THIS [facility] [place]  ED  EMERGENCY DEPARTMENT ENCOUNTER      Pt Name: Emi Soulier  ICX:[id number]  Birthdate 1950  Date of evaluation: 3/19/2022  Provider: RACHAEL Dahl      Chief Complaint:    Chief Complaint   Patient presents with    Head Injury     states tripped over own feet tonight, landed on concrete, denies loc. complains of L knee pain. takes elliquis     Knee Pain    Fall         Nursing Notes, Past Medical Hx, Past Surgical Hx, Social Hx, Allergies, and Family Hx were all reviewed and agreed with or any disagreements were addressed in the HPI.    HPI: (Location, Duration, Timing, Severity, Quality, Assoc Sx, Context, Modifying factors)    Chief Complaint of fall hitting her head. This is a  67 y.o. female who presents via private vehicle after a fall. The patient states she went out to eat with her daughter for her birthday and while leaving the restaurant she tripped over her feet falling on concrete. She states she primarily caught herself with her right arm however she did hit her head on the concrete. She is currently on a blood thinner. She also complains of left knee pain and states she has had multiple surgeries on her left leg. She states that multiple bystanders's had helped her to her feet. She denies any loss of consciousness. No change in vision. She denies headache. She states her shoulder muscles are sore from the fall. She denies any wrist pain. Currently her only complaint of pain is in her left knee.     PastMedical/Surgical History:      Diagnosis Date    Arthritis     Bipolar disorder ([facility] [address]     questionable dx    Cellulitis     L ankle, recurrent; over area of prior surgeries    Chronic back pain     Closed fracture of left fibula 1995    s/p fall; surgery x 6    Closed fracture of left tibia 1995    s/p fall on ice; hx of surgery x 6    Colon polyps     Depression     Problem: PHYSICAL THERAPY ADULT  Goal: Performs mobility at highest level of function for planned discharge setting. See evaluation for individualized goals. Description: Treatment/Interventions: ADL retraining, Functional transfer training, LE strengthening/ROM, Elevations, Therapeutic exercise, Endurance training, Patient/family training, Equipment eval/education, Bed mobility, Gait training, Compensatory technique education, OT, Spoke to case management  Equipment Recommended: Svitlana Dawson       See flowsheet documentation for full assessment, interventions and recommendations. Note:    Problem List: Decreased strength, Decreased endurance, Impaired balance, Decreased mobility, Decreased safety awareness, Pain  Assessment: Patient seen for Physical Therapy evaluation. Patient admitted with Sepsis ([address] Comorbidities affecting patient's physical performance include: Hypertension, CKD 3, acute respiratory failure with hypoxia, PE history, bipolar 1, lumbar disc degeneration, hyperlipidemia, lumbar radiculopathy, myofascial pain syndrome, chronic pain syndrome, OA. Personal factors affecting patient at time of initial evaluation include: lives in two story house, ambulating with assistive device, stairs to enter home, inability to navigate community distances, inability to navigate level surfaces without external assistance, inability to perform dynamic tasks in community and positive fall history. Prior to admission, patient was independent with functional mobility with quad cane, requiring assist for ADLS, requiring assist for IADLS, living with dtr in a two level home with 3 steps to enter, ambulating household distance and lives in a multilevel house but has 1st floor setup.   Please find objective findings from Physical Therapy assessment regarding body systems outlined above with impairments and limitations including weakness, impaired balance, decreased endurance, gait deviations, pain, decreased activity Fibroid (bleeding) (uterine) 1982    Fibromyalgia     GERD (gastroesophageal reflux disease)     Hyperlipidemia     Hypertension     Myoview Lexiscan WNL on 5/17/12    Hypothyroidism     Influenza A 01/31/2020    Migraine     Miscarriage     Morbid obesity ([address]     Neuropathy 2011    seen initially by neuro., Dr. Agatha Montez, on 8/11/10; EMG 7/16/10 showed abn.'s c/w L5 radiculopathy & periph. neuropathy; pt. reports sx in hands and feet ; dx'd by rheum.      MAXIMO (obstructive sleep apnea) 06/07/2012    mild-does not require appliance    RLS (restless legs syndrome)     Urinary incontinence     Urine incontinence     saw urology, Dr. Natali Moreno, on 7/21/11 for microscopic hematuria & hematuria; was started on Vesicare & scheduled for cystoscopy    Vitamin D deficiency 2/27/12    22 ng/mL         Procedure Laterality Date    BARIATRIC SURGERY  11/14/12    Laparoscopic Sleeve Gastrectomy, Lap Hiatal hernia repair   [address] COLONOSCOPY  2010     colon polyps; Dr. Bonnie Donnelly Left 08/31/2018    phaco with IOL    HIATAL HERNIA REPAIR      lap    HYSTERECTOMY  1982    TAHBSO for fibroids and DUB    LEG SURGERY  1995    L; s/p tibia and fibular fx's; has uma in L tibia; surgery x 6    OVARY REMOVAL      FL XCAPSL CTRC RMVL INSJ IO LENS PROSTH W/O ECP Left 8/31/2018    PHACOEMULSIFICATION OF CATARACT LEFT EYE WITH INTRAOCULAR LENS IMPLANT performed by Matthias Richards MD at [address] W/O ECP Right 9/14/2018    PHACOEMULSIFICATION OF CATARACT RIGHT  EYE WITH INTRAOCULAR LENS IMPLANT performed by Matthias Richards MD at [address]  2010    Dr. Dennis Randhawa  4-27-12    WNL       Medications:  Previous Medications    ALBUTEROL SULFATE HFA (PROVENTIL HFA) 108 (90 BASE) MCG/ACT INHALER    Inhale 2 puffs into the lungs every 6 hours as needed for Wheezing or Shortness of Breath tolerance, decreased functional mobility tolerance, decreased safety awareness, fall risk and SOB upon exertion. The Barthel Index was used as a functional outcome tool presenting with a score of Barthel Index Score: 55 today indicating marked limitations of functional mobility and ADLS. Patient's clinical presentation is currently unstable/unpredictable as seen in patient's presentation of vital sign response, changing level of pain, increased fall risk, new onset of impairment of functional mobility, decreased endurance and new onset of weakness. Pt would benefit from continued Physical Therapy treatment to address deficits as defined above and maximize level of functional mobility. As demonstrated by objective findings, the assigned level of complexity for this evaluation is high. The patient's AM-PAC Basic Mobility Inpatient Short Form Raw Score is 18. A Raw score of greater than 16 suggests the patient may benefit from discharge to home. Please also refer to the recommendation of the Physical Therapist for safe discharge planning. PT Discharge Recommendation: Home with home health rehabilitation    See flowsheet documentation for full assessment. APIXABAN (ELIQUIS) 5 MG TABS TABLET    1 bid    ATORVASTATIN (LIPITOR) 40 MG TABLET    Take 1 tablet by mouth nightly    BENZONATATE (TESSALON PERLES) 100 MG CAPSULE    Take one to two caps by mouth as needed for cough three times a day. BUPROPION (WELLBUTRIN XL) 150 MG EXTENDED RELEASE TABLET    TAKE 2 TABLETS EVERY DAY    GABAPENTIN (NEURONTIN) 300 MG CAPSULE    Take 1 capsule by mouth 3 times daily for 30 days. LEVOTHYROXINE (SYNTHROID) 175 MCG TABLET    TAKE 1 TABLET EVERY DAY    METOPROLOL SUCCINATE (TOPROL XL) 25 MG EXTENDED RELEASE TABLET    Take 1 tablet by mouth daily    OMEPRAZOLE (PRILOSEC) 40 MG DELAYED RELEASE CAPSULE    TAKE 1 CAPSULE EVERY DAY    OXYBUTYNIN (DITROPAN) 5 MG TABLET    Take 1 tablet by mouth 2 times daily    QUETIAPINE (SEROQUEL XR) 150 MG TB24 EXTENDED RELEASE TABLET    TAKE 1 TABLET EVERY DAY    ROPINIROLE (REQUIP) 1 MG TABLET    TAKE 1 TABLET THREE TIMES DAILY    TRAZODONE (DESYREL) 150 MG TABLET    TAKE 1 TABLET EVERY NIGHT    VERAPAMIL (VERELAN) 180 MG EXTENDED RELEASE CAPSULE    Take 2 capsules by mouth nightly         Review of Systems:  (2-9 systems needed)    \"Positives and Pertinent negatives as per HPI\"    Physical Exam:  Physical Exam  Vitals and nursing note reviewed. Constitutional:       Appearance: Normal appearance. She is not diaphoretic. HENT:      Head: Normocephalic and atraumatic. Nose: Nose normal.      Mouth/Throat:      Mouth: Mucous membranes are moist.   Eyes:      General:         Right eye: No discharge. Left eye: No discharge. Extraocular Movements: Extraocular movements intact. Pupils: Pupils are equal, round, and reactive to light. Pulmonary:      Effort: Pulmonary effort is normal. No respiratory distress. Musculoskeletal:         General: Normal range of motion. Cervical back: Normal, normal range of motion and neck supple.       Thoracic back: Normal.      Lumbar back: Normal.      Right knee: Normal.      Left knee: Swelling present. No deformity, effusion, erythema, lacerations or bony tenderness. Tenderness present. Comments: Upper extremities: No pain upon palpation of shoulders, elbows or wrists. Patient has full active range of motion of all upper extremity joints. Compartments are soft, nontender without crepitus. No obvious deformity or palpable step-off. Radial pulses are 2+ and cap refill less than 2 seconds. Lower extremity: Left knee with tenderness to palpation of patella. Minimal bruising noted above and below the knee. There is no palpable step-off. The patient has good range of motion although slightly limited secondary to pain. There are no open lacerations. Compartments are soft, nontender without crepitus. No obvious deformity or palpable step-off. Skin:     General: Skin is warm and dry. Coloration: Skin is not pale. Neurological:      Mental Status: She is alert and oriented to person, place, and time. Psychiatric:         Mood and Affect: Mood normal.         Behavior: Behavior normal.         MEDICAL DECISION MAKING    Vitals:    Vitals:    03/19/22 2305 03/19/22 2307 03/20/22 0106   BP: 111/60  (!) 109/55   Pulse: 76  67   Resp: 18  18   Temp:  98.4 °F (36.9 °C)    TempSrc:  Oral    SpO2: 98%  100%   Weight: 230 lb (104.3 kg)         LABS:Labs Reviewed - No data to display     Remainder of labs reviewed and were negative at this time or not returned at the time of this note. RADIOLOGY:   Non-plain film images such as CT, Ultrasound and MRI are read by the radiologist. RACHAEL De La Rosa have directly visualized the radiologic plain film image(s) with the below findings:      Interpretation per the Radiologist below, if available at the time of this note:    CT Cervical Spine WO Contrast   Final Result   No acute abnormality of the cervical spine.       RECOMMENDATIONS:   Unavailable         CT Head WO Contrast   Final Result   Mild atrophy and mild chronic microischemic changes scattered in the deep   white matter which is unchanged with no acute abnormality seen. XR KNEE LEFT (3 VIEWS)   Final Result   Mild osteoarthritic changes medially in the knee and postop changes along the   proximal tibia which is unchanged with no acute abnormality seen. Moderate patellofemoral degenerative changes and a small suprapatellar   effusion which is unchanged. XR KNEE LEFT (3 VIEWS)    Result Date: 3/19/2022  EXAMINATION: THREE XRAY VIEWS OF THE LEFT KNEE 3/19/2022 11:20 pm COMPARISON: 03/09/2022 HISTORY: ORDERING SYSTEM PROVIDED HISTORY: injury TECHNOLOGIST PROVIDED HISTORY: Reason for exam:->injury Reason for Exam: fall onto left knee with head injury, chronic bilateral knee pain, previous fixation through tibia FINDINGS: There is mild joint space narrowing medially in the knee. No fracture or dislocation is seen. There are postop changes along the proximal tibia which is unchanged. There is moderate narrowing of the patellofemoral joint with a small suprapatellar effusion which is unchanged. Mild osteoarthritic changes medially in the knee and postop changes along the proximal tibia which is unchanged with no acute abnormality seen. Moderate patellofemoral degenerative changes and a small suprapatellar effusion which is unchanged. XR KNEE LEFT (MIN 4 VIEWS)    Result Date: 3/9/2022  Radiology exam is complete. No Radiologist dictation. Please follow up with ordering provider. XR KNEE RIGHT (MIN 4 VIEWS)    Result Date: 3/9/2022  Radiology exam is complete. No Radiologist dictation. Please follow up with ordering provider.      CT Head WO Contrast    Result Date: 3/20/2022  EXAMINATION: CT OF THE HEAD WITHOUT CONTRAST  3/20/2022 1:03 am TECHNIQUE: CT of the head was performed without the administration of intravenous contrast. Dose modulation, iterative reconstruction, and/or weight based adjustment of the mA/kV was utilized to reduce the radiation dose to as low as reasonably achievable. COMPARISON: 02/09/2022 HISTORY: ORDERING SYSTEM PROVIDED HISTORY: fall TECHNOLOGIST PROVIDED HISTORY: Reason for exam:->fall Has a \"code stroke\" or \"stroke alert\" been called? ->No Decision Support Exception - unselect if not a suspected or confirmed emergency medical condition->Emergency Medical Condition (MA) Reason for Exam: fall with head injury, takes blood thinners FINDINGS: BRAIN/VENTRICLES: The ventricles are mildly enlarged and there is diffuse mild prominence of the cortical sulci. There is mild periventricular low density bilaterally which is unchanged. No intracranial hemorrhage or edema is seen. There is no extra-axial fluid collection or mass. ORBITS: The visualized portion of the orbits demonstrate no acute abnormality. SINUSES: The visualized paranasal sinuses and mastoid air cells demonstrate no acute abnormality. SOFT TISSUES/SKULL:  No acute abnormality of the visualized skull or soft tissues. Mild atrophy and mild chronic microischemic changes scattered in the deep white matter which is unchanged with no acute abnormality seen. CT Cervical Spine WO Contrast    Result Date: 3/20/2022  EXAMINATION: CT OF THE CERVICAL SPINE WITHOUT CONTRAST 3/20/2022 1:03 am TECHNIQUE: CT of the cervical spine was performed without the administration of intravenous contrast. Multiplanar reformatted images are provided for review. Dose modulation, iterative reconstruction, and/or weight based adjustment of the mA/kV was utilized to reduce the radiation dose to as low as reasonably achievable. COMPARISON: None.  HISTORY: ORDERING SYSTEM PROVIDED HISTORY: fall TECHNOLOGIST PROVIDED HISTORY: Reason for exam:->fall Decision Support Exception - unselect if not a suspected or confirmed emergency medical condition->Emergency Medical Condition (MA) Reason for Exam: fall with head injury, neck pain, takes blood thinners FINDINGS: BONES/ALIGNMENT: Cervical vertebral body heights and alignment are normal. Facet joints are normally aligned. There is no acute fracture or traumatic malalignment. DEGENERATIVE CHANGES: There is mild disc space narrowing at C5-C6. There is multilevel primarily left-sided facet arthropathy. SOFT TISSUES: There is no prevertebral soft tissue swelling. No acute abnormality of the cervical spine. RECOMMENDATIONS: Unavailable          MEDICAL DECISION MAKING / ED COURSE:      Patient was seen and evaluated in the emergency department today after a fall hitting her head. She is on Eliquis therefore CT head is obtained. She does have some mild neck pain although this is likely musculoskeletal in nature due to mechanism of injury will obtain CT neck as well. Finally she is complaining of left knee pain and an x-ray is obtained. Patient was given:  Medications   acetaminophen (TYLENOL) tablet 650 mg (650 mg Oral Given 3/20/22 0122)     Imaging results include:  CT cervical spine without acute abnormality. CT head shows mild atrophy with mild chronic microischemic changes scattered in the deep white matter which is unchanged with no acute abnormality. X-ray of left knee reveals mild osteoarthritic changes medially in the knee and postop changes along the proximal tibia which is unchanged with no acute abnormality. She is also noted to have moderate patellofemoral degenerative changes and small suprapatellar effusion which is unchanged. Upon reevaluation the patient remains well-appearing and hemodynamically stable. A discussion was had with the patient regarding all imaging results. Given reassuring work-up we will plan to discharge the patient home with close primary care follow-up. We also discussed that if her knee pain continues despite conservative management she may need to follow-up with her orthopedic physician for further evaluation and treatment.   We discussed using an Ace wrap for swelling, using her walker at home for ambulation, using ice for 20 minutes at a time, at least 3 times a day and taking Tylenol for pain. The patient is in agreement with treatment plan, all questions are answered and she is discharged home in good condition. The patient tolerated their visit well. I evaluated the patient. The physician was available for consultation as needed. The patient and / or the family were informed of the results of any tests, a time was given to answer questions, a plan was proposed and they agreed with plan. CLINICAL IMPRESSION:  1. Closed head injury, initial encounter    2. Contusion of left knee, initial encounter    3. Fall, initial encounter    4. History of anticoagulant therapy        I estimate there is LOW risk for SUBARACHNOID HEMORRHAGE, MENINGITIS, INTRACRANIAL HEMORRHAGE, SUBDURAL HEMATOMA, OR STROKE, thus I consider the discharge disposition reasonable. Lori Flow and I have discussed the diagnosis and risks, and we agree with discharging home to follow-up with their primary doctor. We also discussed returning to the Emergency Department immediately if new or worsening symptoms occur. We have discussed the symptoms which are most concerning (e.g., changing or worsening pain, weakness, vomiting, fever) that necessitate immediate return. Final Impression    1. Closed head injury, initial encounter    2. Contusion of left knee, initial encounter    3. Fall, initial encounter    4. History of anticoagulant therapy        Discharge Vital Signs:  Blood pressure (!) 109/55, pulse 67, temperature 98.4 °F (36.9 °C), temperature source Oral, resp. rate 18, weight 230 lb (104.3 kg), SpO2 100 %, not currently breastfeeding.         DISPOSITION Decision To Discharge 03/20/2022 01:42:02 AM      PATIENT REFERRED TO:  [facility]  ED  [address] [facility] [address] [facility]  Go to   If symptoms worsen    Carolina Starr DO  [address] [facility]  [address] [place] [address]  [phone]    Schedule an appointment as soon as possible for a visit   If symptoms worsen    MD Andrea Jones [address] [place] [address]  [phone]            DISCHARGE MEDICATIONS:  New Prescriptions    No medications on file       DISCONTINUED MEDICATIONS:  Discontinued Medications    No medications on file              (Please note the MDM and HPI sections of this note were completed with a voice recognition program.  Efforts were made to edit the dictations but occasionally words are mis-transcribed.)    Electronically signed, Vincent De Santiago,           Vincent De Santiago  03/20/22 8726

## 2023-08-29 NOTE — TELEPHONE ENCOUNTER
Crys Hernandez is calling in to check to see if these are being sent to [facility] as well.  Please advise

## 2023-08-29 NOTE — TELEPHONE ENCOUNTER
Josefina Daniel with [facility] is calling to see if a verbal order is ok for ana ryan to help reduce the swelling in pts legs     OR if you can place the order and send over to a DME company that would be great as well please advise

## 2023-08-30 ENCOUNTER — OFFICE VISIT (OUTPATIENT)
Dept: ORTHOPEDIC SURGERY | Age: 73
End: 2023-08-30

## 2023-08-30 DIAGNOSIS — M17.11 PRIMARY OSTEOARTHRITIS OF RIGHT KNEE: Primary | ICD-10-CM

## 2023-08-30 DIAGNOSIS — M17.12 PRIMARY OSTEOARTHRITIS OF LEFT KNEE: ICD-10-CM

## 2023-08-30 RX ORDER — HYALURONATE SODIUM 10 MG/ML
40 SYRINGE (ML) INTRAARTICULAR ONCE
Status: COMPLETED | OUTPATIENT
Start: 2023-08-30 | End: 2023-08-30

## 2023-08-30 RX ADMIN — Medication 40 MG: at 13:19

## 2023-08-30 NOTE — PROGRESS NOTES
ORTHOPAEDIC SURGERY FOLLOWUP VISIT     CHIEF COMPLAINT: Right knee pain      DATE OF INJURY: 8/23/2023     HISTORY OF PRESENT ILLNESS:  23-year-old female well-known to me presents for repeat evaluation of her right knee. She has chronic knee osteoarthritis for which she has been managing with injections both corticosteroid and viscosupplementation. Both of these types of injections are effective for her. In June, she had her most recent series of Euflexxa. She only completed 2 of 3 of the series. She did have a recent injury to the right knee. She presented last Friday and received intra-articular corticosteroid injection. This has been significantly beneficial for her. Her pain has improved 30%. She additionally would like to proceed with the third injection of her viscosupplementation series which she has not gone forward with. She presents for this injection today. PHYSICAL EXAM:  General: Well-appearing. No distress. Right lower extremity: No cuts, open wounds, abrasions to the lower extremity. There is diffuse lymphedema and lower extremity swelling. There is no warmth or erythema. There is tenderness to palpation diffusely about the lateral knee. Knee range of motion is full extension to 110 degrees of flexion. There is no significant mechanical block. There is mild to moderate subpatellar crepitus. There is no tenderness to palpation about the medial joint line. There is tenderness to palpation laterally. There is pain reproduced with varus stress at 30 degrees experienced over the lateral knee. No pain with valgus stress. Negative anterior posterior drawer test. Sensation is intact to light touch in deep peroneal, superficial peroneal, tibial, sural, and saphenous nerve distributions. Motor function is intact to EHL, FHL, tibialis anterior, and gastroc. There is brisk capillary refill to the toes and a strong palpable dorsalis pedis pulse.   Compartments are soft and

## 2023-08-31 DIAGNOSIS — R60.0 LOCALIZED EDEMA: Primary | ICD-10-CM

## 2023-09-05 ENCOUNTER — TELEPHONE (OUTPATIENT)
Dept: FAMILY MEDICINE CLINIC | Age: 73
End: 2023-09-05

## 2023-09-05 NOTE — TELEPHONE ENCOUNTER
Nurse Wyatt Kraft with [address] [place] [address] called in today with an update on patient. She went out and saw Sri Damaso today and reported vitals:     Pulse: 85  BP: 98/65    Patient did report she has put on 3 lbs in the past week. She went from 236lbs last week to 239lbs this week. Wyatt Kraft also stated patient put on Jose hose compression stockings today and she already noticed a difference. Lastly, patient is taking her Lasix as prescribed.

## 2023-09-25 ENCOUNTER — OFFICE VISIT (OUTPATIENT)
Dept: CARDIOLOGY CLINIC | Age: 73
End: 2023-09-25
Payer: MEDICARE

## 2023-09-25 VITALS
HEIGHT: 64 IN | HEART RATE: 66 BPM | OXYGEN SATURATION: 97 % | BODY MASS INDEX: 41.66 KG/M2 | DIASTOLIC BLOOD PRESSURE: 64 MMHG | WEIGHT: 244 LBS | SYSTOLIC BLOOD PRESSURE: 110 MMHG

## 2023-09-25 DIAGNOSIS — R06.02 SOB (SHORTNESS OF BREATH): ICD-10-CM

## 2023-09-25 DIAGNOSIS — R07.2 PRECORDIAL PAIN: ICD-10-CM

## 2023-09-25 DIAGNOSIS — E78.2 MIXED HYPERLIPIDEMIA: Primary | ICD-10-CM

## 2023-09-25 DIAGNOSIS — I10 ESSENTIAL HYPERTENSION: ICD-10-CM

## 2023-09-25 DIAGNOSIS — G47.39 OTHER SLEEP APNEA: ICD-10-CM

## 2023-09-25 DIAGNOSIS — R60.0 LEG EDEMA: ICD-10-CM

## 2023-09-25 PROCEDURE — 1123F ACP DISCUSS/DSCN MKR DOCD: CPT | Performed by: INTERNAL MEDICINE

## 2023-09-25 PROCEDURE — 3078F DIAST BP <80 MM HG: CPT | Performed by: INTERNAL MEDICINE

## 2023-09-25 PROCEDURE — 99214 OFFICE O/P EST MOD 30 MIN: CPT | Performed by: INTERNAL MEDICINE

## 2023-09-25 PROCEDURE — 3074F SYST BP LT 130 MM HG: CPT | Performed by: INTERNAL MEDICINE

## 2023-09-25 RX ORDER — TORSEMIDE 20 MG/1
20 TABLET ORAL 2 TIMES DAILY
Qty: 90 TABLET | Refills: 1 | Status: SHIPPED | OUTPATIENT
Start: 2023-09-25

## 2023-09-25 NOTE — PATIENT INSTRUCTIONS
Plan:  ~Take torsemide 20 mg daily   ~Recommend follow back up with sleep center to treat sleep apnea   ~Labs- TSH and BNP   ~Recommend a stress test- Lexiscan Myoview to evaluate chest pain and shortness of breath. Patient is not able to walk on a treadmill   ~Recommend an echocardiogram which is an ultrasound of your heart to evaluate heart function, structures and valves. ~Recommend obtaining knee high compression stockings from a medical supply store. You can get the lowest compression since these can be hard to apply if they are too tight. Wear these during the day and take off at night. Would also recommend elevating feet when sitting to help mobilize fluid. Cardiac medications reviewed including indications and pertinent side effects. Medication list updated at this visit. Check blood pressure and heart rate at home a few times per week- keep a log with dates and times and bring to office visit   ~Recommend the Mediterranean diet for a heart healthy option. For weight loss recommend counting calories with a program like Weight Watchers. Also follow a low sodium diet   Regular exercise and following a healthy diet encouraged   Follow up with NP in 6 months       Your provider has ordered testing for further evaluation. An order/prescription has been included in your paper work. To schedule outpatient testing, contact Central Scheduling by calling [address] [place], [place] [address]Y ([phone]).

## 2023-10-17 ASSESSMENT — PATIENT HEALTH QUESTIONNAIRE - PHQ9
SUM OF ALL RESPONSES TO PHQ QUESTIONS 1-9: 5
6. FEELING BAD ABOUT YOURSELF - OR THAT YOU ARE A FAILURE OR HAVE LET YOURSELF OR YOUR FAMILY DOWN: NOT AT ALL
SUM OF ALL RESPONSES TO PHQ QUESTIONS 1-9: 5
4. FEELING TIRED OR HAVING LITTLE ENERGY: 3
10. IF YOU CHECKED OFF ANY PROBLEMS, HOW DIFFICULT HAVE THESE PROBLEMS MADE IT FOR YOU TO DO YOUR WORK, TAKE CARE OF THINGS AT HOME, OR GET ALONG WITH OTHER PEOPLE: NOT DIFFICULT AT ALL
7. TROUBLE CONCENTRATING ON THINGS, SUCH AS READING THE NEWSPAPER OR WATCHING TELEVISION: 1
3. TROUBLE FALLING OR STAYING ASLEEP: 0
6. FEELING BAD ABOUT YOURSELF - OR THAT YOU ARE A FAILURE OR HAVE LET YOURSELF OR YOUR FAMILY DOWN: 0
SUM OF ALL RESPONSES TO PHQ QUESTIONS 1-9: 5
10. IF YOU CHECKED OFF ANY PROBLEMS, HOW DIFFICULT HAVE THESE PROBLEMS MADE IT FOR YOU TO DO YOUR WORK, TAKE CARE OF THINGS AT HOME, OR GET ALONG WITH OTHER PEOPLE: 0
1. LITTLE INTEREST OR PLEASURE IN DOING THINGS: 0
9. THOUGHTS THAT YOU WOULD BE BETTER OFF DEAD, OR OF HURTING YOURSELF: 0
7. TROUBLE CONCENTRATING ON THINGS, SUCH AS READING THE NEWSPAPER OR WATCHING TELEVISION: SEVERAL DAYS
SUM OF ALL RESPONSES TO PHQ QUESTIONS 1-9: 5
5. POOR APPETITE OR OVEREATING: SEVERAL DAYS
9. THOUGHTS THAT YOU WOULD BE BETTER OFF DEAD, OR OF HURTING YOURSELF: NOT AT ALL
2. FEELING DOWN, DEPRESSED OR HOPELESS: NOT AT ALL
3. TROUBLE FALLING OR STAYING ASLEEP: NOT AT ALL
8. MOVING OR SPEAKING SO SLOWLY THAT OTHER PEOPLE COULD HAVE NOTICED. OR THE OPPOSITE - BEING SO FIDGETY OR RESTLESS THAT YOU HAVE BEEN MOVING AROUND A LOT MORE THAN USUAL: NOT AT ALL
2. FEELING DOWN, DEPRESSED OR HOPELESS: 0
SUM OF ALL RESPONSES TO PHQ9 QUESTIONS 1 & 2: 0
5. POOR APPETITE OR OVEREATING: 1
8. MOVING OR SPEAKING SO SLOWLY THAT OTHER PEOPLE COULD HAVE NOTICED. OR THE OPPOSITE, BEING SO FIGETY OR RESTLESS THAT YOU HAVE BEEN MOVING AROUND A LOT MORE THAN USUAL: 0
SUM OF ALL RESPONSES TO PHQ QUESTIONS 1-9: 5
1. LITTLE INTEREST OR PLEASURE IN DOING THINGS: NOT AT ALL
4. FEELING TIRED OR HAVING LITTLE ENERGY: NEARLY EVERY DAY

## 2023-10-18 ENCOUNTER — OFFICE VISIT (OUTPATIENT)
Dept: FAMILY MEDICINE CLINIC | Age: 73
End: 2023-10-18
Payer: MEDICARE

## 2023-10-18 VITALS
OXYGEN SATURATION: 93 % | DIASTOLIC BLOOD PRESSURE: 82 MMHG | HEART RATE: 72 BPM | WEIGHT: 241.8 LBS | SYSTOLIC BLOOD PRESSURE: 118 MMHG | BODY MASS INDEX: 41.5 KG/M2

## 2023-10-18 DIAGNOSIS — I10 ESSENTIAL HYPERTENSION: ICD-10-CM

## 2023-10-18 DIAGNOSIS — K21.9 GASTROESOPHAGEAL REFLUX DISEASE WITHOUT ESOPHAGITIS: ICD-10-CM

## 2023-10-18 DIAGNOSIS — E03.9 ACQUIRED HYPOTHYROIDISM: ICD-10-CM

## 2023-10-18 DIAGNOSIS — Z12.31 ENCOUNTER FOR SCREENING MAMMOGRAM FOR MALIGNANT NEOPLASM OF BREAST: ICD-10-CM

## 2023-10-18 DIAGNOSIS — E55.9 VITAMIN D DEFICIENCY: ICD-10-CM

## 2023-10-18 DIAGNOSIS — R60.0 LOCALIZED EDEMA: Primary | ICD-10-CM

## 2023-10-18 PROCEDURE — G0008 ADMIN INFLUENZA VIRUS VAC: HCPCS | Performed by: FAMILY MEDICINE

## 2023-10-18 PROCEDURE — 90694 VACC AIIV4 NO PRSRV 0.5ML IM: CPT | Performed by: FAMILY MEDICINE

## 2023-10-18 PROCEDURE — 99214 OFFICE O/P EST MOD 30 MIN: CPT | Performed by: FAMILY MEDICINE

## 2023-10-18 PROCEDURE — 3079F DIAST BP 80-89 MM HG: CPT | Performed by: FAMILY MEDICINE

## 2023-10-18 PROCEDURE — 3074F SYST BP LT 130 MM HG: CPT | Performed by: FAMILY MEDICINE

## 2023-10-18 PROCEDURE — 1123F ACP DISCUSS/DSCN MKR DOCD: CPT | Performed by: FAMILY MEDICINE

## 2023-10-18 RX ORDER — ERGOCALCIFEROL 1.25 MG/1
50000 CAPSULE ORAL WEEKLY
Qty: 12 CAPSULE | Refills: 1 | Status: SHIPPED | OUTPATIENT
Start: 2023-10-18

## 2023-10-18 ASSESSMENT — ENCOUNTER SYMPTOMS
EYE DISCHARGE: 0
BACK PAIN: 0
NAUSEA: 0
SHORTNESS OF BREATH: 0
SINUS PRESSURE: 0
WHEEZING: 0
EYE PAIN: 0
CONSTIPATION: 0
TROUBLE SWALLOWING: 0
BLOOD IN STOOL: 0
RHINORRHEA: 0
SORE THROAT: 0
DIARRHEA: 0
VOICE CHANGE: 0
CHEST TIGHTNESS: 0
COUGH: 0
EYE ITCHING: 0
ABDOMINAL DISTENTION: 0
ANAL BLEEDING: 0
ABDOMINAL PAIN: 0
VOMITING: 0
EYE REDNESS: 0

## 2023-10-18 NOTE — PROGRESS NOTES
Subjective:      Patient ID: Frankie Age is a 75 y.o. female. HPI  Patient in for checkup on several medical issues. For edema-she is has seen a cardiologist in the last month and he is still checking out her cardiac status but he did increase her Demadex to twice a day and she has been wearing support stockings daily and she thinks that really has made a difference-less swelling less discomfort and overall feels better. GERD on medication and doing well. Hypertension-blood pressure 140/80 or below when she checks at home or elsewhere        Review of Systems    Review of Systems   Constitutional:  Positive for fatigue. Negative for unexpected weight change. HENT:  Negative for congestion, ear pain, hearing loss, nosebleeds, postnasal drip, rhinorrhea, sinus pressure, sneezing, sore throat, tinnitus, trouble swallowing and voice change. Eyes:  Negative for pain, discharge, redness, itching and visual disturbance. Respiratory:  Negative for cough, chest tightness, shortness of breath and wheezing. Cardiovascular:  Positive for palpitations and leg swelling. Negative for chest pain. Gastrointestinal:  Negative for abdominal distention, abdominal pain, anal bleeding, blood in stool, constipation, diarrhea, nausea and vomiting. Genitourinary:  Positive for frequency. Negative for dysuria, flank pain, hematuria, menstrual problem, pelvic pain, urgency and vaginal discharge. Musculoskeletal:  Positive for arthralgias. Negative for back pain, gait problem, joint swelling, myalgias and neck pain. Skin:  Negative for pallor and rash. Neurological:  Negative for dizziness, tremors, seizures, weakness, light-headedness, numbness and headaches. Hematological:  Negative for adenopathy. Does not bruise/bleed easily. Psychiatric/Behavioral:  Negative for agitation, confusion, decreased concentration and sleep disturbance. The patient is not nervous/anxious and is not hyperactive.

## 2023-10-18 NOTE — PROGRESS NOTES
2022 - 2023 Flu Vaccine Questionnaire      VIS given -  Yes    Have you received any other vaccine within the last 14 days? No  2. Do you currently have an active infectious or acute respiratory illness or fever? No  3. Are you taking steroids or immune suppressive drugs? No  4. Have you ever had a reaction to a flu vaccine? No  5. Are you allergic to eggs, egg products, chicken, Thimerosal (preservative) Gentamycin, polymixin, neomycin or Latex? No  6. Have you ever had Guillian Martha Syndrome?   No

## 2023-10-19 ENCOUNTER — TELEPHONE (OUTPATIENT)
Dept: CARDIOLOGY CLINIC | Age: 73
End: 2023-10-19

## 2023-10-19 NOTE — TELEPHONE ENCOUNTER
----- Message from Emma Ureña MD sent at 10/19/2023  8:47 AM EDT -----  Please notify patient that their lab results are normal.

## 2023-10-25 ENCOUNTER — HOSPITAL ENCOUNTER (OUTPATIENT)
Dept: WOMENS IMAGING | Age: 73
Discharge: HOME | End: 2023-10-25
Attending: FAMILY MEDICINE
Payer: MEDICARE

## 2023-10-25 VITALS — HEIGHT: 64 IN | WEIGHT: 235 LBS | BODY MASS INDEX: 40.12 KG/M2

## 2023-10-25 DIAGNOSIS — Z12.31 ENCOUNTER FOR SCREENING MAMMOGRAM FOR MALIGNANT NEOPLASM OF BREAST: ICD-10-CM

## 2023-10-25 PROCEDURE — 77063 BREAST TOMOSYNTHESIS BI: CPT

## 2023-11-01 DIAGNOSIS — K21.9 GASTROESOPHAGEAL REFLUX DISEASE WITHOUT ESOPHAGITIS: ICD-10-CM

## 2023-11-01 RX ORDER — OMEPRAZOLE 40 MG/1
CAPSULE, DELAYED RELEASE ORAL
Qty: 90 CAPSULE | Refills: 1 | Status: SHIPPED | OUTPATIENT
Start: 2023-11-01

## 2023-11-01 NOTE — TELEPHONE ENCOUNTER
Refill Request     CONFIRM preferred pharmacy with the patient. If Mail Order Rx - Pend for 90 day refill. Last Seen: Last Seen Department: 10/18/2023  Last Seen by PCP: 10/18/2023    Last Written: 5/8/2023    If no future appointment scheduled:  Review the last OV with PCP and review information for follow-up visit,  Route STAFF MESSAGE with patient name to the [facility] for scheduling with the following information:            -  Timing of next visit           -  Visit type ie Physical, OV, etc           -  Diagnoses/Reason ie. COPD, HTN - Do not use MEDICATION, Follow-up or CHECK UP - Give reason for visit      Next Appointment:   Future Appointments   Date Time Provider [address]   12/13/2023  9:30 AM [facility] ECHO ROOM [facility] AND [facility] Mitchel Pratt HOD   1/2/2024  2:00 PM MD TANIA Garcia   2/19/2024  3:00 PM Mauri Bunch, DO [facility]  [place] - D[facility]D       Message sent to [address] [place] [address] to schedule appt with patient?   NO      Requested Prescriptions     Pending Prescriptions Disp Refills    omeprazole (PRILOSEC) 40 MG delayed release capsule [Pharmacy Med Name: OMEPRAZOLE DR 40 MG CAPSULE] 90 capsule 1     Sig: TAKE 1 CAPSULE BY MOUTH EVERY DAY

## 2023-11-17 RX ORDER — TORSEMIDE 20 MG/1
20 TABLET ORAL 2 TIMES DAILY
Qty: 180 TABLET | Refills: 1 | Status: SHIPPED | OUTPATIENT
Start: 2023-11-17

## 2023-12-01 ENCOUNTER — OFFICE VISIT (OUTPATIENT)
Dept: PULMONOLOGY | Age: 73
End: 2023-12-01
Payer: MEDICARE

## 2023-12-01 VITALS
WEIGHT: 235 LBS | HEIGHT: 63 IN | HEART RATE: 100 BPM | RESPIRATION RATE: 20 BRPM | SYSTOLIC BLOOD PRESSURE: 132 MMHG | DIASTOLIC BLOOD PRESSURE: 78 MMHG | BODY MASS INDEX: 41.64 KG/M2 | OXYGEN SATURATION: 96 %

## 2023-12-01 DIAGNOSIS — G47.33 OSA (OBSTRUCTIVE SLEEP APNEA): Primary | ICD-10-CM

## 2023-12-01 DIAGNOSIS — G25.81 RLS (RESTLESS LEGS SYNDROME): ICD-10-CM

## 2023-12-01 PROCEDURE — 3075F SYST BP GE 130 - 139MM HG: CPT | Performed by: INTERNAL MEDICINE

## 2023-12-01 PROCEDURE — 99214 OFFICE O/P EST MOD 30 MIN: CPT | Performed by: INTERNAL MEDICINE

## 2023-12-01 PROCEDURE — 3078F DIAST BP <80 MM HG: CPT | Performed by: INTERNAL MEDICINE

## 2023-12-01 PROCEDURE — 1123F ACP DISCUSS/DSCN MKR DOCD: CPT | Performed by: INTERNAL MEDICINE

## 2023-12-01 ASSESSMENT — SLEEP AND FATIGUE QUESTIONNAIRES
HOW LIKELY ARE YOU TO NOD OFF OR FALL ASLEEP IN A CAR, WHILE STOPPED FOR A FEW MINUTES IN TRAFFIC: 0
ESS TOTAL SCORE: 11
HOW LIKELY ARE YOU TO NOD OFF OR FALL ASLEEP WHILE WATCHING TV: 2
HOW LIKELY ARE YOU TO NOD OFF OR FALL ASLEEP WHILE SITTING AND TALKING TO SOMEONE: 0
HOW LIKELY ARE YOU TO NOD OFF OR FALL ASLEEP WHEN YOU ARE A PASSENGER IN A CAR FOR AN HOUR WITHOUT A BREAK: 3
HOW LIKELY ARE YOU TO NOD OFF OR FALL ASLEEP WHILE SITTING QUIETLY AFTER LUNCH WITHOUT ALCOHOL: 0
NECK CIRCUMFERENCE (INCHES): 13.5
HOW LIKELY ARE YOU TO NOD OFF OR FALL ASLEEP WHILE LYING DOWN TO REST IN THE AFTERNOON WHEN CIRCUMSTANCES PERMIT: 3
HOW LIKELY ARE YOU TO NOD OFF OR FALL ASLEEP WHILE SITTING INACTIVE IN A PUBLIC PLACE: 1
HOW LIKELY ARE YOU TO NOD OFF OR FALL ASLEEP WHILE SITTING AND READING: 2

## 2023-12-01 NOTE — PROGRESS NOTES
CC: MAXIMO    HPI   Interval History 12/1/23:   two prior attempts at CPAP then BiPAP 13/7, looked really great in the sleep lab but then abandoned therapy. Has RLS, stopped gabapentin due to CKD, still on requip 3 HS. Has more than 10 year h/o Obstructive Sleep Apnea with snoring. .Interval History:  Had titration, started on 17/13 (request per reading was 13/7). No difference in RLS or sleep. Still waking up. Sometimes feels like too little pressure. Changed to wellbutrin and okay from anxiety perspective, but no improvement in RLS. Very dry mouth, but can't tolerate humidifier due to warm air temperature. HPI: 61 yo with many year h/o obesity s/p gastric sleeve, known mild MAXIMO, AHI of 10, diagnosed by PSG at [facility] in 2012, tried CPAP but poorly tolerant, now having palpitations and snoring and waking with headache, poor sleep quality. Problems with CPAP in the past and did not like having face covered. Review of Systems      Objective:   Physical Exam  Blood pressure 132/78, pulse 100, resp. rate 20, height 1.6 m (5' 3\"), weight 106.6 kg (235 lb), SpO2 96 %, not currently breastfeeding. Constitutional:  No acute distress. HENT:  Oropharynx is clear and moist. Class IV airway  Neck: No tracheal deviation present. 17 inches   Cardiovascular: Normal heart sounds. ++ lower extremity edema. Pulmonary/Chest: No wheezes. No rhonchi. No rales. No decreased breath sounds. No accessory muscle usage or stridor. Musculoskeletal: No cyanosis. No clubbing. Skin: Skin is warm and dry. Psychiatric: Normal mood and affect. Neurologic: speech fluent, alert and oriented, strength symmetric       PSG @ [facility] with CPAP titration 6-14-12, AHI of 10, CPAP of 10 resulted in AHI of zero  HST 3/16/21 AHI of 14  Titration 4/21/21 looked best at 13/7    STRESS 1/19/21   Normal myocardial perfusion study with normal left ventricular function,    size, and wall motion.     The estimated left ventricular function is 68%

## 2023-12-05 ENCOUNTER — TELEPHONE (OUTPATIENT)
Dept: PULMONOLOGY | Age: 73
End: 2023-12-05

## 2023-12-05 NOTE — TELEPHONE ENCOUNTER
Per Jeevan, pt has bad debt that has to be resolved before pt can get new machine.     Will notify pt and find out if she plans to resolve or wants orders to be sent to another DME in the mean time.

## 2023-12-08 ENCOUNTER — TELEMEDICINE (OUTPATIENT)
Dept: PRIMARY CARE CLINIC | Age: 73
End: 2023-12-08
Payer: MEDICARE

## 2023-12-08 DIAGNOSIS — R06.2 WHEEZING: ICD-10-CM

## 2023-12-08 DIAGNOSIS — U07.1 COVID-19: ICD-10-CM

## 2023-12-08 DIAGNOSIS — J06.9 VIRAL URI WITH COUGH: Primary | ICD-10-CM

## 2023-12-08 PROCEDURE — 99214 OFFICE O/P EST MOD 30 MIN: CPT | Performed by: NURSE PRACTITIONER

## 2023-12-08 PROCEDURE — 1123F ACP DISCUSS/DSCN MKR DOCD: CPT | Performed by: NURSE PRACTITIONER

## 2023-12-08 RX ORDER — NIRMATRELVIR AND RITONAVIR 150-100 MG
KIT ORAL
Qty: 20 TABLET | Refills: 0 | Status: SHIPPED | OUTPATIENT
Start: 2023-12-08 | End: 2023-12-13

## 2023-12-08 RX ORDER — BENZONATATE 200 MG/1
200 CAPSULE ORAL 3 TIMES DAILY PRN
Qty: 30 CAPSULE | Refills: 0 | Status: SHIPPED | OUTPATIENT
Start: 2023-12-08 | End: 2023-12-18

## 2023-12-08 RX ORDER — ALBUTEROL SULFATE 90 UG/1
2 AEROSOL, METERED RESPIRATORY (INHALATION) EVERY 6 HOURS PRN
Qty: 18 G | Refills: 3 | Status: SHIPPED | OUTPATIENT
Start: 2023-12-08

## 2023-12-08 RX ORDER — QUETIAPINE FUMARATE 25 MG/1
25 TABLET, FILM COATED ORAL 2 TIMES DAILY
Qty: 16 TABLET | Refills: 0 | Status: SHIPPED | OUTPATIENT
Start: 2023-12-08 | End: 2023-12-16

## 2023-12-08 ASSESSMENT — ENCOUNTER SYMPTOMS
SINUS PAIN: 1
SHORTNESS OF BREATH: 1
COUGH: 1
WHEEZING: 1
CHEST TIGHTNESS: 1
RHINORRHEA: 0
SWOLLEN GLANDS: 1
SORE THROAT: 1

## 2023-12-08 NOTE — PROGRESS NOTES
Virtualist Visit today, follow up visit status is as follows: Patient to follow up as previously instructed by PCP    Return if symptoms worsen or fail to improve. Subjective: Symptoms started feeling ill Weds while at work. Thursday had to call off. Feels pain in her bronchial area. She is coughing up clear mucus until today it is dry. URI   This is a new problem. The current episode started in the past 7 days (4 days ago). The problem has been gradually worsening. The maximum temperature recorded prior to her arrival was 100.4 - 100.9 F. Associated symptoms include chest pain (ribs, from coughing), congestion, coughing, ear pain, headaches, a plugged ear sensation, sinus pain, a sore throat, swollen glands and wheezing. Pertinent negatives include no rhinorrhea or sneezing. She has tried antihistamine and acetaminophen for the symptoms. The treatment provided mild relief. Review of Systems   Constitutional:  Positive for chills, diaphoresis, fatigue and fever. HENT:  Positive for congestion, ear pain, sinus pain and sore throat. Negative for rhinorrhea and sneezing. Respiratory:  Positive for cough, chest tightness, shortness of breath and wheezing. Cardiovascular:  Positive for chest pain (ribs, from coughing). Musculoskeletal:  Positive for myalgias. Neurological:  Positive for headaches.        Objective:  Patient-Reported Vitals  No data recorded         6/5/2023     3:59 PM   Patient-Reported Vitals   Patient-Reported Weight 230lb   Patient-Reported Height 5' 4\"   Patient-Reported Systolic 281 mmHg   Patient-Reported Diastolic 74 mmHg      Home covid test positive during visit    Physical Exam:  [INSTRUCTIONS:  \"[x]\" Indicates a positive item  \"[]\" Indicates a negative item  -- DELETE ALL ITEMS NOT EXAMINED]    Constitutional: [x] Appears well-developed and well-nourished [x] No apparent distress      [] Abnormal -     Mental status: [x] Alert and awake  [x] Oriented to

## 2024-01-09 ENCOUNTER — OFFICE VISIT (OUTPATIENT)
Dept: ORTHOPEDIC SURGERY | Age: 74
End: 2024-01-09
Payer: MEDICARE

## 2024-01-09 VITALS — WEIGHT: 235 LBS | BODY MASS INDEX: 41.64 KG/M2 | HEIGHT: 63 IN

## 2024-01-09 DIAGNOSIS — M17.12 PRIMARY OSTEOARTHRITIS OF LEFT KNEE: Primary | ICD-10-CM

## 2024-01-09 DIAGNOSIS — M17.11 PRIMARY OSTEOARTHRITIS OF RIGHT KNEE: ICD-10-CM

## 2024-01-09 PROCEDURE — 20610 DRAIN/INJ JOINT/BURSA W/O US: CPT | Performed by: ORTHOPAEDIC SURGERY

## 2024-01-09 PROCEDURE — 99999 PR OFFICE/OUTPT VISIT,PROCEDURE ONLY: CPT | Performed by: ORTHOPAEDIC SURGERY

## 2024-01-09 RX ORDER — TRIAMCINOLONE ACETONIDE 40 MG/ML
160 INJECTION, SUSPENSION INTRA-ARTICULAR; INTRAMUSCULAR ONCE
Status: COMPLETED | OUTPATIENT
Start: 2024-01-09 | End: 2024-01-09

## 2024-01-09 RX ORDER — LIDOCAINE HYDROCHLORIDE 10 MG/ML
6 INJECTION, SOLUTION INFILTRATION; PERINEURAL ONCE
Status: COMPLETED | OUTPATIENT
Start: 2024-01-09 | End: 2024-01-09

## 2024-01-09 RX ADMIN — TRIAMCINOLONE ACETONIDE 160 MG: 40 INJECTION, SUSPENSION INTRA-ARTICULAR; INTRAMUSCULAR at 11:47

## 2024-01-09 RX ADMIN — LIDOCAINE HYDROCHLORIDE 6 ML: 10 INJECTION, SOLUTION INFILTRATION; PERINEURAL at 11:42

## 2024-01-09 NOTE — PROGRESS NOTES
ORTHOPAEDIC SURGERY FOLLOWUP VISIT     CHIEF COMPLAINT: Right knee pain      DATE OF INJURY: 8/23/2023     HISTORY OF PRESENT ILLNESS:  73-year-old female well-known to me presents for repeat evaluation of her right knee.  She has chronic knee osteoarthritis for which she has been managing with injections both corticosteroid and viscosupplementation.  Both of these types of injections are effective for her.  Her most recent injections were 8/30/2023.  She underwent a series of Euflexxa injections.  Overall, she continues to have pain.  This is more significant in the right knee than the left.  She has pain on a day-to-day basis.  Presents requesting repeat corticosteroid injection today.     PHYSICAL EXAM:  General: Well-appearing.  No distress.  Right lower extremity: No cuts, open wounds, abrasions to the lower extremity.  There is diffuse lymphedema and lower extremity swelling.  There is no warmth or erythema.  There is tenderness to palpation diffusely about the lateral knee.  Knee range of motion is full extension to 110 degrees of flexion.  There is no significant mechanical block.  There is mild to moderate subpatellar crepitus.  There is no tenderness to palpation about the medial joint line.  There is tenderness to palpation laterally.  There is pain reproduced with varus stress at 30 degrees experienced over the lateral knee.  No pain with valgus stress.  Negative anterior posterior drawer test. Sensation is intact to light touch in deep peroneal, superficial peroneal, tibial, sural, and saphenous nerve distributions.  Motor function is intact to EHL, FHL, tibialis anterior, and gastroc.  There is brisk capillary refill to the toes and a strong palpable dorsalis pedis pulse.  Compartments are soft and compressible.  There is no calf tenderness and a negative Homans' sign.     Left lower extremity:  No cuts, open wounds, abrasions to the lower extremity.  There is diffuse lymphedema and lower extremity

## 2024-01-10 ENCOUNTER — TELEPHONE (OUTPATIENT)
Dept: ORTHOPEDIC SURGERY | Age: 74
End: 2024-01-10

## 2024-01-10 DIAGNOSIS — M17.0 PRIMARY OSTEOARTHRITIS OF BOTH KNEES: Primary | ICD-10-CM

## 2024-01-10 NOTE — TELEPHONE ENCOUNTER
Spoke with patient. Informed her that her visco-supplementation was approved by insurance, she has been scheduled for these.

## 2024-01-16 ENCOUNTER — OFFICE VISIT (OUTPATIENT)
Dept: ORTHOPEDIC SURGERY | Age: 74
End: 2024-01-16

## 2024-01-16 VITALS — HEIGHT: 63 IN | BODY MASS INDEX: 41.64 KG/M2 | WEIGHT: 235 LBS

## 2024-01-16 DIAGNOSIS — M17.0 PRIMARY OSTEOARTHRITIS OF BOTH KNEES: Primary | ICD-10-CM

## 2024-01-16 NOTE — PROGRESS NOTES
ORTHOPAEDIC SURGERY FOLLOWUP VISIT     CHIEF COMPLAINT: Right knee pain      DATE OF INJURY: 8/23/2023     HISTORY OF PRESENT ILLNESS:  73-year-old female well-known to me presents for repeat evaluation of her right knee.  She has chronic knee osteoarthritis for which she has been managing with injections both corticosteroid and viscosupplementation.  Both of these types of injections are effective for her.  She recently underwent a corticosteroid injection 1/9/2024.  She indicates that this was effective for her.  She rates her pain 3 out of 10 today.  She is happy with the corticosteroid injection. Presents for initiation of viscosupplement series.    PHYSICAL EXAM:  General: Well-appearing.  No distress.  Right lower extremity: No cuts, open wounds, abrasions to the lower extremity.  There is diffuse lymphedema and lower extremity swelling.  There is no warmth or erythema.  There is tenderness to palpation diffusely about the lateral knee.  Knee range of motion is full extension to 110 degrees of flexion.  There is no significant mechanical block.  There is mild to moderate subpatellar crepitus.  There is no tenderness to palpation about the medial joint line.  There is tenderness to palpation laterally.  There is pain reproduced with varus stress at 30 degrees experienced over the lateral knee.  No pain with valgus stress.  Negative anterior posterior drawer test. Sensation is intact to light touch in deep peroneal, superficial peroneal, tibial, sural, and saphenous nerve distributions.  Motor function is intact to EHL, FHL, tibialis anterior, and gastroc.  There is brisk capillary refill to the toes and a strong palpable dorsalis pedis pulse.  Compartments are soft and compressible.  There is no calf tenderness and a negative Homans' sign.     Left lower extremity:  No cuts, open wounds, abrasions to the lower extremity.  There is diffuse lymphedema and lower extremity swelling.  There is no warmth or

## 2024-01-23 ENCOUNTER — OFFICE VISIT (OUTPATIENT)
Dept: ORTHOPEDIC SURGERY | Age: 74
End: 2024-01-23
Payer: MEDICARE

## 2024-01-23 VITALS — BODY MASS INDEX: 41.64 KG/M2 | HEIGHT: 63 IN | WEIGHT: 235 LBS

## 2024-01-23 DIAGNOSIS — M17.0 PRIMARY OSTEOARTHRITIS OF BOTH KNEES: Primary | ICD-10-CM

## 2024-01-23 PROCEDURE — 20610 DRAIN/INJ JOINT/BURSA W/O US: CPT | Performed by: ORTHOPAEDIC SURGERY

## 2024-01-23 PROCEDURE — 99999 PR OFFICE/OUTPT VISIT,PROCEDURE ONLY: CPT | Performed by: ORTHOPAEDIC SURGERY

## 2024-01-24 NOTE — PROGRESS NOTES
ORTHOPAEDIC SURGERY FOLLOWUP VISIT     CHIEF COMPLAINT: Right knee pain      DATE OF INJURY: 8/23/2023     HISTORY OF PRESENT ILLNESS:  73-year-old female well-known to me presents for repeat evaluation of her right knee.  She has chronic knee osteoarthritis for which she has been managing with injections both corticosteroid and viscosupplementation.  Both of these types of injections are effective for her.  She recently underwent a corticosteroid injection 1/9/2024.  She started a viscosupplementation series last week. She indicates that she has had 60% improvement in her pain after her first injection on 1/16/2024.     PHYSICAL EXAM:  General: Well-appearing.  No distress.  Right lower extremity: No cuts, open wounds, abrasions to the lower extremity.  There is diffuse lymphedema and lower extremity swelling.  There is no warmth or erythema.  There is tenderness to palpation diffusely about the lateral knee.  Knee range of motion is full extension to 110 degrees of flexion.  There is no significant mechanical block.  There is mild to moderate subpatellar crepitus.  There is no tenderness to palpation about the medial joint line.  There is tenderness to palpation laterally.  There is pain reproduced with varus stress at 30 degrees experienced over the lateral knee.  No pain with valgus stress.  Negative anterior posterior drawer test. Sensation is intact to light touch in deep peroneal, superficial peroneal, tibial, sural, and saphenous nerve distributions.  Motor function is intact to EHL, FHL, tibialis anterior, and gastroc.  There is brisk capillary refill to the toes and a strong palpable dorsalis pedis pulse.  Compartments are soft and compressible.  There is no calf tenderness and a negative Homans' sign.     Left lower extremity:  No cuts, open wounds, abrasions to the lower extremity.  There is diffuse lymphedema and lower extremity swelling.  There is no warmth or erythema.  Knee range of motion is full 
extension to 110 degrees of flexion.  There is no significant mechanical block.  There is mild to moderate subpatellar crepitus.  There is no tenderness to palpation about the medial joint line.  There is tenderness to palpation laterally.  There is pain reproduced with varus stress at 30 degrees experienced over the lateral knee.  No pain with valgus stress.  Negative anterior posterior drawer test. Sensation is intact to light touch in deep peroneal, superficial peroneal, tibial, sural, and saphenous nerve distributions.  Motor function is intact to EHL, FHL, tibialis anterior, and gastroc.  There is brisk capillary refill to the toes and a strong palpable dorsalis pedis pulse.  Compartments are soft and compressible.  There is no calf tenderness and a negative Homans' sign.     RADIOGRAPHIC EXAM:  No new xrays obtained today.     ASSESSMENT AND PLAN:  Bilateral knee osteoarthritis.     Injection only visit.     Risk, benefits, and alternatives to the viscosupplementation were discussed.  She elected to proceed.  Therefore, each knee was sterilely prepped with Betadine over the anterolateral aspect.  Ethyl chloride spray was used to anesthetize the skin.  A 22-gauge needle was used to administer a 2cc Gelsyn intra-articularly via anterolateral approach.  She tolerated this well.  Was dressed with a Band-Aid.  She was given verbal postinjection instructions. Return next week for injection #3 of 3.     Sandip Pettit MD

## 2024-01-30 ENCOUNTER — OFFICE VISIT (OUTPATIENT)
Dept: ORTHOPEDIC SURGERY | Age: 74
End: 2024-01-30
Payer: MEDICARE

## 2024-01-30 VITALS — HEIGHT: 63 IN | BODY MASS INDEX: 41.64 KG/M2 | WEIGHT: 235 LBS

## 2024-01-30 DIAGNOSIS — M17.0 PRIMARY OSTEOARTHRITIS OF BOTH KNEES: Primary | ICD-10-CM

## 2024-01-30 PROCEDURE — 99999 PR OFFICE/OUTPT VISIT,PROCEDURE ONLY: CPT | Performed by: ORTHOPAEDIC SURGERY

## 2024-01-30 PROCEDURE — 20610 DRAIN/INJ JOINT/BURSA W/O US: CPT | Performed by: ORTHOPAEDIC SURGERY

## 2024-01-30 NOTE — PROGRESS NOTES
ORTHOPAEDIC SURGERY FOLLOWUP VISIT     CHIEF COMPLAINT: Right knee pain      DATE OF INJURY: 8/23/2023     HISTORY OF PRESENT ILLNESS:  73-year-old female well-known to me presents for repeat evaluation of her right knee.  She has chronic knee osteoarthritis for which she has been managing with injections both corticosteroid and viscosupplementation.  Both of these types of injections are effective for her.  She recently underwent a corticosteroid injection 1/9/2024.  She started a viscosupplementation series and presents for injection #3 of 3 of this series. Her pain has dramatically improved.     PHYSICAL EXAM:  General: Well-appearing.  No distress.  Right lower extremity: No cuts, open wounds, abrasions to the lower extremity.  There is diffuse lymphedema and lower extremity swelling.  There is no warmth or erythema.  There is tenderness to palpation diffusely about the lateral knee.  Knee range of motion is full extension to 110 degrees of flexion.  There is no significant mechanical block.  There is mild to moderate subpatellar crepitus.  There is no tenderness to palpation about the medial joint line.  There is tenderness to palpation laterally.  There is pain reproduced with varus stress at 30 degrees experienced over the lateral knee.  No pain with valgus stress.  Negative anterior posterior drawer test. Sensation is intact to light touch in deep peroneal, superficial peroneal, tibial, sural, and saphenous nerve distributions.  Motor function is intact to EHL, FHL, tibialis anterior, and gastroc.  There is brisk capillary refill to the toes and a strong palpable dorsalis pedis pulse.  Compartments are soft and compressible.  There is no calf tenderness and a negative Homans' sign.     Left lower extremity:  No cuts, open wounds, abrasions to the lower extremity.  There is diffuse lymphedema and lower extremity swelling.  There is no warmth or erythema.  Knee range of motion is full extension to 110

## 2024-02-02 RX ORDER — METOPROLOL SUCCINATE 25 MG/1
25 TABLET, EXTENDED RELEASE ORAL DAILY
Qty: 90 TABLET | Refills: 1 | Status: SHIPPED | OUTPATIENT
Start: 2024-02-02

## 2024-02-02 NOTE — TELEPHONE ENCOUNTER
Refill Request     CONFIRM preferred pharmacy with the patient.    If Mail Order Rx - Pend for 90 day refill.      Last Seen: Last Seen Department: 10/18/2023  Last Seen by PCP: 10/18/2023    Last Written: 05/08/2023 90 tab 1 refills     If no future appointment scheduled:  Review the last OV with PCP and review information for follow-up visit,  Route STAFF MESSAGE with patient name to the  Pool for scheduling with the following information:            -  Timing of next visit           -  Visit type ie Physical, OV, etc           -  Diagnoses/Reason ie. COPD, HTN - Do not use MEDICATION, Follow-up or CHECK UP - Give reason for visit      Next Appointment:   Future Appointments   Date Time Provider Department Center   2/14/2024  1:00 PM [facility]   2/19/2024  2:30 PM Lindsay Espinosa DO [facility]  [place] - [facility]       Message sent to  to schedule appt with patient?  NO      Requested Prescriptions     Pending Prescriptions Disp Refills    metoprolol succinate (TOPROL XL) 25 MG extended release tablet [Pharmacy Med Name: METOPROLOL SUCC ER 25 MG TAB] 90 tablet 1     Sig: TAKE 1 TABLET BY MOUTH EVERY DAY

## 2024-02-17 ENCOUNTER — APPOINTMENT (OUTPATIENT)
Dept: CT IMAGING | Age: 74
End: 2024-02-17
Payer: MEDICARE

## 2024-02-17 ENCOUNTER — HOSPITAL ENCOUNTER (EMERGENCY)
Age: 74
Discharge: HOME | End: 2024-02-17
Payer: MEDICARE

## 2024-02-17 VITALS
SYSTOLIC BLOOD PRESSURE: 116 MMHG | TEMPERATURE: 98.1 F | OXYGEN SATURATION: 90 % | DIASTOLIC BLOOD PRESSURE: 54 MMHG | RESPIRATION RATE: 20 BRPM | HEART RATE: 74 BPM

## 2024-02-17 DIAGNOSIS — N12 PYELONEPHRITIS: Primary | ICD-10-CM

## 2024-02-17 LAB
ALBUMIN SERPL-MCNC: 4 G/DL (ref 3.4–5)
ALBUMIN/GLOB SERPL: 1.6 {RATIO} (ref 1.1–2.2)
ALP SERPL-CCNC: 103 U/L (ref 40–129)
ALT SERPL-CCNC: 12 U/L (ref 10–40)
ANION GAP SERPL CALCULATED.3IONS-SCNC: 8 MMOL/L (ref 3–16)
AST SERPL-CCNC: 10 U/L (ref 15–37)
BACTERIA URNS QL MICRO: ABNORMAL /HPF
BASOPHILS # BLD: 0.1 K/UL (ref 0–0.2)
BASOPHILS NFR BLD: 0.6 %
BILIRUB SERPL-MCNC: 0.8 MG/DL (ref 0–1)
BILIRUB UR QL STRIP.AUTO: ABNORMAL
BUN SERPL-MCNC: 15 MG/DL (ref 7–20)
CALCIUM SERPL-MCNC: 9.3 MG/DL (ref 8.3–10.6)
CHLORIDE SERPL-SCNC: 102 MMOL/L (ref 99–110)
CLARITY UR: CLEAR
CO2 SERPL-SCNC: 28 MMOL/L (ref 21–32)
COLOR UR: ABNORMAL
CREAT SERPL-MCNC: 1.1 MG/DL (ref 0.6–1.2)
DEPRECATED RDW RBC AUTO: 15.4 % (ref 12.4–15.4)
EKG ATRIAL RATE: 66 BPM
EKG DIAGNOSIS: NORMAL
EKG P AXIS: 33 DEGREES
EKG P-R INTERVAL: 156 MS
EKG Q-T INTERVAL: 420 MS
EKG QRS DURATION: 74 MS
EKG QTC CALCULATION (BAZETT): 440 MS
EKG R AXIS: 35 DEGREES
EKG T AXIS: 39 DEGREES
EKG VENTRICULAR RATE: 66 BPM
EOSINOPHIL # BLD: 0 K/UL (ref 0–0.6)
EOSINOPHIL NFR BLD: 0.5 %
EPI CELLS #/AREA URNS HPF: ABNORMAL /HPF (ref 0–5)
GFR SERPLBLD CREATININE-BSD FMLA CKD-EPI: 52 ML/MIN/{1.73_M2}
GLUCOSE SERPL-MCNC: 105 MG/DL (ref 70–99)
GLUCOSE UR STRIP.AUTO-MCNC: ABNORMAL MG/DL
HCT VFR BLD AUTO: 38.4 % (ref 36–48)
HGB BLD-MCNC: 12.4 G/DL (ref 12–16)
HGB UR QL STRIP.AUTO: ABNORMAL
KETONES UR STRIP.AUTO-MCNC: ABNORMAL MG/DL
LEUKOCYTE ESTERASE UR QL STRIP.AUTO: ABNORMAL
LIPASE SERPL-CCNC: 22 U/L (ref 13–60)
LYMPHOCYTES # BLD: 2.1 K/UL (ref 1–5.1)
LYMPHOCYTES NFR BLD: 22.5 %
MCH RBC QN AUTO: 27.3 PG (ref 26–34)
MCHC RBC AUTO-ENTMCNC: 32.3 G/DL (ref 31–36)
MCV RBC AUTO: 84.3 FL (ref 80–100)
MONOCYTES # BLD: 0.8 K/UL (ref 0–1.3)
MONOCYTES NFR BLD: 8.2 %
NEUTROPHILS # BLD: 6.4 K/UL (ref 1.7–7.7)
NEUTROPHILS NFR BLD: 68.2 %
NITRITE UR QL STRIP.AUTO: ABNORMAL
PH UR STRIP.AUTO: ABNORMAL [PH] (ref 5–8)
PLATELET # BLD AUTO: 210 K/UL (ref 135–450)
PMV BLD AUTO: 9.2 FL (ref 5–10.5)
POTASSIUM SERPL-SCNC: 4.4 MMOL/L (ref 3.5–5.1)
PROT SERPL-MCNC: 6.5 G/DL (ref 6.4–8.2)
PROT UR STRIP.AUTO-MCNC: ABNORMAL MG/DL
RBC # BLD AUTO: 4.55 M/UL (ref 4–5.2)
RBC #/AREA URNS HPF: ABNORMAL /HPF (ref 0–4)
SODIUM SERPL-SCNC: 138 MMOL/L (ref 136–145)
SP GR UR STRIP.AUTO: ABNORMAL (ref 1–1.03)
UA COMPLETE W REFLEX CULTURE PNL UR: YES
UA DIPSTICK W REFLEX MICRO PNL UR: YES
URN SPEC COLLECT METH UR: ABNORMAL
UROBILINOGEN UR STRIP-ACNC: ABNORMAL E.U./DL
WBC # BLD AUTO: 9.4 K/UL (ref 4–11)
WBC #/AREA URNS HPF: ABNORMAL /HPF (ref 0–5)

## 2024-02-17 PROCEDURE — 74176 CT ABD & PELVIS W/O CONTRAST: CPT

## 2024-02-17 PROCEDURE — 96375 TX/PRO/DX INJ NEW DRUG ADDON: CPT

## 2024-02-17 PROCEDURE — 96365 THER/PROPH/DIAG IV INF INIT: CPT

## 2024-02-17 PROCEDURE — 6360000002 HC RX W HCPCS: Performed by: PHYSICIAN ASSISTANT

## 2024-02-17 PROCEDURE — 93010 ELECTROCARDIOGRAM REPORT: CPT | Performed by: INTERNAL MEDICINE

## 2024-02-17 PROCEDURE — 87086 URINE CULTURE/COLONY COUNT: CPT

## 2024-02-17 PROCEDURE — 93005 ELECTROCARDIOGRAM TRACING: CPT | Performed by: PHYSICIAN ASSISTANT

## 2024-02-17 PROCEDURE — 96361 HYDRATE IV INFUSION ADD-ON: CPT

## 2024-02-17 PROCEDURE — 84443 ASSAY THYROID STIM HORMONE: CPT

## 2024-02-17 PROCEDURE — 84439 ASSAY OF FREE THYROXINE: CPT

## 2024-02-17 PROCEDURE — 85025 COMPLETE CBC W/AUTO DIFF WBC: CPT

## 2024-02-17 PROCEDURE — 99284 EMERGENCY DEPT VISIT MOD MDM: CPT

## 2024-02-17 PROCEDURE — 83690 ASSAY OF LIPASE: CPT

## 2024-02-17 PROCEDURE — 87077 CULTURE AEROBIC IDENTIFY: CPT

## 2024-02-17 PROCEDURE — 2580000003 HC RX 258: Performed by: PHYSICIAN ASSISTANT

## 2024-02-17 PROCEDURE — 81001 URINALYSIS AUTO W/SCOPE: CPT

## 2024-02-17 PROCEDURE — 80053 COMPREHEN METABOLIC PANEL: CPT

## 2024-02-17 PROCEDURE — 87186 SC STD MICRODIL/AGAR DIL: CPT

## 2024-02-17 PROCEDURE — 80061 LIPID PANEL: CPT

## 2024-02-17 RX ORDER — 0.9 % SODIUM CHLORIDE 0.9 %
1000 INTRAVENOUS SOLUTION INTRAVENOUS ONCE
Status: COMPLETED | OUTPATIENT
Start: 2024-02-17 | End: 2024-02-17

## 2024-02-17 RX ORDER — ONDANSETRON 2 MG/ML
4 INJECTION INTRAMUSCULAR; INTRAVENOUS ONCE
Status: COMPLETED | OUTPATIENT
Start: 2024-02-17 | End: 2024-02-17

## 2024-02-17 RX ORDER — CEFUROXIME AXETIL 500 MG/1
500 TABLET ORAL 2 TIMES DAILY
Qty: 20 TABLET | Refills: 0 | Status: SHIPPED | OUTPATIENT
Start: 2024-02-17 | End: 2024-02-27

## 2024-02-17 RX ORDER — MORPHINE SULFATE 4 MG/ML
4 INJECTION, SOLUTION INTRAMUSCULAR; INTRAVENOUS ONCE
Status: COMPLETED | OUTPATIENT
Start: 2024-02-17 | End: 2024-02-17

## 2024-02-17 RX ORDER — ONDANSETRON 4 MG/1
4 TABLET, ORALLY DISINTEGRATING ORAL 3 TIMES DAILY PRN
Qty: 21 TABLET | Refills: 0 | Status: SHIPPED | OUTPATIENT
Start: 2024-02-17

## 2024-02-17 RX ADMIN — CEFTRIAXONE SODIUM 1000 MG: 1 INJECTION, POWDER, FOR SOLUTION INTRAMUSCULAR; INTRAVENOUS at 13:09

## 2024-02-17 RX ADMIN — MORPHINE SULFATE 4 MG: 4 INJECTION, SOLUTION INTRAMUSCULAR; INTRAVENOUS at 13:11

## 2024-02-17 RX ADMIN — SODIUM CHLORIDE 1000 ML: 9 INJECTION, SOLUTION INTRAVENOUS at 13:07

## 2024-02-17 RX ADMIN — ONDANSETRON 4 MG: 2 INJECTION INTRAMUSCULAR; INTRAVENOUS at 13:10

## 2024-02-17 ASSESSMENT — PAIN DESCRIPTION - LOCATION
LOCATION: FLANK
LOCATION: FLANK

## 2024-02-17 ASSESSMENT — PAIN - FUNCTIONAL ASSESSMENT: PAIN_FUNCTIONAL_ASSESSMENT: 0-10

## 2024-02-17 ASSESSMENT — PAIN SCALES - GENERAL
PAINLEVEL_OUTOF10: 9
PAINLEVEL_OUTOF10: 7

## 2024-02-17 ASSESSMENT — PAIN DESCRIPTION - ORIENTATION
ORIENTATION: RIGHT
ORIENTATION: RIGHT

## 2024-02-17 ASSESSMENT — PAIN DESCRIPTION - DESCRIPTORS: DESCRIPTORS: SHARP

## 2024-02-17 NOTE — ED PROVIDER NOTES
[facility]  ED  Emergency Department Encounter    Patient Name: Imani Monzon  MRN: [id number]  YOB: 1950  Date of Evaluation: 2/17/2024  Provider: Mauri Bunch DO  Note Started: 12:45 PM EST 2/17/24    CHIEF COMPLAINT  Flank Pain (Pain with urination, pain in right flank. Taking OTC medications not helping. Stage 3 renal failure/)    SHARED SERVICE VISIT  Evaluated by TYLER.  My supervising physician was available for consultation.     HISTORY OF PRESENT ILLNESS  Imani Monzon is a 73 y.o. female who presents to the ED for evaluation of flank pain and dysuria.  Symptoms have been going on now several days.  States that she did get nauseous without vomiting.  No diarrhea or constipation.  Reports fever yesterday.  States that she has a history of CKD.  No history of kidney stones.  Flank pain worse with urinating.  Burning sensation as well.  She denies any headaches, lightheadedness or dizziness.  She denies any chest pain or shortness of breath.      No other complaints, modifying factors or associated symptoms.     Nursing notes reviewed were all reviewed and agreed with or any disagreements were addressed in the HPI.    PMH:  Past Medical History:   Diagnosis Date    Arthritis     Bipolar disorder (HCC)     questionable dx    Cellulitis     L ankle, recurrent; over area of prior surgeries    Chronic back pain     Closed fracture of left fibula 1995    s/p fall; surgery x 6    Closed fracture of left tibia 1995    s/p fall on ice; hx of surgery x 6    Colon polyps     Depression     Fibroid (bleeding) (uterine) 1982    Fibromyalgia     GERD (gastroesophageal reflux disease)     Hyperlipidemia     Hypertension     Myoview Lexiscan WNL on 5/17/12    Hypothyroidism     Influenza A 01/31/2020    Migraine     Miscarriage     Morbid obesity (HCC)     Neuropathy 2011    seen initially by neuro., Dr. Sommers, on 8/11/10; EMG 7/16/10 showed abn.'s c/w L5 radiculopathy & periph.  [place]  [address]  [facility] [address]  [phone]    Schedule an appointment as soon as possible for a visit       [facility]  ED  [address]  [facility] [address] [phone]    If symptoms worsen    Discharge Medications:   New Prescriptions    CEFUROXIME (CEFTIN) 500 MG TABLET    Take 1 tablet by mouth 2 times daily for 10 days    ONDANSETRON (ZOFRAN-ODT) 4 MG DISINTEGRATING TABLET    Take 1 tablet by mouth 3 times daily as needed for Nausea or Vomiting     Discontinued Medications:  Discontinued Medications    No medications on file     Risk management discussed and shared decision making had with patient and/or surrogate. All questions were answered. Patient will follow up with PCP in 2 to 3 days for further evaluation/treatment.  All questions answered.  Patient will return to ED for new/worsening symptoms.    DISPOSITION:  Patient was discharged home in stable condition.    (Please note that portions of this note were completed with a voice recognition program.  Efforts were made to edit the dictations but occasionally words are mis-transcribed).         Zaki Saldivar PA  02/17/24 9070

## 2024-02-17 NOTE — ED PROVIDER NOTES
EKG  The Ekg interpreted by me shows  normal sinus rhythm with a rate of 66  Axis is   Normal  QTc is  normal  Intervals and Durations are unremarkable.      ST Segments: no acute change  No significant change from prior EKG dated 8/10/23        Yahir Childs MD  02/17/24 1408

## 2024-02-17 NOTE — ED NOTES
Written and verbal discharge provided to patient, patient verbalizes understanding. IV removed, no complications, dressing applied. Patient ambulatory with steady gait, GCS 15, no acute signs or symptoms of distress. Patient discharged at this time with daughter.

## 2024-02-17 NOTE — ED NOTES
Patient called out complaining of pain under left breast. EKG ordered per protocol and provider notified.

## 2024-02-18 LAB
BACTERIA UR CULT: ABNORMAL
ORGANISM: ABNORMAL

## 2024-02-18 ASSESSMENT — PATIENT HEALTH QUESTIONNAIRE - PHQ9
SUM OF ALL RESPONSES TO PHQ QUESTIONS 1-9: 2
5. POOR APPETITE OR OVEREATING: 1
SUM OF ALL RESPONSES TO PHQ QUESTIONS 1-9: 2
SUM OF ALL RESPONSES TO PHQ9 QUESTIONS 1 & 2: 0
3. TROUBLE FALLING OR STAYING ASLEEP: 0
6. FEELING BAD ABOUT YOURSELF - OR THAT YOU ARE A FAILURE OR HAVE LET YOURSELF OR YOUR FAMILY DOWN: NOT AT ALL
9. THOUGHTS THAT YOU WOULD BE BETTER OFF DEAD, OR OF HURTING YOURSELF: 0
6. FEELING BAD ABOUT YOURSELF - OR THAT YOU ARE A FAILURE OR HAVE LET YOURSELF OR YOUR FAMILY DOWN: 0
SUM OF ALL RESPONSES TO PHQ QUESTIONS 1-9: 2
10. IF YOU CHECKED OFF ANY PROBLEMS, HOW DIFFICULT HAVE THESE PROBLEMS MADE IT FOR YOU TO DO YOUR WORK, TAKE CARE OF THINGS AT HOME, OR GET ALONG WITH OTHER PEOPLE: 0
8. MOVING OR SPEAKING SO SLOWLY THAT OTHER PEOPLE COULD HAVE NOTICED. OR THE OPPOSITE - BEING SO FIDGETY OR RESTLESS THAT YOU HAVE BEEN MOVING AROUND A LOT MORE THAN USUAL: NOT AT ALL
10. IF YOU CHECKED OFF ANY PROBLEMS, HOW DIFFICULT HAVE THESE PROBLEMS MADE IT FOR YOU TO DO YOUR WORK, TAKE CARE OF THINGS AT HOME, OR GET ALONG WITH OTHER PEOPLE: NOT DIFFICULT AT ALL
SUM OF ALL RESPONSES TO PHQ QUESTIONS 1-9: 2
1. LITTLE INTEREST OR PLEASURE IN DOING THINGS: NOT AT ALL
SUM OF ALL RESPONSES TO PHQ QUESTIONS 1-9: 2
2. FEELING DOWN, DEPRESSED OR HOPELESS: NOT AT ALL
7. TROUBLE CONCENTRATING ON THINGS, SUCH AS READING THE NEWSPAPER OR WATCHING TELEVISION: NOT AT ALL
2. FEELING DOWN, DEPRESSED OR HOPELESS: 0
7. TROUBLE CONCENTRATING ON THINGS, SUCH AS READING THE NEWSPAPER OR WATCHING TELEVISION: 0
5. POOR APPETITE OR OVEREATING: SEVERAL DAYS
9. THOUGHTS THAT YOU WOULD BE BETTER OFF DEAD, OR OF HURTING YOURSELF: NOT AT ALL
1. LITTLE INTEREST OR PLEASURE IN DOING THINGS: 0
3. TROUBLE FALLING OR STAYING ASLEEP: NOT AT ALL
4. FEELING TIRED OR HAVING LITTLE ENERGY: SEVERAL DAYS
8. MOVING OR SPEAKING SO SLOWLY THAT OTHER PEOPLE COULD HAVE NOTICED. OR THE OPPOSITE, BEING SO FIGETY OR RESTLESS THAT YOU HAVE BEEN MOVING AROUND A LOT MORE THAN USUAL: 0
4. FEELING TIRED OR HAVING LITTLE ENERGY: 1

## 2024-02-19 ENCOUNTER — OFFICE VISIT (OUTPATIENT)
Dept: FAMILY MEDICINE CLINIC | Age: 74
End: 2024-02-19
Payer: MEDICARE

## 2024-02-19 VITALS
DIASTOLIC BLOOD PRESSURE: 78 MMHG | WEIGHT: 238 LBS | HEART RATE: 71 BPM | OXYGEN SATURATION: 99 % | BODY MASS INDEX: 42.16 KG/M2 | SYSTOLIC BLOOD PRESSURE: 128 MMHG

## 2024-02-19 VITALS
SYSTOLIC BLOOD PRESSURE: 128 MMHG | RESPIRATION RATE: 16 BRPM | HEIGHT: 63 IN | DIASTOLIC BLOOD PRESSURE: 78 MMHG | WEIGHT: 238 LBS | OXYGEN SATURATION: 98 % | BODY MASS INDEX: 42.17 KG/M2 | HEART RATE: 71 BPM

## 2024-02-19 DIAGNOSIS — I10 ESSENTIAL HYPERTENSION: Primary | ICD-10-CM

## 2024-02-19 DIAGNOSIS — E78.2 MIXED HYPERLIPIDEMIA: ICD-10-CM

## 2024-02-19 DIAGNOSIS — Z00.00 MEDICARE ANNUAL WELLNESS VISIT, SUBSEQUENT: Primary | ICD-10-CM

## 2024-02-19 DIAGNOSIS — E03.9 ACQUIRED HYPOTHYROIDISM: ICD-10-CM

## 2024-02-19 DIAGNOSIS — R60.0 BILATERAL LOWER EXTREMITY EDEMA: ICD-10-CM

## 2024-02-19 LAB
BACTERIA UR CULT: ABNORMAL
BACTERIA UR CULT: ABNORMAL
CHOLEST SERPL-MCNC: 121 MG/DL (ref 0–199)
HDLC SERPL-MCNC: 69 MG/DL (ref 40–60)
LDLC SERPL CALC-MCNC: 35 MG/DL
ORGANISM: ABNORMAL
T4 FREE SERPL-MCNC: 2.7 NG/DL (ref 0.9–1.8)
TRIGL SERPL-MCNC: 84 MG/DL (ref 0–150)
TSH SERPL DL<=0.005 MIU/L-ACNC: 0.01 UIU/ML (ref 0.27–4.2)
VLDLC SERPL CALC-MCNC: 17 MG/DL

## 2024-02-19 PROCEDURE — 1123F ACP DISCUSS/DSCN MKR DOCD: CPT | Performed by: FAMILY MEDICINE

## 2024-02-19 PROCEDURE — 99214 OFFICE O/P EST MOD 30 MIN: CPT | Performed by: FAMILY MEDICINE

## 2024-02-19 PROCEDURE — 3074F SYST BP LT 130 MM HG: CPT | Performed by: FAMILY MEDICINE

## 2024-02-19 PROCEDURE — G0439 PPPS, SUBSEQ VISIT: HCPCS | Performed by: FAMILY MEDICINE

## 2024-02-19 PROCEDURE — 3078F DIAST BP <80 MM HG: CPT | Performed by: FAMILY MEDICINE

## 2024-02-19 ASSESSMENT — ANXIETY QUESTIONNAIRES
6. BECOMING EASILY ANNOYED OR IRRITABLE: 0
GAD7 TOTAL SCORE: 0
1. FEELING NERVOUS, ANXIOUS, OR ON EDGE: 0
7. FEELING AFRAID AS IF SOMETHING AWFUL MIGHT HAPPEN: 0
IF YOU CHECKED OFF ANY PROBLEMS ON THIS QUESTIONNAIRE, HOW DIFFICULT HAVE THESE PROBLEMS MADE IT FOR YOU TO DO YOUR WORK, TAKE CARE OF THINGS AT HOME, OR GET ALONG WITH OTHER PEOPLE: NOT DIFFICULT AT ALL
3. WORRYING TOO MUCH ABOUT DIFFERENT THINGS: 0
4. TROUBLE RELAXING: 0
2. NOT BEING ABLE TO STOP OR CONTROL WORRYING: 0
5. BEING SO RESTLESS THAT IT IS HARD TO SIT STILL: 0

## 2024-02-19 ASSESSMENT — LIFESTYLE VARIABLES
HOW OFTEN DO YOU HAVE A DRINK CONTAINING ALCOHOL: NEVER
HOW MANY STANDARD DRINKS CONTAINING ALCOHOL DO YOU HAVE ON A TYPICAL DAY: PATIENT DOES NOT DRINK

## 2024-02-19 NOTE — PROGRESS NOTES
Medicare Annual Wellness Visit    Imani Monzon is here for Medicare AWV    Assessment & Plan   Medicare annual wellness visit, subsequent  Recommendations for Preventive Services Due: see orders and patient instructions/AVS.  Recommended screening schedule for the next 5-10 years is provided to the patient in written form: see Patient Instructions/AVS.     No follow-ups on file.     Subjective       Patient's complete Health Risk Assessment and screening values have been reviewed and are found in Flowsheets. The following problems were reviewed today and where indicated follow up appointments were made and/or referrals ordered.    Positive Risk Factor Screenings with Interventions:               General HRA Questions:  Select all that apply: (!) New or Increased Fatigue (recent UTI)    Fatigue Interventions:  Recent UTI- symptoms now resolving      Activity, Diet, and Weight:  On average, how many days per week do you engage in moderate to strenuous exercise (like a brisk walk)?: 0 days  On average, how many minutes do you engage in exercise at this level?: 0 min    Do you eat balanced/healthy meals regularly?: (!) No    Body mass index is 42.16 kg/m². (!) Abnormal      Inactivity Interventions:  See AVS for additional education material  Do you eat balanced/healthy meals regularly Interventions:  See AVS for additional education material  Obesity Interventions:  See AVS for additional education material              Vision Screen:  Do you have difficulty driving, watching TV, or doing any of your daily activities because of your eyesight?: No  Have you had an eye exam within the past year?: (!) No  No results found.    Interventions:   Patient encouraged to make appointment with their eye specialist                    Objective   Vitals:    02/19/24 1506   BP: 128/78   Pulse: 71   Resp: 16   SpO2: 98%   Weight: 108 kg (238 lb)   Height: 1.6 m (5' 3\")      Body mass index is 42.16 kg/m².               No Known

## 2024-02-19 NOTE — PROGRESS NOTES
Imani Monzon is a 73 y.o. female    Chief Complaint   Patient presents with    Follow-up    Hypertension    Edema     Patient's legs are swollen +1 pitting, its very painful to her. Patient did not wear compression stocking nor did she take Torsemide today.     Urinary Tract Infection     Pain when urinating.        HPI:    Hypertension  This is a chronic problem. The current episode started more than 1 year ago. The problem is unchanged. The problem is controlled. Risk factors for coronary artery disease include post-menopausal state. Past treatments include calcium channel blockers and beta blockers. The current treatment provides significant improvement. There are no compliance problems.  Hypertensive end-organ damage includes CVA.   Hyperlipidemia  This is a chronic problem. The current episode started more than 1 year ago. The problem is controlled. Recent lipid tests were reviewed and are normal. Exacerbating diseases include obesity. She has no history of diabetes. Current antihyperlipidemic treatment includes statins. The current treatment provides significant improvement of lipids. There are no compliance problems.  Risk factors for coronary artery disease include hypertension and post-menopausal.     Acquired hypothyroidism.  This is a chronic issue.  The patient takes her medicine in the morning on an empty stomach.  Her recent TSH was within normal limits.    Bilateral leg swelling.  This is a chronic issue.  She has good relief currently with torsemide.  Previously furosemide did not work as well. She does have mild CKD.     ROS:    Review of Systems   Cardiovascular:  Positive for leg swelling.       /78   Pulse 71   Wt 108 kg (238 lb)   SpO2 99%   BMI 42.16 kg/m²     Physical Exam:    Physical Exam  Constitutional:       General: She is not in acute distress.     Appearance: She is well-developed. She is obese. She is not toxic-appearing.   HENT:      Head: Normocephalic.   Neurological:

## 2024-03-11 ENCOUNTER — PATIENT MESSAGE (OUTPATIENT)
Dept: FAMILY MEDICINE CLINIC | Age: 74
End: 2024-03-11

## 2024-03-11 NOTE — TELEPHONE ENCOUNTER
From: Imani Monzon  To: Dr. Lindsay Espinosa  Sent: 3/11/2024 3:37 PM EDT  Subject: Pyelonephritis    I was taking Ceftin for 10 days, February 18th thru February 28. Other than causing a nasty yeast infection, it did help with the excruciating pain I was having. However, a couple days ago I began having the same symptoms, pain in the kidney and pain when I pee. Please advise on what I need to do next. It gets worse day by day.    Thanks for your consideration!  Imani Monzon

## 2024-03-12 ENCOUNTER — OFFICE VISIT (OUTPATIENT)
Dept: FAMILY MEDICINE CLINIC | Age: 74
End: 2024-03-12
Payer: MEDICARE

## 2024-03-12 VITALS
HEIGHT: 63 IN | WEIGHT: 240 LBS | OXYGEN SATURATION: 97 % | BODY MASS INDEX: 42.52 KG/M2 | SYSTOLIC BLOOD PRESSURE: 100 MMHG | DIASTOLIC BLOOD PRESSURE: 60 MMHG | HEART RATE: 64 BPM

## 2024-03-12 DIAGNOSIS — R30.0 DYSURIA: Primary | ICD-10-CM

## 2024-03-12 DIAGNOSIS — B37.9 ANTIBIOTIC-INDUCED YEAST INFECTION: ICD-10-CM

## 2024-03-12 DIAGNOSIS — T36.95XA ANTIBIOTIC-INDUCED YEAST INFECTION: ICD-10-CM

## 2024-03-12 DIAGNOSIS — R82.998 LEUKOCYTES IN URINE: ICD-10-CM

## 2024-03-12 DIAGNOSIS — R39.89 SUSPECTED UTI: ICD-10-CM

## 2024-03-12 LAB
BILIRUBIN, POC: NEGATIVE
BLOOD URINE, POC: NORMAL
CLARITY, POC: NORMAL
COLOR, POC: YELLOW
GLUCOSE URINE, POC: NEGATIVE
KETONES, POC: NEGATIVE
LEUKOCYTE EST, POC: NORMAL
NITRITE, POC: NEGATIVE
PH, POC: 6
PROTEIN, POC: NEGATIVE
SPECIFIC GRAVITY, POC: 1.02
UROBILINOGEN, POC: NORMAL

## 2024-03-12 PROCEDURE — 81002 URINALYSIS NONAUTO W/O SCOPE: CPT | Performed by: NURSE PRACTITIONER

## 2024-03-12 PROCEDURE — 99213 OFFICE O/P EST LOW 20 MIN: CPT | Performed by: NURSE PRACTITIONER

## 2024-03-12 PROCEDURE — 3078F DIAST BP <80 MM HG: CPT | Performed by: NURSE PRACTITIONER

## 2024-03-12 PROCEDURE — 3074F SYST BP LT 130 MM HG: CPT | Performed by: NURSE PRACTITIONER

## 2024-03-12 PROCEDURE — 1123F ACP DISCUSS/DSCN MKR DOCD: CPT | Performed by: NURSE PRACTITIONER

## 2024-03-12 RX ORDER — SULFAMETHOXAZOLE AND TRIMETHOPRIM 800; 160 MG/1; MG/1
1 TABLET ORAL 2 TIMES DAILY
Qty: 14 TABLET | Refills: 0 | Status: SHIPPED | OUTPATIENT
Start: 2024-03-12 | End: 2024-03-19

## 2024-03-12 RX ORDER — FLUCONAZOLE 150 MG/1
150 TABLET ORAL ONCE
Qty: 2 TABLET | Refills: 0 | Status: SHIPPED | OUTPATIENT
Start: 2024-03-12 | End: 2024-03-12

## 2024-03-12 ASSESSMENT — ENCOUNTER SYMPTOMS
SHORTNESS OF BREATH: 0
DIARRHEA: 0
NAUSEA: 0
VOMITING: 0
ABDOMINAL PAIN: 0

## 2024-03-12 NOTE — PATIENT INSTRUCTIONS
Drink plenty of water   Wipe front to back after urinating  Urinate after sexual intercourse   Avoid bath bombs, bubble baths etc    Change pads/tampons frequently   6. Go to Er or notify office if develop high fever with back pain, significant worsening of symptoms, chest pain or shortness of breath   7. Take antibiotic as prescribed  8. Will call with results of urine culture and base further treatement on results  9. Follow up if symptoms worsen or fail to improve    10. May take cranberry tablets

## 2024-03-12 NOTE — PROGRESS NOTES
may contain inaccuracies due to incorrect word recognition.  If further clarification is needed please contact the office at [phone].     An electronic signature was used to authenticate this note.    --SANDOR Frank - CNP

## 2024-03-14 LAB — BACTERIA UR CULT: NORMAL

## 2024-05-04 DIAGNOSIS — I10 ESSENTIAL HYPERTENSION: Primary | ICD-10-CM

## 2024-05-04 DIAGNOSIS — N18.30 STAGE 3 CHRONIC KIDNEY DISEASE, UNSPECIFIED WHETHER STAGE 3A OR 3B CKD (HCC): ICD-10-CM

## 2024-05-04 DIAGNOSIS — F51.01 PRIMARY INSOMNIA: ICD-10-CM

## 2024-05-04 DIAGNOSIS — G25.81 RLS (RESTLESS LEGS SYNDROME): ICD-10-CM

## 2024-05-04 DIAGNOSIS — K21.9 GASTROESOPHAGEAL REFLUX DISEASE WITHOUT ESOPHAGITIS: ICD-10-CM

## 2024-05-04 RX ORDER — ROPINIROLE 1 MG/1
TABLET, FILM COATED ORAL
Qty: 270 TABLET | Refills: 1 | Status: SHIPPED | OUTPATIENT
Start: 2024-05-04

## 2024-05-04 RX ORDER — TRAZODONE HYDROCHLORIDE 150 MG/1
TABLET ORAL
Qty: 90 TABLET | Refills: 1 | Status: SHIPPED | OUTPATIENT
Start: 2024-05-04

## 2024-05-04 RX ORDER — VERAPAMIL HYDROCHLORIDE 180 MG/1
CAPSULE, EXTENDED RELEASE ORAL
Qty: 180 CAPSULE | Refills: 1 | Status: SHIPPED | OUTPATIENT
Start: 2024-05-04

## 2024-05-04 RX ORDER — LISINOPRIL 2.5 MG/1
2.5 TABLET ORAL DAILY
Qty: 90 TABLET | Refills: 1 | Status: SHIPPED | OUTPATIENT
Start: 2024-05-04

## 2024-05-04 RX ORDER — OMEPRAZOLE 40 MG/1
CAPSULE, DELAYED RELEASE ORAL
Qty: 90 CAPSULE | Refills: 1 | Status: SHIPPED | OUTPATIENT
Start: 2024-05-04

## 2024-05-04 NOTE — TELEPHONE ENCOUNTER
Refill Request     CONFIRM preferred pharmacy with the patient.    If Mail Order Rx - Pend for 90 day refill.      Last Seen: Last Seen Department: 3/12/2024  Last Seen by PCP: 10/18/2023    Last Written: 5/8/2023 Verapamil  11/1/2023 Omeprazole   5/8/2023 lisinopril   5/8/2023 Ropinirole  5/8/2023 Trazodone     If no future appointment scheduled:  Review the last OV with PCP and review information for follow-up visit,  Route STAFF MESSAGE with patient name to the  Pool for scheduling with the following information:            -  Timing of next visit           -  Visit type ie Physical, OV, etc           -  Diagnoses/Reason ie. COPD, HTN - Do not use MEDICATION, Follow-up or CHECK UP - Give reason for visit      Next Appointment:   Future Appointments   Date Time Provider Department Center   8/26/2024  1:00 PM Mauri Bunch, DO [facility] [place] [facility]       Message sent to  to schedule appt with patient?  NO      Requested Prescriptions     Pending Prescriptions Disp Refills    verapamil (VERELAN) 180 MG extended release capsule [Pharmacy Med Name: VERAPAMIL  MG CAPSULE] 180 capsule 1     Sig: TAKE 2 CAPSULES BY MOUTH AT BEDTIME    omeprazole (PRILOSEC) 40 MG delayed release capsule [Pharmacy Med Name: OMEPRAZOLE DR 40 MG CAPSULE] 90 capsule 1     Sig: TAKE 1 CAPSULE BY MOUTH EVERY DAY    lisinopril (PRINIVIL;ZESTRIL) 2.5 MG tablet [Pharmacy Med Name: LISINOPRIL 2.5 MG TABLET] 90 tablet 1     Sig: TAKE 1 TABLET BY MOUTH EVERY DAY    rOPINIRole (REQUIP) 1 MG tablet [Pharmacy Med Name: ROPINIROLE HCL 1 MG TABLET] 270 tablet 1     Sig: TAKE 1 TABLET BY MOUTH THREE TIMES A DAY    traZODone (DESYREL) 150 MG tablet [Pharmacy Med Name: TRAZODONE 150 MG TABLET] 90 tablet 1     Sig: TAKE 1 TABLET BY MOUTH EVERY DAY AT NIGHT

## 2024-05-08 NOTE — TELEPHONE ENCOUNTER
.Refill Request     CONFIRM preferred pharmacy with the patient.    If Mail Order Rx - Pend for 90 day refill.      Last Seen: Last Seen Department: 3/12/2024  Last Seen by PCP: 10/18/2023    Last Written: 5-8-23 90 with 1    If no future appointment scheduled:  Review the last OV with PCP and review information for follow-up visit,  Route STAFF MESSAGE with patient name to the  Pool for scheduling with the following information:            -  Timing of next visit           -  Visit type ie Physical, OV, etc           -  Diagnoses/Reason ie. COPD, HTN - Do not use MEDICATION, Follow-up or CHECK UP - Give reason for visit      Next Appointment:   Future Appointments   Date Time Provider Department Center   8/26/2024  1:00 PM Mauri Bunch, DO [facility]       Message sent to  to schedule appt with patient?  NO      Requested Prescriptions     Pending Prescriptions Disp Refills    atorvastatin (LIPITOR) 40 MG tablet [Pharmacy Med Name: ATORVASTATIN 40 MG TABLET] 90 tablet 1     Sig: TAKE 1 TABLET BY MOUTH NIGHTLY

## 2024-05-09 RX ORDER — ATORVASTATIN CALCIUM 40 MG/1
40 TABLET, FILM COATED ORAL NIGHTLY
Qty: 90 TABLET | Refills: 1 | Status: SHIPPED | OUTPATIENT
Start: 2024-05-09

## 2024-07-31 RX ORDER — METOPROLOL SUCCINATE 25 MG/1
25 TABLET, EXTENDED RELEASE ORAL DAILY
Qty: 90 TABLET | Refills: 1 | Status: SHIPPED | OUTPATIENT
Start: 2024-07-31

## 2024-07-31 NOTE — TELEPHONE ENCOUNTER
Refill Request     CONFIRM preferred pharmacy with the patient.    If Mail Order Rx - Pend for 90 day refill.      Last Seen: Last Seen Department: 3/12/2024  Last Seen by PCP: 3/12/2024    Last Written: 2/2/24 90 with 1 refill     If no future appointment scheduled:  Review the last OV with PCP and review information for follow-up visit,  Route STAFF MESSAGE with patient name to the  Pool for scheduling with the following information:            -  Timing of next visit           -  Visit type ie Physical, OV, etc           -  Diagnoses/Reason ie. COPD, HTN - Do not use MEDICATION, Follow-up or CHECK UP - Give reason for visit      Next Appointment:   Future Appointments   Date Time Provider Department Center   8/26/2024  1:00 PM Mauri Bunch, DO [facility]       Message sent to  to schedule appt with patient?  NO      Requested Prescriptions     Pending Prescriptions Disp Refills    metoprolol succinate (TOPROL XL) 25 MG extended release tablet [Pharmacy Med Name: METOPROLOL SUCC ER 25 MG TAB] 90 tablet 1     Sig: TAKE 1 TABLET BY MOUTH EVERY DAY

## 2024-08-02 DIAGNOSIS — F32.89 OTHER DEPRESSION: ICD-10-CM

## 2024-08-02 RX ORDER — QUETIAPINE 150 MG/1
TABLET, FILM COATED, EXTENDED RELEASE ORAL
Qty: 90 TABLET | Refills: 0 | Status: SHIPPED | OUTPATIENT
Start: 2024-08-02

## 2024-08-02 NOTE — TELEPHONE ENCOUNTER
Refill Request     CONFIRM preferred pharmacy with the patient.    If Mail Order Rx - Pend for 90 day refill.      Last Seen: Last Seen Department: 3/12/2024  Last Seen by PCP: 10/18/2023    Last Written: 5/8/23 90 with 1 refill     If no future appointment scheduled:  Review the last OV with PCP and review information for follow-up visit,  Route STAFF MESSAGE with patient name to the  Pool for scheduling with the following information:            -  Timing of next visit           -  Visit type ie Physical, OV, etc           -  Diagnoses/Reason ie. COPD, HTN - Do not use MEDICATION, Follow-up or CHECK UP - Give reason for visit      Next Appointment:   Future Appointments   Date Time Provider Department Center   8/26/2024  1:00 PM Mauri Bunch, DO [facility]       Message sent to  to schedule appt with patient?  NO      Requested Prescriptions     Pending Prescriptions Disp Refills    QUEtiapine (SEROQUEL XR) 150 MG TB24 extended release tablet [Pharmacy Med Name: QUETIAPINE  MG TABLET] 90 tablet 1     Sig: TAKE 1 TABLET BY MOUTH EVERY DAY

## 2024-08-03 RX ORDER — LEVOTHYROXINE SODIUM 175 UG/1
TABLET ORAL
Qty: 90 TABLET | Refills: 1 | Status: SHIPPED | OUTPATIENT
Start: 2024-08-03

## 2024-08-03 NOTE — TELEPHONE ENCOUNTER
.Refill Request     CONFIRM preferred pharmacy with the patient.    If Mail Order Rx - Pend for 90 day refill.      Last Seen: Last Seen Department: 3/12/2024  Last Seen by PCP: 10/18/2023    Last Written: 5/8/23 90 with 1     If no future appointment scheduled:  Review the last OV with PCP and review information for follow-up visit,  Route STAFF MESSAGE with patient name to the  Pool for scheduling with the following information:            -  Timing of next visit           -  Visit type ie Physical, OV, etc           -  Diagnoses/Reason ie. COPD, HTN - Do not use MEDICATION, Follow-up or CHECK UP - Give reason for visit      Next Appointment:   Future Appointments   Date Time Provider Department Center   8/26/2024  1:00 PM Mauri Bunch, DO [facility]       Message sent to  to schedule appt with patient?  NO      Requested Prescriptions     Pending Prescriptions Disp Refills    levothyroxine (SYNTHROID) 175 MCG tablet [Pharmacy Med Name: LEVOTHYROXINE 175 MCG TABLET] 90 tablet 1     Sig: TAKE 1 TABLET BY MOUTH EVERY DAY

## 2024-08-04 DIAGNOSIS — N18.30 STAGE 3 CHRONIC KIDNEY DISEASE, UNSPECIFIED WHETHER STAGE 3A OR 3B CKD (HCC): ICD-10-CM

## 2024-08-05 ENCOUNTER — TELEPHONE (OUTPATIENT)
Dept: ORTHOPEDIC SURGERY | Age: 74
End: 2024-08-05

## 2024-08-05 DIAGNOSIS — M17.0 PRIMARY OSTEOARTHRITIS OF BOTH KNEES: Primary | ICD-10-CM

## 2024-08-05 RX ORDER — LISINOPRIL 2.5 MG/1
2.5 TABLET ORAL DAILY
Qty: 90 TABLET | Refills: 3 | Status: SHIPPED | OUTPATIENT
Start: 2024-08-05

## 2024-08-05 NOTE — TELEPHONE ENCOUNTER
Refill Request     CONFIRM preferred pharmacy with the patient.    If Mail Order Rx - Pend for 90 day refill.      Last Seen: Last Seen Department: 3/12/2024  Last Seen by PCP: 10/18/2023    Last Written: 5/4/24 90 tab 1 refills    If no future appointment scheduled:  Review the last OV with PCP and review information for follow-up visit,  Route STAFF MESSAGE with patient name to the  Pool for scheduling with the following information:            -  Timing of next visit           -  Visit type ie Physical, OV, etc           -  Diagnoses/Reason ie. COPD, HTN - Do not use MEDICATION, Follow-up or CHECK UP - Give reason for visit      Next Appointment:   Future Appointments   Date Time Provider Department Center   8/26/2024  1:00 PM Mauri Bunch, DO [facility]       Message sent to  to schedule appt with patient?  NO      Requested Prescriptions     Pending Prescriptions Disp Refills    lisinopril (PRINIVIL;ZESTRIL) 2.5 MG tablet [Pharmacy Med Name: LISINOPRIL 2.5 MG TABLET] 90 tablet 1     Sig: TAKE 1 TABLET BY MOUTH EVERY DAY

## 2024-08-06 ENCOUNTER — OFFICE VISIT (OUTPATIENT)
Dept: ORTHOPEDIC SURGERY | Age: 74
End: 2024-08-06

## 2024-08-06 VITALS — WEIGHT: 240 LBS | BODY MASS INDEX: 42.52 KG/M2 | HEIGHT: 63 IN

## 2024-08-06 DIAGNOSIS — M17.0 PRIMARY OSTEOARTHRITIS OF BOTH KNEES: Primary | ICD-10-CM

## 2024-08-06 RX ORDER — LIDOCAINE HYDROCHLORIDE 10 MG/ML
6 INJECTION, SOLUTION EPIDURAL; INFILTRATION; INTRACAUDAL; PERINEURAL ONCE
Status: COMPLETED | OUTPATIENT
Start: 2024-08-06 | End: 2024-08-06

## 2024-08-06 RX ORDER — TRIAMCINOLONE ACETONIDE 40 MG/ML
160 INJECTION, SUSPENSION INTRA-ARTICULAR; INTRAMUSCULAR ONCE
Status: COMPLETED | OUTPATIENT
Start: 2024-08-06 | End: 2024-08-06

## 2024-08-06 RX ADMIN — LIDOCAINE HYDROCHLORIDE 6 ML: 10 INJECTION, SOLUTION EPIDURAL; INFILTRATION; INTRACAUDAL; PERINEURAL at 11:14

## 2024-08-06 RX ADMIN — TRIAMCINOLONE ACETONIDE 160 MG: 40 INJECTION, SUSPENSION INTRA-ARTICULAR; INTRAMUSCULAR at 11:15

## 2024-08-08 NOTE — TELEPHONE ENCOUNTER
08/08 called and spoke w/ pt and she is requesting to be called back in a couple weeks to schedule, advised pt meds could not be refilled until appt has been made, she VU

## 2024-08-08 NOTE — PROGRESS NOTES
ORTHOPAEDIC SURGERY FOLLOWUP VISIT     CHIEF COMPLAINT: Right knee pain      DATE OF INJURY: 8/23/2023     HISTORY OF PRESENT ILLNESS:  73-year-old female well-known to me presents for repeat evaluation of her right knee.  She has chronic knee osteoarthritis for which she has been managing with injections both corticosteroid and viscosupplementation.  Both of these types of injections are effective for her.  Her most recent injections corticosteroid injection was January 9, 2024.  Her most recent viscosupplementation series was at the end of January 2024..  Overall, she continues to have pain.  This is more significant in the right knee than the left.  She has pain on a day-to-day basis.  Presents requesting repeat corticosteroid injection today.     PHYSICAL EXAM:  General: Well-appearing.  No distress.  Right lower extremity: No cuts, open wounds, abrasions to the lower extremity.  There is diffuse lymphedema and lower extremity swelling.  There is no warmth or erythema.  There is tenderness to palpation diffusely about the lateral knee.  Knee range of motion is full extension to 110 degrees of flexion.  There is no significant mechanical block.  There is mild to moderate subpatellar crepitus.  There is no tenderness to palpation about the medial joint line.  There is tenderness to palpation laterally.  There is pain reproduced with varus stress at 30 degrees experienced over the lateral knee.  No pain with valgus stress.  Negative anterior posterior drawer test. Sensation is intact to light touch in deep peroneal, superficial peroneal, tibial, sural, and saphenous nerve distributions.  Motor function is intact to EHL, FHL, tibialis anterior, and gastroc.  There is brisk capillary refill to the toes and a strong palpable dorsalis pedis pulse.  Compartments are soft and compressible.  There is no calf tenderness and a negative Homans' sign.     Left lower extremity:  No cuts, open wounds, abrasions to the lower

## 2024-08-14 ENCOUNTER — OFFICE VISIT (OUTPATIENT)
Dept: ORTHOPEDIC SURGERY | Age: 74
End: 2024-08-14

## 2024-08-14 VITALS — HEIGHT: 63 IN | WEIGHT: 240 LBS | BODY MASS INDEX: 42.52 KG/M2

## 2024-08-14 DIAGNOSIS — M17.0 PRIMARY OSTEOARTHRITIS OF BOTH KNEES: Primary | ICD-10-CM

## 2024-08-14 RX ORDER — HYALURONATE SODIUM 10 MG/ML
20 SYRINGE (ML) INTRAARTICULAR ONCE
Status: COMPLETED | OUTPATIENT
Start: 2024-08-14 | End: 2024-08-14

## 2024-08-14 RX ADMIN — Medication 20 MG: at 14:32

## 2024-08-14 NOTE — PROGRESS NOTES
ORTHOPAEDIC SURGERY FOLLOWUP VISIT     CHIEF COMPLAINT: Right knee pain      DATE OF INJURY: 8/23/2023     HISTORY OF PRESENT ILLNESS:  73-year-old female well-known to me presents for repeat evaluation of her right knee.  She has chronic knee osteoarthritis for which she has been managing with injections both corticosteroid and viscosupplementation.  Both of these types of injections are effective for her.  She underwent an intra-articular corticosteroid injection.  This has worked for her well.  She was also prior authorized for viscosupplementation injections.  She presents for injection #1 of 3 today.      PHYSICAL EXAM:  General: Well-appearing.  No distress.  Right lower extremity: No cuts, open wounds, abrasions to the lower extremity.  There is diffuse lymphedema and lower extremity swelling.  There is no warmth or erythema.  There is tenderness to palpation diffusely about the lateral knee.  Knee range of motion is full extension to 110 degrees of flexion.  There is no significant mechanical block.  There is mild to moderate subpatellar crepitus.  There is no tenderness to palpation about the medial joint line.  There is tenderness to palpation laterally.  There is pain reproduced with varus stress at 30 degrees experienced over the lateral knee.  No pain with valgus stress.  Negative anterior posterior drawer test. Sensation is intact to light touch in deep peroneal, superficial peroneal, tibial, sural, and saphenous nerve distributions.  Motor function is intact to EHL, FHL, tibialis anterior, and gastroc.  There is brisk capillary refill to the toes and a strong palpable dorsalis pedis pulse.  Compartments are soft and compressible.  There is no calf tenderness and a negative Homans' sign.     Left lower extremity:  No cuts, open wounds, abrasions to the lower extremity.  There is diffuse lymphedema and lower extremity swelling.  There is no warmth or erythema.  Knee range of motion is full extension

## 2024-08-20 ENCOUNTER — OFFICE VISIT (OUTPATIENT)
Dept: ORTHOPEDIC SURGERY | Age: 74
End: 2024-08-20

## 2024-08-20 VITALS — HEIGHT: 63 IN | BODY MASS INDEX: 42.52 KG/M2 | WEIGHT: 240 LBS

## 2024-08-20 DIAGNOSIS — M17.0 PRIMARY OSTEOARTHRITIS OF BOTH KNEES: Primary | ICD-10-CM

## 2024-08-23 NOTE — PROGRESS NOTES
ORTHOPAEDIC SURGERY FOLLOWUP VISIT     CHIEF COMPLAINT: Right knee pain      DATE OF INJURY: 8/23/2023     HISTORY OF PRESENT ILLNESS:  73-year-old female well-known to me presents for repeat evaluation of her right knee.  She has chronic knee osteoarthritis for which she has been managing with injections both corticosteroid and viscosupplementation.  Both of these types of injections are effective for her.  She underwent an intra-articular corticosteroid injection.  This has worked for her well.  Additionally, she was prior authorized for viscosupplementation and received her first injection of this last week.  She has not had a positive response to it.  Presents for injection #2 of 3 today.     PHYSICAL EXAM:  General: Well-appearing.  No distress.  Right lower extremity: No cuts, open wounds, abrasions to the lower extremity.  There is diffuse lymphedema and lower extremity swelling.  There is no warmth or erythema.  There is tenderness to palpation diffusely about the lateral knee.  Knee range of motion is full extension to 110 degrees of flexion.  There is no significant mechanical block.  There is mild to moderate subpatellar crepitus.  There is no tenderness to palpation about the medial joint line.  There is tenderness to palpation laterally.  There is pain reproduced with varus stress at 30 degrees experienced over the lateral knee.  No pain with valgus stress.  Negative anterior posterior drawer test. Sensation is intact to light touch in deep peroneal, superficial peroneal, tibial, sural, and saphenous nerve distributions.  Motor function is intact to EHL, FHL, tibialis anterior, and gastroc.  There is brisk capillary refill to the toes and a strong palpable dorsalis pedis pulse.  Compartments are soft and compressible.  There is no calf tenderness and a negative Homans' sign.     Left lower extremity:  No cuts, open wounds, abrasions to the lower extremity.  There is diffuse lymphedema and lower

## 2024-08-25 SDOH — ECONOMIC STABILITY: FOOD INSECURITY: WITHIN THE PAST 12 MONTHS, THE FOOD YOU BOUGHT JUST DIDN'T LAST AND YOU DIDN'T HAVE MONEY TO GET MORE.: SOMETIMES TRUE

## 2024-08-25 SDOH — ECONOMIC STABILITY: FOOD INSECURITY: WITHIN THE PAST 12 MONTHS, YOU WORRIED THAT YOUR FOOD WOULD RUN OUT BEFORE YOU GOT MONEY TO BUY MORE.: SOMETIMES TRUE

## 2024-08-25 SDOH — ECONOMIC STABILITY: INCOME INSECURITY: HOW HARD IS IT FOR YOU TO PAY FOR THE VERY BASICS LIKE FOOD, HOUSING, MEDICAL CARE, AND HEATING?: HARD

## 2024-08-26 ENCOUNTER — OFFICE VISIT (OUTPATIENT)
Dept: FAMILY MEDICINE CLINIC | Age: 74
End: 2024-08-26
Payer: MEDICARE

## 2024-08-26 VITALS
DIASTOLIC BLOOD PRESSURE: 74 MMHG | HEART RATE: 86 BPM | WEIGHT: 216.6 LBS | SYSTOLIC BLOOD PRESSURE: 130 MMHG | OXYGEN SATURATION: 96 % | BODY MASS INDEX: 38.37 KG/M2

## 2024-08-26 DIAGNOSIS — Z78.0 POST-MENOPAUSAL: ICD-10-CM

## 2024-08-26 DIAGNOSIS — G25.81 RLS (RESTLESS LEGS SYNDROME): ICD-10-CM

## 2024-08-26 DIAGNOSIS — I10 ESSENTIAL HYPERTENSION: ICD-10-CM

## 2024-08-26 DIAGNOSIS — K21.9 GASTROESOPHAGEAL REFLUX DISEASE WITHOUT ESOPHAGITIS: ICD-10-CM

## 2024-08-26 DIAGNOSIS — N18.30 STAGE 3 CHRONIC KIDNEY DISEASE, UNSPECIFIED WHETHER STAGE 3A OR 3B CKD (HCC): ICD-10-CM

## 2024-08-26 DIAGNOSIS — F33.0 MAJOR DEPRESSIVE DISORDER, RECURRENT EPISODE, MILD (HCC): Primary | ICD-10-CM

## 2024-08-26 PROCEDURE — 1123F ACP DISCUSS/DSCN MKR DOCD: CPT | Performed by: FAMILY MEDICINE

## 2024-08-26 PROCEDURE — 3075F SYST BP GE 130 - 139MM HG: CPT | Performed by: FAMILY MEDICINE

## 2024-08-26 PROCEDURE — 3078F DIAST BP <80 MM HG: CPT | Performed by: FAMILY MEDICINE

## 2024-08-26 PROCEDURE — 99214 OFFICE O/P EST MOD 30 MIN: CPT | Performed by: FAMILY MEDICINE

## 2024-08-26 SDOH — ECONOMIC STABILITY: FOOD INSECURITY: WITHIN THE PAST 12 MONTHS, THE FOOD YOU BOUGHT JUST DIDN'T LAST AND YOU DIDN'T HAVE MONEY TO GET MORE.: NEVER TRUE

## 2024-08-26 SDOH — ECONOMIC STABILITY: INCOME INSECURITY: HOW HARD IS IT FOR YOU TO PAY FOR THE VERY BASICS LIKE FOOD, HOUSING, MEDICAL CARE, AND HEATING?: NOT HARD AT ALL

## 2024-08-26 SDOH — ECONOMIC STABILITY: FOOD INSECURITY: WITHIN THE PAST 12 MONTHS, YOU WORRIED THAT YOUR FOOD WOULD RUN OUT BEFORE YOU GOT MONEY TO BUY MORE.: NEVER TRUE

## 2024-08-26 ASSESSMENT — PATIENT HEALTH QUESTIONNAIRE - PHQ9
1. LITTLE INTEREST OR PLEASURE IN DOING THINGS: NOT AT ALL
2. FEELING DOWN, DEPRESSED OR HOPELESS: NOT AT ALL
SUM OF ALL RESPONSES TO PHQ QUESTIONS 1-9: 0
1. LITTLE INTEREST OR PLEASURE IN DOING THINGS: NOT AT ALL
DEPRESSION UNABLE TO ASSESS: FUNCTIONAL CAPACITY MOTIVATION LIMITS ACCURACY
3. TROUBLE FALLING OR STAYING ASLEEP: NOT AT ALL
9. THOUGHTS THAT YOU WOULD BE BETTER OFF DEAD, OR OF HURTING YOURSELF: NOT AT ALL
5. POOR APPETITE OR OVEREATING: NOT AT ALL
7. TROUBLE CONCENTRATING ON THINGS, SUCH AS READING THE NEWSPAPER OR WATCHING TELEVISION: NOT AT ALL
SUM OF ALL RESPONSES TO PHQ9 QUESTIONS 1 & 2: 0
SUM OF ALL RESPONSES TO PHQ QUESTIONS 1-9: 0
2. FEELING DOWN, DEPRESSED OR HOPELESS: NOT AT ALL
SUM OF ALL RESPONSES TO PHQ9 QUESTIONS 1 & 2: 0
6. FEELING BAD ABOUT YOURSELF - OR THAT YOU ARE A FAILURE OR HAVE LET YOURSELF OR YOUR FAMILY DOWN: NOT AT ALL
SUM OF ALL RESPONSES TO PHQ QUESTIONS 1-9: 0
10. IF YOU CHECKED OFF ANY PROBLEMS, HOW DIFFICULT HAVE THESE PROBLEMS MADE IT FOR YOU TO DO YOUR WORK, TAKE CARE OF THINGS AT HOME, OR GET ALONG WITH OTHER PEOPLE: NOT DIFFICULT AT ALL
SUM OF ALL RESPONSES TO PHQ QUESTIONS 1-9: 0
SUM OF ALL RESPONSES TO PHQ QUESTIONS 1-9: 0
DEPRESSION UNABLE TO ASSESS: FUNCTIONAL CAPACITY MOTIVATION LIMITS ACCURACY
SUM OF ALL RESPONSES TO PHQ QUESTIONS 1-9: 0
SUM OF ALL RESPONSES TO PHQ QUESTIONS 1-9: 0
8. MOVING OR SPEAKING SO SLOWLY THAT OTHER PEOPLE COULD HAVE NOTICED. OR THE OPPOSITE, BEING SO FIGETY OR RESTLESS THAT YOU HAVE BEEN MOVING AROUND A LOT MORE THAN USUAL: NOT AT ALL
SUM OF ALL RESPONSES TO PHQ QUESTIONS 1-9: 0
4. FEELING TIRED OR HAVING LITTLE ENERGY: NOT AT ALL

## 2024-08-26 ASSESSMENT — ANXIETY QUESTIONNAIRES
1. FEELING NERVOUS, ANXIOUS, OR ON EDGE: NOT AT ALL
IF YOU CHECKED OFF ANY PROBLEMS ON THIS QUESTIONNAIRE, HOW DIFFICULT HAVE THESE PROBLEMS MADE IT FOR YOU TO DO YOUR WORK, TAKE CARE OF THINGS AT HOME, OR GET ALONG WITH OTHER PEOPLE: NOT DIFFICULT AT ALL
5. BEING SO RESTLESS THAT IT IS HARD TO SIT STILL: NOT AT ALL
2. NOT BEING ABLE TO STOP OR CONTROL WORRYING: NOT AT ALL
6. BECOMING EASILY ANNOYED OR IRRITABLE: NOT AT ALL
GAD7 TOTAL SCORE: 0
7. FEELING AFRAID AS IF SOMETHING AWFUL MIGHT HAPPEN: NOT AT ALL
4. TROUBLE RELAXING: NOT AT ALL
3. WORRYING TOO MUCH ABOUT DIFFERENT THINGS: NOT AT ALL

## 2024-08-26 ASSESSMENT — ENCOUNTER SYMPTOMS
EYE DISCHARGE: 0
CHEST TIGHTNESS: 0
VOMITING: 0
DIARRHEA: 0
VOICE CHANGE: 0
CONSTIPATION: 0
WHEEZING: 0
RHINORRHEA: 0
BACK PAIN: 0
BLOOD IN STOOL: 0
EYE PAIN: 0
EYE REDNESS: 0
COUGH: 0
SINUS PRESSURE: 0
ANAL BLEEDING: 0
NAUSEA: 0
SHORTNESS OF BREATH: 1
ABDOMINAL DISTENTION: 0
ABDOMINAL PAIN: 0
SORE THROAT: 0
EYE ITCHING: 0
TROUBLE SWALLOWING: 0

## 2024-08-26 NOTE — PROGRESS NOTES
Subjective:      Patient ID: Imani Monzon is a 74 y.o. female.    HPI  Patient in for checkup on several medical issues.  Major depression-she states that this is her 1 problem at the moment because of her age and her loneliness-she has a daughter who is unfortunately 50 miles away.  She does work 16 hours a week at a [employer] which is 4 hours 4 times a week and likes being there.  GERD-on medication and relatively stable.  Chronic kidney disease does see a nephrologist and last BUN and creatinine were normal.  Restless leg syndrome-on medication and doing fairly well.  She is due for a DEXA scan.        Review of Systems    Review of Systems   Constitutional:  Positive for fatigue. Negative for unexpected weight change.   HENT:  Negative for congestion, ear pain, hearing loss, nosebleeds, postnasal drip, rhinorrhea, sinus pressure, sneezing, sore throat, tinnitus, trouble swallowing and voice change.    Eyes:  Negative for pain, discharge, redness, itching and visual disturbance.   Respiratory:  Positive for shortness of breath. Negative for cough, chest tightness and wheezing.    Cardiovascular:  Negative for chest pain, palpitations and leg swelling.   Gastrointestinal:  Negative for abdominal distention, abdominal pain, anal bleeding, blood in stool, constipation, diarrhea, nausea and vomiting.   Genitourinary:  Negative for dysuria, flank pain, frequency, hematuria, menstrual problem, pelvic pain, urgency and vaginal discharge.   Musculoskeletal:  Positive for arthralgias. Negative for back pain, gait problem, joint swelling, myalgias and neck pain.   Skin:  Negative for pallor and rash.   Neurological:  Negative for dizziness, tremors, seizures, weakness, light-headedness, numbness and headaches.   Hematological:  Negative for adenopathy. Does not bruise/bleed easily.   Psychiatric/Behavioral:  Positive for dysphoric mood. Negative for agitation, confusion, decreased concentration and sleep

## 2024-08-28 ENCOUNTER — OFFICE VISIT (OUTPATIENT)
Dept: ORTHOPEDIC SURGERY | Age: 74
End: 2024-08-28
Payer: MEDICARE

## 2024-08-28 VITALS — HEIGHT: 63 IN | WEIGHT: 216 LBS | BODY MASS INDEX: 38.27 KG/M2

## 2024-08-28 DIAGNOSIS — M17.0 PRIMARY OSTEOARTHRITIS OF BOTH KNEES: Primary | ICD-10-CM

## 2024-08-28 PROCEDURE — 20610 DRAIN/INJ JOINT/BURSA W/O US: CPT | Performed by: ORTHOPAEDIC SURGERY

## 2024-08-28 RX ORDER — TORSEMIDE 20 MG/1
TABLET ORAL
Qty: 90 TABLET | Refills: 0 | Status: SHIPPED | OUTPATIENT
Start: 2024-08-28

## 2024-08-28 NOTE — PROGRESS NOTES
ORTHOPAEDIC SURGERY FOLLOWUP VISIT     CHIEF COMPLAINT: Right knee pain      DATE OF INJURY: 8/23/2023     HISTORY OF PRESENT ILLNESS:  73-year-old female well-known to me presents for repeat evaluation of her right knee.  She has chronic knee osteoarthritis for which she has been managing with injections both corticosteroid and viscosupplementation.  Both of these types of injections are effective for her.  She underwent an intra-articular corticosteroid injection.  This has worked for her well.  She has been benefiting from intra-articular viscosupplementation injections.  She has not had any adverse events.  Overall there has been improvement.  Presents for injection #3 of 3 today.     PHYSICAL EXAM:  General: Well-appearing.  No distress.  Right lower extremity: No cuts, open wounds, abrasions to the lower extremity.  There is diffuse lymphedema and lower extremity swelling.  There is no warmth or erythema.  There is tenderness to palpation diffusely about the lateral knee.  Knee range of motion is full extension to 110 degrees of flexion.  There is no significant mechanical block.  There is mild to moderate subpatellar crepitus.  There is no tenderness to palpation about the medial joint line.  There is tenderness to palpation laterally.  There is pain reproduced with varus stress at 30 degrees experienced over the lateral knee.  No pain with valgus stress.  Negative anterior posterior drawer test. Sensation is intact to light touch in deep peroneal, superficial peroneal, tibial, sural, and saphenous nerve distributions.  Motor function is intact to EHL, FHL, tibialis anterior, and gastroc.  There is brisk capillary refill to the toes and a strong palpable dorsalis pedis pulse.  Compartments are soft and compressible.  There is no calf tenderness and a negative Homans' sign.     Left lower extremity:  No cuts, open wounds, abrasions to the lower extremity.  There is diffuse lymphedema and lower extremity  swelling.  There is no warmth or erythema.  Knee range of motion is full extension to 110 degrees of flexion.  There is no significant mechanical block.  There is mild to moderate subpatellar crepitus.  There is no tenderness to palpation about the medial joint line.  There is tenderness to palpation laterally.  There is pain reproduced with varus stress at 30 degrees experienced over the lateral knee.  No pain with valgus stress.  Negative anterior posterior drawer test. Sensation is intact to light touch in deep peroneal, superficial peroneal, tibial, sural, and saphenous nerve distributions.  Motor function is intact to EHL, FHL, tibialis anterior, and gastroc.  There is brisk capillary refill to the toes and a strong palpable dorsalis pedis pulse.  Compartments are soft and compressible.  There is no calf tenderness and a negative Homans' sign.     RADIOGRAPHIC EXAM:  No new x-rays obtained today.     ASSESSMENT AND PLAN:  Bilateral knee osteoarthritis.     Risk, benefits, and alternatives to the viscosupplementation series were described.  She elected.  Therefore, each knee was sterilely prepped with Betadine over the anterolateral aspect.  Ethyl chloride spray was used to anesthetize the skin.  A 22-gauge needle was used to administer a 2 cc Gelsyn3 intra-articularly via anterolateral approach.  She tolerated this well.  Was dressed with a Band-Aid.  She was given verbal postinjection instructions.  She was advised that she could repeat the injection series in 6 months if desired.  Return to clinic if needed.      Sandip Pettit MD

## 2024-08-28 NOTE — TELEPHONE ENCOUNTER
8/28- called pt @[phone]. Pt is now scheduled 10/14/2024 audelia with [facility]. Please review med request.

## 2024-10-14 ENCOUNTER — OFFICE VISIT (OUTPATIENT)
Dept: CARDIOLOGY CLINIC | Age: 74
End: 2024-10-14
Payer: MEDICARE

## 2024-10-14 VITALS
HEART RATE: 73 BPM | HEIGHT: 63 IN | OXYGEN SATURATION: 99 % | SYSTOLIC BLOOD PRESSURE: 110 MMHG | WEIGHT: 219 LBS | BODY MASS INDEX: 38.8 KG/M2 | DIASTOLIC BLOOD PRESSURE: 62 MMHG

## 2024-10-14 DIAGNOSIS — K21.9 GASTROESOPHAGEAL REFLUX DISEASE WITHOUT ESOPHAGITIS: ICD-10-CM

## 2024-10-14 DIAGNOSIS — R01.1 HEART MURMUR: ICD-10-CM

## 2024-10-14 DIAGNOSIS — E78.2 MIXED HYPERLIPIDEMIA: ICD-10-CM

## 2024-10-14 DIAGNOSIS — I10 ESSENTIAL HYPERTENSION: ICD-10-CM

## 2024-10-14 DIAGNOSIS — I10 ESSENTIAL HYPERTENSION: Primary | ICD-10-CM

## 2024-10-14 DIAGNOSIS — F51.01 PRIMARY INSOMNIA: ICD-10-CM

## 2024-10-14 DIAGNOSIS — R06.02 SOB (SHORTNESS OF BREATH): ICD-10-CM

## 2024-10-14 DIAGNOSIS — F32.89 OTHER DEPRESSION: ICD-10-CM

## 2024-10-14 DIAGNOSIS — R06.02 SHORTNESS OF BREATH: ICD-10-CM

## 2024-10-14 DIAGNOSIS — G25.81 RLS (RESTLESS LEGS SYNDROME): ICD-10-CM

## 2024-10-14 PROCEDURE — 3078F DIAST BP <80 MM HG: CPT | Performed by: INTERNAL MEDICINE

## 2024-10-14 PROCEDURE — 1123F ACP DISCUSS/DSCN MKR DOCD: CPT | Performed by: INTERNAL MEDICINE

## 2024-10-14 PROCEDURE — 99214 OFFICE O/P EST MOD 30 MIN: CPT | Performed by: INTERNAL MEDICINE

## 2024-10-14 PROCEDURE — 3074F SYST BP LT 130 MM HG: CPT | Performed by: INTERNAL MEDICINE

## 2024-10-14 RX ORDER — QUETIAPINE 150 MG/1
TABLET, FILM COATED, EXTENDED RELEASE ORAL
Qty: 90 TABLET | Refills: 0 | Status: SHIPPED | OUTPATIENT
Start: 2024-10-14

## 2024-10-14 RX ORDER — OMEPRAZOLE 40 MG/1
CAPSULE, DELAYED RELEASE ORAL
Qty: 90 CAPSULE | Refills: 1 | Status: SHIPPED | OUTPATIENT
Start: 2024-10-14

## 2024-10-14 RX ORDER — VERAPAMIL HYDROCHLORIDE 180 MG/1
CAPSULE, EXTENDED RELEASE ORAL
Qty: 180 CAPSULE | Refills: 1 | Status: SHIPPED | OUTPATIENT
Start: 2024-10-14

## 2024-10-14 RX ORDER — TORSEMIDE 20 MG/1
40 TABLET ORAL DAILY
Qty: 90 TABLET | Refills: 0
Start: 2024-10-14

## 2024-10-14 RX ORDER — ROPINIROLE 1 MG/1
TABLET, FILM COATED ORAL
Qty: 270 TABLET | Refills: 1 | Status: SHIPPED | OUTPATIENT
Start: 2024-10-14

## 2024-10-14 RX ORDER — TRAZODONE HYDROCHLORIDE 150 MG/1
TABLET ORAL
Qty: 90 TABLET | Refills: 1 | Status: SHIPPED | OUTPATIENT
Start: 2024-10-14

## 2024-10-14 NOTE — PATIENT INSTRUCTIONS
Plan:  ~Echocardiogram    ~Call [facility] scheduling at [phone] to schedule testing    ~Take 2 torsemide at the same time in the mornings- 20 mg daily    ~Labs in one week- BMP     Cardiac medications reviewed including indications and pertinent side effects. Medication list updated at this visit.   Patient verbalizes understanding of the need for treatment and education has been provided at today's visit. Additional education material will be provided in after visit summary.    Check blood pressure and heart rate at home a few times per week- keep a log with dates and times and bring to office visit   Regular exercise and following a healthy diet encouraged   Follow up with me in 1 year

## 2024-10-14 NOTE — TELEPHONE ENCOUNTER
Refill Request     CONFIRM preferred pharmacy with the patient.    If Mail Order Rx - Pend for 90 day refill.      Last Seen: Last Seen Department: 8/26/2024    Last Seen by PCP: 8/26/2024    Last Written:     If no future appointment scheduled:  Review the last OV with PCP and review information for follow-up visit,  Route STAFF MESSAGE with patient name to the  Pool for scheduling with the following information:            -  Timing of next visit           -  Visit type ie Physical, OV, etc           -  Diagnoses/Reason ie. COPD, HTN - Do not use MEDICATION, Follow-up or CHECK UP - Give reason for visit      Next Appointment: 2/26/25  Future Appointments   Date Time Provider Department Center   2/26/2025  1:00 PM Mauri Bunch, DO [facility] DEP       Message sent to  to schedule appt with patient?  NO      Requested Prescriptions     Pending Prescriptions Disp Refills    verapamil (VERELAN) 180 MG extended release capsule [Pharmacy Med Name: VERAPAMIL  MG CAPSULE] 180 capsule 1     Sig: TAKE 2 CAPSULES BY MOUTH AT BEDTIME    QUEtiapine (SEROQUEL XR) 150 MG TB24 extended release tablet [Pharmacy Med Name: QUETIAPINE  MG TABLET] 90 tablet 0     Sig: TAKE 1 TABLET BY MOUTH EVERY DAY    rOPINIRole (REQUIP) 1 MG tablet [Pharmacy Med Name: ROPINIROLE HCL 1 MG TABLET] 270 tablet 1     Sig: TAKE 1 TABLET BY MOUTH THREE TIMES A DAY    omeprazole (PRILOSEC) 40 MG delayed release capsule [Pharmacy Med Name: OMEPRAZOLE DR 40 MG CAPSULE] 90 capsule 1     Sig: TAKE 1 CAPSULE BY MOUTH EVERY DAY    traZODone (DESYREL) 150 MG tablet [Pharmacy Med Name: TRAZODONE 150 MG TABLET] 90 tablet 1     Sig: TAKE 1 TABLET BY MOUTH EVERY DAY AT NIGHT

## 2024-10-14 NOTE — PROGRESS NOTES
exercise and following a healthy diet encouraged   Follow up with me in 1 year       Scribe's attestation:  This note was scribed in the presence of Dr. Yahir Frederick M.D. By Raysa Monge RN    The scribe’s documentation has been prepared under my direction and personally reviewed by me in its entirety.  I confirm that the note above accurately reflects all work, treatment, procedures, and medical decision making performed by me.    Dr. Yahir Frederick MD      Thank you for allowing me to participate in the care of this individual.      Kvng Frederick M.D., [facility], [facility]

## 2024-10-16 RX ORDER — METOPROLOL SUCCINATE 25 MG/1
25 TABLET, EXTENDED RELEASE ORAL DAILY
Qty: 90 TABLET | Refills: 1 | Status: SHIPPED | OUTPATIENT
Start: 2024-10-16

## 2024-12-18 ENCOUNTER — HOSPITAL ENCOUNTER (INPATIENT)
Age: 74
LOS: 7 days | Discharge: NURSING, REHAB OR LONG-TERM CARE | DRG: 312 | End: 2024-12-26
Attending: EMERGENCY MEDICINE | Admitting: FAMILY MEDICINE
Payer: MEDICARE

## 2024-12-18 ENCOUNTER — APPOINTMENT (OUTPATIENT)
Dept: CT IMAGING | Age: 74
DRG: 312 | End: 2024-12-18
Payer: MEDICARE

## 2024-12-18 DIAGNOSIS — R42 DIZZINESS: ICD-10-CM

## 2024-12-18 DIAGNOSIS — R79.89 ELEVATED D-DIMER: ICD-10-CM

## 2024-12-18 DIAGNOSIS — N18.9 ACUTE KIDNEY INJURY SUPERIMPOSED ON CKD (HCC): ICD-10-CM

## 2024-12-18 DIAGNOSIS — R51.9 NONINTRACTABLE HEADACHE, UNSPECIFIED CHRONICITY PATTERN, UNSPECIFIED HEADACHE TYPE: ICD-10-CM

## 2024-12-18 DIAGNOSIS — N17.9 ACUTE KIDNEY INJURY SUPERIMPOSED ON CKD (HCC): ICD-10-CM

## 2024-12-18 DIAGNOSIS — Z86.711 HISTORY OF PULMONARY EMBOLISM: ICD-10-CM

## 2024-12-18 DIAGNOSIS — R55 SYNCOPE AND COLLAPSE: Primary | ICD-10-CM

## 2024-12-18 LAB
ALBUMIN SERPL-MCNC: 3.7 G/DL (ref 3.4–5)
ALBUMIN/GLOB SERPL: 1.5 {RATIO} (ref 1.1–2.2)
ALP SERPL-CCNC: 100 U/L (ref 40–129)
ALT SERPL-CCNC: 15 U/L (ref 10–40)
ANION GAP SERPL CALCULATED.3IONS-SCNC: 16 MMOL/L (ref 3–16)
AST SERPL-CCNC: 18 U/L (ref 15–37)
BACTERIA URNS QL MICRO: ABNORMAL /HPF
BASE EXCESS BLDV CALC-SCNC: 1.2 MMOL/L (ref -3–3)
BASOPHILS # BLD: 0.1 K/UL (ref 0–0.2)
BASOPHILS NFR BLD: 0.6 %
BILIRUB SERPL-MCNC: 0.5 MG/DL (ref 0–1)
BILIRUB UR QL STRIP.AUTO: NEGATIVE
BUN SERPL-MCNC: 32 MG/DL (ref 7–20)
CALCIUM SERPL-MCNC: 9.3 MG/DL (ref 8.3–10.6)
CHLORIDE SERPL-SCNC: 99 MMOL/L (ref 99–110)
CLARITY UR: CLEAR
CO2 BLDV-SCNC: 24 MMOL/L
CO2 SERPL-SCNC: 20 MMOL/L (ref 21–32)
COHGB MFR BLDV: 2.1 % (ref 0–1.5)
COLOR UR: YELLOW
CREAT SERPL-MCNC: 2 MG/DL (ref 0.6–1.2)
D-DIMER QUANTITATIVE: 3.08 UG/ML FEU (ref 0–0.6)
DEPRECATED RDW RBC AUTO: 13.7 % (ref 12.4–15.4)
EOSINOPHIL # BLD: 0.1 K/UL (ref 0–0.6)
EOSINOPHIL NFR BLD: 0.8 %
EPI CELLS #/AREA URNS HPF: ABNORMAL /HPF (ref 0–5)
FLUAV RNA RESP QL NAA+PROBE: NOT DETECTED
FLUBV RNA RESP QL NAA+PROBE: NOT DETECTED
GFR SERPLBLD CREATININE-BSD FMLA CKD-EPI: 25 ML/MIN/{1.73_M2}
GLUCOSE SERPL-MCNC: 128 MG/DL (ref 70–99)
GLUCOSE UR STRIP.AUTO-MCNC: NEGATIVE MG/DL
HCO3 BLDV-SCNC: 22.8 MMOL/L (ref 23–29)
HCT VFR BLD AUTO: 33.9 % (ref 36–48)
HGB BLD-MCNC: 11.3 G/DL (ref 12–16)
HGB UR QL STRIP.AUTO: NEGATIVE
KETONES UR STRIP.AUTO-MCNC: NEGATIVE MG/DL
LEUKOCYTE ESTERASE UR QL STRIP.AUTO: ABNORMAL
LYMPHOCYTES # BLD: 3.1 K/UL (ref 1–5.1)
LYMPHOCYTES NFR BLD: 30.8 %
MCH RBC QN AUTO: 29.2 PG (ref 26–34)
MCHC RBC AUTO-ENTMCNC: 33.3 G/DL (ref 31–36)
MCV RBC AUTO: 87.6 FL (ref 80–100)
METHGB MFR BLDV: 0.2 %
MONOCYTES # BLD: 0.7 K/UL (ref 0–1.3)
MONOCYTES NFR BLD: 6.8 %
NEUTROPHILS # BLD: 6.2 K/UL (ref 1.7–7.7)
NEUTROPHILS NFR BLD: 61 %
NITRITE UR QL STRIP.AUTO: NEGATIVE
O2 THERAPY: ABNORMAL
PCO2 BLDV: 27.1 MMHG (ref 40–50)
PH BLDV: 7.54 [PH] (ref 7.35–7.45)
PH UR STRIP.AUTO: 6 [PH] (ref 5–8)
PLATELET # BLD AUTO: 252 K/UL (ref 135–450)
PMV BLD AUTO: 9.5 FL (ref 5–10.5)
PO2 BLDV: 42.9 MMHG (ref 25–40)
POTASSIUM SERPL-SCNC: 3.6 MMOL/L (ref 3.5–5.1)
PROT SERPL-MCNC: 6.2 G/DL (ref 6.4–8.2)
PROT UR STRIP.AUTO-MCNC: NEGATIVE MG/DL
RBC # BLD AUTO: 3.88 M/UL (ref 4–5.2)
RBC #/AREA URNS HPF: ABNORMAL /HPF (ref 0–4)
SAO2 % BLDV: 85 %
SARS-COV-2 RNA RESP QL NAA+PROBE: NOT DETECTED
SODIUM SERPL-SCNC: 135 MMOL/L (ref 136–145)
SP GR UR STRIP.AUTO: 1.01 (ref 1–1.03)
TROPONIN, HIGH SENSITIVITY: 14 NG/L (ref 0–14)
TROPONIN, HIGH SENSITIVITY: 15 NG/L (ref 0–14)
UA COMPLETE W REFLEX CULTURE PNL UR: ABNORMAL
UA DIPSTICK W REFLEX MICRO PNL UR: YES
URN SPEC COLLECT METH UR: ABNORMAL
UROBILINOGEN UR STRIP-ACNC: 0.2 E.U./DL
WBC # BLD AUTO: 10.1 K/UL (ref 4–11)
WBC #/AREA URNS HPF: ABNORMAL /HPF (ref 0–5)

## 2024-12-18 PROCEDURE — 36415 COLL VENOUS BLD VENIPUNCTURE: CPT

## 2024-12-18 PROCEDURE — 80053 COMPREHEN METABOLIC PANEL: CPT

## 2024-12-18 PROCEDURE — 82803 BLOOD GASES ANY COMBINATION: CPT

## 2024-12-18 PROCEDURE — 96375 TX/PRO/DX INJ NEW DRUG ADDON: CPT

## 2024-12-18 PROCEDURE — 87636 SARSCOV2 & INF A&B AMP PRB: CPT

## 2024-12-18 PROCEDURE — 99285 EMERGENCY DEPT VISIT HI MDM: CPT

## 2024-12-18 PROCEDURE — 96374 THER/PROPH/DIAG INJ IV PUSH: CPT

## 2024-12-18 PROCEDURE — 84484 ASSAY OF TROPONIN QUANT: CPT

## 2024-12-18 PROCEDURE — 2580000003 HC RX 258: Performed by: EMERGENCY MEDICINE

## 2024-12-18 PROCEDURE — 6370000000 HC RX 637 (ALT 250 FOR IP): Performed by: EMERGENCY MEDICINE

## 2024-12-18 PROCEDURE — 85379 FIBRIN DEGRADATION QUANT: CPT

## 2024-12-18 PROCEDURE — 70450 CT HEAD/BRAIN W/O DYE: CPT

## 2024-12-18 PROCEDURE — 93005 ELECTROCARDIOGRAM TRACING: CPT | Performed by: EMERGENCY MEDICINE

## 2024-12-18 PROCEDURE — 81001 URINALYSIS AUTO W/SCOPE: CPT

## 2024-12-18 PROCEDURE — 85025 COMPLETE CBC W/AUTO DIFF WBC: CPT

## 2024-12-18 PROCEDURE — 6360000002 HC RX W HCPCS: Performed by: EMERGENCY MEDICINE

## 2024-12-18 RX ORDER — TORSEMIDE 20 MG/1
40 TABLET ORAL DAILY
Status: CANCELLED | OUTPATIENT
Start: 2024-12-19

## 2024-12-18 RX ORDER — BUTALBITAL, ACETAMINOPHEN AND CAFFEINE 50; 325; 40 MG/1; MG/1; MG/1
1 TABLET ORAL ONCE
Status: COMPLETED | OUTPATIENT
Start: 2024-12-18 | End: 2024-12-18

## 2024-12-18 RX ORDER — FENTANYL CITRATE 50 UG/ML
50 INJECTION, SOLUTION INTRAMUSCULAR; INTRAVENOUS ONCE
Status: COMPLETED | OUTPATIENT
Start: 2024-12-18 | End: 2024-12-18

## 2024-12-18 RX ORDER — DIPHENHYDRAMINE HYDROCHLORIDE 50 MG/ML
50 INJECTION INTRAMUSCULAR; INTRAVENOUS ONCE
Status: COMPLETED | OUTPATIENT
Start: 2024-12-18 | End: 2024-12-18

## 2024-12-18 RX ORDER — 0.9 % SODIUM CHLORIDE 0.9 %
1000 INTRAVENOUS SOLUTION INTRAVENOUS ONCE
Status: COMPLETED | OUTPATIENT
Start: 2024-12-18 | End: 2024-12-18

## 2024-12-18 RX ORDER — METOCLOPRAMIDE HYDROCHLORIDE 5 MG/ML
10 INJECTION INTRAMUSCULAR; INTRAVENOUS ONCE
Status: COMPLETED | OUTPATIENT
Start: 2024-12-18 | End: 2024-12-18

## 2024-12-18 RX ADMIN — METOCLOPRAMIDE 10 MG: 5 INJECTION, SOLUTION INTRAMUSCULAR; INTRAVENOUS at 22:12

## 2024-12-18 RX ADMIN — SODIUM CHLORIDE 1000 ML: 9 INJECTION, SOLUTION INTRAVENOUS at 21:51

## 2024-12-18 RX ADMIN — FENTANYL CITRATE 50 MCG: 50 INJECTION INTRAMUSCULAR; INTRAVENOUS at 21:08

## 2024-12-18 RX ADMIN — DIPHENHYDRAMINE HYDROCHLORIDE 50 MG: 50 INJECTION INTRAMUSCULAR; INTRAVENOUS at 22:09

## 2024-12-18 RX ADMIN — BUTALBITAL, ACETAMINOPHEN AND CAFFEINE 1 TABLET: 325; 50; 40 TABLET ORAL at 22:09

## 2024-12-18 ASSESSMENT — PAIN SCALES - GENERAL
PAINLEVEL_OUTOF10: 3
PAINLEVEL_OUTOF10: 10
PAINLEVEL_OUTOF10: 8

## 2024-12-19 ENCOUNTER — APPOINTMENT (OUTPATIENT)
Age: 74
DRG: 312 | End: 2024-12-19
Payer: MEDICARE

## 2024-12-19 ENCOUNTER — APPOINTMENT (OUTPATIENT)
Dept: GENERAL RADIOLOGY | Age: 74
DRG: 312 | End: 2024-12-19
Payer: MEDICARE

## 2024-12-19 ENCOUNTER — APPOINTMENT (OUTPATIENT)
Dept: NUCLEAR MEDICINE | Age: 74
DRG: 312 | End: 2024-12-19
Payer: MEDICARE

## 2024-12-19 PROBLEM — R55 SYNCOPE AND COLLAPSE: Status: ACTIVE | Noted: 2024-12-19

## 2024-12-19 LAB
ECHO AO ROOT DIAM: 3.3 CM
ECHO AO ROOT INDEX: 1.68 CM/M2
ECHO AV AREA PEAK VELOCITY: 2.9 CM2
ECHO AV AREA VTI: 2.8 CM2
ECHO AV AREA/BSA PEAK VELOCITY: 1.5 CM2/M2
ECHO AV AREA/BSA VTI: 1.4 CM2/M2
ECHO AV CUSP MM: 1.9 CM
ECHO AV MEAN GRADIENT: 6 MMHG
ECHO AV MEAN VELOCITY: 1.1 M/S
ECHO AV PEAK GRADIENT: 10 MMHG
ECHO AV PEAK VELOCITY: 1.6 M/S
ECHO AV VELOCITY RATIO: 0.94
ECHO AV VTI: 33.7 CM
ECHO BSA: 2.05 M2
ECHO LA AREA 2C: 24.2 CM2
ECHO LA AREA 4C: 23.4 CM2
ECHO LA MAJOR AXIS: 6.8 CM
ECHO LA MINOR AXIS: 6.4 CM
ECHO LA VOL BP: 75 ML (ref 22–52)
ECHO LA VOL MOD A2C: 76 ML (ref 22–52)
ECHO LA VOL MOD A4C: 70 ML (ref 22–52)
ECHO LA VOL/BSA BIPLANE: 38 ML/M2 (ref 16–34)
ECHO LA VOLUME INDEX MOD A2C: 39 ML/M2 (ref 16–34)
ECHO LA VOLUME INDEX MOD A4C: 36 ML/M2 (ref 16–34)
ECHO LV E' LATERAL VELOCITY: 10.2 CM/S
ECHO LV E' SEPTAL VELOCITY: 6.74 CM/S
ECHO LV EDV 3D: 164 ML
ECHO LV EDV A2C: 83 ML
ECHO LV EDV A4C: 100 ML
ECHO LV EDV INDEX 3D: 83 ML/M2
ECHO LV EDV INDEX A4C: 51 ML/M2
ECHO LV EDV NDEX A2C: 42 ML/M2
ECHO LV EF PHYSICIAN: 63 %
ECHO LV EJECTION FRACTION 3D: 63 %
ECHO LV EJECTION FRACTION A2C: 65 %
ECHO LV EJECTION FRACTION A4C: 63 %
ECHO LV EJECTION FRACTION BIPLANE: 65 % (ref 55–100)
ECHO LV ESV 3D: 61 ML
ECHO LV ESV A2C: 29 ML
ECHO LV ESV A4C: 37 ML
ECHO LV ESV INDEX 3D: 31 ML/M2
ECHO LV ESV INDEX A2C: 15 ML/M2
ECHO LV ESV INDEX A4C: 19 ML/M2
ECHO LV FRACTIONAL SHORTENING: 40 % (ref 28–44)
ECHO LV GLOBAL LONGITUDINAL STRAIN (GLS): -21.9 %
ECHO LV GLOBAL LONGITUDINAL STRAIN (GLS): -24.3 %
ECHO LV GLOBAL LONGITUDINAL STRAIN (GLS): -24.5 %
ECHO LV GLOBAL LONGITUDINAL STRAIN (GLS): -26.4 %
ECHO LV INTERNAL DIMENSION DIASTOLE INDEX: 2.39 CM/M2
ECHO LV INTERNAL DIMENSION DIASTOLIC: 4.7 CM (ref 3.9–5.3)
ECHO LV INTERNAL DIMENSION SYSTOLIC INDEX: 1.42 CM/M2
ECHO LV INTERNAL DIMENSION SYSTOLIC: 2.8 CM
ECHO LV IVSD: 1.2 CM (ref 0.6–0.9)
ECHO LV MASS 2D: 175.8 G (ref 67–162)
ECHO LV MASS 3D INDEX: 82.2 G/M2
ECHO LV MASS 3D: 162 G
ECHO LV MASS INDEX 2D: 89.3 G/M2 (ref 43–95)
ECHO LV POSTERIOR WALL DIASTOLIC: 0.9 CM (ref 0.6–0.9)
ECHO LV RELATIVE WALL THICKNESS RATIO: 0.38
ECHO LVOT AREA: 3.1 CM2
ECHO LVOT AV VTI INDEX: 0.91
ECHO LVOT DIAM: 2 CM
ECHO LVOT MEAN GRADIENT: 4 MMHG
ECHO LVOT PEAK GRADIENT: 9 MMHG
ECHO LVOT PEAK VELOCITY: 1.5 M/S
ECHO LVOT STROKE VOLUME INDEX: 48.6 ML/M2
ECHO LVOT SV: 95.8 ML
ECHO LVOT VTI: 30.5 CM
ECHO MV A VELOCITY: 1.32 M/S
ECHO MV E DECELERATION TIME (DT): 258 MS
ECHO MV E VELOCITY: 1.12 M/S
ECHO MV E/A RATIO: 0.85
ECHO MV E/E' LATERAL: 10.98
ECHO MV E/E' RATIO (AVERAGED): 13.8
ECHO MV E/E' SEPTAL: 16.62
ECHO PV MAX VELOCITY: 1 M/S
ECHO PV PEAK GRADIENT: 4 MMHG
ECHO RA AREA 4C: 14.6 CM2
ECHO RA END SYSTOLIC VOLUME APICAL 4 CHAMBER INDEX BSA: 19 ML/M2
ECHO RA VOLUME: 38 ML
ECHO RV BASAL DIMENSION: 3.4 CM
ECHO RV FREE WALL PEAK S': 12.9 CM/S
ECHO RV MID DIMENSION: 2.8 CM
ECHO RV TAPSE: 1.6 CM (ref 1.7–?)
EKG ATRIAL RATE: 83 BPM
EKG DIAGNOSIS: NORMAL
EKG P AXIS: 31 DEGREES
EKG P-R INTERVAL: 178 MS
EKG Q-T INTERVAL: 418 MS
EKG QRS DURATION: 72 MS
EKG QTC CALCULATION (BAZETT): 491 MS
EKG R AXIS: 14 DEGREES
EKG T AXIS: 5 DEGREES
EKG VENTRICULAR RATE: 83 BPM

## 2024-12-19 PROCEDURE — 6370000000 HC RX 637 (ALT 250 FOR IP)

## 2024-12-19 PROCEDURE — 1200000000 HC SEMI PRIVATE

## 2024-12-19 PROCEDURE — 2500000003 HC RX 250 WO HCPCS

## 2024-12-19 PROCEDURE — 71045 X-RAY EXAM CHEST 1 VIEW: CPT

## 2024-12-19 PROCEDURE — A9540 TC99M MAA: HCPCS

## 2024-12-19 PROCEDURE — 3430000000 HC RX DIAGNOSTIC RADIOPHARMACEUTICAL

## 2024-12-19 PROCEDURE — A9558 XE133 XENON 10MCI: HCPCS

## 2024-12-19 PROCEDURE — 76376 3D RENDER W/INTRP POSTPROCES: CPT | Performed by: INTERNAL MEDICINE

## 2024-12-19 PROCEDURE — 93306 TTE W/DOPPLER COMPLETE: CPT | Performed by: INTERNAL MEDICINE

## 2024-12-19 PROCEDURE — 78582 LUNG VENTILAT&PERFUS IMAGING: CPT

## 2024-12-19 PROCEDURE — 2580000003 HC RX 258: Performed by: INTERNAL MEDICINE

## 2024-12-19 PROCEDURE — 6370000000 HC RX 637 (ALT 250 FOR IP): Performed by: NURSE PRACTITIONER

## 2024-12-19 PROCEDURE — 93356 MYOCRD STRAIN IMG SPCKL TRCK: CPT | Performed by: INTERNAL MEDICINE

## 2024-12-19 PROCEDURE — 6360000002 HC RX W HCPCS

## 2024-12-19 PROCEDURE — 93010 ELECTROCARDIOGRAM REPORT: CPT | Performed by: INTERNAL MEDICINE

## 2024-12-19 PROCEDURE — 93306 TTE W/DOPPLER COMPLETE: CPT

## 2024-12-19 RX ORDER — LISINOPRIL 2.5 MG/1
2.5 TABLET ORAL DAILY
Status: DISCONTINUED | OUTPATIENT
Start: 2024-12-19 | End: 2024-12-26 | Stop reason: HOSPADM

## 2024-12-19 RX ORDER — POLYETHYLENE GLYCOL 3350 17 G/17G
17 POWDER, FOR SOLUTION ORAL DAILY PRN
Status: DISCONTINUED | OUTPATIENT
Start: 2024-12-19 | End: 2024-12-26 | Stop reason: HOSPADM

## 2024-12-19 RX ORDER — SODIUM CHLORIDE 0.9 % (FLUSH) 0.9 %
5-40 SYRINGE (ML) INJECTION PRN
Status: DISCONTINUED | OUTPATIENT
Start: 2024-12-19 | End: 2024-12-26 | Stop reason: HOSPADM

## 2024-12-19 RX ORDER — SODIUM CHLORIDE 9 MG/ML
INJECTION, SOLUTION INTRAVENOUS PRN
Status: DISCONTINUED | OUTPATIENT
Start: 2024-12-19 | End: 2024-12-26 | Stop reason: HOSPADM

## 2024-12-19 RX ORDER — QUETIAPINE FUMARATE 50 MG/1
150 TABLET, EXTENDED RELEASE ORAL
Status: DISCONTINUED | OUTPATIENT
Start: 2024-12-19 | End: 2024-12-26 | Stop reason: HOSPADM

## 2024-12-19 RX ORDER — 0.9 % SODIUM CHLORIDE 0.9 %
500 INTRAVENOUS SOLUTION INTRAVENOUS ONCE
Status: COMPLETED | OUTPATIENT
Start: 2024-12-19 | End: 2024-12-19

## 2024-12-19 RX ORDER — SODIUM CHLORIDE 0.9 % (FLUSH) 0.9 %
5-40 SYRINGE (ML) INJECTION EVERY 12 HOURS SCHEDULED
Status: DISCONTINUED | OUTPATIENT
Start: 2024-12-19 | End: 2024-12-26 | Stop reason: HOSPADM

## 2024-12-19 RX ORDER — TRAMADOL HYDROCHLORIDE 50 MG/1
50 TABLET ORAL EVERY 6 HOURS PRN
Status: DISCONTINUED | OUTPATIENT
Start: 2024-12-19 | End: 2024-12-26 | Stop reason: HOSPADM

## 2024-12-19 RX ORDER — PANTOPRAZOLE SODIUM 40 MG/1
40 TABLET, DELAYED RELEASE ORAL
Status: DISCONTINUED | OUTPATIENT
Start: 2024-12-19 | End: 2024-12-26 | Stop reason: HOSPADM

## 2024-12-19 RX ORDER — ACETAMINOPHEN 325 MG/1
650 TABLET ORAL EVERY 6 HOURS PRN
Status: DISCONTINUED | OUTPATIENT
Start: 2024-12-19 | End: 2024-12-26 | Stop reason: HOSPADM

## 2024-12-19 RX ORDER — ROPINIROLE 0.5 MG/1
1 TABLET, FILM COATED ORAL NIGHTLY
Status: DISCONTINUED | OUTPATIENT
Start: 2024-12-19 | End: 2024-12-26 | Stop reason: HOSPADM

## 2024-12-19 RX ORDER — ACETAMINOPHEN 650 MG/1
650 SUPPOSITORY RECTAL EVERY 6 HOURS PRN
Status: DISCONTINUED | OUTPATIENT
Start: 2024-12-19 | End: 2024-12-26 | Stop reason: HOSPADM

## 2024-12-19 RX ORDER — ONDANSETRON 2 MG/ML
4 INJECTION INTRAMUSCULAR; INTRAVENOUS EVERY 6 HOURS PRN
Status: DISCONTINUED | OUTPATIENT
Start: 2024-12-19 | End: 2024-12-26 | Stop reason: HOSPADM

## 2024-12-19 RX ORDER — METOPROLOL SUCCINATE 25 MG/1
25 TABLET, EXTENDED RELEASE ORAL DAILY
Status: DISCONTINUED | OUTPATIENT
Start: 2024-12-19 | End: 2024-12-21

## 2024-12-19 RX ORDER — ONDANSETRON 4 MG/1
4 TABLET, ORALLY DISINTEGRATING ORAL EVERY 8 HOURS PRN
Status: DISCONTINUED | OUTPATIENT
Start: 2024-12-19 | End: 2024-12-26 | Stop reason: HOSPADM

## 2024-12-19 RX ORDER — ONDANSETRON 4 MG/1
4 TABLET, ORALLY DISINTEGRATING ORAL 3 TIMES DAILY PRN
Status: DISCONTINUED | OUTPATIENT
Start: 2024-12-19 | End: 2024-12-26 | Stop reason: HOSPADM

## 2024-12-19 RX ORDER — TORSEMIDE 20 MG/1
20 TABLET ORAL DAILY
Status: DISCONTINUED | OUTPATIENT
Start: 2024-12-19 | End: 2024-12-24

## 2024-12-19 RX ORDER — XENON XE-133 10 MCI/1
17.6 GAS RESPIRATORY (INHALATION)
Status: COMPLETED | OUTPATIENT
Start: 2024-12-19 | End: 2024-12-19

## 2024-12-19 RX ORDER — ENOXAPARIN SODIUM 100 MG/ML
30 INJECTION SUBCUTANEOUS DAILY
Status: DISCONTINUED | OUTPATIENT
Start: 2024-12-19 | End: 2024-12-20

## 2024-12-19 RX ORDER — VERAPAMIL HYDROCHLORIDE 180 MG/1
180 TABLET, EXTENDED RELEASE ORAL DAILY
Status: DISCONTINUED | OUTPATIENT
Start: 2024-12-19 | End: 2024-12-26 | Stop reason: HOSPADM

## 2024-12-19 RX ORDER — TRAZODONE HYDROCHLORIDE 50 MG/1
150 TABLET, FILM COATED ORAL NIGHTLY
Status: DISCONTINUED | OUTPATIENT
Start: 2024-12-19 | End: 2024-12-26 | Stop reason: HOSPADM

## 2024-12-19 RX ORDER — ATORVASTATIN CALCIUM 40 MG/1
40 TABLET, FILM COATED ORAL NIGHTLY
Status: DISCONTINUED | OUTPATIENT
Start: 2024-12-19 | End: 2024-12-26 | Stop reason: HOSPADM

## 2024-12-19 RX ADMIN — PANTOPRAZOLE SODIUM 40 MG: 40 TABLET, DELAYED RELEASE ORAL at 05:30

## 2024-12-19 RX ADMIN — SODIUM CHLORIDE 500 ML: 9 INJECTION, SOLUTION INTRAVENOUS at 13:30

## 2024-12-19 RX ADMIN — TRAZODONE HYDROCHLORIDE 150 MG: 50 TABLET ORAL at 19:48

## 2024-12-19 RX ADMIN — QUETIAPINE FUMARATE 150 MG: 50 TABLET, EXTENDED RELEASE ORAL at 02:06

## 2024-12-19 RX ADMIN — METOPROLOL SUCCINATE 25 MG: 25 TABLET, FILM COATED, EXTENDED RELEASE ORAL at 07:41

## 2024-12-19 RX ADMIN — Medication 10 ML: at 19:50

## 2024-12-19 RX ADMIN — QUETIAPINE FUMARATE 150 MG: 50 TABLET, EXTENDED RELEASE ORAL at 19:48

## 2024-12-19 RX ADMIN — POLYETHYLENE GLYCOL 3350 17 G: 17 POWDER, FOR SOLUTION ORAL at 07:46

## 2024-12-19 RX ADMIN — ATORVASTATIN CALCIUM 40 MG: 40 TABLET, FILM COATED ORAL at 19:48

## 2024-12-19 RX ADMIN — Medication 6.2 MILLICURIE: at 12:19

## 2024-12-19 RX ADMIN — LEVOTHYROXINE SODIUM 175 MCG: 0.03 TABLET ORAL at 05:30

## 2024-12-19 RX ADMIN — TRAMADOL HYDROCHLORIDE 50 MG: 50 TABLET, COATED ORAL at 22:36

## 2024-12-19 RX ADMIN — ENOXAPARIN SODIUM 30 MG: 100 INJECTION SUBCUTANEOUS at 07:40

## 2024-12-19 RX ADMIN — ROPINIROLE HYDROCHLORIDE 1 MG: 0.5 TABLET, FILM COATED ORAL at 19:48

## 2024-12-19 RX ADMIN — VERAPAMIL HYDROCHLORIDE 180 MG: 180 TABLET, FILM COATED, EXTENDED RELEASE ORAL at 07:41

## 2024-12-19 RX ADMIN — TRAMADOL HYDROCHLORIDE 50 MG: 50 TABLET, COATED ORAL at 02:49

## 2024-12-19 RX ADMIN — TRAMADOL HYDROCHLORIDE 50 MG: 50 TABLET, COATED ORAL at 17:31

## 2024-12-19 RX ADMIN — XENON XE-133 17.6 MILLICURIE: 10 GAS RESPIRATORY (INHALATION) at 12:19

## 2024-12-19 RX ADMIN — TRAMADOL HYDROCHLORIDE 50 MG: 50 TABLET, COATED ORAL at 09:46

## 2024-12-19 ASSESSMENT — PAIN DESCRIPTION - DESCRIPTORS
DESCRIPTORS: ACHING
DESCRIPTORS: ACHING

## 2024-12-19 ASSESSMENT — PAIN DESCRIPTION - ONSET
ONSET: ON-GOING
ONSET: ON-GOING

## 2024-12-19 ASSESSMENT — PAIN SCALES - GENERAL
PAINLEVEL_OUTOF10: 6
PAINLEVEL_OUTOF10: 5
PAINLEVEL_OUTOF10: 7
PAINLEVEL_OUTOF10: 4
PAINLEVEL_OUTOF10: 5
PAINLEVEL_OUTOF10: 8
PAINLEVEL_OUTOF10: 7
PAINLEVEL_OUTOF10: 5
PAINLEVEL_OUTOF10: 0

## 2024-12-19 ASSESSMENT — PAIN DESCRIPTION - LOCATION
LOCATION: HEAD
LOCATION: HEAD

## 2024-12-19 ASSESSMENT — PAIN DESCRIPTION - PAIN TYPE
TYPE: ACUTE PAIN
TYPE: ACUTE PAIN

## 2024-12-19 ASSESSMENT — PAIN - FUNCTIONAL ASSESSMENT
PAIN_FUNCTIONAL_ASSESSMENT: ACTIVITIES ARE NOT PREVENTED
PAIN_FUNCTIONAL_ASSESSMENT: ACTIVITIES ARE NOT PREVENTED

## 2024-12-19 ASSESSMENT — PAIN DESCRIPTION - FREQUENCY
FREQUENCY: CONTINUOUS
FREQUENCY: CONTINUOUS

## 2024-12-19 ASSESSMENT — PAIN DESCRIPTION - ORIENTATION
ORIENTATION: POSTERIOR
ORIENTATION: POSTERIOR

## 2024-12-19 NOTE — H&P
Hospital Medicine History & Physical      Date of Admission: 12/19/2024    Date of Service:  Pt seen/examined on 12/19/2024    [x]Admitted to Inpatient with expected LOS greater than two midnights due to medical therapy.  []Placed in Observation status.    Chief Admission Complaint: Syncope    Presenting Admission History:    Imani Monzon is a 74 y.o. female with pmh of hypertension, CKD stage III, bipolar, RLS, hypothyroidism, lower extremity edema, history of PE who presents with syncope.  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  Patient had an episode of syncope when walking towards the sink.  Patient did not have any prodromal symptoms and does not remember how she ended up falling.  Patient hit her head.  Patient was endorsing dizziness.     In the ED patient was found to have a elevated creatinine of 2.0 previous 1.4.  Patient CT head was negative troponin was negative and EKG was sinus rhythm without any acute changes.  Patient was given 1 L of normal saline.  Patient did have elevated D-dimer of 3.08.  Patient denies any chest pain or shortness of breath.  Patient is UA showed 3+ bacteria with WBCs.     Patient examined at bedside in no acute distress.  Patient denies any chest pain, shortness of breath, vomiting, and or dizziness.  Patient is no longer dizzy.  Patient denies any urinary symptoms.      Assessment/Plan:    Current Principal Problem:  Syncope and collapse    Syncope  Likely secondary to dehydration and MAUREEN.  Will evaluate for cardiogenic causes with echo  Continue telemetry  Last echo in 2022 showed EF of 55%  EKG without arrhythmia or acute ischemic abnormalities.  D-dimer was elevated but denies any chest pain shortness of breath O2 sats normal, no EKG changes, no tachypnea  V/Q scan ordered     MAUREEN on CKD3b  Creatinine today at 2.0 previous 1.4  1 L of normal saline given in the ED  Continue to monitor     Hypertension  Patient

## 2024-12-19 NOTE — CARE COORDINATION
Case Management Assessment  Initial Evaluation    Date/Time of Evaluation: 12/19/2024 1:28 PM  Assessment Completed by: Lyn Steele RN    If patient is discharged prior to next notation, then this note serves as note for discharge by case management.    Patient Name: Imani Monzon                   YOB: 1950  Diagnosis: Syncope and collapse [R55]  Dizziness [R42]  History of pulmonary embolism [Z86.711]  Elevated d-dimer [R79.89]  Nonintractable headache, unspecified chronicity pattern, unspecified headache type [R51.9]  Acute kidney injury superimposed on CKD (HCC) [N17.9, N18.9]                   Date / Time: 12/18/2024  7:52 PM    Patient Admission Status: Inpatient   Readmission Risk (Low < 19, Mod (19-27), High > 27): Readmission Risk Score: 12.8    Current PCP: Mauri Bunch, DO  PCP verified by CM? Yes    Chart Reviewed: Yes      History Provided by: Patient, Medical Record  Patient Orientation: Alert and Oriented    Patient Cognition: Alert    Hospitalization in the last 30 days (Readmission):  No    If yes, Readmission Assessment in CM Navigator will be completed.    Advance Directives:      Code Status: Full Code   Patient's Primary Decision Maker is: Legal Next of Kin    Primary Decision Maker: Sparkle Zhang - Child - [phone]    Discharge Planning:    Patient lives with: Alone Type of Home: Apartment  Primary Care Giver: Self  Patient Support Systems include: Children   Current Financial resources: Medicare  Current community resources: None  Current services prior to admission: None            Current DME:              Type of Home Care services:  None    ADLS  Prior functional level: Independent in ADLs/IADLs  Current functional level: Independent in ADLs/IADLs    PT AM-PAC:   /24  OT AM-PAC:   /24    Family can provide assistance at DC: Yes  Would you like Case Management to discuss the discharge plan with any other family members/significant others, and if so, who? Yes

## 2024-12-19 NOTE — FLOWSHEET NOTE
12/19/24 1430 12/19/24 1432 12/19/24 1433   Vital Signs   Pulse 66 78 79   Heart Rate Source Monitor Monitor Monitor   Respirations 18 18 17   BP (!) 97/57 112/65 (!) 121/57   MAP (Calculated) 70 81 78   BP Location Right upper arm Right upper arm Right upper arm   BP Method Automatic Automatic Automatic   Patient Position Supine Sitting Standing     Orthos negative

## 2024-12-19 NOTE — ED PROVIDER NOTES
function.   The right ventricle is mildly enlarged.   Aortic valve appears mildly sclerotic but opens adequately.   Mild tricuspid and pulmonic regurgitation.   Systolic pulmonary artery pressure (SPAP) is normal estimated at 33 mmHg   (Right atrial pressure of 8 mmHg).       Nursing Notes were all reviewed and agreed with or any disagreements were addressed in the HPI.      MEDICAL HISTORY  Past Medical History:   Diagnosis Date    Arthritis     Bipolar disorder (HCC)     questionable dx    Cellulitis     L ankle, recurrent; over area of prior surgeries    Chronic back pain     Closed fracture of left fibula 1995    s/p fall; surgery x 6    Closed fracture of left tibia 1995    s/p fall on ice; hx of surgery x 6    Colon polyps     Depression     Fibroid (bleeding) (uterine) 1982    Fibromyalgia     GERD (gastroesophageal reflux disease)     Hyperlipidemia     Hypertension     Myoview Lexiscan WNL on 5/17/12    Hypothyroidism     Influenza A 01/31/2020    Migraine     Miscarriage     Morbid obesity     Neuropathy 2011    seen initially by neuro., Dr. Sommers, on 8/11/10; EMG 7/16/10 showed abn.'s c/w L5 radiculopathy & periph. neuropathy; pt. reports sx in hands and feet ; dx'd by rheum.     MAXIMO (obstructive sleep apnea) 06/07/2012    mild-does not require appliance    RLS (restless legs syndrome)     Urinary incontinence     Urine incontinence     saw urology, Dr. Verdin, on 7/21/11 for microscopic hematuria & hematuria; was started on Vesicare & scheduled for cystoscopy    Vitamin D deficiency 2/27/12    22 ng/mL        SURGICAL HISTORY  Past Surgical History:   Procedure Laterality Date    BARIATRIC SURGERY  11/14/12    Laparoscopic Sleeve Gastrectomy, Lap Hiatal hernia repair    CHOLECYSTECTOMY  1985    COLONOSCOPY  2010     colon polyps; Dr. Ferrell    EYE SURGERY Left 08/31/2018    phaco with IOL    HIATAL HERNIA REPAIR      lap    HYSTERECTOMY (CERVIX STATUS UNKNOWN)  1982    TAHBSO for fibroids and DUB    LEG

## 2024-12-19 NOTE — ED NOTES
Imani Monzon is a 74 y.o. female admitted for  Principal Problem:    Syncope and collapse  Resolved Problems:    * No resolved hospital problems. *  .   Patient Home via EMS transportation with   Chief Complaint   Patient presents with    Loss of Consciousness     Patient had dizziness and syncopal episode at work. Hit back of head. Patient complains of nausea and dizziness. Alert and oriented x4 at this time. Not on thinners. Patient had previous stroke no deficits.    .  Patient is alert and Person, Place, Time, and Situation  Patient's baseline mobility: Baseline Mobility: Independent   Code Status: Prior   Cardiac Rhythm:       Is patient on baseline Oxygen: no how many Liters:   Abnormal Assessment Findings: Dizziness with movement     Isolation:       NIH Score:    C-SSRS: Risk of Suicide: No Risk  Bedside swallow:        Active LDA's:   Peripheral IV 12/18/24 Left Antecubital (Active)           Family/Caregiver Present no Any Concerns: no   Restraints no  Sitter no         Vitals: MEWS Score: 1    Vitals:    12/19/24 0004 12/19/24 0021 12/19/24 0034 12/19/24 0050   BP: 112/62 (!) 117/41 107/61 117/67   Pulse: 84 82 86 80   Resp: 16 17 15 16   Temp:       TempSrc:       SpO2: 97% 94% 97% 96%   Weight:       Height:           Last documented pain score (0-10 scale) Pain Level: 3  Pain medication administered Yes- see MAR.    Pertinent or High Risk Medications/Drips: No.    Pending Blood Product Administration: no    Abnormal labs:   Abnormal Labs Reviewed   CBC WITH AUTO DIFFERENTIAL - Abnormal; Notable for the following components:       Result Value    RBC 3.88 (*)     Hemoglobin 11.3 (*)     Hematocrit 33.9 (*)     All other components within normal limits   COMPREHENSIVE METABOLIC PANEL W/ REFLEX TO MG FOR LOW K - Abnormal; Notable for the following components:    Sodium 135 (*)     CO2 20 (*)     Glucose 128 (*)     BUN 32 (*)     Creatinine 2.0 (*)     Est, Glom Filt Rate 25 (*)     Total Protein 6.2

## 2024-12-20 DIAGNOSIS — F32.89 OTHER DEPRESSION: ICD-10-CM

## 2024-12-20 LAB
ALBUMIN SERPL-MCNC: 2.9 G/DL (ref 3.4–5)
ALBUMIN/GLOB SERPL: 1.5 {RATIO} (ref 1.1–2.2)
ALP SERPL-CCNC: 73 U/L (ref 40–129)
ALT SERPL-CCNC: 12 U/L (ref 10–40)
ANION GAP SERPL CALCULATED.3IONS-SCNC: 5 MMOL/L (ref 3–16)
AST SERPL-CCNC: 17 U/L (ref 15–37)
BASOPHILS # BLD: 0 K/UL (ref 0–0.2)
BASOPHILS NFR BLD: 0.3 %
BILIRUB SERPL-MCNC: 0.4 MG/DL (ref 0–1)
BUN SERPL-MCNC: 22 MG/DL (ref 7–20)
CALCIUM SERPL-MCNC: 8.4 MG/DL (ref 8.3–10.6)
CHLORIDE SERPL-SCNC: 109 MMOL/L (ref 99–110)
CO2 SERPL-SCNC: 28 MMOL/L (ref 21–32)
CREAT SERPL-MCNC: 1.4 MG/DL (ref 0.6–1.2)
DEPRECATED RDW RBC AUTO: 13.9 % (ref 12.4–15.4)
EOSINOPHIL # BLD: 0.2 K/UL (ref 0–0.6)
EOSINOPHIL NFR BLD: 3.8 %
GFR SERPLBLD CREATININE-BSD FMLA CKD-EPI: 39 ML/MIN/{1.73_M2}
GLUCOSE SERPL-MCNC: 83 MG/DL (ref 70–99)
HCT VFR BLD AUTO: 28.3 % (ref 36–48)
HGB BLD-MCNC: 9.5 G/DL (ref 12–16)
LYMPHOCYTES # BLD: 2.4 K/UL (ref 1–5.1)
LYMPHOCYTES NFR BLD: 39.6 %
MCH RBC QN AUTO: 29.5 PG (ref 26–34)
MCHC RBC AUTO-ENTMCNC: 33.4 G/DL (ref 31–36)
MCV RBC AUTO: 88.2 FL (ref 80–100)
MONOCYTES # BLD: 0.5 K/UL (ref 0–1.3)
MONOCYTES NFR BLD: 7.7 %
NEUTROPHILS # BLD: 3 K/UL (ref 1.7–7.7)
NEUTROPHILS NFR BLD: 48.6 %
PLATELET # BLD AUTO: 187 K/UL (ref 135–450)
PMV BLD AUTO: 9.3 FL (ref 5–10.5)
POTASSIUM SERPL-SCNC: 4.7 MMOL/L (ref 3.5–5.1)
PROT SERPL-MCNC: 4.8 G/DL (ref 6.4–8.2)
RBC # BLD AUTO: 3.21 M/UL (ref 4–5.2)
SODIUM SERPL-SCNC: 142 MMOL/L (ref 136–145)
WBC # BLD AUTO: 6.2 K/UL (ref 4–11)

## 2024-12-20 PROCEDURE — 6360000002 HC RX W HCPCS

## 2024-12-20 PROCEDURE — 1200000000 HC SEMI PRIVATE

## 2024-12-20 PROCEDURE — 85025 COMPLETE CBC W/AUTO DIFF WBC: CPT

## 2024-12-20 PROCEDURE — 36415 COLL VENOUS BLD VENIPUNCTURE: CPT

## 2024-12-20 PROCEDURE — 6360000002 HC RX W HCPCS: Performed by: NURSE PRACTITIONER

## 2024-12-20 PROCEDURE — 2500000003 HC RX 250 WO HCPCS

## 2024-12-20 PROCEDURE — 6370000000 HC RX 637 (ALT 250 FOR IP)

## 2024-12-20 PROCEDURE — 80053 COMPREHEN METABOLIC PANEL: CPT

## 2024-12-20 PROCEDURE — 6370000000 HC RX 637 (ALT 250 FOR IP): Performed by: NURSE PRACTITIONER

## 2024-12-20 RX ORDER — ENOXAPARIN SODIUM 100 MG/ML
40 INJECTION SUBCUTANEOUS DAILY
Status: DISCONTINUED | OUTPATIENT
Start: 2024-12-21 | End: 2024-12-21

## 2024-12-20 RX ORDER — TORSEMIDE 20 MG/1
20 TABLET ORAL DAILY PRN
Qty: 90 TABLET | Refills: 0
Start: 2024-12-20

## 2024-12-20 RX ORDER — DIGOXIN 0.25 MG/ML
125 INJECTION INTRAMUSCULAR; INTRAVENOUS ONCE
Status: COMPLETED | OUTPATIENT
Start: 2024-12-21 | End: 2024-12-20

## 2024-12-20 RX ORDER — TRAMADOL HYDROCHLORIDE 50 MG/1
50 TABLET ORAL EVERY 6 HOURS PRN
Qty: 12 TABLET | Refills: 0 | Status: SHIPPED | OUTPATIENT
Start: 2024-12-20 | End: 2024-12-23

## 2024-12-20 RX ADMIN — TRAMADOL HYDROCHLORIDE 50 MG: 50 TABLET, COATED ORAL at 14:18

## 2024-12-20 RX ADMIN — Medication 10 ML: at 21:01

## 2024-12-20 RX ADMIN — LEVOTHYROXINE SODIUM 175 MCG: 0.03 TABLET ORAL at 05:52

## 2024-12-20 RX ADMIN — DIGOXIN 125 MCG: 0.25 INJECTION INTRAMUSCULAR; INTRAVENOUS at 23:52

## 2024-12-20 RX ADMIN — ROPINIROLE HYDROCHLORIDE 1 MG: 0.5 TABLET, FILM COATED ORAL at 21:01

## 2024-12-20 RX ADMIN — ATORVASTATIN CALCIUM 40 MG: 40 TABLET, FILM COATED ORAL at 21:01

## 2024-12-20 RX ADMIN — ENOXAPARIN SODIUM 30 MG: 100 INJECTION SUBCUTANEOUS at 08:16

## 2024-12-20 RX ADMIN — Medication 10 ML: at 08:17

## 2024-12-20 RX ADMIN — PANTOPRAZOLE SODIUM 40 MG: 40 TABLET, DELAYED RELEASE ORAL at 05:52

## 2024-12-20 RX ADMIN — QUETIAPINE FUMARATE 150 MG: 50 TABLET, EXTENDED RELEASE ORAL at 21:01

## 2024-12-20 RX ADMIN — TRAMADOL HYDROCHLORIDE 50 MG: 50 TABLET, COATED ORAL at 21:05

## 2024-12-20 RX ADMIN — TRAZODONE HYDROCHLORIDE 150 MG: 50 TABLET ORAL at 21:04

## 2024-12-20 ASSESSMENT — PAIN SCALES - GENERAL
PAINLEVEL_OUTOF10: 7
PAINLEVEL_OUTOF10: 9
PAINLEVEL_OUTOF10: 0

## 2024-12-20 ASSESSMENT — PAIN DESCRIPTION - LOCATION: LOCATION: NECK

## 2024-12-20 ASSESSMENT — PAIN DESCRIPTION - DESCRIPTORS: DESCRIPTORS: THROBBING

## 2024-12-20 NOTE — CARE COORDINATION
Writer reviewed chart, and spoke with RN, patient is IPTA from apartment alone, and plans to return. RN getting orthostatics for possible DC this day.    Lyn Steele RN

## 2024-12-20 NOTE — PLAN OF CARE
Problem: Discharge Planning  Goal: Discharge to home or other facility with appropriate resources  12/20/2024 0827 by Radha Quintana, RN  Outcome: Progressing     Problem: Pain  Goal: Verbalizes/displays adequate comfort level or baseline comfort level  12/20/2024 0827 by Radha Quintana, RN  Outcome: Progressing     Problem: Safety - Adult  Goal: Free from fall injury  12/20/2024 0827 by Radha Quintana, RN  Outcome: Progressing     Problem: Skin/Tissue Integrity  Goal: Absence of new skin breakdown  Description: 1.  Monitor for areas of redness and/or skin breakdown  2.  Assess vascular access sites hourly  3.  Every 4-6 hours minimum:  Change oxygen saturation probe site  4.  Every 4-6 hours:  If on nasal continuous positive airway pressure, respiratory therapy assess nares and determine need for appliance change or resting period.  Outcome: Progressing     Problem: Cardiovascular - Adult  Goal: Maintains optimal cardiac output and hemodynamic stability  Outcome: Progressing     Problem: Musculoskeletal - Adult  Goal: Return mobility to safest level of function  Outcome: Progressing     Problem: Metabolic/Fluid and Electrolytes - Adult  Goal: Electrolytes maintained within normal limits  Outcome: Progressing

## 2024-12-20 NOTE — CARE COORDINATION
CASE MANAGEMENT DISCHARGE SUMMARY      Discharge to: Home     Precertification completed: N/A  Hospital Exemption Notification (HENS) completed: N/A    IMM given: (date) 12/19/24    New Durable Medical Equipment ordered/agency: N/A    Transportation:    Family/car: Personal      Confirmed discharge plan with:     Patient: yes     Family:  yes           RN, name: Radha    Note: Discharging nurse to complete SURYA, reconcile AVS, and place final copy with patient's discharge packet. RN to ensure that written prescriptions for  Level II medications are sent with patient to the facility as per protocol.      Lyn Steele RN

## 2024-12-20 NOTE — PLAN OF CARE
Problem: Discharge Planning  Goal: Discharge to home or other facility with appropriate resources  12/19/2024 2224 by Sha Rivera RN  Outcome: Progressing  12/19/2024 1851 by Burton Ram, RN  Outcome: Progressing     Problem: Pain  Goal: Verbalizes/displays adequate comfort level or baseline comfort level  12/19/2024 2224 by Sha Rivera, RN  Outcome: Progressing  12/19/2024 1851 by Burton Ram, RN  Outcome: Progressing     Problem: Safety - Adult  Goal: Free from fall injury  12/19/2024 2224 by Sha Rivera, RN  Outcome: Progressing  12/19/2024 1851 by Burton Ram, RN  Outcome: Progressing

## 2024-12-20 NOTE — TELEPHONE ENCOUNTER
Refill Request     CONFIRM preferred pharmacy with the patient.    If Mail Order Rx - Pend for 90 day refill.      Last Seen: Last Seen Department: 8/26/2024  Last Seen by PCP: 8/26/2024    Last Written: 10/14/2024    If no future appointment scheduled:  Review the last OV with PCP and review information for follow-up visit,  Route STAFF MESSAGE with patient name to the  Pool for scheduling with the following information:            -  Timing of next visit           -  Visit type ie Physical, OV, etc           -  Diagnoses/Reason ie. COPD, HTN - Do not use MEDICATION, Follow-up or CHECK UP - Give reason for visit      Next Appointment:   Future Appointments   Date Time Provider Department Center   2/26/2025  1:00 PM Mauri Bunch, DO [facility] DEP       Message sent to  to schedule appt with patient?  NO      Requested Prescriptions     Pending Prescriptions Disp Refills    QUEtiapine (SEROQUEL XR) 150 MG TB24 extended release tablet [Pharmacy Med Name: QUETIAPINE  MG TABLET] 90 tablet 0     Sig: TAKE 1 TABLET BY MOUTH EVERY DAY

## 2024-12-20 NOTE — CARE COORDINATION
Writer spoke with RN, and charge RN patient had an episode and passed out. Patient will likely DC 12/21 instead of this day. Patient will have NN at DC.    Lyn Steele RN

## 2024-12-21 ENCOUNTER — APPOINTMENT (OUTPATIENT)
Dept: MRI IMAGING | Age: 74
DRG: 312 | End: 2024-12-21
Payer: MEDICARE

## 2024-12-21 LAB
ANION GAP SERPL CALCULATED.3IONS-SCNC: 9 MMOL/L (ref 3–16)
BUN SERPL-MCNC: 17 MG/DL (ref 7–20)
CALCIUM SERPL-MCNC: 8.5 MG/DL (ref 8.3–10.6)
CHLORIDE SERPL-SCNC: 105 MMOL/L (ref 99–110)
CO2 SERPL-SCNC: 25 MMOL/L (ref 21–32)
CREAT SERPL-MCNC: 1.2 MG/DL (ref 0.6–1.2)
EKG DIAGNOSIS: NORMAL
EKG Q-T INTERVAL: 320 MS
EKG QRS DURATION: 68 MS
EKG QTC CALCULATION (BAZETT): 417 MS
EKG R AXIS: 27 DEGREES
EKG T AXIS: 25 DEGREES
EKG VENTRICULAR RATE: 102 BPM
GFR SERPLBLD CREATININE-BSD FMLA CKD-EPI: 47 ML/MIN/{1.73_M2}
GLUCOSE SERPL-MCNC: 108 MG/DL (ref 70–99)
POTASSIUM SERPL-SCNC: 4.3 MMOL/L (ref 3.5–5.1)
SODIUM SERPL-SCNC: 139 MMOL/L (ref 136–145)

## 2024-12-21 PROCEDURE — 6370000000 HC RX 637 (ALT 250 FOR IP)

## 2024-12-21 PROCEDURE — 1200000000 HC SEMI PRIVATE

## 2024-12-21 PROCEDURE — 6370000000 HC RX 637 (ALT 250 FOR IP): Performed by: INTERNAL MEDICINE

## 2024-12-21 PROCEDURE — 70551 MRI BRAIN STEM W/O DYE: CPT

## 2024-12-21 PROCEDURE — 6370000000 HC RX 637 (ALT 250 FOR IP): Performed by: NURSE PRACTITIONER

## 2024-12-21 PROCEDURE — 93010 ELECTROCARDIOGRAM REPORT: CPT | Performed by: INTERNAL MEDICINE

## 2024-12-21 PROCEDURE — 6360000002 HC RX W HCPCS: Performed by: INTERNAL MEDICINE

## 2024-12-21 PROCEDURE — 93005 ELECTROCARDIOGRAM TRACING: CPT | Performed by: INTERNAL MEDICINE

## 2024-12-21 PROCEDURE — 80048 BASIC METABOLIC PNL TOTAL CA: CPT

## 2024-12-21 PROCEDURE — 36415 COLL VENOUS BLD VENIPUNCTURE: CPT

## 2024-12-21 PROCEDURE — 99223 1ST HOSP IP/OBS HIGH 75: CPT | Performed by: INTERNAL MEDICINE

## 2024-12-21 PROCEDURE — 2500000003 HC RX 250 WO HCPCS

## 2024-12-21 RX ORDER — ENOXAPARIN SODIUM 100 MG/ML
1 INJECTION SUBCUTANEOUS 2 TIMES DAILY
Status: COMPLETED | OUTPATIENT
Start: 2024-12-21 | End: 2024-12-22

## 2024-12-21 RX ORDER — AMIODARONE HYDROCHLORIDE 200 MG/1
400 TABLET ORAL 2 TIMES DAILY
Status: DISCONTINUED | OUTPATIENT
Start: 2024-12-21 | End: 2024-12-22 | Stop reason: SDUPTHER

## 2024-12-21 RX ORDER — LORAZEPAM 2 MG/ML
0.5 INJECTION INTRAMUSCULAR ONCE
Status: COMPLETED | OUTPATIENT
Start: 2024-12-21 | End: 2024-12-21

## 2024-12-21 RX ORDER — QUETIAPINE 150 MG/1
TABLET, FILM COATED, EXTENDED RELEASE ORAL
Qty: 90 TABLET | Refills: 0 | Status: SHIPPED | OUTPATIENT
Start: 2024-12-21

## 2024-12-21 RX ORDER — METOPROLOL SUCCINATE 25 MG/1
12.5 TABLET, EXTENDED RELEASE ORAL 2 TIMES DAILY
Status: DISCONTINUED | OUTPATIENT
Start: 2024-12-21 | End: 2024-12-26 | Stop reason: HOSPADM

## 2024-12-21 RX ADMIN — LEVOTHYROXINE SODIUM 175 MCG: 0.03 TABLET ORAL at 10:47

## 2024-12-21 RX ADMIN — METOPROLOL SUCCINATE 12.5 MG: 25 TABLET, EXTENDED RELEASE ORAL at 10:47

## 2024-12-21 RX ADMIN — ENOXAPARIN SODIUM 90 MG: 100 INJECTION SUBCUTANEOUS at 19:38

## 2024-12-21 RX ADMIN — ATORVASTATIN CALCIUM 40 MG: 40 TABLET, FILM COATED ORAL at 19:40

## 2024-12-21 RX ADMIN — ENOXAPARIN SODIUM 90 MG: 100 INJECTION SUBCUTANEOUS at 10:56

## 2024-12-21 RX ADMIN — TRAZODONE HYDROCHLORIDE 150 MG: 50 TABLET ORAL at 19:39

## 2024-12-21 RX ADMIN — LORAZEPAM 0.5 MG: 2 INJECTION INTRAMUSCULAR; INTRAVENOUS at 17:11

## 2024-12-21 RX ADMIN — ROPINIROLE HYDROCHLORIDE 1 MG: 0.5 TABLET, FILM COATED ORAL at 19:39

## 2024-12-21 RX ADMIN — AMIODARONE HYDROCHLORIDE 400 MG: 200 TABLET ORAL at 12:41

## 2024-12-21 RX ADMIN — Medication 10 ML: at 10:48

## 2024-12-21 RX ADMIN — Medication 10 ML: at 10:49

## 2024-12-21 RX ADMIN — TRAMADOL HYDROCHLORIDE 50 MG: 50 TABLET, COATED ORAL at 19:39

## 2024-12-21 RX ADMIN — ONDANSETRON 4 MG: 4 TABLET, ORALLY DISINTEGRATING ORAL at 19:39

## 2024-12-21 RX ADMIN — QUETIAPINE FUMARATE 150 MG: 50 TABLET, EXTENDED RELEASE ORAL at 19:40

## 2024-12-21 RX ADMIN — TRAMADOL HYDROCHLORIDE 50 MG: 50 TABLET, COATED ORAL at 10:55

## 2024-12-21 RX ADMIN — Medication 10 ML: at 19:42

## 2024-12-21 RX ADMIN — PANTOPRAZOLE SODIUM 40 MG: 40 TABLET, DELAYED RELEASE ORAL at 10:47

## 2024-12-21 RX ADMIN — METOPROLOL SUCCINATE 12.5 MG: 25 TABLET, EXTENDED RELEASE ORAL at 19:40

## 2024-12-21 RX ADMIN — POLYETHYLENE GLYCOL 3350 17 G: 17 POWDER, FOR SOLUTION ORAL at 11:03

## 2024-12-21 RX ADMIN — AMIODARONE HYDROCHLORIDE 400 MG: 200 TABLET ORAL at 19:39

## 2024-12-21 ASSESSMENT — PAIN DESCRIPTION - ORIENTATION: ORIENTATION: POSTERIOR

## 2024-12-21 ASSESSMENT — PAIN SCALES - GENERAL
PAINLEVEL_OUTOF10: 6
PAINLEVEL_OUTOF10: 7
PAINLEVEL_OUTOF10: 6

## 2024-12-21 ASSESSMENT — PAIN DESCRIPTION - LOCATION
LOCATION: HEAD

## 2024-12-21 ASSESSMENT — PAIN DESCRIPTION - DESCRIPTORS
DESCRIPTORS: ACHING
DESCRIPTORS: THROBBING

## 2024-12-21 NOTE — CONSULTS
CARDIAC ELECTROPHYSIOLOGY CONSULT NOTE      Patient Name: Imani Monzon  YOB: 1950    Primary Care Physician: Mauri Bunch DO    Referring Physician: Dr. Triplett    CHIEF COMPLAINT:   Chief Complaint   Patient presents with    Loss of Consciousness     Patient had dizziness and syncopal episode at work. Hit back of head. Patient complains of nausea and dizziness. Alert and oriented x4 at this time. Not on thinners. Patient had previous stroke no deficits.          Imani Monzon was seen today on 12/21/2024 in consultation due to chief complaint of atrial fibrillation and syncope  Patient is a 74 y.o. female with PMH of hypertension, CHF with preserved ejection fraction, prior stroke, diastolic dysfunction, history of PE, CKD stage III, bipolar disorder, hypothyroidism, lower extremity edema who was seen today for syncope, atrial fibrillation     Patient was admitted with syncope with history of recent dizziness.  Patient had a MAUREEN on presentation.  The troponins were negative during this admission.  CT of the head did not show any acute abnormalities.  Patient was orthostatic on presentation and had recurrent syncope again.  VQ scan was negative.  Patient's metoprolol, verapamil, lisinopril and torsemide were being held.    EP was consulted for her multiple arrhythmias.  She was in sinus rhythm on presentation.  However, the EKG today shows atrial fibrillation.      I spoke to the patient in detail regarding her syncopal episodes.  She reports the first episode of syncope was at work when she attempted to get up from a seated position.  She does not remember anything except for waking up on the floor.  Subsequently, while she was here and seated on the side of the bed without attempting to get up, she had another syncopal episode.  Her orthostatic blood pressure check was negative at that time.    On telemetry here, she went into atrial fibrillation on 12/20/2024 at 23:06.  She reports having

## 2024-12-21 NOTE — PLAN OF CARE
Problem: Discharge Planning  Goal: Discharge to home or other facility with appropriate resources  Outcome: Progressing     Problem: Pain  Goal: Verbalizes/displays adequate comfort level or baseline comfort level  Outcome: Progressing     Problem: Safety - Adult  Goal: Free from fall injury  Outcome: Progressing     Problem: Skin/Tissue Integrity  Goal: Absence of new skin breakdown  Description: 1.  Monitor for areas of redness and/or skin breakdown  2.  Assess vascular access sites hourly  3.  Every 4-6 hours minimum:  Change oxygen saturation probe site  4.  Every 4-6 hours:  If on nasal continuous positive airway pressure, respiratory therapy assess nares and determine need for appliance change or resting period.  Outcome: Progressing     Problem: Cardiovascular - Adult  Goal: Maintains optimal cardiac output and hemodynamic stability  Outcome: Progressing     Problem: Musculoskeletal - Adult  Goal: Return mobility to safest level of function  Outcome: Progressing     Problem: Metabolic/Fluid and Electrolytes - Adult  Goal: Electrolytes maintained within normal limits  Outcome: Progressing     Problem: Neurosensory - Adult  Goal: Achieves maximal functionality and self care  Outcome: Progressing     Problem: Gastrointestinal - Adult  Goal: Minimal or absence of nausea and vomiting  Outcome: Progressing  Goal: Maintains or returns to baseline bowel function  Outcome: Progressing     Problem: Genitourinary - Adult  Goal: Absence of urinary retention  Outcome: Progressing

## 2024-12-21 NOTE — CONSENT
Informed Consent for Blood Component Transfusion Note    I have discussed with the patient the rationale for blood component transfusion; its benefits in treating or preventing fatigue, organ damage, or death; and its risk which includes mild transfusion reactions, rare risk of blood borne infection, or more serious but rare reactions. I have discussed the alternatives to transfusion, including the risk and consequences of not receiving transfusion. The patient had an opportunity to ask questions and had agreed to proceed with transfusion of blood components.    Electronically signed by WENDY ESCAMILLA MD on 12/21/24 at 9:55 AM EST

## 2024-12-22 LAB
ANION GAP SERPL CALCULATED.3IONS-SCNC: 6 MMOL/L (ref 3–16)
BUN SERPL-MCNC: 16 MG/DL (ref 7–20)
CALCIUM SERPL-MCNC: 8.3 MG/DL (ref 8.3–10.6)
CHLORIDE SERPL-SCNC: 104 MMOL/L (ref 99–110)
CO2 SERPL-SCNC: 28 MMOL/L (ref 21–32)
CREAT SERPL-MCNC: 1.3 MG/DL (ref 0.6–1.2)
DEPRECATED RDW RBC AUTO: 13.7 % (ref 12.4–15.4)
EKG ATRIAL RATE: 76 BPM
EKG DIAGNOSIS: NORMAL
EKG P AXIS: 31 DEGREES
EKG P-R INTERVAL: 164 MS
EKG Q-T INTERVAL: 414 MS
EKG QRS DURATION: 74 MS
EKG QTC CALCULATION (BAZETT): 465 MS
EKG R AXIS: 5 DEGREES
EKG T AXIS: 18 DEGREES
EKG VENTRICULAR RATE: 76 BPM
GFR SERPLBLD CREATININE-BSD FMLA CKD-EPI: 43 ML/MIN/{1.73_M2}
GLUCOSE SERPL-MCNC: 99 MG/DL (ref 70–99)
HCT VFR BLD AUTO: 32.4 % (ref 36–48)
HGB BLD-MCNC: 10.8 G/DL (ref 12–16)
MAGNESIUM SERPL-MCNC: 1.96 MG/DL (ref 1.8–2.4)
MAGNESIUM SERPL-MCNC: 1.97 MG/DL (ref 1.8–2.4)
MCH RBC QN AUTO: 29.4 PG (ref 26–34)
MCHC RBC AUTO-ENTMCNC: 33.4 G/DL (ref 31–36)
MCV RBC AUTO: 88 FL (ref 80–100)
PLATELET # BLD AUTO: 196 K/UL (ref 135–450)
PMV BLD AUTO: 9.5 FL (ref 5–10.5)
POTASSIUM SERPL-SCNC: 4.4 MMOL/L (ref 3.5–5.1)
RBC # BLD AUTO: 3.68 M/UL (ref 4–5.2)
SODIUM SERPL-SCNC: 138 MMOL/L (ref 136–145)
WBC # BLD AUTO: 5.8 K/UL (ref 4–11)

## 2024-12-22 PROCEDURE — 99223 1ST HOSP IP/OBS HIGH 75: CPT | Performed by: INTERNAL MEDICINE

## 2024-12-22 PROCEDURE — 93005 ELECTROCARDIOGRAM TRACING: CPT | Performed by: INTERNAL MEDICINE

## 2024-12-22 PROCEDURE — 97110 THERAPEUTIC EXERCISES: CPT

## 2024-12-22 PROCEDURE — 6370000000 HC RX 637 (ALT 250 FOR IP): Performed by: INTERNAL MEDICINE

## 2024-12-22 PROCEDURE — 85027 COMPLETE CBC AUTOMATED: CPT

## 2024-12-22 PROCEDURE — 83735 ASSAY OF MAGNESIUM: CPT

## 2024-12-22 PROCEDURE — 36415 COLL VENOUS BLD VENIPUNCTURE: CPT

## 2024-12-22 PROCEDURE — 97166 OT EVAL MOD COMPLEX 45 MIN: CPT

## 2024-12-22 PROCEDURE — 97162 PT EVAL MOD COMPLEX 30 MIN: CPT

## 2024-12-22 PROCEDURE — 97530 THERAPEUTIC ACTIVITIES: CPT

## 2024-12-22 PROCEDURE — 6360000002 HC RX W HCPCS: Performed by: INTERNAL MEDICINE

## 2024-12-22 PROCEDURE — 80048 BASIC METABOLIC PNL TOTAL CA: CPT

## 2024-12-22 PROCEDURE — 1200000000 HC SEMI PRIVATE

## 2024-12-22 PROCEDURE — 6370000000 HC RX 637 (ALT 250 FOR IP)

## 2024-12-22 PROCEDURE — 93010 ELECTROCARDIOGRAM REPORT: CPT | Performed by: INTERNAL MEDICINE

## 2024-12-22 PROCEDURE — 6370000000 HC RX 637 (ALT 250 FOR IP): Performed by: NURSE PRACTITIONER

## 2024-12-22 PROCEDURE — 2500000003 HC RX 250 WO HCPCS

## 2024-12-22 RX ORDER — AMIODARONE HYDROCHLORIDE 200 MG/1
400 TABLET ORAL 2 TIMES DAILY
Status: DISCONTINUED | OUTPATIENT
Start: 2024-12-22 | End: 2024-12-26 | Stop reason: HOSPADM

## 2024-12-22 RX ORDER — SENNOSIDES A AND B 8.6 MG/1
1 TABLET, FILM COATED ORAL ONCE
Status: COMPLETED | OUTPATIENT
Start: 2024-12-22 | End: 2024-12-22

## 2024-12-22 RX ORDER — BISACODYL 5 MG/1
10 TABLET, DELAYED RELEASE ORAL ONCE
Status: COMPLETED | OUTPATIENT
Start: 2024-12-22 | End: 2024-12-22

## 2024-12-22 RX ADMIN — LEVOTHYROXINE SODIUM 175 MCG: 0.03 TABLET ORAL at 05:36

## 2024-12-22 RX ADMIN — POLYETHYLENE GLYCOL 3350 17 G: 17 POWDER, FOR SOLUTION ORAL at 09:43

## 2024-12-22 RX ADMIN — METOPROLOL SUCCINATE 12.5 MG: 25 TABLET, EXTENDED RELEASE ORAL at 09:42

## 2024-12-22 RX ADMIN — Medication 10 ML: at 09:43

## 2024-12-22 RX ADMIN — TRAZODONE HYDROCHLORIDE 150 MG: 50 TABLET ORAL at 19:44

## 2024-12-22 RX ADMIN — RIVAROXABAN 20 MG: 20 TABLET, FILM COATED ORAL at 19:04

## 2024-12-22 RX ADMIN — BISACODYL 10 MG: 5 TABLET, COATED ORAL at 12:04

## 2024-12-22 RX ADMIN — AMIODARONE HYDROCHLORIDE 400 MG: 200 TABLET ORAL at 19:44

## 2024-12-22 RX ADMIN — ENOXAPARIN SODIUM 90 MG: 100 INJECTION SUBCUTANEOUS at 09:43

## 2024-12-22 RX ADMIN — QUETIAPINE FUMARATE 150 MG: 50 TABLET, EXTENDED RELEASE ORAL at 19:44

## 2024-12-22 RX ADMIN — Medication 10 ML: at 19:47

## 2024-12-22 RX ADMIN — AMIODARONE HYDROCHLORIDE 400 MG: 200 TABLET ORAL at 09:42

## 2024-12-22 RX ADMIN — PANTOPRAZOLE SODIUM 40 MG: 40 TABLET, DELAYED RELEASE ORAL at 05:36

## 2024-12-22 RX ADMIN — ATORVASTATIN CALCIUM 40 MG: 40 TABLET, FILM COATED ORAL at 19:45

## 2024-12-22 RX ADMIN — TRAMADOL HYDROCHLORIDE 50 MG: 50 TABLET, COATED ORAL at 19:45

## 2024-12-22 RX ADMIN — SENNOSIDES 8.6 MG: 8.6 TABLET, FILM COATED ORAL at 12:03

## 2024-12-22 RX ADMIN — METOPROLOL SUCCINATE 12.5 MG: 25 TABLET, EXTENDED RELEASE ORAL at 19:45

## 2024-12-22 RX ADMIN — ROPINIROLE HYDROCHLORIDE 1 MG: 0.5 TABLET, FILM COATED ORAL at 19:44

## 2024-12-22 ASSESSMENT — PAIN DESCRIPTION - ORIENTATION
ORIENTATION: POSTERIOR
ORIENTATION: UPPER;POSTERIOR

## 2024-12-22 ASSESSMENT — PAIN DESCRIPTION - DESCRIPTORS
DESCRIPTORS: ACHING
DESCRIPTORS: ACHING

## 2024-12-22 ASSESSMENT — PAIN SCALES - GENERAL
PAINLEVEL_OUTOF10: 5
PAINLEVEL_OUTOF10: 6

## 2024-12-22 ASSESSMENT — PAIN DESCRIPTION - LOCATION
LOCATION: HEAD
LOCATION: HEAD

## 2024-12-22 NOTE — PLAN OF CARE
Problem: Discharge Planning  Goal: Discharge to home or other facility with appropriate resources  Outcome: Progressing     Problem: Pain  Goal: Verbalizes/displays adequate comfort level or baseline comfort level  Outcome: Progressing     Problem: Safety - Adult  Goal: Free from fall injury  Outcome: Progressing     Problem: Skin/Tissue Integrity  Goal: Absence of new skin breakdown  Description: 1.  Monitor for areas of redness and/or skin breakdown  2.  Assess vascular access sites hourly  Outcome: Progressing     Problem: Cardiovascular - Adult  Goal: Maintains optimal cardiac output and hemodynamic stability  Outcome: Progressing     Problem: Musculoskeletal - Adult  Goal: Return mobility to safest level of function  Outcome: Progressing     Problem: Neurosensory - Adult  Goal: Achieves maximal functionality and self care  Outcome: Progressing     Problem: Gastrointestinal - Adult  Goal: Minimal or absence of nausea and vomiting  Outcome: Progressing  Goal: Maintains or returns to baseline bowel function  Outcome: Progressing

## 2024-12-22 NOTE — CONSULTS
CARDIAC ELECTROPHYSIOLOGY CONSULT NOTE      Patient Name: Imani Monzon  YOB: 1950    Primary Care Physician: Mauri Bunch DO    Referring Physician: Dr. Triplett    CHIEF COMPLAINT:   Chief Complaint   Patient presents with    Loss of Consciousness     Patient had dizziness and syncopal episode at work. Hit back of head. Patient complains of nausea and dizziness. Alert and oriented x4 at this time. Not on thinners. Patient had previous stroke no deficits.          Imani Monzon was seen today on 12/22/2024 in consultation due to chief complaint of atrial fibrillation and syncope  Patient is a 74 y.o. female with PMH of hypertension, CHF with preserved ejection fraction, prior stroke, diastolic dysfunction, history of PE, CKD stage III, bipolar disorder, hypothyroidism, lower extremity edema who was seen today for syncope, atrial fibrillation     Patient was admitted with syncope with history of recent dizziness.  Patient had a MAUREEN on presentation.  The troponins were negative during this admission.  CT of the head did not show any acute abnormalities.  Patient was orthostatic on presentation and had recurrent syncope again.  VQ scan was negative.  Patient's metoprolol, verapamil, lisinopril and torsemide were being held.    EP was consulted for her multiple arrhythmias.  She was in sinus rhythm on presentation.  However, the EKG today shows atrial fibrillation.      I spoke to the patient in detail regarding her syncopal episodes.  She reports the first episode of syncope was at work when she attempted to get up from a seated position.  She does not remember anything except for waking up on the floor.  Subsequently, while she was here and seated on the side of the bed without attempting to get up, she had another syncopal episode.  Her orthostatic blood pressure check was negative at that time.    When she was seen on 12/21/2024-on telemetry here, she went into atrial fibrillation on 12/20/2024

## 2024-12-23 ENCOUNTER — ANCILLARY PROCEDURE (OUTPATIENT)
Dept: CARDIOLOGY CLINIC | Age: 74
End: 2024-12-23

## 2024-12-23 ENCOUNTER — TELEPHONE (OUTPATIENT)
Dept: CARDIOLOGY CLINIC | Age: 74
End: 2024-12-23

## 2024-12-23 DIAGNOSIS — R55 SYNCOPE AND COLLAPSE: ICD-10-CM

## 2024-12-23 LAB
ANION GAP SERPL CALCULATED.3IONS-SCNC: 6 MMOL/L (ref 3–16)
BUN SERPL-MCNC: 14 MG/DL (ref 7–20)
CALCIUM SERPL-MCNC: 8.5 MG/DL (ref 8.3–10.6)
CHLORIDE SERPL-SCNC: 106 MMOL/L (ref 99–110)
CO2 SERPL-SCNC: 28 MMOL/L (ref 21–32)
CREAT SERPL-MCNC: 1.2 MG/DL (ref 0.6–1.2)
DEPRECATED RDW RBC AUTO: 14.1 % (ref 12.4–15.4)
GFR SERPLBLD CREATININE-BSD FMLA CKD-EPI: 47 ML/MIN/{1.73_M2}
GLUCOSE SERPL-MCNC: 85 MG/DL (ref 70–99)
HCT VFR BLD AUTO: 31.4 % (ref 36–48)
HGB BLD-MCNC: 10.5 G/DL (ref 12–16)
MAGNESIUM SERPL-MCNC: 2.03 MG/DL (ref 1.8–2.4)
MCH RBC QN AUTO: 29 PG (ref 26–34)
MCHC RBC AUTO-ENTMCNC: 33.4 G/DL (ref 31–36)
MCV RBC AUTO: 87.1 FL (ref 80–100)
PLATELET # BLD AUTO: 193 K/UL (ref 135–450)
PMV BLD AUTO: 8.9 FL (ref 5–10.5)
POTASSIUM SERPL-SCNC: 4.3 MMOL/L (ref 3.5–5.1)
RBC # BLD AUTO: 3.6 M/UL (ref 4–5.2)
SODIUM SERPL-SCNC: 140 MMOL/L (ref 136–145)
WBC # BLD AUTO: 5.5 K/UL (ref 4–11)

## 2024-12-23 PROCEDURE — 6370000000 HC RX 637 (ALT 250 FOR IP): Performed by: INTERNAL MEDICINE

## 2024-12-23 PROCEDURE — 6370000000 HC RX 637 (ALT 250 FOR IP)

## 2024-12-23 PROCEDURE — 85027 COMPLETE CBC AUTOMATED: CPT

## 2024-12-23 PROCEDURE — 80048 BASIC METABOLIC PNL TOTAL CA: CPT

## 2024-12-23 PROCEDURE — 36415 COLL VENOUS BLD VENIPUNCTURE: CPT

## 2024-12-23 PROCEDURE — 2500000003 HC RX 250 WO HCPCS

## 2024-12-23 PROCEDURE — 99232 SBSQ HOSP IP/OBS MODERATE 35: CPT

## 2024-12-23 PROCEDURE — 1200000000 HC SEMI PRIVATE

## 2024-12-23 PROCEDURE — 83735 ASSAY OF MAGNESIUM: CPT

## 2024-12-23 PROCEDURE — 6370000000 HC RX 637 (ALT 250 FOR IP): Performed by: NURSE PRACTITIONER

## 2024-12-23 RX ORDER — BISACODYL 5 MG/1
10 TABLET, DELAYED RELEASE ORAL ONCE
Status: COMPLETED | OUTPATIENT
Start: 2024-12-23 | End: 2024-12-23

## 2024-12-23 RX ADMIN — RIVAROXABAN 20 MG: 20 TABLET, FILM COATED ORAL at 18:40

## 2024-12-23 RX ADMIN — QUETIAPINE FUMARATE 150 MG: 50 TABLET, EXTENDED RELEASE ORAL at 20:07

## 2024-12-23 RX ADMIN — TRAZODONE HYDROCHLORIDE 150 MG: 50 TABLET ORAL at 20:06

## 2024-12-23 RX ADMIN — AMIODARONE HYDROCHLORIDE 400 MG: 200 TABLET ORAL at 20:06

## 2024-12-23 RX ADMIN — METOPROLOL SUCCINATE 12.5 MG: 25 TABLET, EXTENDED RELEASE ORAL at 08:56

## 2024-12-23 RX ADMIN — LEVOTHYROXINE SODIUM 175 MCG: 0.03 TABLET ORAL at 05:27

## 2024-12-23 RX ADMIN — METOPROLOL SUCCINATE 12.5 MG: 25 TABLET, EXTENDED RELEASE ORAL at 20:07

## 2024-12-23 RX ADMIN — BISACODYL 10 MG: 5 TABLET, COATED ORAL at 11:54

## 2024-12-23 RX ADMIN — ACETAMINOPHEN 650 MG: 325 TABLET ORAL at 09:01

## 2024-12-23 RX ADMIN — PANTOPRAZOLE SODIUM 40 MG: 40 TABLET, DELAYED RELEASE ORAL at 05:27

## 2024-12-23 RX ADMIN — Medication 10 ML: at 08:56

## 2024-12-23 RX ADMIN — ATORVASTATIN CALCIUM 40 MG: 40 TABLET, FILM COATED ORAL at 20:06

## 2024-12-23 RX ADMIN — AMIODARONE HYDROCHLORIDE 400 MG: 200 TABLET ORAL at 08:55

## 2024-12-23 RX ADMIN — POLYETHYLENE GLYCOL 3350 17 G: 17 POWDER, FOR SOLUTION ORAL at 09:01

## 2024-12-23 RX ADMIN — ROPINIROLE HYDROCHLORIDE 1 MG: 0.5 TABLET, FILM COATED ORAL at 20:06

## 2024-12-23 RX ADMIN — TRAMADOL HYDROCHLORIDE 50 MG: 50 TABLET, COATED ORAL at 05:27

## 2024-12-23 ASSESSMENT — PAIN DESCRIPTION - LOCATION
LOCATION: HEAD
LOCATION: HEAD

## 2024-12-23 ASSESSMENT — PAIN SCALES - GENERAL
PAINLEVEL_OUTOF10: 8
PAINLEVEL_OUTOF10: 5

## 2024-12-23 ASSESSMENT — PAIN DESCRIPTION - ORIENTATION: ORIENTATION: ANTERIOR

## 2024-12-23 ASSESSMENT — PAIN DESCRIPTION - DESCRIPTORS: DESCRIPTORS: ACHING

## 2024-12-23 NOTE — PLAN OF CARE
Problem: Discharge Planning  Goal: Discharge to home or other facility with appropriate resources  Outcome: Progressing     Problem: Pain  Goal: Verbalizes/displays adequate comfort level or baseline comfort level  Outcome: Progressing     Problem: Safety - Adult  Goal: Free from fall injury  Outcome: Progressing     Problem: Skin/Tissue Integrity  Goal: Absence of new skin breakdown  Description: 1.  Monitor for areas of redness and/or skin breakdown  2.  Assess vascular access sites hourly  Outcome: Progressing     Problem: Cardiovascular - Adult  Goal: Maintains optimal cardiac output and hemodynamic stability  Outcome: Progressing     Problem: Musculoskeletal - Adult  Goal: Return mobility to safest level of function  Outcome: Progressing     Problem: Metabolic/Fluid and Electrolytes - Adult  Goal: Electrolytes maintained within normal limits  Outcome: Progressing     Problem: Neurosensory - Adult  Goal: Achieves maximal functionality and self care  Outcome: Progressing     Problem: Gastrointestinal - Adult  Goal: Maintains or returns to baseline bowel function  Outcome: Progressing     Problem: Skin/Tissue Integrity - Adult  Goal: Skin integrity remains intact  Outcome: Progressing     Problem: Respiratory - Adult  Goal: Achieves optimal ventilation and oxygenation  Outcome: Progressing

## 2024-12-23 NOTE — TELEPHONE ENCOUNTER
----- Message from Dr. Phoebe Torres DO sent at 12/21/2024 11:41 AM EST -----  This patient needs a 30-day event monitor and a follow-up with me in 2-3 months.  Thank you

## 2024-12-23 NOTE — TELEPHONE ENCOUNTER
Pt currently admitted in hospital. Spoke with [facility] Rn regarding future appt and monitor placement. Per Oscar hernandez to route message to her.

## 2024-12-23 NOTE — PLAN OF CARE
Problem: Discharge Planning  Goal: Discharge to home or other facility with appropriate resources  12/23/2024 0942 by Candace Pandey RN  Outcome: Progressing     Problem: Pain  Goal: Verbalizes/displays adequate comfort level or baseline comfort level  12/23/2024 0942 by Candace Pandey RN  Outcome: Progressing     Problem: Safety - Adult  Goal: Free from fall injury  12/23/2024 0942 by Candace Pandey RN  Outcome: Progressing

## 2024-12-23 NOTE — TELEPHONE ENCOUNTER
Monitor company [employer]  Length of monitor 30 days  Monitor ordered by Phoebe Torres DO   Patch ID [id number]  Device number [id number]  Monitor given to Ellie Buchanan RN.   Nurse to apply at the time of discharge.

## 2024-12-24 LAB
ANION GAP SERPL CALCULATED.3IONS-SCNC: 4 MMOL/L (ref 3–16)
BUN SERPL-MCNC: 16 MG/DL (ref 7–20)
CALCIUM SERPL-MCNC: 8.8 MG/DL (ref 8.3–10.6)
CHLORIDE SERPL-SCNC: 105 MMOL/L (ref 99–110)
CO2 SERPL-SCNC: 31 MMOL/L (ref 21–32)
CREAT SERPL-MCNC: 1.3 MG/DL (ref 0.6–1.2)
DEPRECATED RDW RBC AUTO: 14 % (ref 12.4–15.4)
GFR SERPLBLD CREATININE-BSD FMLA CKD-EPI: 43 ML/MIN/{1.73_M2}
GLUCOSE SERPL-MCNC: 84 MG/DL (ref 70–99)
HCT VFR BLD AUTO: 31.9 % (ref 36–48)
HGB BLD-MCNC: 10.7 G/DL (ref 12–16)
MAGNESIUM SERPL-MCNC: 2.08 MG/DL (ref 1.8–2.4)
MCH RBC QN AUTO: 29.2 PG (ref 26–34)
MCHC RBC AUTO-ENTMCNC: 33.4 G/DL (ref 31–36)
MCV RBC AUTO: 87.4 FL (ref 80–100)
PLATELET # BLD AUTO: 211 K/UL (ref 135–450)
PMV BLD AUTO: 8.9 FL (ref 5–10.5)
POTASSIUM SERPL-SCNC: 4.6 MMOL/L (ref 3.5–5.1)
RBC # BLD AUTO: 3.65 M/UL (ref 4–5.2)
SODIUM SERPL-SCNC: 140 MMOL/L (ref 136–145)
WBC # BLD AUTO: 5.5 K/UL (ref 4–11)

## 2024-12-24 PROCEDURE — 6370000000 HC RX 637 (ALT 250 FOR IP): Performed by: INTERNAL MEDICINE

## 2024-12-24 PROCEDURE — 97530 THERAPEUTIC ACTIVITIES: CPT

## 2024-12-24 PROCEDURE — 83735 ASSAY OF MAGNESIUM: CPT

## 2024-12-24 PROCEDURE — 6370000000 HC RX 637 (ALT 250 FOR IP): Performed by: NURSE PRACTITIONER

## 2024-12-24 PROCEDURE — 36415 COLL VENOUS BLD VENIPUNCTURE: CPT

## 2024-12-24 PROCEDURE — 80048 BASIC METABOLIC PNL TOTAL CA: CPT

## 2024-12-24 PROCEDURE — 6370000000 HC RX 637 (ALT 250 FOR IP)

## 2024-12-24 PROCEDURE — 97110 THERAPEUTIC EXERCISES: CPT

## 2024-12-24 PROCEDURE — 1200000000 HC SEMI PRIVATE

## 2024-12-24 PROCEDURE — 85027 COMPLETE CBC AUTOMATED: CPT

## 2024-12-24 RX ORDER — BISACODYL 5 MG/1
10 TABLET, DELAYED RELEASE ORAL ONCE
Status: COMPLETED | OUTPATIENT
Start: 2024-12-24 | End: 2024-12-24

## 2024-12-24 RX ORDER — POLYETHYLENE GLYCOL 3350 17 G/17G
17 POWDER, FOR SOLUTION ORAL DAILY
Status: DISCONTINUED | OUTPATIENT
Start: 2024-12-24 | End: 2024-12-26 | Stop reason: HOSPADM

## 2024-12-24 RX ORDER — FUROSEMIDE 20 MG/1
20 TABLET ORAL DAILY
Status: DISCONTINUED | OUTPATIENT
Start: 2024-12-24 | End: 2024-12-26 | Stop reason: HOSPADM

## 2024-12-24 RX ORDER — TORSEMIDE 20 MG/1
10 TABLET ORAL DAILY
Status: DISCONTINUED | OUTPATIENT
Start: 2024-12-24 | End: 2024-12-26 | Stop reason: HOSPADM

## 2024-12-24 RX ADMIN — RIVAROXABAN 20 MG: 20 TABLET, FILM COATED ORAL at 18:54

## 2024-12-24 RX ADMIN — QUETIAPINE FUMARATE 150 MG: 50 TABLET, EXTENDED RELEASE ORAL at 21:25

## 2024-12-24 RX ADMIN — METOPROLOL SUCCINATE 12.5 MG: 25 TABLET, EXTENDED RELEASE ORAL at 08:36

## 2024-12-24 RX ADMIN — PANTOPRAZOLE SODIUM 40 MG: 40 TABLET, DELAYED RELEASE ORAL at 06:11

## 2024-12-24 RX ADMIN — METOPROLOL SUCCINATE 12.5 MG: 25 TABLET, EXTENDED RELEASE ORAL at 21:25

## 2024-12-24 RX ADMIN — AMIODARONE HYDROCHLORIDE 400 MG: 200 TABLET ORAL at 21:25

## 2024-12-24 RX ADMIN — AMIODARONE HYDROCHLORIDE 400 MG: 200 TABLET ORAL at 08:36

## 2024-12-24 RX ADMIN — BISACODYL 10 MG: 5 TABLET, COATED ORAL at 08:36

## 2024-12-24 RX ADMIN — POLYETHYLENE GLYCOL 3350 17 G: 17 POWDER, FOR SOLUTION ORAL at 08:35

## 2024-12-24 RX ADMIN — FUROSEMIDE 20 MG: 20 TABLET ORAL at 08:38

## 2024-12-24 RX ADMIN — ATORVASTATIN CALCIUM 40 MG: 40 TABLET, FILM COATED ORAL at 21:25

## 2024-12-24 RX ADMIN — ROPINIROLE HYDROCHLORIDE 1 MG: 0.5 TABLET, FILM COATED ORAL at 21:25

## 2024-12-24 RX ADMIN — TRAZODONE HYDROCHLORIDE 150 MG: 50 TABLET ORAL at 21:25

## 2024-12-24 RX ADMIN — LEVOTHYROXINE SODIUM 175 MCG: 0.03 TABLET ORAL at 06:10

## 2024-12-24 RX ADMIN — TRAMADOL HYDROCHLORIDE 50 MG: 50 TABLET, COATED ORAL at 08:38

## 2024-12-24 ASSESSMENT — PAIN SCALES - GENERAL
PAINLEVEL_OUTOF10: 0
PAINLEVEL_OUTOF10: 6

## 2024-12-24 NOTE — CARE COORDINATION
[facility] can accept, will start cert this day.     Writer sent electronic referral to [facility] per patient preference.     Lyn Steele RN

## 2024-12-24 NOTE — PLAN OF CARE
Problem: Discharge Planning  Goal: Discharge to home or other facility with appropriate resources  Outcome: Progressing     Problem: Pain  Goal: Verbalizes/displays adequate comfort level or baseline comfort level  Outcome: Progressing  Pt scoring pain on 0-10 scale. Pain medications given per MAR. Pt instructed to call out when pain level increasing. Call light within reach.     Problem: Safety - Adult  Goal: Free from fall injury  Outcome: Progressing  Bed in lowest position, wheels locked, 2/4 side rails up, nonskid footwear on. Bed/ chair check alarm in place, call light within reach. Pt instructed to call out when needing assistance. Pt stated understanding.     Problem: Skin/Tissue Integrity  Goal: Absence of new skin breakdown  Description: 1.  Monitor for areas of redness and/or skin breakdown  2.  Assess vascular access sites hourly  3.  Every 4-6 hours minimum:  Change oxygen saturation probe site  4.  Every 4-6 hours:  If on nasal continuous positive airway pressure, respiratory therapy assess nares and determine need for appliance change or resting period.  Outcome: Progressing     Problem: Musculoskeletal - Adult  Goal: Return mobility to safest level of function  Outcome: Progressing     Problem: Metabolic/Fluid and Electrolytes - Adult  Goal: Electrolytes maintained within normal limits  Outcome: Progressing     Problem: Neurosensory - Adult  Goal: Achieves maximal functionality and self care  Outcome: Progressing

## 2024-12-25 PROCEDURE — 6370000000 HC RX 637 (ALT 250 FOR IP): Performed by: INTERNAL MEDICINE

## 2024-12-25 PROCEDURE — 6370000000 HC RX 637 (ALT 250 FOR IP)

## 2024-12-25 PROCEDURE — 1200000000 HC SEMI PRIVATE

## 2024-12-25 RX ADMIN — ACETAMINOPHEN 650 MG: 325 TABLET ORAL at 20:47

## 2024-12-25 RX ADMIN — RIVAROXABAN 20 MG: 20 TABLET, FILM COATED ORAL at 18:28

## 2024-12-25 RX ADMIN — AMIODARONE HYDROCHLORIDE 400 MG: 200 TABLET ORAL at 20:47

## 2024-12-25 RX ADMIN — FUROSEMIDE 20 MG: 20 TABLET ORAL at 09:30

## 2024-12-25 RX ADMIN — QUETIAPINE FUMARATE 150 MG: 50 TABLET, EXTENDED RELEASE ORAL at 20:47

## 2024-12-25 RX ADMIN — AMIODARONE HYDROCHLORIDE 400 MG: 200 TABLET ORAL at 09:30

## 2024-12-25 RX ADMIN — POLYETHYLENE GLYCOL 3350 17 G: 17 POWDER, FOR SOLUTION ORAL at 09:31

## 2024-12-25 RX ADMIN — LEVOTHYROXINE SODIUM 175 MCG: 0.03 TABLET ORAL at 05:41

## 2024-12-25 RX ADMIN — METOPROLOL SUCCINATE 12.5 MG: 25 TABLET, EXTENDED RELEASE ORAL at 09:30

## 2024-12-25 RX ADMIN — METOPROLOL SUCCINATE 12.5 MG: 25 TABLET, EXTENDED RELEASE ORAL at 20:47

## 2024-12-25 RX ADMIN — ATORVASTATIN CALCIUM 40 MG: 40 TABLET, FILM COATED ORAL at 20:47

## 2024-12-25 RX ADMIN — ROPINIROLE HYDROCHLORIDE 1 MG: 0.5 TABLET, FILM COATED ORAL at 20:47

## 2024-12-25 RX ADMIN — PANTOPRAZOLE SODIUM 40 MG: 40 TABLET, DELAYED RELEASE ORAL at 05:41

## 2024-12-25 RX ADMIN — TRAZODONE HYDROCHLORIDE 150 MG: 50 TABLET ORAL at 20:46

## 2024-12-25 ASSESSMENT — PAIN SCALES - GENERAL
PAINLEVEL_OUTOF10: 0
PAINLEVEL_OUTOF10: 6

## 2024-12-25 ASSESSMENT — PAIN DESCRIPTION - LOCATION: LOCATION: HEAD

## 2024-12-26 VITALS
DIASTOLIC BLOOD PRESSURE: 71 MMHG | TEMPERATURE: 98.2 F | HEART RATE: 65 BPM | HEIGHT: 63 IN | SYSTOLIC BLOOD PRESSURE: 137 MMHG | OXYGEN SATURATION: 96 % | BODY MASS INDEX: 36.86 KG/M2 | WEIGHT: 208 LBS | RESPIRATION RATE: 16 BRPM

## 2024-12-26 LAB
ANION GAP SERPL CALCULATED.3IONS-SCNC: 7 MMOL/L (ref 3–16)
BUN SERPL-MCNC: 20 MG/DL (ref 7–20)
CALCIUM SERPL-MCNC: 8.8 MG/DL (ref 8.3–10.6)
CHLORIDE SERPL-SCNC: 107 MMOL/L (ref 99–110)
CO2 SERPL-SCNC: 27 MMOL/L (ref 21–32)
CREAT SERPL-MCNC: 1.2 MG/DL (ref 0.6–1.2)
DEPRECATED RDW RBC AUTO: 14.2 % (ref 12.4–15.4)
GFR SERPLBLD CREATININE-BSD FMLA CKD-EPI: 47 ML/MIN/{1.73_M2}
GLUCOSE SERPL-MCNC: 93 MG/DL (ref 70–99)
HCT VFR BLD AUTO: 30.3 % (ref 36–48)
HGB BLD-MCNC: 10.3 G/DL (ref 12–16)
MAGNESIUM SERPL-MCNC: 2.02 MG/DL (ref 1.8–2.4)
MCH RBC QN AUTO: 29.5 PG (ref 26–34)
MCHC RBC AUTO-ENTMCNC: 33.9 G/DL (ref 31–36)
MCV RBC AUTO: 87.1 FL (ref 80–100)
PLATELET # BLD AUTO: 198 K/UL (ref 135–450)
PMV BLD AUTO: 9.3 FL (ref 5–10.5)
POTASSIUM SERPL-SCNC: 3.9 MMOL/L (ref 3.5–5.1)
RBC # BLD AUTO: 3.48 M/UL (ref 4–5.2)
SODIUM SERPL-SCNC: 141 MMOL/L (ref 136–145)
WBC # BLD AUTO: 6 K/UL (ref 4–11)

## 2024-12-26 PROCEDURE — 6370000000 HC RX 637 (ALT 250 FOR IP): Performed by: INTERNAL MEDICINE

## 2024-12-26 PROCEDURE — 97110 THERAPEUTIC EXERCISES: CPT

## 2024-12-26 PROCEDURE — 36415 COLL VENOUS BLD VENIPUNCTURE: CPT

## 2024-12-26 PROCEDURE — 97530 THERAPEUTIC ACTIVITIES: CPT

## 2024-12-26 PROCEDURE — 6370000000 HC RX 637 (ALT 250 FOR IP): Performed by: NURSE PRACTITIONER

## 2024-12-26 PROCEDURE — 83735 ASSAY OF MAGNESIUM: CPT

## 2024-12-26 PROCEDURE — 6370000000 HC RX 637 (ALT 250 FOR IP)

## 2024-12-26 PROCEDURE — 85027 COMPLETE CBC AUTOMATED: CPT

## 2024-12-26 PROCEDURE — 97535 SELF CARE MNGMENT TRAINING: CPT

## 2024-12-26 PROCEDURE — 80048 BASIC METABOLIC PNL TOTAL CA: CPT

## 2024-12-26 RX ORDER — AMIODARONE HYDROCHLORIDE 400 MG/1
400 TABLET ORAL 2 TIMES DAILY
Qty: 24 TABLET | Refills: 0 | Status: SHIPPED | OUTPATIENT
Start: 2024-12-26 | End: 2025-01-07

## 2024-12-26 RX ORDER — FUROSEMIDE 20 MG/1
20 TABLET ORAL DAILY
Qty: 60 TABLET | Refills: 1 | Status: SHIPPED | OUTPATIENT
Start: 2024-12-26

## 2024-12-26 RX ORDER — POTASSIUM CHLORIDE 1500 MG/1
20 TABLET, EXTENDED RELEASE ORAL ONCE
Status: COMPLETED | OUTPATIENT
Start: 2024-12-26 | End: 2024-12-26

## 2024-12-26 RX ORDER — METOPROLOL SUCCINATE 25 MG/1
12.5 TABLET, EXTENDED RELEASE ORAL 2 TIMES DAILY
Qty: 30 TABLET | Refills: 1 | Status: SHIPPED | OUTPATIENT
Start: 2024-12-26

## 2024-12-26 RX ORDER — AMIODARONE HYDROCHLORIDE 100 MG/1
200 TABLET ORAL DAILY
Qty: 30 TABLET | Refills: 1 | Status: SHIPPED | OUTPATIENT
Start: 2025-01-07

## 2024-12-26 RX ADMIN — LEVOTHYROXINE SODIUM 175 MCG: 0.03 TABLET ORAL at 05:31

## 2024-12-26 RX ADMIN — PANTOPRAZOLE SODIUM 40 MG: 40 TABLET, DELAYED RELEASE ORAL at 05:32

## 2024-12-26 RX ADMIN — FUROSEMIDE 20 MG: 20 TABLET ORAL at 08:49

## 2024-12-26 RX ADMIN — POTASSIUM CHLORIDE 20 MEQ: 1500 TABLET, EXTENDED RELEASE ORAL at 08:49

## 2024-12-26 RX ADMIN — TRAMADOL HYDROCHLORIDE 50 MG: 50 TABLET, COATED ORAL at 05:34

## 2024-12-26 RX ADMIN — AMIODARONE HYDROCHLORIDE 400 MG: 200 TABLET ORAL at 08:49

## 2024-12-26 RX ADMIN — POLYETHYLENE GLYCOL 3350 17 G: 17 POWDER, FOR SOLUTION ORAL at 08:49

## 2024-12-26 RX ADMIN — METOPROLOL SUCCINATE 12.5 MG: 25 TABLET, EXTENDED RELEASE ORAL at 08:49

## 2024-12-26 RX ADMIN — ACETAMINOPHEN 650 MG: 325 TABLET ORAL at 08:49

## 2024-12-26 ASSESSMENT — PAIN SCALES - GENERAL
PAINLEVEL_OUTOF10: 0
PAINLEVEL_OUTOF10: 6
PAINLEVEL_OUTOF10: 8

## 2024-12-26 ASSESSMENT — PAIN DESCRIPTION - LOCATION: LOCATION: HEAD

## 2024-12-26 NOTE — DISCHARGE SUMMARY
Headache fell hit head Has a \"code stroke\" or \"stroke alert\" been called?  No Decision Support Exception - unselect if not a suspected or confirmed emergency medical condition->Emergency Medical Condition (MA) Reason for Exam:  Headache fell hit head. FINDINGS: BRAIN/VENTRICLES: There is no acute intracranial hemorrhage, mass effect or midline shift.  No abnormal extra-axial fluid collection.  The gray-white differentiation is maintained without evidence of an acute infarct.  There is no evidence of hydrocephalus. Redemonstration of age-appropriate atrophy and small-vessel disease ischemic changes. ORBITS: The visualized portion of the orbits demonstrate no acute abnormality. SINUSES: Chronic sinusitis right maxillary sinus.  Other sinuses are well aerated.  Mastoids well aerated. SOFT TISSUES/SKULL:  No acute abnormality of the visualized skull or soft tissues.     1. No acute intracranial abnormality. Age-appropriate atrophy and small-vessel disease ischemic changes. 2. Chronic sinusitis right maxillary sinus.       Consults:     IP CONSULT TO CARDIOLOGY    Labs:     Recent Labs     12/24/24  0539 12/26/24  0528   WBC 5.5 6.0   HGB 10.7* 10.3*   HCT 31.9* 30.3*    198     Recent Labs     12/24/24  0539 12/26/24  0528    141   K 4.6 3.9    107   CO2 31 27   BUN 16 20   CREATININE 1.3* 1.2   CALCIUM 8.8 8.8   MG 2.08 2.02     No results for input(s): \"PROBNP\", \"TROPHS\" in the last 72 hours.  No results for input(s): \"LABA1C\" in the last 72 hours.  No results for input(s): \"AST\", \"ALT\", \"BILIDIR\", \"BILITOT\", \"ALKPHOS\" in the last 72 hours.  No results for input(s): \"INR\", \"LACTA\", \"TSH\" in the last 72 hours.    Urine Cultures:   Lab Results   Component Value Date/Time    LABURIN  11/26/2024 02:54 PM     >50,000 CFU/ml mixed skin/urogenital flor. No further workup    LABURIN >100,000 CFU/ml 11/26/2024 02:54 PM     Blood Cultures:   Lab Results   Component Value Date/Time    BC No Growth after 4

## 2024-12-26 NOTE — PROGRESS NOTES
V2.0  History and Physical      Name:  Imani Monzon /Age/Sex: 1950  (74 y.o. female)   MRN & CSN:  [id number] & [id number] Encounter Date/Time: 2024 12:18 AM EST   Location:   PCP: Mauri Bunch DO       Hospital Day: 2    Assessment and Plan:   Imani Monzon is a 74 y.o. female with a pmh of hypertension, CKD stage III, bipolar, RLS, hypothyroidism, lower extremity edema, history of PE who presents with Syncope and collapse    Hospital Problems             Last Modified POA    * (Principal) Syncope and collapse 2024 Yes       Plan:  Syncope  Likely secondary to dehydration and MAUREEN.  Will evaluate for cardiogenic causes with echo  Continue telemetry  Last echo in  showed EF of 55%  Patient does take verapamil and metoprolol.  EKG without arrhythmia or acute ischemic abnormalities.  D-dimer was elevated but denies any chest pain shortness of breath O2 sats normal, no EKG changes, no tachypnea  V/Q scan in morning due to MAUREEN    MAUREEN on CKD3b  Creatinine today at 2.0 previous 1.4  1 L of normal saline given in the ED  Continue to monitor    Hypertension  Patient takes verapamil and metoprolol and lisinopril for BP  Holding lisinopril    Hypothyroidism  Continue levothyroxine    Lower extremity edema  Patient takes 40 mg of torsemide daily for lower extremity edema  No history of heart failure  Holding torsemide at this time due to MAUREEN  Compression stockings    Restless legs  Continue ropinirole    Bipolar  Continue Seroquel    Insomnia  Continue trazodone    Disposition:   Current Living situation: Home  Expected Disposition: Home  Estimated D/C: 2 to 3 days    Diet No diet orders on file   DVT Prophylaxis [x] Lovenox, []  Heparin, [] SCDs, [] Ambulation,  [] Eliquis, [] Xarelto, [] Coumadin   Code Status Prior   Surrogate Decision Maker/ Sparkle Trevino     Personally reviewed Lab Studies and Imaging         History from:     patient    History of Present Illness:     Chief 
  [facility] Medicine Progress Note  V 10.25      Date of Admission: 12/18/2024    Hospital Day: 7      Chief Admission Complaint:  syncope    Subjective: Up in bed, looks little weak and tired.  States her dizziness is improved.  Denies chest pain or shortness of breath    Presenting Admission History:       74 y.o. female with pmh of hypertension, CKD stage III, bipolar, RLS, hypothyroidism, lower extremity edema, history of PE who presented with syncope.  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  Patient had an episode of syncope when walking towards the sink.  Patient did not have any prodromal symptoms and does not remember how she ended up falling.  Patient hit her head.  Patient was endorsing dizziness.     In the ED patient was found to have a elevated creatinine of 2.0 previous 1.4.  Patient CT head was negative troponin was negative and EKG was sinus rhythm without any acute changes.  Patient was given 1 L of normal saline.  Patient did have elevated D-dimer of 3.08.  Patient denied any chest pain or shortness of breath.         Assessment/Plan:      Current Principal Problem:  Syncope and collapse    Syncope  Suspect may be secondary to atrial fibrillation.  Echo completed on 12/19/2024  Did reveal normal left ventricular systolic function with estimated EF 60 to 65%.  Right ventricle size normal, low normal systolic function  Grade 1 diastolic dysfunction      VQ scan completed on 12/19/24 and was negative for pulmonary embolism.      Have been  holding metoprolol, verapamil, lisinopril, torsemide.  Telemetry on 12/21/24  was suspicious for atrial fibrillation.  Twelve-lead EKG completed 12/21/2024 did confirm atrial fibrillation.        Atrial fibrillation : Noted on 12/21/2024 on telemetry to be in atrial fibrillation.  Twelve-lead EKG also reviewed and confirmed atrial fibrillation.     Cardiology consulted for further input and 
  Physician Progress Note      PATIENT:               VANESSA GTZ  CSN #:                  [id number]  :                       1950  ADMIT DATE:       2024 7:52 PM  DISCH DATE:  RESPONDING  PROVIDER #:        Sandip Leon MD          QUERY TEXT:    Patient admitted with syncope.   Noted documentation of Syncope Likely   secondary to dehydration and MAUREEN in the H&P. and Syncope likely 2/2   orthostatic hypotension in PN .  If possible, please document in progress notes and discharge summary if you   are evaluating and /or treating any of the following:    The medical record reflects the following:  Risk Factors: 73 yo w/ MAUREEN, syncope, dehydration  Clinical Indicators: Per the H&P: Syncope Likely secondary to dehydration and   MAUREEN.  Per PN ; Syncope likely 2/2 orthostatic hypotension.  Per RN ;   BP supine 97/57, sitting 112/65, standing 121/57. Orthos negative.  Treatment: ECHO, VQ scan, orthostatic vitals, lab monitoring, IVF  Options provided:  -- Syncope due to MAUREEN, dehydration confirmed  -- Syncope due to orthostatic hypotension confirmed  -- Other - I will add my own diagnosis  -- Disagree - Not applicable / Not valid  -- Disagree - Clinically unable to determine / Unknown  -- Refer to Clinical Documentation Reviewer    PROVIDER RESPONSE TEXT:    After study, syncope due to orthostatic hypotension confirmed.    Query created by: Abi Burgos on 2024 6:02 AM      Electronically signed by:  Sandip Leon MD 2024 8:48 AM          
  [facility] Medicine Progress Note  V 10.25      Date of Admission: 12/18/2024    Hospital Day: 9      Chief Admission Complaint:  syncope    Subjective: She is sitting up in bed, in no distress.  Denies chest pain or shortness of breath.  Feels weak and tired but is starting to feel little better.  Appetite improving.    Presenting Admission History:       74 y.o. female with pmh of hypertension, CKD stage III, bipolar, RLS, hypothyroidism, lower extremity edema, history of PE who presented with syncope.  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  Patient had an episode of syncope when walking towards the sink.  Patient did not have any prodromal symptoms and does not remember how she ended up falling.  Patient hit her head.  Patient was endorsing dizziness.     In the ED patient was found to have a elevated creatinine of 2.0 previous 1.4.  Patient CT head was negative troponin was negative and EKG was sinus rhythm without any acute changes.  Patient was given 1 L of normal saline.  Patient did have elevated D-dimer of 3.08.  Patient denied any chest pain or shortness of breath.         Assessment/Plan:      Current Principal Problem:  Syncope and collapse    Syncope  Suspect may be secondary to atrial fibrillation.  Echo completed on 12/19/2024  Did reveal normal left ventricular systolic function with estimated EF 60 to 65%.  Right ventricle size normal, low normal systolic function  Grade 1 diastolic dysfunction      VQ scan completed on 12/19/24 and was negative for pulmonary embolism.      Have been  holding metoprolol, verapamil, lisinopril, torsemide.  Telemetry on 12/21/24  was suspicious for atrial fibrillation.  Twelve-lead EKG completed 12/21/2024 did confirm atrial fibrillation.        Atrial fibrillation : Noted on 12/21/2024 on telemetry to be in atrial fibrillation.  Twelve-lead EKG also reviewed and confirmed atrial fibrillation.   
..4 Eyes Skin Assessment     The patient is being assess for  Admission    I agree that 2 RN's have performed a thorough Head to Toe Skin Assessment on the patient. ALL assessment sites listed below have been assessed.       Areas assessed by both nurses: EFREN Lunsford  [x]   Head, Face, and Ears   [x]   Shoulders, Back, and Chest  [x]   Arms, Elbows, and Hands   [x]   Coccyx, Sacrum, and IschIum  [x]   Legs, Feet, and Heels        Does the Patient have Skin Breakdown?  Yes LDA WOUND CARE was Initiated documentation include the Ginger-wound, Wound Assessment, Measurements, Dressing Treatment, Drainage, and Color\",      Wound in occipital area   Alden Prevention initiated:  Yes   Wound Care Orders initiated:  No      WOC nurse consulted for Pressure Injury (Stage 3,4, Unstageable, DTI, NWPT, and Complex wounds), New and Established Ostomies:  No      Nurse 1 eSignature: Electronically signed by Sha Rivera RN on 12/19/24 at 2:47 AM EST    **SHARE this note so that the co-signing nurse is able to place an eSignature**    Nurse 2 eSignature: Electronically signed by Isatu Calloway RN on 12/19/24 at 2:57 AM EST             
CMU notified RN that pt converted back to NSR at 1835.  Strip faxed and placed in chart.  
Occupational Therapy  Facility/Department: [facility] - MED SURG/ORTHO  Daily Treatment Note  NAME: Imani Monzon  : 1950  MRN: [id number]    Date of Service: 2024    Discharge Recommendations:  Subacute/Skilled Nursing Facility       Therapy discharge recommendations are subject to collaboration from the patient’s interdisciplinary healthcare team, including MD and case management recommendations.    Barriers to Home Discharge:   [] Steps to access home entry or bed/bath   [x] Unable to transfer, ambulate, or propel wheelchair household distances without assist   [x] Limited available assist at home upon discharge    [] Patient or family requests d/c to post-acute facility    [] Poor cognition/safety awareness for d/c to home alone    [] Unable to maintain ordered weight bearing status    [] Patient with salient signs of long-standing immobility   [x] Decreased independence with ADLs   [x] Increased risk for falls   [] Other:      Patient Diagnosis(es): The primary encounter diagnosis was Syncope and collapse. Diagnoses of Elevated d-dimer, History of pulmonary embolism, Dizziness, Acute kidney injury superimposed on CKD (HCC), and Nonintractable headache, unspecified chronicity pattern, unspecified headache type were also pertinent to this visit.     Assessment   Assessment: Pt tolerated session fair, continues to be limited by dizziness, however dizziness does improve (does not resolve) and balance improves. Pt initial balance at EOB min A, progressing to SBA with bed rail as UE support, requires mod A for balance with initial stand due to posterior LOB again does improve to CGA-min A to complete stand step transfer from bed > chair. Pt tolerated UE therex in chair without difficulty. Pt continues to function below her baseline due to above noted barriers and current functional deficits continue to recommend SNF at d/c.   Activity Tolerance: Patient tolerated treatment well;Treatment limited 
Occupational Therapy  Facility/Department: [facility] - MED SURG/ORTHO  Daily Treatment Note  NAME: Imani Monzon  : 1950  MRN: [id number]    Date of Service: 2024    Discharge Recommendations:  Subacute/Skilled Nursing Facility  OT Equipment Recommendations  Equipment Needed: No (defer)    Therapy discharge recommendations are subject to collaboration from the patient’s interdisciplinary healthcare team, including MD and case management recommendations.    Barriers to Home Discharge:   [] Steps to access home entry or bed/bath:   [x] Unable to transfer, ambulate, or propel wheelchair household distances without assist   [x] Limited available assist at home upon discharge    [x] Patient or family requests d/c to post-acute facility    [x] Poor cognition/safety awareness for d/c to home alone    [] Unable to maintain ordered weight bearing status    [] Patient with salient signs of long-standing immobility   [x] Decreased independence with ADLs   [x] Increased risk for falls   [] Other:    If pt is unable to be seen after this session, please let this note serve as discharge summary.  Please see case management note for discharge disposition.  Thank you.    Patient Diagnosis(es): The primary encounter diagnosis was Syncope and collapse. Diagnoses of Elevated d-dimer, History of pulmonary embolism, Dizziness, Acute kidney injury superimposed on CKD (HCC), and Nonintractable headache, unspecified chronicity pattern, unspecified headache type were also pertinent to this visit.     Assessment   Assessment: Pt agreeable to OT session and tolerated well. Co-tx collaboration this date to safely meet goals and will have better occupational performance outcomes with in a co-treatment than 1:1 session (pt seen as cotx d/t needing 2 person assist during previous session, will not need cotx next session). Pt requires CGA for bed mobility, CGA-Kevin for functional transfers, and Kevin for functional mobility using RW. 
Patient admitted to room 546 from ED.  Patient oriented to room, call light, bed rails, phone, lights and bathroom.  Patient instructed about the schedule of the day including: vital sign frequency, lab draws, possible tests, frequency of MD and staff rounds, including RN/MD rounding together at bedside, daily weights, and I &O's.  Patient instructed about prescribed diet, how to use 8MENU, and television.   bed alarm in place, patient aware of placement and reason.   Telemetry box  in place, patient aware of placement and reason.  Bed locked, in lowest position, side rails up 2/4, call light within reach.  Will continue to monitor.     
Patient flipped into sustained Afib. NP notified, 125 mcg digoxin prescribed. Patient tolerated well, states she used to have bouts of afib after stroke two years ago. Will update NP with any changes.   
Physical Therapy  Facility/Department: [place] [address] - MED SURG/ORTHO  Daily Treatment Note  NAME: Imani Monzon  : 1950  MRN: [id number]    Date of Service: 2024    Discharge Recommendations:  Subacute/Skilled Nursing Facility   PT Equipment Recommendations  Equipment Needed: No  Other: defer to next level of care.    Patient Diagnosis(es): The primary encounter diagnosis was Syncope and collapse. Diagnoses of Elevated d-dimer, History of pulmonary embolism, Dizziness, Acute kidney injury superimposed on CKD (HCC), and Nonintractable headache, unspecified chronicity pattern, unspecified headache type were also pertinent to this visit.    Assessment  Assessment: Pt requires CGA for bed mobility, CGA-Kevin for functional transfers, and Kevin for ambulation using RW. Pt is functioning below their baseline and will benefit from continued skilled acute PT services to maximize safety and IND with mobility tasks. SNF is recommended at d/c.  Activity Tolerance: Patient tolerated treatment well  Equipment Needed: No  Other: defer to next level of care.    Plan  Physical Therapy Plan  General Plan: 3-5 times per week  Current Treatment Recommendations: Strengthening;Balance training;Functional mobility training;Transfer training;Endurance training;Gait training;Neuromuscular re-education;Home exercise program;Safety education & training;Patient/Caregiver education & training;Equipment evaluation, education, & procurement;Therapeutic activities;Co-Treatment    Restrictions  Restrictions/Precautions  Restrictions/Precautions: General Precautions, Fall Risk  Position Activity Restriction  Other Position/Activity Restrictions: purewick     Subjective   Orientation  Overall Orientation Status: Within Functional Limits  Orientation Level: Oriented to person;Oriented to situation;Oriented to time;Oriented to place  Cognition  Overall Cognitive Status: Exceptions  Arousal/Alertness: Impaired  Following Commands: Follows one 
Pt c/o severe dizziness lying in bed. BP 98/57 this afternoon. MD notified.   VQ scan negative.   WCTM  
Report called to [facility]. Pt and family sent with all belongings. Heart monitor placed on patient.   
Writer was helping patient get dressed for discharge when patient passed out sitting on side of bed. Writer and patient's daughter assisted patient back in to bed in a supine position. NGUYEN KAUR made aware.   
appreciate their help.  She does follow with [facility] cardiology/Dr. Frederick.  She is now on amiodarone 400 mg p.o. twice daily.  This dose will continue for 14 days and then she will be 200 mg daily.  Also on metoprolol 12.5 mg twice daily     She was started on anticoagulant with Lovenox  1 mg/kg dose twice daily.  Lovenox now discontinued and she is now on Xarelto 20 mg daily.     MRI brain completed 12/21/2024 no bleed noted        MAUREEN on CKD  - likely due to hypovolemia  - improved with IVF and will continue gentle hydration with IV  - continue to follow     HypoThyroid - clinically euthyroid on oral replacement therapy. Continue, w/ outpatient monitoring as previously arranged.        RLS  - continue requip and is currently stable     Bipolar disorder  - mood stable  - continue seroquel, trazodone     Weakness/deconditioning: PT/OT have evaluated and recommend discharge to SNF for continuing therapy.        Ongoing threat to life and/or bodily function without ongoing treatment due to:    Syncope  Atrial fibrillation  Need for anticoagulation         Consults:      IP CONSULT TO CARDIOLOGY      Cardiology consulted and appreciated.  Available consultant notes from yesterday and today were reviewed on 12/23/2024, w/ plan for continued inpatient w/up and management -  as noted above     --------------------------------------------------      Medications:        Infusion Medications    sodium chloride       Scheduled Medications    rivaroxaban  20 mg Oral Daily    amiodarone  400 mg Oral BID    metoprolol succinate  12.5 mg Oral BID    atorvastatin  40 mg Oral Nightly    levothyroxine  175 mcg Oral Daily    [Held by provider] lisinopril  2.5 mg Oral Daily    [Held by provider] verapamil  180 mg Oral Daily    traZODone  150 mg Oral Nightly    QUEtiapine  150 mg Oral QHS    rOPINIRole  1 mg Oral Nightly    pantoprazole  40 mg Oral QAM AC    sodium chloride flush  5-40 mL IntraVENous 2 times per day    [Held by 
mood stable  - continue seroquel, trazodone    Ongoing threat to life and/or bodily function without ongoing treatment due to:  recurrent syncope    Consults:      None        --------------------------------------------------      Medications:        Infusion Medications    sodium chloride       Scheduled Medications    [START ON 12/21/2024] enoxaparin  40 mg SubCUTAneous Daily    atorvastatin  40 mg Oral Nightly    levothyroxine  175 mcg Oral Daily    [Held by provider] lisinopril  2.5 mg Oral Daily    [Held by provider] verapamil  180 mg Oral Daily    [Held by provider] metoprolol succinate  25 mg Oral Daily    traZODone  150 mg Oral Nightly    QUEtiapine  150 mg Oral QHS    rOPINIRole  1 mg Oral Nightly    pantoprazole  40 mg Oral QAM AC    sodium chloride flush  5-40 mL IntraVENous 2 times per day    [Held by provider] torsemide  20 mg Oral Daily     PRN Meds: [Held by provider] ondansetron, sodium chloride flush, sodium chloride, ondansetron **OR** ondansetron, polyethylene glycol, acetaminophen **OR** acetaminophen, sulfur hexafluoride microspheres, traMADol      Physical Exam Performed:      General appearance:  No apparent distress  Respiratory:  Normal respiratory effort.   Cardiovascular:  Regular rate and rhythm.  Abdomen:  Soft, non-tender, non-distended.  Musculoskeletal:  No edema  Neurologic:  Non-focal  Psychiatric:  Alert and oriented    BP (!) 147/77   Pulse 81   Temp 97.9 °F (36.6 °C) (Oral)   Resp 16   Ht 1.6 m (5' 3\")   Wt 94.3 kg (208 lb)   SpO2 94%   BMI 36.85 kg/m²     Telemetry:      Personally reviewed and interpreted telemetry (Rhythm Strip) on 12/20/2024 with the following findings: NSR    Diet: ADULT DIET; Regular    DVT Prophylaxis: [x]PPx LMWH  []SQ Heparin  []IPC/SCDs  []Eliquis  []Xarelto  []Coumadin  [] Heparin Drip  []Other -      Code status: Full Code    PT/OT Eval Status:   [x]NOT yet ordered  []Ordered and Pending   []Seen with Recommendations for:   []Home 
50 mg Oral Q6H PRN BradPrincess, SANDOR - CNP   50 mg at 12/23/24 0527       Objective:     Telemetry monitor: Telemetry independently reviewed and interpreted by me today and shows: normal sinus rhythm     Physical Exam:  Constitutional and general appearance: alert, cooperative, no distress, and appears stated age  HEENT: PERRL, no cervical lymphadenopathy. No masses palpable. Normal oral mucosa  Respiratory:  Normal excursion and expansion without use of accessory muscles  Resp auscultation: Normal breath sounds without wheezing, rhonchi, and rales  Cardiovascular:  The apical impulse is not displaced  Heart tones are crisp and normal. regular S1 and S2.  Jugular venous pulsation Normal  The carotid upstroke is normal in amplitude and contour without delay or bruit  Peripheral pulses are symmetrical and full   Abdomen:  No masses or tenderness  Bowel sounds present  Extremities:   No cyanosis or clubbing   No lower extremity edema   Skin: warm and dry  Neurological:  Alert and oriented  Moves all extremities well  No abnormalities of mood, affect, memory, mentation, or behavior are noted    Data  ECG 12/22/2024  Sinus rhythm rate 76    ECHO-12/19/2024      Left Ventricle: Normal left ventricular systolic function with a visually estimated EF of 60 - 65%. Left ventricle size is normal. Normal wall thickness. Normal wall motion. Grade I diastolic dysfunction with normal LAP.    Right Ventricle: Right ventricle size is normal. Low normal systolic function.    Aortic Valve: No regurgitation. No stenosis.    Mitral Valve: Mild regurgitation.    Tricuspid Valve: No regurgitation. Unable to assess RVSP due to inadequate or insignificant tricuspid regurgitation.    Pericardium: No pericardial effusion.    IVC/SVC: IVC was not well visualized.    All labs and testing reviewed.  Lab Review     Renal Profile:   Lab Results   Component Value Date/Time    CREATININE 1.2 12/23/2024 06:09 AM    BUN 14 12/23/2024 06:09 AM    NA 
Left upper arm  BP Method: Automatic  Patient Position: Semi fowlers  Comment: reports dizziness at rest in supine. HR 78 BPM, SPO2 97% on room air, /56 in supine, HR 74 BPM, /72 in seated at EOB.             Safety Devices  Type of Devices: All vandana prominences offloaded;Heels elevated for pressure relief;Bed alarm in place;Call light within reach;Nurse notified;Left in bed;Patient at risk for falls  Restraints  Restraints Initially in Place: No        AROM: Generally decreased, functional  Strength: Grossly decreased, non-functional  Coordination: Generally decreased, functional  Tone: Normal    ADL  Toileting: Dependent/Total ([place])        Bed mobility  Supine to Sit: Maximum assistance  Sit to Supine: 2 Person assistance  Scooting: Moderate assistance  Bed Mobility Comments: posterior lean upon initial supine -->sit, c/o dizziness with postural changes, BP stable    Transfers  Sit to stand: Unable to assess (increased c/o dizziness and decreased strength to complete full sit-->stand with A x 2)    Vision  Vision: Impaired  Vision Exceptions: Wears glasses for reading;Wears glasses for distance;Wears glasses at all times  Hearing  Hearing: Within functional limits    Cognition  Overall Cognitive Status: Exceptions (tires easily with minimal exertion, becoming less verbal, vitials stable, RN notified)  Arousal/Alertness: Impaired  Following Commands: Follows one step commands consistently  Attention Span: Impaired  Memory: Decreased recall of recent events  Safety Judgement: Decreased awareness of need for safety;Decreased awareness of need for assistance  Insights: Decreased awareness of deficits  Initiation: Requires cues for some  Sequencing: Does not require cues  Orientation  Overall Orientation Status: Within Functional Limits  Orientation Level: Oriented to person;Oriented to situation;Oriented to time;Oriented to place               Exercise Treatment: BUE AROM seated EOB  Hand flex/ext: x 
[x]Home w/ []HHC vs []SNF  []Acute Rehab  []LTAC  []Hospice  []Other -    Anticipated Discharge Day/Date:  1-2 days  Barriers to Discharge: hypotension  --------------------------------------------------    MDM (any 2 required for High level billing)    A. Problems (any 1)  [x] Acute/Chronic Illness/injury posing ongoing threat to life and/or bodily function without ongoing treatment    [] Severe exacerbation of chronic illness    --------------------------------------------------  B. Risk of Treatment (any 1)    [] Drugs/treatments that require intensive monitoring for toxicity    [] IV ABX (Vancomycin, Aminoglycosides, etc)     [] Post-Cath/Contrast study requiring serial monitoring    [] IV Narcotic analgesia    [] Aggressive IV diuresis    [] Hypertonic Saline    [] Critical electrolyte abnormalities requiring IV replacement    [] Insulin - Scheduled/SSI or Insulin gtt    [] Anticoagulation (Heparin gtt or Coumadin - other anticoagulants in special circumstances)    [] Cardiac Medications (IV Amiodarone/Diltiazem, Tikosyn, etc)    [] Hemodialysis    [] Other -    [] Change in code status    [] Decision to escalate care    [] Major surgery/procedure with associated risk factors    --------------------------------------------------  C. Data (any 2)    [] Data Review (any 3)    [] Consultant notes from yesterday/today    [x] All available current labs reviewed interpreted for clinical significance    [x] Appropriate follow-up labs were ordered  [] Collateral history obtained     [x] Independent Interpretation of tests (any 1)    [x] Telemetry (Rhythm Strip) personally reviewed and interpreted        [] Imaging personally reviewed and interpreted     [x] Discussion (any 1)  [x] Multi-Disciplinary Rounds with Case Management  [] Discussed management of the case with           Labs:  Personally reviewed on 12/19/2024 and interpreted for clinical significance as documented above.     Recent Labs     12/18/24 2019 
after 1 minute    /80   Pulse 78   Temp 98.1 °F (36.7 °C) (Oral)   Resp 18   Ht 1.6 m (5' 3\")   Wt 94.3 kg (208 lb)   SpO2 92%   BMI 36.85 kg/m²     Telemetry:      Personally reviewed and interpreted telemetry (Rhythm Strip) on 12/21/2024 with the following findings:  atrial fib    Diet: ADULT DIET; Regular    DVT Prophylaxis: [x]PPx LMWH  []SQ Heparin  []IPC/SCDs  []Eliquis  []Xarelto  []Coumadin  [] Heparin Drip  []Other -      Code status: Full Code    PT/OT Eval Status:   []NOT yet ordered  [x]Ordered and Pending   []Seen with Recommendations for:   []Home independently  []Home w/ assist  []HHC  []SNF  []Acute Rehab    Multi-Disciplinary Rounds with Case Management completed on 12/21/2024 with the following recommendations:  Anticipated Discharge Location: []Home w/ [x]HHC vs []SNF  []Acute Rehab  []LTAC  []Hospice  []Other -    Anticipated Discharge Day/Date:  1-2 days  Barriers to Discharge: atrial fib  Dizzyness   --------------------------------------------------    MDM (any 2 required for High level billing)    A. Problems (any 1)  [x] Acute/Chronic Illness/injury posing ongoing threat to life and/or bodily function without ongoing treatment    [] Severe exacerbation of chronic illness    --------------------------------------------------  B. Risk of Treatment (any 1)    [x] Drugs/treatments that require intensive monitoring for toxicity    [] IV ABX (Vancomycin, Aminoglycosides, etc)     [] Post-Cath/Contrast study requiring serial monitoring    [] IV Narcotic analgesia    [] Aggressive IV diuresis    [] Hypertonic Saline    [x] Critical electrolyte abnormalities requiring IV replacement    [] Insulin - Scheduled/SSI or Insulin gtt    [] Anticoagulation (Heparin gtt or Coumadin - other anticoagulants in special circumstances)    [] Cardiac Medications (IV Amiodarone/Diltiazem, Tikosyn, etc)    [] Hemodialysis    [] Other -    [] Change in code status    [] Decision to escalate care    [] 
Day/Date: 1 to 2 days  Barriers to Discharge:   Atrial fibrillation  Weakness   trying to arrange suitable disposition  --------------------------------------------------    MDM (any 2 required for High level billing)    A. Problems (any 1)  [x] Acute/Chronic Illness/injury posing ongoing threat to life and/or bodily function without ongoing treatment    [] Severe exacerbation of chronic illness    --------------------------------------------------  B. Risk of Treatment (any 1)    [x] Drugs/treatments that require intensive monitoring for toxicity    [] IV ABX (Vancomycin, Aminoglycosides, etc)     [] Post-Cath/Contrast study requiring serial monitoring    [] IV Narcotic analgesia    [] Aggressive IV diuresis    [] Hypertonic Saline    [] Critical electrolyte abnormalities requiring IV replacement    [] Insulin - Scheduled/SSI or Insulin gtt    [x] Anticoagulation (Heparin gtt or Coumadin - other anticoagulants in special circumstances)    [] Cardiac Medications (IV Amiodarone/Diltiazem, Tikosyn, etc)    [] Hemodialysis    [] Other -    [] Change in code status    [] Decision to escalate care    [] Major surgery/procedure with associated risk factors    --------------------------------------------------  C. Data (any 2)    [x] Data Review (any 3)    [x] Consultant notes from yesterday/today    [x] All available current labs reviewed interpreted for clinical significance    [x] Appropriate follow-up labs were ordered  [] Collateral history obtained     [x] Independent Interpretation of tests (any 1)    [x] Telemetry (Rhythm Strip) personally reviewed and interpreted        [] Imaging personally reviewed and interpreted     [x] Discussion (any 1)  [x] Multi-Disciplinary Rounds with Case Management  [] Discussed management of the case with           Labs:  Personally reviewed on 12/22/2024 and interpreted for clinical significance as documented above.     Recent Labs     12/20/24  0547 12/22/24  0536   WBC 6.2 5.8 
16   CREATININE 1.3* 1.2 1.3*   CALCIUM 8.3 8.5 8.8   MG 1.97 2.03 2.08     No results for input(s): \"PROBNP\", \"TROPHS\" in the last 72 hours.  No results for input(s): \"LABA1C\" in the last 72 hours.  No results for input(s): \"AST\", \"ALT\", \"BILIDIR\", \"BILITOT\", \"ALKPHOS\" in the last 72 hours.  No results for input(s): \"INR\", \"LACTA\", \"TSH\" in the last 72 hours.    Urine Cultures:   Lab Results   Component Value Date/Time    LABURIN  11/26/2024 02:54 PM     >50,000 CFU/ml mixed skin/urogenital flor. No further workup    LABURIN >100,000 CFU/ml 11/26/2024 02:54 PM     Blood Cultures:   Lab Results   Component Value Date/Time    BC No Growth after 4 days of incubation. 01/31/2020 09:47 PM     Lab Results   Component Value Date/Time    BLOODCULT2 No Growth after 4 days of incubation. 01/31/2020 11:09 PM     Organism:   Lab Results   Component Value Date/Time    ORG Klebsiella pneumoniae 11/26/2024 02:54 PM         WENDY ESCAMILLA MD   
Supine: Maximum assistance;Assist X2;Additional time;Adaptive equipment  Balance  Sitting: Impaired  Sitting - Static: Poor (constant support) (several posterior and R lateral LOB in seated requriing up to MOD A to maintin balance.)  Sitting - Dynamic: Poor (constant support)  Standing:  (ADITYA. trialled sit<>stand several times with RW, pt unable to come into standing on any attempt secondary to dizziness.)  Transfer Training  Transfer Training: No (pt unable to come up into standing despite several tries with RW and MAX A secondary to dizziness.)  Gait  Gait Training: No      Exercise Treatment: AP, LAQ in seated at EOB, QS, GS, HS, SAQ 1X10 each in supine.     AM-PAC - Mobility    AM-PAC Basic Mobility - Inpatient   How much help is needed turning from your back to your side while in a flat bed without using bedrails?: A Lot  How much help is needed moving from lying on your back to sitting on the side of a flat bed without using bedrails?: A Lot  How much help is needed moving to and from a bed to a chair?: Total  How much help is needed standing up from a chair using your arms?: Total  How much help is needed walking in hospital room?: Total  How much help is needed climbing 3-5 steps with a railing?: Total  AM-PAC Inpatient Mobility Raw Score : 8  AM-PAC Inpatient T-Scale Score : 28.52  Mobility Inpatient CMS 0-100% Score: 86.62  Mobility Inpatient CMS G-Code Modifier : CM    Goals  Short Term Goals  Time Frame for Short Term Goals: 7 days (12/29) unless otherwise noted.  Short Term Goal 1: Pt will demonstrate MOD A with bed mobility.  Short Term Goal 2: Pt will demonstrate MOD A with functional transfers with LRAD.  Short Term Goal 3: Pt will demonstrate MOD A for ambulation up to 15' with LRAD.  Short Term Goal 4: Pt will participate in 4 different BLE exercises of 12-15 reps each to improve strength and endurance by 12.27.  Patient Goals   Patient Goals : \"I want to get my dizziness better and go back home

## 2024-12-26 NOTE — CARE COORDINATION
Follow-Up copy of Important Message from Medicare (IMM2) has been explained to patient and/or designated healthcare decision maker (HCDM). Pt and/or HCDM aware that patient is permitted to stay an additional 4 hours prior to discharge to consider an appeal if they feel as though they are being discharged too soon. Patient may discharge as planned if chooses to do so.  Patient/HCDM voice no other concerns or questions regarding this process.      Lyn Steele RN

## 2024-12-26 NOTE — CARE COORDINATION
CASE MANAGEMENT DISCHARGE SUMMARY      Discharge to: [facility]    Precertification completed: Yes  Hospital Exemption Notification (HENS) completed: Yes    IMM given: (date) 12/26/24    New Durable Medical Equipment ordered/agency: Deferred    Transportation: [facility]      Medical Transport explained to pt/family. Pt/family voice no agency preference.    Agency used: [facility]   time: 1400   Ambulance form completed: Yes    Confirmed discharge plan with:     Patient: yes     Family:  yes    Name: Sparkle Contact number: [phone]     Facility/Agency, name: [facility]    Phone number for report to facility: [phone]     RN, name: Candace    Note: Discharging nurse to complete SURYA, reconcile AVS, and place final copy with patient's discharge packet. RN to ensure that written prescriptions for  Level II medications are sent with patient to the facility as per protocol.      Lyn Steele, RN

## 2024-12-26 NOTE — DISCHARGE INSTR - COC
Continuity of Care Form    Patient Name: Imani Monzon   :  1950  MRN:  [id number]    Admit date:  2024  Discharge date:  ***    Code Status Order: Full Code   Advance Directives:   Advance Care Flowsheet Documentation             Admitting Physician:  Dodie Wisdom MD  PCP: Mauri Bunch DO    Discharging Nurse: ***  Discharging Hospital Unit/Room#: 0546/0546-01  Discharging Unit Phone Number: ***    Emergency Contact:   Extended Emergency Contact Information  Primary Emergency Contact: Sparkle Zhang  Address: [address] [place] [address] [facility]  Home Phone: [phone]  Mobile Phone: [phone]  Relation: Child  Preferred language: English   needed? No    Past Surgical History:  Past Surgical History:   Procedure Laterality Date    BARIATRIC SURGERY  11    Laparoscopic Sleeve Gastrectomy, Lap Hiatal hernia repair    CHOLECYSTECTOMY  1985    COLONOSCOPY       colon polyps; Dr. Ferrell    EYE SURGERY Left 2018    phaco with IOL    HIATAL HERNIA REPAIR      lap    HYSTERECTOMY (CERVIX STATUS UNKNOWN)      TAHBSO for fibroids and DUB    LEG SURGERY      L; s/p tibia and fibular fx's; has uma in L tibia; surgery x 6    OVARY REMOVAL      MD XCAPSL CTRC RMVL INSJ IO LENS PROSTH W/O ECP Left 2018    PHACOEMULSIFICATION OF CATARACT LEFT EYE WITH INTRAOCULAR LENS IMPLANT performed by Tony Reyes MD at [facility] OR    MD XCAPSL CTRC RMVL INSJ IO LENS PROSTH W/O ECP Right 2018    PHACOEMULSIFICATION OF CATARACT RIGHT  EYE WITH INTRAOCULAR LENS IMPLANT performed by Tony Reyes MD at [facility] OR    SINUS SURGERY      \"Cleaned out\"    TONSILLECTOMY      UPPER GASTROINTESTINAL ENDOSCOPY      Dr. Ferrell    UPPER GASTROINTESTINAL ENDOSCOPY  11    WNL       Immunization History:   Immunization History   Administered Date(s) Administered    COVID-19, PFIZER PURPLE top, DILUTE for use, (age 12 y+), 30mcg/0.3mL 2021,

## 2024-12-27 ENCOUNTER — TELEPHONE (OUTPATIENT)
Dept: FAMILY MEDICINE CLINIC | Age: 74
End: 2024-12-27

## 2024-12-27 NOTE — TELEPHONE ENCOUNTER
Care Transitions Initial Follow Up Call    Outreach made within 2 business days of discharge: Yes    Patient: Imani Monzon Patient : 1950   MRN: [id number]  Reason for Admission: Syncope, and collapse   Discharge Date: 24       Spoke with: Patient admitted to SNF [facility].  Once the patient is discharged from SNF I will schedule for a Hospital follow up appointment.    Discharge department/facility: [facility]        Scheduled appointment with PCP within 7-14 days    Follow Up  Future Appointments   Date Time Provider Department Center   2025  1:00 PM Mauri Bunch DO [facility] DEP   3/20/2025  1:30 PM Phoebe Torres DO [facility]       Ally Baez LPN

## 2024-12-30 ENCOUNTER — TELEPHONE (OUTPATIENT)
Dept: FAMILY MEDICINE CLINIC | Age: 74
End: 2024-12-30

## 2024-12-30 NOTE — TELEPHONE ENCOUNTER
Received phone call from Kenny with care connection in regards to    D/C home Friday from [facility] in [place]    [facility] PT, OT, SN    Kenny- [phone]

## 2024-12-30 NOTE — TELEPHONE ENCOUNTER
.Care Transitions Initial Follow Up Call    Outreach made within 2 business days of discharge: Yes    Patient: Imani Monzon Patient : 1950   MRN: [id number]  Reason for Admission: 24  Discharge Date: 24       Spoke with: Patient    Discharge department/facility: [facility] Interactive Patient Contact:  Was patient able to fill all prescriptions: Yes  Was patient instructed to bring all medications to the follow-up visit: Yes  Is patient taking all medications as directed in the discharge summary? Yes  Does patient understand their discharge instructions: Yes  Does patient have questions or concerns that need addressed prior to 7-14 day follow up office visit: no    Additional needs identified to be addressed with provider  No needs identified             Scheduled appointment with PCP within 7-14 days    Follow Up  Future Appointments   Date Time Provider Department Center   2025  1:00 PM Mauri Bunch DO [facility]   3/20/2025  1:30 PM Phoebe Torres DO [facility] Stephanie Traft

## 2024-12-31 ENCOUNTER — OFFICE VISIT (OUTPATIENT)
Dept: FAMILY MEDICINE CLINIC | Age: 74
End: 2024-12-31

## 2024-12-31 VITALS
SYSTOLIC BLOOD PRESSURE: 108 MMHG | WEIGHT: 209.8 LBS | BODY MASS INDEX: 37.16 KG/M2 | HEART RATE: 84 BPM | DIASTOLIC BLOOD PRESSURE: 82 MMHG | OXYGEN SATURATION: 98 %

## 2024-12-31 DIAGNOSIS — G25.81 RLS (RESTLESS LEGS SYNDROME): ICD-10-CM

## 2024-12-31 DIAGNOSIS — R55 SYNCOPE AND COLLAPSE: ICD-10-CM

## 2024-12-31 DIAGNOSIS — R60.0 LEG EDEMA: ICD-10-CM

## 2024-12-31 DIAGNOSIS — I48.0 PAROXYSMAL ATRIAL FIBRILLATION (HCC): ICD-10-CM

## 2024-12-31 DIAGNOSIS — Z09 HOSPITAL DISCHARGE FOLLOW-UP: Primary | ICD-10-CM

## 2024-12-31 RX ORDER — ROPINIROLE 1 MG/1
TABLET, FILM COATED ORAL
Qty: 270 TABLET | Refills: 1
Start: 2024-12-31

## 2024-12-31 NOTE — PROGRESS NOTES
A with pneumonia    Acute respiratory failure with hypoxemia    Multifocal pneumonia    Palpitations    Acute pulmonary embolism without acute cor pulmonale (HCC)    Dizziness    TIA (transient ischemic attack)    Sensorineural hearing loss, bilateral    Precordial pain    SOB (shortness of breath)    Syncope and collapse       Medications listed as ordered at the time of discharge from hospital     Medication List            Accurate as of December 31, 2024  5:50 PM. If you have any questions, ask your nurse or doctor.                CHANGE how you take these medications      rOPINIRole 1 MG tablet  Commonly known as: REQUIP  TAKE 3 TABLETS BY MOUTH NIGHTLY.  What changed:   how much to take  how to take this  when to take this  additional instructions  Changed by: Dr. Nahed Vargas MD            CONTINUE taking these medications      albuterol sulfate  (90 Base) MCG/ACT inhaler  Commonly known as: Proventil HFA  Inhale 2 puffs into the lungs every 6 hours as needed for Wheezing     * amiodarone 400 MG tablet  Commonly known as: PACERONE  Take 1 tablet by mouth 2 times daily for 12 days     * amiodarone 100 MG tablet  Commonly known as: PACERONE  Take 2 tablets by mouth daily  Start taking on: January 7, 2025     atorvastatin 40 MG tablet  Commonly known as: LIPITOR  TAKE 1 TABLET BY MOUTH NIGHTLY     JOBST KNEE HIGH COMPRESSION SM Misc  1 each by Does not apply route daily as needed (swelling)     levothyroxine 175 MCG tablet  Commonly known as: SYNTHROID  TAKE 1 TABLET BY MOUTH EVERY DAY     metoprolol succinate 25 MG extended release tablet  Commonly known as: TOPROL XL  Take 0.5 tablets by mouth 2 times daily     omeprazole 40 MG delayed release capsule  Commonly known as: PRILOSEC  TAKE 1 CAPSULE BY MOUTH EVERY DAY     ondansetron 4 MG disintegrating tablet  Commonly known as: ZOFRAN-ODT  Take 1 tablet by mouth 3 times daily as needed for Nausea or Vomiting     QUEtiapine 150 MG Tb24 extended release

## 2025-01-02 ENCOUNTER — PATIENT MESSAGE (OUTPATIENT)
Dept: FAMILY MEDICINE CLINIC | Age: 75
End: 2025-01-02

## 2025-01-03 NOTE — TELEPHONE ENCOUNTER
Spoke with patient.  Advised those scripts were sent to the [facility].  Advised she will need to call [facility] and have [facility] pull the scripts from [place].  Patient stated understanding.

## 2025-01-10 RX ORDER — LEVOTHYROXINE SODIUM 175 UG/1
TABLET ORAL
Qty: 90 TABLET | Refills: 1 | Status: SHIPPED | OUTPATIENT
Start: 2025-01-10

## 2025-01-10 NOTE — TELEPHONE ENCOUNTER
Refill Request     CONFIRM preferred pharmacy with the patient.    If Mail Order Rx - Pend for 90 day refill.      Last Seen: Last Seen Department: 12/31/2024  Last Seen by PCP: 8/26/2024    Last Written: 8/3/24 90 with 1 refill     If no future appointment scheduled:  Review the last OV with PCP and review information for follow-up visit,  Route STAFF MESSAGE with patient name to the  Pool for scheduling with the following information:            -  Timing of next visit           -  Visit type ie Physical, OV, etc           -  Diagnoses/Reason ie. COPD, HTN - Do not use MEDICATION, Follow-up or CHECK UP - Give reason for visit      Next Appointment:   Future Appointments   Date Time Provider Department Center   2/26/2025  1:00 PM Mauri Bunch DO [facility]   3/20/2025  1:30 PM Phoebe Torres DO [facility]       Message sent to  to schedule appt with patient?  NO      Requested Prescriptions     Pending Prescriptions Disp Refills    levothyroxine (SYNTHROID) 175 MCG tablet [Pharmacy Med Name: LEVOTHYROXINE 175 MCG TABLET] 90 tablet 1     Sig: TAKE 1 TABLET BY MOUTH EVERY DAY

## 2025-01-20 DIAGNOSIS — E78.2 MIXED HYPERLIPIDEMIA: ICD-10-CM

## 2025-01-20 DIAGNOSIS — E03.9 ACQUIRED HYPOTHYROIDISM: ICD-10-CM

## 2025-01-21 LAB
CHOLEST SERPL-MCNC: 192 MG/DL (ref 0–199)
HDLC SERPL-MCNC: 72 MG/DL (ref 40–60)
LDLC SERPL CALC-MCNC: 97 MG/DL
T4 FREE SERPL-MCNC: 2.5 NG/DL (ref 0.9–1.8)
TRIGL SERPL-MCNC: 117 MG/DL (ref 0–150)
TSH SERPL DL<=0.005 MIU/L-ACNC: 0.06 UIU/ML (ref 0.27–4.2)
VLDLC SERPL CALC-MCNC: 23 MG/DL

## 2025-01-23 DIAGNOSIS — E03.9 ACQUIRED HYPOTHYROIDISM: Primary | ICD-10-CM

## 2025-01-24 ENCOUNTER — PATIENT MESSAGE (OUTPATIENT)
Dept: CARDIOLOGY CLINIC | Age: 75
End: 2025-01-24

## 2025-01-24 NOTE — TELEPHONE ENCOUNTER
When did it start? 3 days ago off and on   How long? Off and on luis when waking up   Any vomiting or diarrhea? No but nauseous   Started any new medications? no  Have you had this before? Yes when she passed out at work on dec 18 2024  Is your heart racing? no  Have you or do you feel like your going to pass out? No but have previously before hospital. She sts she is unsteady and has had to grab on to things to keep from falling

## 2025-01-24 NOTE — TELEPHONE ENCOUNTER
Call  Unclear why having these symptoms  Was seen in hospital for this and monitor ordered. Does she have the monitor. If not, have come in for 4 week monitor   Keep OV as scheduled  Should also see PCP to eval for non cardiac causes of her symptoms

## 2025-01-25 ENCOUNTER — APPOINTMENT (OUTPATIENT)
Dept: CT IMAGING | Age: 75
DRG: 103 | End: 2025-01-25
Payer: COMMERCIAL

## 2025-01-25 ENCOUNTER — HOSPITAL ENCOUNTER (INPATIENT)
Age: 75
LOS: 4 days | Discharge: HOME | DRG: 103 | End: 2025-01-29
Attending: EMERGENCY MEDICINE
Payer: COMMERCIAL

## 2025-01-25 ENCOUNTER — APPOINTMENT (OUTPATIENT)
Dept: GENERAL RADIOLOGY | Age: 75
DRG: 103 | End: 2025-01-25
Payer: COMMERCIAL

## 2025-01-25 DIAGNOSIS — I63.9 CEREBROVASCULAR ACCIDENT (CVA), UNSPECIFIED MECHANISM (HCC): ICD-10-CM

## 2025-01-25 DIAGNOSIS — R55 SYNCOPE, UNSPECIFIED SYNCOPE TYPE: ICD-10-CM

## 2025-01-25 DIAGNOSIS — R27.0 ATAXIA: ICD-10-CM

## 2025-01-25 DIAGNOSIS — R42 DIZZINESS: Primary | ICD-10-CM
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
## 2025-01-25 LAB
ALBUMIN SERPL-MCNC: 4 G/DL (ref 3.4–5)
ALBUMIN/GLOB SERPL: 1.6 {RATIO} (ref 1.1–2.2)
ALP SERPL-CCNC: 83 U/L (ref 40–129)
ALT SERPL-CCNC: 15 U/L (ref 10–40)
ANION GAP SERPL CALCULATED.3IONS-SCNC: 11 MMOL/L (ref 3–16)
ANISOCYTOSIS BLD QL SMEAR: ABNORMAL
AST SERPL-CCNC: 20 U/L (ref 15–37)
BASOPHILS # BLD: 0 K/UL (ref 0–0.2)
BASOPHILS NFR BLD: 0 %
BILIRUB SERPL-MCNC: 0.6 MG/DL (ref 0–1)
BUN SERPL-MCNC: 16 MG/DL (ref 7–20)
CALCIUM SERPL-MCNC: 9.8 MG/DL (ref 8.3–10.6)
CHLORIDE SERPL-SCNC: 104 MMOL/L (ref 99–110)
CO2 SERPL-SCNC: 26 MMOL/L (ref 21–32)
CREAT SERPL-MCNC: 1.5 MG/DL (ref 0.6–1.2)
DEPRECATED RDW RBC AUTO: 15.8 % (ref 12.4–15.4)
EOSINOPHIL # BLD: 0.1 K/UL (ref 0–0.6)
EOSINOPHIL NFR BLD: 2 %
GFR SERPLBLD CREATININE-BSD FMLA CKD-EPI: 36 ML/MIN/{1.73_M2}
GLUCOSE SERPL-MCNC: 98 MG/DL (ref 70–99)
HCT VFR BLD AUTO: 40.9 % (ref 36–48)
HGB BLD-MCNC: 13.9 G/DL (ref 12–16)
LYMPHOCYTES # BLD: 4.7 K/UL (ref 1–5.1)
LYMPHOCYTES NFR BLD: 38 %
MACROCYTES BLD QL SMEAR: ABNORMAL
MCH RBC QN AUTO: 29.8 PG (ref 26–34)
MCHC RBC AUTO-ENTMCNC: 34.1 G/DL (ref 31–36)
MCV RBC AUTO: 87.5 FL (ref 80–100)
MICROCYTES BLD QL SMEAR: ABNORMAL
MONOCYTES # BLD: 0.1 K/UL (ref 0–1.3)
MONOCYTES NFR BLD: 2 %
NEUTROPHILS # BLD: 2.3 K/UL (ref 1.7–7.7)
NEUTROPHILS NFR BLD: 31 %
OVALOCYTES BLD QL SMEAR: ABNORMAL
PATH INTERP BLD-IMP: YES
PLATELET # BLD AUTO: 252 K/UL (ref 135–450)
PLATELET BLD QL SMEAR: ADEQUATE
PMV BLD AUTO: 9.2 FL (ref 5–10.5)
POIKILOCYTOSIS BLD QL SMEAR: ABNORMAL
POTASSIUM SERPL-SCNC: 4 MMOL/L (ref 3.5–5.1)
PROT SERPL-MCNC: 6.5 G/DL (ref 6.4–8.2)
RBC # BLD AUTO: 4.67 M/UL (ref 4–5.2)
SLIDE REVIEW: ABNORMAL
SODIUM SERPL-SCNC: 141 MMOL/L (ref 136–145)
TROPONIN, HIGH SENSITIVITY: 10 NG/L (ref 0–14)
VARIANT LYMPHS NFR BLD MANUAL: 27 % (ref 0–6)
WBC # BLD AUTO: 7.3 K/UL (ref 4–11)

## 2025-01-25 PROCEDURE — 96375 TX/PRO/DX INJ NEW DRUG ADDON: CPT

## 2025-01-25 PROCEDURE — 70450 CT HEAD/BRAIN W/O DYE: CPT

## 2025-01-25 PROCEDURE — 6360000002 HC RX W HCPCS: Performed by: EMERGENCY MEDICINE

## 2025-01-25 PROCEDURE — 71045 X-RAY EXAM CHEST 1 VIEW: CPT

## 2025-01-25 PROCEDURE — 93005 ELECTROCARDIOGRAM TRACING: CPT | Performed by: EMERGENCY MEDICINE

## 2025-01-25 PROCEDURE — 99285 EMERGENCY DEPT VISIT HI MDM: CPT

## 2025-01-25 PROCEDURE — 1200000000 HC SEMI PRIVATE

## 2025-01-25 PROCEDURE — 2500000003 HC RX 250 WO HCPCS

## 2025-01-25 PROCEDURE — 6370000000 HC RX 637 (ALT 250 FOR IP): Performed by: EMERGENCY MEDICINE

## 2025-01-25 PROCEDURE — 6370000000 HC RX 637 (ALT 250 FOR IP)

## 2025-01-25 PROCEDURE — 96374 THER/PROPH/DIAG INJ IV PUSH: CPT

## 2025-01-25 PROCEDURE — 85025 COMPLETE CBC W/AUTO DIFF WBC: CPT

## 2025-01-25 PROCEDURE — 80053 COMPREHEN METABOLIC PANEL: CPT

## 2025-01-25 PROCEDURE — 84484 ASSAY OF TROPONIN QUANT: CPT

## 2025-01-25 RX ORDER — MAGNESIUM SULFATE IN WATER 40 MG/ML
2000 INJECTION, SOLUTION INTRAVENOUS PRN
Status: DISCONTINUED | OUTPATIENT
Start: 2025-01-25 | End: 2025-01-27

## 2025-01-25 RX ORDER — POTASSIUM CHLORIDE 7.45 MG/ML
10 INJECTION INTRAVENOUS PRN
Status: DISCONTINUED | OUTPATIENT
Start: 2025-01-25 | End: 2025-01-29 | Stop reason: HOSPADM

## 2025-01-25 RX ORDER — ROPINIROLE 0.5 MG/1
1 TABLET, FILM COATED ORAL NIGHTLY
Status: DISCONTINUED | OUTPATIENT
Start: 2025-01-25 | End: 2025-01-27

## 2025-01-25 RX ORDER — AMIODARONE HYDROCHLORIDE 200 MG/1
200 TABLET ORAL DAILY
Status: DISCONTINUED | OUTPATIENT
Start: 2025-01-25 | End: 2025-01-29 | Stop reason: HOSPADM

## 2025-01-25 RX ORDER — ONDANSETRON 4 MG/1
4 TABLET, ORALLY DISINTEGRATING ORAL EVERY 8 HOURS PRN
Status: DISCONTINUED | OUTPATIENT
Start: 2025-01-25 | End: 2025-01-29 | Stop reason: HOSPADM

## 2025-01-25 RX ORDER — ATORVASTATIN CALCIUM 40 MG/1
40 TABLET, FILM COATED ORAL NIGHTLY
Status: DISCONTINUED | OUTPATIENT
Start: 2025-01-25 | End: 2025-01-29 | Stop reason: HOSPADM

## 2025-01-25 RX ORDER — ACETAMINOPHEN 650 MG/1
650 SUPPOSITORY RECTAL EVERY 6 HOURS PRN
Status: DISCONTINUED | OUTPATIENT
Start: 2025-01-25 | End: 2025-01-29 | Stop reason: HOSPADM

## 2025-01-25 RX ORDER — ACETAMINOPHEN 500 MG
1000 TABLET ORAL
Status: COMPLETED | OUTPATIENT
Start: 2025-01-25 | End: 2025-01-25

## 2025-01-25 RX ORDER — POLYETHYLENE GLYCOL 3350 17 G/17G
17 POWDER, FOR SOLUTION ORAL DAILY PRN
Status: DISCONTINUED | OUTPATIENT
Start: 2025-01-25 | End: 2025-01-29 | Stop reason: HOSPADM

## 2025-01-25 RX ORDER — ONDANSETRON 2 MG/ML
4 INJECTION INTRAMUSCULAR; INTRAVENOUS ONCE
Status: COMPLETED | OUTPATIENT
Start: 2025-01-25 | End: 2025-01-25

## 2025-01-25 RX ORDER — ONDANSETRON 2 MG/ML
4 INJECTION INTRAMUSCULAR; INTRAVENOUS EVERY 6 HOURS PRN
Status: DISCONTINUED | OUTPATIENT
Start: 2025-01-25 | End: 2025-01-29 | Stop reason: HOSPADM

## 2025-01-25 RX ORDER — ACETAMINOPHEN 325 MG/1
650 TABLET ORAL EVERY 6 HOURS PRN
Status: DISCONTINUED | OUTPATIENT
Start: 2025-01-25 | End: 2025-01-29 | Stop reason: HOSPADM

## 2025-01-25 RX ORDER — SODIUM CHLORIDE 9 MG/ML
INJECTION, SOLUTION INTRAVENOUS PRN
Status: DISCONTINUED | OUTPATIENT
Start: 2025-01-25 | End: 2025-01-29 | Stop reason: HOSPADM

## 2025-01-25 RX ORDER — SODIUM CHLORIDE 0.9 % (FLUSH) 0.9 %
5-40 SYRINGE (ML) INJECTION EVERY 12 HOURS SCHEDULED
Status: DISCONTINUED | OUTPATIENT
Start: 2025-01-25 | End: 2025-01-29 | Stop reason: HOSPADM

## 2025-01-25 RX ORDER — PANTOPRAZOLE SODIUM 40 MG/1
40 TABLET, DELAYED RELEASE ORAL
Status: DISCONTINUED | OUTPATIENT
Start: 2025-01-26 | End: 2025-01-29 | Stop reason: HOSPADM

## 2025-01-25 RX ORDER — QUETIAPINE FUMARATE 50 MG/1
150 TABLET, EXTENDED RELEASE ORAL DAILY
Status: DISCONTINUED | OUTPATIENT
Start: 2025-01-26 | End: 2025-01-29 | Stop reason: HOSPADM

## 2025-01-25 RX ORDER — LEVOTHYROXINE SODIUM 150 UG/1
150 TABLET ORAL DAILY
Status: DISCONTINUED | OUTPATIENT
Start: 2025-01-26 | End: 2025-01-29 | Stop reason: HOSPADM

## 2025-01-25 RX ORDER — TRAZODONE HYDROCHLORIDE 50 MG/1
150 TABLET, FILM COATED ORAL NIGHTLY
Status: DISCONTINUED | OUTPATIENT
Start: 2025-01-25 | End: 2025-01-29 | Stop reason: HOSPADM

## 2025-01-25 RX ORDER — POTASSIUM CHLORIDE 1500 MG/1
40 TABLET, EXTENDED RELEASE ORAL PRN
Status: DISCONTINUED | OUTPATIENT
Start: 2025-01-25 | End: 2025-01-29 | Stop reason: HOSPADM

## 2025-01-25 RX ORDER — PROCHLORPERAZINE EDISYLATE 5 MG/ML
10 INJECTION INTRAMUSCULAR; INTRAVENOUS ONCE
Status: COMPLETED | OUTPATIENT
Start: 2025-01-25 | End: 2025-01-25

## 2025-01-25 RX ORDER — SODIUM CHLORIDE 0.9 % (FLUSH) 0.9 %
5-40 SYRINGE (ML) INJECTION PRN
Status: DISCONTINUED | OUTPATIENT
Start: 2025-01-25 | End: 2025-01-29 | Stop reason: HOSPADM

## 2025-01-25 RX ADMIN — Medication 10 ML: at 20:56

## 2025-01-25 RX ADMIN — AMIODARONE HYDROCHLORIDE 200 MG: 200 TABLET ORAL at 20:53

## 2025-01-25 RX ADMIN — ROPINIROLE HYDROCHLORIDE 1 MG: 0.5 TABLET, FILM COATED ORAL at 21:18

## 2025-01-25 RX ADMIN — TRAZODONE HYDROCHLORIDE 150 MG: 50 TABLET ORAL at 20:52

## 2025-01-25 RX ADMIN — PROCHLORPERAZINE EDISYLATE 10 MG: 5 INJECTION INTRAMUSCULAR; INTRAVENOUS at 16:43

## 2025-01-25 RX ADMIN — ONDANSETRON 4 MG: 2 INJECTION, SOLUTION INTRAMUSCULAR; INTRAVENOUS at 15:29

## 2025-01-25 RX ADMIN — ATORVASTATIN CALCIUM 40 MG: 40 TABLET, FILM COATED ORAL at 20:52

## 2025-01-25 RX ADMIN — ACETAMINOPHEN 650 MG: 325 TABLET ORAL at 20:53

## 2025-01-25 RX ADMIN — ACETAMINOPHEN 1000 MG: 500 TABLET ORAL at 15:47

## 2025-01-25 ASSESSMENT — PAIN SCALES - GENERAL
PAINLEVEL_OUTOF10: 7
PAINLEVEL_OUTOF10: 4
PAINLEVEL_OUTOF10: 7
PAINLEVEL_OUTOF10: 7

## 2025-01-25 ASSESSMENT — PAIN DESCRIPTION - ORIENTATION
ORIENTATION: LEFT

## 2025-01-25 ASSESSMENT — PAIN DESCRIPTION - DESCRIPTORS
DESCRIPTORS: ACHING
DESCRIPTORS: ACHING

## 2025-01-25 ASSESSMENT — PAIN DESCRIPTION - LOCATION
LOCATION: HEAD
LOCATION: HEAD;NECK

## 2025-01-25 NOTE — ED NOTES
Pt reports that she has been dizzy.  Had a HA yesterday, got up this AM approx 0230 to take something for headache and that's when she felt more dizzy and off-balance.

## 2025-01-25 NOTE — ED PROVIDER NOTES
Emergency Department Provider Note  Location: [facility] EMERGENCY DEPARTMENT  1/25/2025     Patient Identification  Imani Monzon is a 74 y.o. female    Chief Complaint  Dizziness (Pt reports dizziness x 'a few days' but that it got worse this AM approx 0800.  States she was so dizzy she was unable to ambulate felt like everything was spinning.  Pt had assisted fall today via family, was lowered to the ground.  Denies hitting head, denies LOC.  Pt is not on any blood thinners, supposed to take xarelto, but hasn't taken d/t cost.  Pt with HX afib, sinus on monitor per EMS.  Fsbs 119 for EMS.  Only complaint nausea and HA, given 4mg zofran ODT)          HPI  (History provided by patient)  Patient presents to the ER with dizziness.  Her symptoms started approximately 2-3 days ago.  She states that she feels like she is under an ocean.  She also noticed a gradual onset headache yesterday.  She has a history of prior TIA, A-fib, PE.  She is supposed to be on Xarelto but has not been taking it due to cost concerns.  This morning she was in her kitchen and she felt very dizzy.  She grabbed the refrigerator handle and lowered herself to the ground, and then crawled to the bed.  She endorses headache and nausea.  No chest pain, dyspnea.  No recent illness, vomiting, diarrhea, cough.  No head injury.    Nursing Notes reviewed.    Allergies:   Allergies   Allergen Reactions    Morphine      Reports severe abdominal pain       Past medical history:  has a past medical history of Arthritis, Bipolar disorder (HCC), Cellulitis, Chronic back pain, Closed fracture of left fibula (1995), Closed fracture of left tibia (1995), Depression, Fibroid (bleeding) (uterine) (1982), Fibromyalgia, GERD (gastroesophageal reflux disease), Hyperlipidemia, Hypertension, Hypothyroidism, Influenza A (01/31/2020), Migraine, Neuropathy (2011), MAXIMO (obstructive sleep apnea) (06/07/2012), RLS (restless legs syndrome), Urine incontinence, and

## 2025-01-25 NOTE — ED NOTES
Pt now reporting numbness to L lower side of face.  Dr Crowell notified and requested for stroke r/o

## 2025-01-25 NOTE — ED NOTES
Imani Monzon is a 74 y.o. female admitted for  Principal Problem:    Fall against object  Resolved Problems:    * No resolved hospital problems. *  .   Patient Home via EMS transportation with   Chief Complaint   Patient presents with    Dizziness     Pt reports dizziness x 'a few days' but that it got worse this AM approx 0800.  States she was so dizzy she was unable to ambulate felt like everything was spinning.  Pt had assisted fall today via family, was lowered to the ground.  Denies hitting head, denies LOC.  Pt is not on any blood thinners, supposed to take xarelto, but hasn't taken d/t cost.  Pt with HX afib, sinus on monitor per EMS.  Fsbs 119 for EMS.  Only complaint nausea and HA, given 4mg zofran ODT   .  Patient is alert and oriented  Patient's baseline mobility:   Code Status: Full Code   Cardiac Rhythm:       Is patient on baseline Oxygen:  how many Liters:   Abnormal Assessment Findings:     Isolation:       NIH Score:    C-SSRS:    Bedside swallow:        Active LDA's:   Peripheral IV 01/25/25 Right Antecubital (Active)   Site Assessment Clean, dry & intact 01/25/25 1425   Line Status Blood return noted;Flushed 01/25/25 1425   Phlebitis Assessment No symptoms 01/25/25 1425   Infiltration Assessment 0 01/25/25 1425   Dressing Status New dressing applied;Clean, dry & intact 01/25/25 1425   Dressing Type Transparent 01/25/25 1425   Dressing Intervention New 01/25/25 1425     Patient admitted with a curran:  If the curran is chronic was it exchanged:  Reason for curran:   Patient admitted with Central Line:  . PICC line placement confirmed: YES OR NO:[id number]}   Reason for Central line:   Was central line Inserted from an outside facility:        Family/Caregiver Present  Any Concerns:    Restraints   Sitter          Vitals:      Vitals:    01/25/25 1550 01/25/25 1620 01/25/25 1650 01/25/25 1805   BP: 137/83 138/79 (!) 153/69 134/69   Pulse: 64 61 63 68   Resp: 13 14 15 15   Temp:       TempSrc:

## 2025-01-25 NOTE — ED NOTES
Orthostatics obtained on patient.  Pt able to complete laying and sitting BP, however when sitting she became increasingly dizzy and 'woozy'.  Pt unable to stand with assistance d/t weakness and dizziness.  ED Attending Crowell notified.

## 2025-01-26 ENCOUNTER — APPOINTMENT (OUTPATIENT)
Dept: MRI IMAGING | Age: 75
DRG: 103 | End: 2025-01-26
Payer: COMMERCIAL

## 2025-01-26 LAB
ANION GAP SERPL CALCULATED.3IONS-SCNC: 8 MMOL/L (ref 3–16)
BILIRUB UR QL STRIP.AUTO: NEGATIVE
BUN SERPL-MCNC: 19 MG/DL (ref 7–20)
CALCIUM SERPL-MCNC: 9.3 MG/DL (ref 8.3–10.6)
CHLORIDE SERPL-SCNC: 108 MMOL/L (ref 99–110)
CLARITY UR: CLEAR
CO2 SERPL-SCNC: 27 MMOL/L (ref 21–32)
COLOR UR: YELLOW
CREAT SERPL-MCNC: 1.3 MG/DL (ref 0.6–1.2)
EKG ATRIAL RATE: 67 BPM
EKG DIAGNOSIS: NORMAL
EKG P AXIS: 14 DEGREES
EKG P-R INTERVAL: 166 MS
EKG Q-T INTERVAL: 432 MS
EKG QRS DURATION: 78 MS
EKG QTC CALCULATION (BAZETT): 456 MS
EKG R AXIS: 55 DEGREES
EKG T AXIS: 41 DEGREES
EKG VENTRICULAR RATE: 67 BPM
ERYTHROCYTE [SEDIMENTATION RATE] IN BLOOD BY WESTERGREN METHOD: 8 MM/HR (ref 0–30)
GFR SERPLBLD CREATININE-BSD FMLA CKD-EPI: 43 ML/MIN/{1.73_M2}
GLUCOSE SERPL-MCNC: 84 MG/DL (ref 70–99)
GLUCOSE UR STRIP.AUTO-MCNC: NEGATIVE MG/DL
HGB UR QL STRIP.AUTO: NEGATIVE
KETONES UR STRIP.AUTO-MCNC: 15 MG/DL
LEUKOCYTE ESTERASE UR QL STRIP.AUTO: NEGATIVE
NITRITE UR QL STRIP.AUTO: NEGATIVE
PH UR STRIP.AUTO: 8.5 [PH] (ref 5–8)
POTASSIUM SERPL-SCNC: 4 MMOL/L (ref 3.5–5.1)
PROT UR STRIP.AUTO-MCNC: NEGATIVE MG/DL
SODIUM SERPL-SCNC: 143 MMOL/L (ref 136–145)
SP GR UR STRIP.AUTO: 1.01 (ref 1–1.03)
UA COMPLETE W REFLEX CULTURE PNL UR: ABNORMAL
UA DIPSTICK W REFLEX MICRO PNL UR: ABNORMAL
URN SPEC COLLECT METH UR: ABNORMAL
UROBILINOGEN UR STRIP-ACNC: 0.2 E.U./DL

## 2025-01-26 PROCEDURE — 81003 URINALYSIS AUTO W/O SCOPE: CPT

## 2025-01-26 PROCEDURE — 6370000000 HC RX 637 (ALT 250 FOR IP): Performed by: NURSE PRACTITIONER

## 2025-01-26 PROCEDURE — 80048 BASIC METABOLIC PNL TOTAL CA: CPT

## 2025-01-26 PROCEDURE — 70551 MRI BRAIN STEM W/O DYE: CPT

## 2025-01-26 PROCEDURE — 6370000000 HC RX 637 (ALT 250 FOR IP): Performed by: STUDENT IN AN ORGANIZED HEALTH CARE EDUCATION/TRAINING PROGRAM

## 2025-01-26 PROCEDURE — 97110 THERAPEUTIC EXERCISES: CPT

## 2025-01-26 PROCEDURE — 1200000000 HC SEMI PRIVATE

## 2025-01-26 PROCEDURE — 97165 OT EVAL LOW COMPLEX 30 MIN: CPT

## 2025-01-26 PROCEDURE — 97530 THERAPEUTIC ACTIVITIES: CPT

## 2025-01-26 PROCEDURE — 6370000000 HC RX 637 (ALT 250 FOR IP)

## 2025-01-26 PROCEDURE — 97161 PT EVAL LOW COMPLEX 20 MIN: CPT

## 2025-01-26 PROCEDURE — 2580000003 HC RX 258: Performed by: STUDENT IN AN ORGANIZED HEALTH CARE EDUCATION/TRAINING PROGRAM

## 2025-01-26 PROCEDURE — 93010 ELECTROCARDIOGRAM REPORT: CPT | Performed by: INTERNAL MEDICINE

## 2025-01-26 PROCEDURE — 2500000003 HC RX 250 WO HCPCS

## 2025-01-26 PROCEDURE — 85652 RBC SED RATE AUTOMATED: CPT

## 2025-01-26 RX ORDER — SODIUM CHLORIDE, SODIUM LACTATE, POTASSIUM CHLORIDE, CALCIUM CHLORIDE 600; 310; 30; 20 MG/100ML; MG/100ML; MG/100ML; MG/100ML
INJECTION, SOLUTION INTRAVENOUS CONTINUOUS
Status: ACTIVE | OUTPATIENT
Start: 2025-01-26 | End: 2025-01-26

## 2025-01-26 RX ORDER — ROPINIROLE 0.5 MG/1
1 TABLET, FILM COATED ORAL ONCE
Status: COMPLETED | OUTPATIENT
Start: 2025-01-26 | End: 2025-01-26

## 2025-01-26 RX ORDER — BUTALBITAL, ACETAMINOPHEN AND CAFFEINE 50; 325; 40 MG/1; MG/1; MG/1
1 TABLET ORAL EVERY 4 HOURS PRN
Status: DISCONTINUED | OUTPATIENT
Start: 2025-01-26 | End: 2025-01-29 | Stop reason: HOSPADM

## 2025-01-26 RX ORDER — ASPIRIN 81 MG/1
81 TABLET, CHEWABLE ORAL DAILY
Status: DISCONTINUED | OUTPATIENT
Start: 2025-01-26 | End: 2025-01-29 | Stop reason: HOSPADM

## 2025-01-26 RX ADMIN — ACETAMINOPHEN 650 MG: 325 TABLET ORAL at 14:14

## 2025-01-26 RX ADMIN — BUTALBITAL, ACETAMINOPHEN AND CAFFEINE 1 TABLET: 325; 50; 40 TABLET ORAL at 23:34

## 2025-01-26 RX ADMIN — ASPIRIN 81 MG: 81 TABLET, CHEWABLE ORAL at 12:12

## 2025-01-26 RX ADMIN — LEVOTHYROXINE SODIUM 150 MCG: 0.15 TABLET ORAL at 05:37

## 2025-01-26 RX ADMIN — ROPINIROLE HYDROCHLORIDE 1 MG: 0.5 TABLET, FILM COATED ORAL at 19:47

## 2025-01-26 RX ADMIN — APIXABAN 5 MG: 5 TABLET, FILM COATED ORAL at 12:12

## 2025-01-26 RX ADMIN — BUTALBITAL, ACETAMINOPHEN AND CAFFEINE 1 TABLET: 325; 50; 40 TABLET ORAL at 19:23

## 2025-01-26 RX ADMIN — TRAZODONE HYDROCHLORIDE 150 MG: 50 TABLET ORAL at 19:47

## 2025-01-26 RX ADMIN — APIXABAN 5 MG: 5 TABLET, FILM COATED ORAL at 19:46

## 2025-01-26 RX ADMIN — SODIUM CHLORIDE, SODIUM LACTATE, POTASSIUM CHLORIDE, AND CALCIUM CHLORIDE: .6; .31; .03; .02 INJECTION, SOLUTION INTRAVENOUS at 12:12

## 2025-01-26 RX ADMIN — QUETIAPINE FUMARATE 150 MG: 50 TABLET, EXTENDED RELEASE ORAL at 19:46

## 2025-01-26 RX ADMIN — POLYETHYLENE GLYCOL 3350 17 G: 17 POWDER, FOR SOLUTION ORAL at 10:17

## 2025-01-26 RX ADMIN — ATORVASTATIN CALCIUM 40 MG: 40 TABLET, FILM COATED ORAL at 19:46

## 2025-01-26 RX ADMIN — PANTOPRAZOLE SODIUM 40 MG: 40 TABLET, DELAYED RELEASE ORAL at 05:37

## 2025-01-26 RX ADMIN — Medication 10 ML: at 08:37

## 2025-01-26 RX ADMIN — AMIODARONE HYDROCHLORIDE 200 MG: 200 TABLET ORAL at 19:46

## 2025-01-26 RX ADMIN — ROPINIROLE HYDROCHLORIDE 1 MG: 0.5 TABLET, FILM COATED ORAL at 23:35

## 2025-01-26 ASSESSMENT — PAIN DESCRIPTION - DESCRIPTORS
DESCRIPTORS: ACHING

## 2025-01-26 ASSESSMENT — PAIN SCALES - GENERAL
PAINLEVEL_OUTOF10: 7
PAINLEVEL_OUTOF10: 6
PAINLEVEL_OUTOF10: 5

## 2025-01-26 ASSESSMENT — PAIN DESCRIPTION - ORIENTATION
ORIENTATION: ANTERIOR

## 2025-01-26 ASSESSMENT — PAIN DESCRIPTION - LOCATION
LOCATION: HEAD

## 2025-01-26 NOTE — H&P
[facility] Medicine History & Physical    V 1.6    Date of Admission: 1/25/2025    Date of Service:  Pt seen/examined on 1/25/25    [x]Admitted to Inpatient with expected LOS greater than two midnights due to medical therapy.  []Placed in Observation status.    Chief Admission Complaint:  fall    Presenting Admission History:      74 y.o. female who presented to [facility] with fall, generalized weakness, debility.  PMHx significant for A-fib on amiodarone and Xarelto (states she has not been on anticoagulant recently due to cost), restless leg syndrome, hypothyroidism, hypertension, hyperlipidemia, lower extremity edema.    Patient presents after having an episode of presyncope and a fall at home.  Of note, she was recently admitted to the hospital 12/2024 for episode of syncope, discharged to SNF.  States she has been home since around 1/7/2025, lives with her boyfriend.  Has been doing okay as far as taking care of herself, but has been eating and drinking poorly about 1 meal a day.  Continues with generalized weakness and debility.  Had an episode today where she was sitting on the toilet this morning, got up to wash her hands and had an episode of lightheadedness and dizziness which caused her to collapse to the ground.  Denies any loss of consciousness, denies hitting her head either.  Did have some difficulties getting up from the ground due to weakness, required the help of her boyfriend.  Subsequently presented to the ED at the urging of her boyfriend where workup has been within normal limits except for mildly elevated creatinine.  They did attempt to ambulate and walker and she was not able to tolerate that.  Unable to tolerate acquisition of orthostatic vital signs.  Subsequently mid to the hospital for further evaluation and management.    Assessment/Plan:      Current Principal Problem:  Fall against object    1.  Fall  2.  Generalized weakness and debility  3.  Elevated

## 2025-01-27 PROBLEM — R46.89 SPELL OF ABNORMAL BEHAVIOR: Status: ACTIVE | Noted: 2025-01-27

## 2025-01-27 PROBLEM — G43.101 MIGRAINE WITH AURA AND WITH STATUS MIGRAINOSUS, NOT INTRACTABLE: Status: ACTIVE | Noted: 2025-01-27

## 2025-01-27 PROBLEM — G43.809 VESTIBULAR MIGRAINE: Status: ACTIVE | Noted: 2025-01-27

## 2025-01-27 LAB
BASOPHILS # BLD: 0 K/UL (ref 0–0.2)
BASOPHILS NFR BLD: 0.4 %
DEPRECATED RDW RBC AUTO: 16 % (ref 12.4–15.4)
EOSINOPHIL # BLD: 0.1 K/UL (ref 0–0.6)
EOSINOPHIL NFR BLD: 2.2 %
HCT VFR BLD AUTO: 33.9 % (ref 36–48)
HGB BLD-MCNC: 11.3 G/DL (ref 12–16)
LYMPHOCYTES # BLD: 3.2 K/UL (ref 1–5.1)
LYMPHOCYTES NFR BLD: 53.9 %
MCH RBC QN AUTO: 29.4 PG (ref 26–34)
MCHC RBC AUTO-ENTMCNC: 33.3 G/DL (ref 31–36)
MCV RBC AUTO: 88.2 FL (ref 80–100)
MONOCYTES # BLD: 0.5 K/UL (ref 0–1.3)
MONOCYTES NFR BLD: 9.1 %
NEUTROPHILS # BLD: 2 K/UL (ref 1.7–7.7)
NEUTROPHILS NFR BLD: 34.4 %
PATH INTERP BLD-IMP: NORMAL
PLATELET # BLD AUTO: 191 K/UL (ref 135–450)
PMV BLD AUTO: 9.6 FL (ref 5–10.5)
RBC # BLD AUTO: 3.84 M/UL (ref 4–5.2)
WBC # BLD AUTO: 5.9 K/UL (ref 4–11)

## 2025-01-27 PROCEDURE — 6370000000 HC RX 637 (ALT 250 FOR IP)

## 2025-01-27 PROCEDURE — 1200000000 HC SEMI PRIVATE

## 2025-01-27 PROCEDURE — 95816 EEG AWAKE AND DROWSY: CPT

## 2025-01-27 PROCEDURE — 36415 COLL VENOUS BLD VENIPUNCTURE: CPT

## 2025-01-27 PROCEDURE — APPSS45 APP SPLIT SHARED TIME 31-45 MINUTES

## 2025-01-27 PROCEDURE — 85025 COMPLETE CBC W/AUTO DIFF WBC: CPT

## 2025-01-27 PROCEDURE — 95816 EEG AWAKE AND DROWSY: CPT | Performed by: PSYCHIATRY & NEUROLOGY

## 2025-01-27 PROCEDURE — 2500000003 HC RX 250 WO HCPCS

## 2025-01-27 PROCEDURE — 6370000000 HC RX 637 (ALT 250 FOR IP): Performed by: STUDENT IN AN ORGANIZED HEALTH CARE EDUCATION/TRAINING PROGRAM

## 2025-01-27 PROCEDURE — 99222 1ST HOSP IP/OBS MODERATE 55: CPT | Performed by: STUDENT IN AN ORGANIZED HEALTH CARE EDUCATION/TRAINING PROGRAM

## 2025-01-27 PROCEDURE — 6360000002 HC RX W HCPCS: Performed by: STUDENT IN AN ORGANIZED HEALTH CARE EDUCATION/TRAINING PROGRAM

## 2025-01-27 RX ORDER — DIPHENHYDRAMINE HYDROCHLORIDE 50 MG/ML
25 INJECTION INTRAMUSCULAR; INTRAVENOUS ONCE
Status: COMPLETED | OUTPATIENT
Start: 2025-01-27 | End: 2025-01-27

## 2025-01-27 RX ORDER — PROCHLORPERAZINE EDISYLATE 5 MG/ML
10 INJECTION INTRAMUSCULAR; INTRAVENOUS ONCE
Status: COMPLETED | OUTPATIENT
Start: 2025-01-27 | End: 2025-01-27

## 2025-01-27 RX ORDER — MAGNESIUM SULFATE 1 G/100ML
1000 INJECTION INTRAVENOUS ONCE
Status: COMPLETED | OUTPATIENT
Start: 2025-01-27 | End: 2025-01-28

## 2025-01-27 RX ORDER — MECLIZINE HCL 12.5 MG 12.5 MG/1
12.5 TABLET ORAL 3 TIMES DAILY PRN
Status: DISCONTINUED | OUTPATIENT
Start: 2025-01-27 | End: 2025-01-29 | Stop reason: HOSPADM

## 2025-01-27 RX ADMIN — APIXABAN 5 MG: 5 TABLET, FILM COATED ORAL at 23:12

## 2025-01-27 RX ADMIN — QUETIAPINE FUMARATE 150 MG: 50 TABLET, EXTENDED RELEASE ORAL at 23:11

## 2025-01-27 RX ADMIN — DIPHENHYDRAMINE HYDROCHLORIDE 25 MG: 50 INJECTION INTRAMUSCULAR; INTRAVENOUS at 23:09

## 2025-01-27 RX ADMIN — Medication 10 ML: at 23:12

## 2025-01-27 RX ADMIN — TRAZODONE HYDROCHLORIDE 150 MG: 50 TABLET ORAL at 23:12

## 2025-01-27 RX ADMIN — LEVOTHYROXINE SODIUM 150 MCG: 0.15 TABLET ORAL at 05:17

## 2025-01-27 RX ADMIN — AMIODARONE HYDROCHLORIDE 200 MG: 200 TABLET ORAL at 23:11

## 2025-01-27 RX ADMIN — APIXABAN 5 MG: 5 TABLET, FILM COATED ORAL at 08:10

## 2025-01-27 RX ADMIN — Medication 10 ML: at 08:11

## 2025-01-27 RX ADMIN — PANTOPRAZOLE SODIUM 40 MG: 40 TABLET, DELAYED RELEASE ORAL at 05:17

## 2025-01-27 RX ADMIN — MAGNESIUM SULFATE HEPTAHYDRATE 1000 MG: 1 INJECTION, SOLUTION INTRAVENOUS at 23:05

## 2025-01-27 RX ADMIN — PROCHLORPERAZINE EDISYLATE 10 MG: 5 INJECTION INTRAMUSCULAR; INTRAVENOUS at 23:10

## 2025-01-27 RX ADMIN — ATORVASTATIN CALCIUM 40 MG: 40 TABLET, FILM COATED ORAL at 23:12

## 2025-01-27 RX ADMIN — ROPINIROLE HYDROCHLORIDE 3 MG: 2 TABLET, FILM COATED ORAL at 23:11

## 2025-01-27 RX ADMIN — ASPIRIN 81 MG: 81 TABLET, CHEWABLE ORAL at 08:10

## 2025-01-27 ASSESSMENT — PAIN SCALES - GENERAL: PAINLEVEL_OUTOF10: 7

## 2025-01-27 NOTE — PROCEDURES
INTERPRETATION:  This 25-minute, computer-assisted video EEG recording is normal.  No potentially epileptiform activity was present during the recording.      CLASSIFICATION:  Normal (awake and drowsy).  Sleep - unsuccessful.  EKG channel.    DESCRIPTION:    BACKGROUND:  The awake recording revealed 9 Hz alpha activity over the posterior head region.  Given the extensive study, the patient still did not sleep.  The EEG showed normal V waves during drowsiness.  There was no significant change on the EEG background with photic stimulation.  Hyperventilation was omitted due to old age.      INTERICTAL DISCHARGES: None    CLINICAL EVENTS:  None    The EKG channel was unremarkable.

## 2025-01-27 NOTE — ACP (ADVANCE CARE PLANNING)
Advance Care Planning     General Advance Care Planning (ACP) Conversation    Date of Conversation: 1/27/2025  Conducted with: Patient with Decision Making Capacity  Other persons present: None    Healthcare Decision Maker:   Primary Decision Maker: Sparkle Zhang - [place] - [phone]     Today we documented Decision Maker(s) consistent with Legal Next of Kin hierarchy.  Content/Action Overview:  Has ACP document(s) NOT on file - requested patient to provide  Reviewed DNR/DNI and patient elects Full Code (Attempt Resuscitation)        Length of Voluntary ACP Conversation in minutes:  <16 minutes (Non-Billable)    ROBY MONTEJO RN

## 2025-01-27 NOTE — CONSULTS
Inpatient Neurology consult        [facility] Neurology            Imani Monzon  1950    Date of Service: 1/27/2025    Referring Physician: Phi Plaza MD      Reason for the consult and CC: Headache, dizziness, atrial fibrillation off of blood thinners due to cost x1 month    HPI:   The patient is a 74 y.o. female with a past medical history of bipolar disorder, hypertension, hyperlipidemia, hypothyroidism, migraines, neuropathy, restless leg syndrome, obstructive sleep apnea, CKD stage III, and atrial fibrillation recently switch to Xarelto on December 18th and has not been able to afford medication therefore has not been taking it originally presenting to the hospital for concerns of near syncope. Patient reports that since December 17th, she has been extremely dizzy with positional changes and walking causing her to fall multiple times. Patient was seen and evaluated on December 18th for a fall secondary to her dizziness and was found to be in atrial fibrillation and hypotensive- she reports \"all her medications were changed\" at that hospitalization and she was to start Xarelto at discharge. However, patient reports prescription costing >$400 and was unable to afford it. Patient reports her dizziness has been persistent since discharge and only gets relief when she lays down. She reports when she moves her head quickly she develops a \"swishing\" sensation. Approximately 2-3 days ago, patient reports dizziness increased in intensity and was accompanied by a headache behind her left eye and left forehead that radiates up and behind her left ear described as a 5/10 pressure-like pain with photosensitivity. She reports going into her kitchen to get tylenol when she had a \"wave of dizziness\" over come her and states she felt off balanced and had to grab onto something to help her get to her bed. She describes dizziness as \"the world spinning around her\" sensation. She denies any numbness,

## 2025-01-27 NOTE — CARE COORDINATION
Case Management Assessment  Initial Evaluation    Date/Time of Evaluation: 1/27/2025 12:38 PM  Assessment Completed by: ROBY MONTEJO RN    If patient is discharged prior to next notation, then this note serves as note for discharge by case management.    Patient Name: Imani Monzon                   YOB: 1950  Diagnosis: Dizziness [R42]  Ataxia [R27.0]  Fall against object [W18.00XA]                   Date / Time: 1/25/2025  2:14 PM    Patient Admission Status: Inpatient   Readmission Risk (Low < 19, Mod (19-27), High > 27): Readmission Risk Score: 16.7    Current PCP: Mauri Bunch, DO  PCP verified by CM? Yes    Chart Reviewed: Yes      History Provided by: Patient  Patient Orientation: Alert and Oriented    Patient Cognition: Alert    Hospitalization in the last 30 days (Readmission):  Yes    If yes, Readmission Assessment in CM Navigator will be completed.    Advance Directives:      Code Status: Full Code   Patient's Primary Decision Maker is: Legal Next of Kin    Primary Decision Maker: Vicente,Sparkle - Child - [phone]    Discharge Planning:    Patient lives with: Alone Type of Home: Apartment  Primary Care Giver: Self  Patient Support Systems include: Children   Current Financial resources: Medicare  Current community resources: ECF/Home Care  Current services prior to admission: Home Care            Current DME:              Type of Home Care services:  OT, PT, Nursing Services    ADLS  Prior functional level: Independent in ADLs/IADLs  Current functional level: Assistance with the following:, Bathing, Dressing, Toileting, Cooking, Housework, Shopping, Mobility    PT AM-PAC: 22 /24  OT AM-PAC: 16 /24    Family can provide assistance at DC: Yes  Would you like Case Management to discuss the discharge plan with any other family members/significant others, and if so, who? Yes (dtr)  Plans to Return to Present Housing: Unknown at present  Other Identified Issues/Barriers to RETURNING

## 2025-01-28 ENCOUNTER — APPOINTMENT (OUTPATIENT)
Age: 75
DRG: 103 | End: 2025-01-28
Payer: COMMERCIAL

## 2025-01-28 PROBLEM — Z86.73 HISTORY OF STROKE: Status: ACTIVE | Noted: 2025-01-28

## 2025-01-28 LAB
ANION GAP SERPL CALCULATED.3IONS-SCNC: 8 MMOL/L (ref 3–16)
BASOPHILS # BLD: 0 K/UL (ref 0–0.2)
BASOPHILS NFR BLD: 0.7 %
BUN SERPL-MCNC: 21 MG/DL (ref 7–20)
CALCIUM SERPL-MCNC: 8.7 MG/DL (ref 8.3–10.6)
CHLORIDE SERPL-SCNC: 109 MMOL/L (ref 99–110)
CO2 SERPL-SCNC: 25 MMOL/L (ref 21–32)
CREAT SERPL-MCNC: 1.2 MG/DL (ref 0.6–1.2)
DEPRECATED RDW RBC AUTO: 15.5 % (ref 12.4–15.4)
ECHO AO ARCH DIAM: 2.7 CM
ECHO AO ASC DIAM: 2.7 CM
ECHO AO ASCENDING AORTA INDEX: 1.29 CM/M2
ECHO AO DESC DIAM: 3.1 CM
ECHO AO DESCENDING AORTA INDEX: 1.48 CM/M2
ECHO AO ROOT DIAM: 2.8 CM
ECHO AO ROOT INDEX: 1.33 CM/M2
ECHO AV AREA PEAK VELOCITY: 2.5 CM2
ECHO AV AREA/BSA PEAK VELOCITY: 1.2 CM2/M2
ECHO AV PEAK GRADIENT: 9 MMHG
ECHO AV PEAK GRADIENT: 9 MMHG
ECHO AV PEAK VELOCITY: 1.5 M/S
ECHO AV VELOCITY RATIO: 0.73
ECHO BSA: 2.21 M2
ECHO EST RA PRESSURE: 3 MMHG
ECHO LA AREA 2C: 17.1 CM2
ECHO LA AREA 4C: 17.6 CM2
ECHO LA DIAMETER INDEX: 1.43 CM/M2
ECHO LA DIAMETER: 3 CM
ECHO LA MAJOR AXIS: 5.6 CM
ECHO LA MINOR AXIS: 5.5 CM
ECHO LA TO AORTIC ROOT RATIO: 1.07
ECHO LA VOL BP: 44 ML (ref 22–52)
ECHO LA VOL MOD A2C: 43 ML (ref 22–52)
ECHO LA VOL MOD A4C: 44 ML (ref 22–52)
ECHO LA VOL/BSA BIPLANE: 21 ML/M2 (ref 16–34)
ECHO LA VOLUME INDEX MOD A2C: 20 ML/M2 (ref 16–34)
ECHO LA VOLUME INDEX MOD A4C: 21 ML/M2 (ref 16–34)
ECHO LV E' LATERAL VELOCITY: 10.8 CM/S
ECHO LV E' SEPTAL VELOCITY: 7.94 CM/S
ECHO LV EDV 3D: 102 ML
ECHO LV EDV INDEX 3D: 49 ML/M2
ECHO LV EJECTION FRACTION 3D: 63 %
ECHO LV ESV 3D: 38 ML
ECHO LV ESV INDEX 3D: 18 ML/M2
ECHO LV FRACTIONAL SHORTENING: 31 % (ref 28–44)
ECHO LV INTERNAL DIMENSION DIASTOLE INDEX: 1.86 CM/M2
ECHO LV INTERNAL DIMENSION DIASTOLIC: 3.9 CM (ref 3.9–5.3)
ECHO LV INTERNAL DIMENSION SYSTOLIC INDEX: 1.29 CM/M2
ECHO LV INTERNAL DIMENSION SYSTOLIC: 2.7 CM
ECHO LV ISOVOLUMETRIC RELAXATION TIME (IVRT): 99 MS
ECHO LV IVSD: 0.9 CM (ref 0.6–0.9)
ECHO LV MASS 2D: 89.7 G (ref 67–162)
ECHO LV MASS 3D INDEX: 51.9 G/M2
ECHO LV MASS 3D: 109 G
ECHO LV MASS INDEX 2D: 42.7 G/M2 (ref 43–95)
ECHO LV POSTERIOR WALL DIASTOLIC: 0.7 CM (ref 0.6–0.9)
ECHO LV RELATIVE WALL THICKNESS RATIO: 0.36
ECHO LVOT AREA: 3.5 CM2
ECHO LVOT DIAM: 2.1 CM
ECHO LVOT MEAN GRADIENT: 2 MMHG
ECHO LVOT PEAK GRADIENT: 5 MMHG
ECHO LVOT PEAK VELOCITY: 1.1 M/S
ECHO LVOT STROKE VOLUME INDEX: 34.9 ML/M2
ECHO LVOT SV: 73.4 ML
ECHO LVOT VTI: 21.2 CM
ECHO MV A VELOCITY: 1.52 M/S
ECHO MV E DECELERATION TIME (DT): 236 MS
ECHO MV E VELOCITY: 1.12 M/S
ECHO MV E/A RATIO: 0.74
ECHO MV E/E' LATERAL: 10.37
ECHO MV E/E' RATIO (AVERAGED): 12.24
ECHO MV E/E' SEPTAL: 14.11
ECHO PULMONARY ARTERY END DIASTOLIC PRESSURE: 6 MMHG
ECHO PV REGURGITANT END DIASTOLIC MAX VELOCITY: 1.2 M/S
ECHO RA AREA 4C: 12.4 CM2
ECHO RA END SYSTOLIC VOLUME APICAL 4 CHAMBER INDEX BSA: 11 ML/M2
ECHO RA VOLUME: 24 ML
ECHO RIGHT VENTRICULAR SYSTOLIC PRESSURE (RVSP): 23 MMHG
ECHO RV BASAL DIMENSION: 3.5 CM
ECHO RV FREE WALL PEAK S': 11.6 CM/S
ECHO RV GLOBAL SYSTOLIC STRAIN (GLS): -25.4 %
ECHO RV INTERNAL DIMENSION: 3.1 CM
ECHO RV LONGITUDINAL DIMENSION: 7.3 CM
ECHO RV MID DIMENSION: 3 CM
ECHO RV TAPSE: 2.1 CM (ref 1.7–?)
ECHO TV REGURGITANT MAX VELOCITY: 2.25 M/S
ECHO TV REGURGITANT PEAK GRADIENT: 20 MMHG
EOSINOPHIL # BLD: 0.2 K/UL (ref 0–0.6)
EOSINOPHIL NFR BLD: 2.9 %
GFR SERPLBLD CREATININE-BSD FMLA CKD-EPI: 47 ML/MIN/{1.73_M2}
GLUCOSE SERPL-MCNC: 94 MG/DL (ref 70–99)
HCT VFR BLD AUTO: 31.8 % (ref 36–48)
HGB BLD-MCNC: 10.6 G/DL (ref 12–16)
LYMPHOCYTES # BLD: 2.8 K/UL (ref 1–5.1)
LYMPHOCYTES NFR BLD: 46.3 %
MCH RBC QN AUTO: 29.3 PG (ref 26–34)
MCHC RBC AUTO-ENTMCNC: 33.4 G/DL (ref 31–36)
MCV RBC AUTO: 87.7 FL (ref 80–100)
MONOCYTES # BLD: 0.5 K/UL (ref 0–1.3)
MONOCYTES NFR BLD: 7.6 %
NEUTROPHILS # BLD: 2.5 K/UL (ref 1.7–7.7)
NEUTROPHILS NFR BLD: 42.5 %
PLATELET # BLD AUTO: 189 K/UL (ref 135–450)
PMV BLD AUTO: 9.3 FL (ref 5–10.5)
POTASSIUM SERPL-SCNC: 4.6 MMOL/L (ref 3.5–5.1)
RBC # BLD AUTO: 3.63 M/UL (ref 4–5.2)
SODIUM SERPL-SCNC: 142 MMOL/L (ref 136–145)
WBC # BLD AUTO: 6 K/UL (ref 4–11)

## 2025-01-28 PROCEDURE — 36415 COLL VENOUS BLD VENIPUNCTURE: CPT

## 2025-01-28 PROCEDURE — 6370000000 HC RX 637 (ALT 250 FOR IP)

## 2025-01-28 PROCEDURE — 99232 SBSQ HOSP IP/OBS MODERATE 35: CPT | Performed by: STUDENT IN AN ORGANIZED HEALTH CARE EDUCATION/TRAINING PROGRAM

## 2025-01-28 PROCEDURE — 76376 3D RENDER W/INTRP POSTPROCES: CPT

## 2025-01-28 PROCEDURE — 2500000003 HC RX 250 WO HCPCS: Performed by: STUDENT IN AN ORGANIZED HEALTH CARE EDUCATION/TRAINING PROGRAM

## 2025-01-28 PROCEDURE — 76376 3D RENDER W/INTRP POSTPROCES: CPT | Performed by: INTERNAL MEDICINE

## 2025-01-28 PROCEDURE — 2580000003 HC RX 258: Performed by: STUDENT IN AN ORGANIZED HEALTH CARE EDUCATION/TRAINING PROGRAM

## 2025-01-28 PROCEDURE — 97535 SELF CARE MNGMENT TRAINING: CPT

## 2025-01-28 PROCEDURE — APPSS30 APP SPLIT SHARED TIME 16-30 MINUTES

## 2025-01-28 PROCEDURE — 6370000000 HC RX 637 (ALT 250 FOR IP): Performed by: STUDENT IN AN ORGANIZED HEALTH CARE EDUCATION/TRAINING PROGRAM

## 2025-01-28 PROCEDURE — 2500000003 HC RX 250 WO HCPCS

## 2025-01-28 PROCEDURE — 6370000000 HC RX 637 (ALT 250 FOR IP): Performed by: NURSE PRACTITIONER

## 2025-01-28 PROCEDURE — 93306 TTE W/DOPPLER COMPLETE: CPT | Performed by: INTERNAL MEDICINE

## 2025-01-28 PROCEDURE — 80048 BASIC METABOLIC PNL TOTAL CA: CPT

## 2025-01-28 PROCEDURE — 85025 COMPLETE CBC W/AUTO DIFF WBC: CPT

## 2025-01-28 PROCEDURE — 93356 MYOCRD STRAIN IMG SPCKL TRCK: CPT | Performed by: INTERNAL MEDICINE

## 2025-01-28 PROCEDURE — 1200000000 HC SEMI PRIVATE

## 2025-01-28 RX ORDER — TOPIRAMATE 25 MG/1
TABLET, FILM COATED ORAL
Qty: 60 TABLET | Refills: 5 | Status: SHIPPED | OUTPATIENT
Start: 2025-01-28 | End: 2025-08-10

## 2025-01-28 RX ORDER — TOPIRAMATE 25 MG/1
25 TABLET, FILM COATED ORAL
Status: DISCONTINUED | OUTPATIENT
Start: 2025-01-28 | End: 2025-01-29 | Stop reason: HOSPADM

## 2025-01-28 RX ORDER — MECLIZINE HCL 12.5 MG 12.5 MG/1
12.5 TABLET ORAL 3 TIMES DAILY PRN
Qty: 20 TABLET | Refills: 5 | Status: SHIPPED | OUTPATIENT
Start: 2025-01-28 | End: 2025-07-27

## 2025-01-28 RX ADMIN — ATORVASTATIN CALCIUM 40 MG: 40 TABLET, FILM COATED ORAL at 21:48

## 2025-01-28 RX ADMIN — Medication 10 ML: at 21:52

## 2025-01-28 RX ADMIN — ASPIRIN 81 MG: 81 TABLET, CHEWABLE ORAL at 08:44

## 2025-01-28 RX ADMIN — TIZANIDINE 4 MG: 4 TABLET ORAL at 01:12

## 2025-01-28 RX ADMIN — LEVOTHYROXINE SODIUM 150 MCG: 0.15 TABLET ORAL at 08:44

## 2025-01-28 RX ADMIN — QUETIAPINE FUMARATE 150 MG: 50 TABLET, EXTENDED RELEASE ORAL at 21:48

## 2025-01-28 RX ADMIN — APIXABAN 5 MG: 5 TABLET, FILM COATED ORAL at 21:48

## 2025-01-28 RX ADMIN — PANTOPRAZOLE SODIUM 40 MG: 40 TABLET, DELAYED RELEASE ORAL at 08:44

## 2025-01-28 RX ADMIN — VALPROATE SODIUM 500 MG: 100 INJECTION, SOLUTION INTRAVENOUS at 16:37

## 2025-01-28 RX ADMIN — AMIODARONE HYDROCHLORIDE 200 MG: 200 TABLET ORAL at 21:48

## 2025-01-28 RX ADMIN — APIXABAN 5 MG: 5 TABLET, FILM COATED ORAL at 08:44

## 2025-01-28 RX ADMIN — TRAZODONE HYDROCHLORIDE 150 MG: 50 TABLET ORAL at 21:52

## 2025-01-28 RX ADMIN — TOPIRAMATE 25 MG: 25 TABLET, FILM COATED ORAL at 21:49

## 2025-01-28 RX ADMIN — ROPINIROLE HYDROCHLORIDE 3 MG: 2 TABLET, FILM COATED ORAL at 21:49

## 2025-01-28 ASSESSMENT — PAIN DESCRIPTION - LOCATION
LOCATION: LEG
LOCATION: HEAD
LOCATION: HEAD

## 2025-01-28 ASSESSMENT — PAIN SCALES - GENERAL
PAINLEVEL_OUTOF10: 6
PAINLEVEL_OUTOF10: 7
PAINLEVEL_OUTOF10: 3

## 2025-01-28 ASSESSMENT — PAIN - FUNCTIONAL ASSESSMENT: PAIN_FUNCTIONAL_ASSESSMENT: ACTIVITIES ARE NOT PREVENTED

## 2025-01-28 ASSESSMENT — PAIN DESCRIPTION - PAIN TYPE: TYPE: CHRONIC PAIN

## 2025-01-28 ASSESSMENT — PAIN DESCRIPTION - DESCRIPTORS: DESCRIPTORS: SPASM

## 2025-01-28 ASSESSMENT — PAIN DESCRIPTION - ORIENTATION: ORIENTATION: LEFT;RIGHT

## 2025-01-28 NOTE — PLAN OF CARE
Problem: Discharge Planning  Goal: Discharge to home or other facility with appropriate resources  Outcome: Progressing     Problem: Pain  Goal: Verbalizes/displays adequate comfort level or baseline comfort level  Outcome: Progressing  Flowsheets  Taken 1/28/2025 0000  Verbalizes/displays adequate comfort level or baseline comfort level: Encourage patient to monitor pain and request assistance  Taken 1/27/2025 2245  Verbalizes/displays adequate comfort level or baseline comfort level: Encourage patient to monitor pain and request assistance     Problem: Safety - Adult  Goal: Free from fall injury  Outcome: Progressing

## 2025-01-28 NOTE — CARE COORDINATION
Chart reviewed day 3. Pt is followed by IM and Neuro. Pending ECHO. Plan to return home w/[facility]. Will follow for needs as they arise. Electronically signed by ROBY MONTEJO RN on 1/28/2025 at 1:12 PM

## 2025-01-28 NOTE — PROGRESS NOTES
Imani Monzon  Neurology Follow-up  [facility] Neurology    Date of Service: 1/28/2025    Subjective:   CC: Follow up today regarding: Headache, dizziness, atrial fibrillation off of blood thinners due to cost x1 month     Events noted. Chart and lab reviewed.       ROS : A 10-12 system review obtained and updated today and is unremarkable except as mentioned  in my interval history.     Past medical history, social history, medication and family history reviewed.       Objective:  Exam:   Constitutional:   Vitals:    01/28/25 0000 01/28/25 0313 01/28/25 0830 01/28/25 1033   BP: 125/64 138/65 125/64 136/78   Pulse: 73 66 68 80   Resp: 16 16 18 18   Temp: 97.9 °F (36.6 °C) 97.5 °F (36.4 °C) 97.5 °F (36.4 °C) 97.3 °F (36.3 °C)   TempSrc: Oral Oral Oral Oral   SpO2: 95% 93% 98% 99%   Weight:       Height:         General appearance:  Normal development and appear in no acute distress.   Mental Status:   Orientation to person, place, time, situation  Memory good immediate recall and intact remote memory               Attention intact as able to attend well to the exam                Language fluent in conversation              Comprehension intact; follows simple and two-step commands     Cranial Nerves:   II: Visual fields: Full without extinction on confrontational testing.   III,IV,VI: Pupils: equal, round, reactive to light, bilaterally; Extra Ocular Movements are intact. No nystagmus  V: Facial sensation is intact to pin prick and light touch   VII: Facial strength and movements: intact and symmetric  VIII: Hearing: Intact to finger rub bilaterally   IX, XI: Normal palatal elevation and shoulder shrug  XII: Tongue movements are normal; tongue midline with protrusion   Musculoskeletal:     R  L    Deltoid 5/5 5/5   Biceps 5/5 5/5   Triceps 5/5 5/5   Wrist extension  5/5 5/5        R  L    Hip flexion  5/5 5/5   Knee flexion  5/5 5/5   Knee extension  5/5 5/5   Ankle dorsiflexion  5/5 5/5   Ankle plantar 
  [facility] Medicine Progress Note  V 1.6      Date of Admission: 1/25/2025    Hospital Day: 3      Chief Admission Complaint:  Fall    Subjective:  Patient seen at bedside this morning. Patient no acute concerns. Patient denies any pain anywhere, nausea, vomiting, fevers, chills. Patient mentions some ongoing dizziness on ambulation.     Presenting Admission History:       74 y.o. female who presented to [facility] with fall, generalized weakness, debility.  PMHx significant for A-fib on amiodarone and Xarelto (states she has not been on anticoagulant recently due to cost), restless leg syndrome, hypothyroidism, hypertension, hyperlipidemia, lower extremity edema.     Patient presents after having an episode of presyncope and a fall at home.  Of note, she was recently admitted to the hospital 12/2024 for episode of syncope, discharged to SNF.  States she has been home since around 1/7/2025, lives with her boyfriend.  Has been doing okay as far as taking care of herself, but has been eating and drinking poorly about 1 meal a day.  Continues with generalized weakness and debility.  Had an episode today where she was sitting on the toilet this morning, got up to wash her hands and had an episode of lightheadedness and dizziness which caused her to collapse to the ground.  Denies any loss of consciousness, denies hitting her head either.  Did have some difficulties getting up from the ground due to weakness, required the help of her boyfriend.  Subsequently presented to the ED at the urging of her boyfriend where workup has been within normal limits except for mildly elevated creatinine.  They did attempt to ambulate and walker and she was not able to tolerate that.  Unable to tolerate acquisition of orthostatic vital signs.  Subsequently mid to the hospital for further evaluation and management.    Assessment/Plan:      Current Principal Problem:  Fall against object    #Dizziness, with a 
4 Eyes Skin Assessment     NAME:  Imani Monzon  YOB: 1950  MEDICAL RECORD NUMBER:  [id number]    The patient is being assessed for  Admission    I agree that at least one RN has performed a thorough Head to Toe Skin Assessment on the patient. ALL assessment sites listed below have been assessed.      Areas assessed by both nurses:    Head, Face, Ears, Shoulders, Back, Chest, Arms, Elbows, Hands, Sacrum. Buttock, Coccyx, Ischium, and Legs. Feet and Heels        Does the Patient have a Wound? No noted wound(s)       Alden Prevention initiated by RN: No  Wound Care Orders initiated by RN: No    Pressure Injury (Stage 3,4, Unstageable, DTI, NWPT, and Complex wounds) if present, place Wound referral order by RN under : No    New Ostomies, if present place, Ostomy referral order under : No     Nurse 1 eSignature: Electronically signed by Pebbles Stearns RN on 1/25/25 at 11:23 PM EST    **SHARE this note so that the co-signing nurse can place an eSignature**    Nurse 2 eSignature: {Esignature:[id number]}   
Occupational Therapy  Facility/Department: [facility] C5 - MED SURG/ORTHO  Daily Treatment Note  NAME: Imani Monzon  : 1950  MRN: [id number]    Date of Service: 2025    Discharge Recommendations:  Home with assist PRN         Patient Diagnosis(es): The primary encounter diagnosis was Dizziness. Diagnoses of Ataxia, Cerebrovascular accident (CVA), unspecified mechanism (HCC), and Syncope, unspecified syncope type were also pertinent to this visit.     Assessment   Assessment: Pt tolerated session well, demos good progress toward OT goals this session. Pt grossly SBA without AD for functional transfers and mobility around room and bathroom. Pt demos good ability to adjust socks at EOB with sitting balance intact and ability to complete LB dressing based on clinical judgement. Pt tolerated sink level grooming greater than 3 mins with SBA, no LOB or c/o dizziness. Pt functioning below her baseline, continue acute OT per POC.   Activity Tolerance: Patient tolerated treatment well  Discharge Recommendations: Home with assist PRN     Plan  Occupational Therapy Plan  Times Per Week: 2-4x/ week    Restrictions  Restrictions/Precautions  Restrictions/Precautions: General Precautions;Fall Risk    Subjective  Subjective  Subjective: Pt resting in bed, agreeable to OT treatment. Pt reports she has been in the chair and ambulating to/from bathroom on her own.    Orientation  Overall Orientation Status: Within Functional Limits    Pain: Pt denies pain.    Cognition  Overall Cognitive Status: WFL       Objective  Vitals  Vitals  Pulse: 77  SpO2: 94 %  O2 Device: None (Room air)  BP: 135/75  MAP (Calculated): 95  BP Location: Left upper arm  BP Method: Automatic  Patient Position: Semi fowlers    Bed Mobility Training  Bed Mobility Training: Yes  Interventions: Safety awareness training;Verbal cues  Supine to Sit: Modified independent  Sit to Supine: Modified independent    Balance  Sitting: Intact  Standing: 
Occupational Therapy  Facility/Department: [place] [id number] - MED SURG/ORTHO  Occupational Therapy Initial Assessment    Name: Imani Monzon  : 1950  MRN: [id number]  Date of Service: 2025    Discharge Recommendations:  Home with Home health OT, 24 hour supervision or assist          Patient Diagnosis(es): The primary encounter diagnosis was Dizziness. A diagnosis of Ataxia was also pertinent to this visit.  Past Medical History:  has a past medical history of Arthritis, Bipolar disorder (HCC), Cellulitis, Chronic back pain, Closed fracture of left fibula, Closed fracture of left tibia, Depression, Fibroid (bleeding) (uterine), Fibromyalgia, GERD (gastroesophageal reflux disease), Hyperlipidemia, Hypertension, Hypothyroidism, Influenza A, Migraine, Neuropathy, MAXIMO (obstructive sleep apnea), RLS (restless legs syndrome), Urine incontinence, and Vitamin D deficiency.  Past Surgical History:  has a past surgical history that includes Cholecystectomy (); Leg Surgery (); Hysterectomy (); Upper gastrointestinal endoscopy (); Colonoscopy ( ); Upper gastrointestinal endoscopy (12); Bariatric Surgery (12); hiatal hernia repair; eye surgery (Left, 2018); pr xcapsl ctrc rmvl insj io lens prosth w/o ecp (Left, 2018); pr xcapsl ctrc rmvl insj io lens prosth w/o ecp (Right, 2018); Ovary removal; Tonsillectomy; and sinus surgery.           Assessment  Performance deficits / Impairments: Decreased functional mobility ;Decreased ADL status;Decreased balance;Decreased strength;Decreased safe awareness  Assessment: pt from home alone, reports normally independent with BADL's & functional mobility without AD; readmitted for fall with dizziness with postural changes; requires CGA with functional transfers & mobility to walk around bed -->chair with RW; orthostatic BP checks normal with postural changes; pt cooperative &  continues to benefit from skilled OT services;  REQUIRES OT 
112/68  Blood Pressure Sittin/82  Blood Pressure Standin/83  Pulse Lyin PER MINUTE  Pulse Sittin PER MINUTE  Pulse Standin PER MINUTE  Comment: HR 77 BPM, SPO2 99% on room air, /68 in supine, HR 76 BPM, /82 in seated, HR 90 BPM, /83 in standing.     Observation/Palpation  Posture: Fair (with RW.  forward head and rounded shoulders posture.)  Gross Assessment  AROM: Within functional limits  PROM: Within functional limits  Strength: Generally decreased, functional (3+/5 MMT score in all major muslce groups of BLE.  pain with testing R knee, pt reports notable arthritis in R knee.)     Bed Mobility Training  Bed Mobility Training: Yes  Overall Level of Assistance: Stand-by assistance;Additional time;Adaptive equipment (with HOB elevated and use of BR.)  Interventions: Safety awareness training;Verbal cues  Supine to Sit: Stand-by assistance;Additional time;Adaptive equipment  Sit to Supine: Other (comment) (ADITYA, pt up in chair at end of treatment.)  Balance  Sitting: Impaired (bracing self with B hands on BR and bed initially, improves with time.)  Sitting - Static: Good (unsupported)  Sitting - Dynamic: Fair (occasional)  Standing: Impaired (with RW.  no overt LOB, but unsteady on her feet.)  Standing - Static: Fair;Constant support  Standing - Dynamic: Fair;Constant support  Transfer Training  Transfer Training: Yes  Overall Level of Assistance: Contact-guard assistance;Additional time;Adaptive equipment (with RW.)  Interventions: Verbal cues;Safety awareness training (VCs for sequenicng and hand placement.)  Sit to Stand: Adaptive equipment;Additional time;Contact-guard assistance  Stand to Sit: Contact-guard assistance;Additional time;Adaptive equipment  Gait  Gait Training: Yes  Overall Level of Assistance: Assist X1;Additional time;Adaptive equipment;Contact-guard assistance  Distance (ft): 15 Feet  Assistive Device: Walker, rolling;Gait belt  Interventions: Verbal 
Or  potassium alternative oral replacement, 40 mEq, PRN   Or  potassium chloride, 10 mEq, PRN  magnesium sulfate, 2,000 mg, PRN  ondansetron, 4 mg, Q8H PRN   Or  ondansetron, 4 mg, Q6H PRN  polyethylene glycol, 17 g, Daily PRN  acetaminophen, 650 mg, Q6H PRN   Or  acetaminophen, 650 mg, Q6H PRN        Labs      Recent Results (from the past 24 hour(s))   EKG 12 Lead    Collection Time: 01/25/25  2:24 PM   Result Value Ref Range    Ventricular Rate 67 BPM    Atrial Rate 67 BPM    P-R Interval 166 ms    QRS Duration 78 ms    Q-T Interval 432 ms    QTc Calculation (Bazett) 456 ms    P Axis 14 degrees    R Axis 55 degrees    T Axis 41 degrees    Diagnosis       Normal sinus rhythmNonspecific ST abnormalityConfirmed by NELLY VELEZ MD ([id number]) on 1/26/2025 11:30:11 AM   CBC with Auto Differential    Collection Time: 01/25/25  2:27 PM   Result Value Ref Range    WBC 7.3 4.0 - 11.0 K/uL    RBC 4.67 4.00 - 5.20 M/uL    Hemoglobin 13.9 12.0 - 16.0 g/dL    Hematocrit 40.9 36.0 - 48.0 %    MCV 87.5 80.0 - 100.0 fL    MCH 29.8 26.0 - 34.0 pg    MCHC 34.1 31.0 - 36.0 g/dL    RDW 15.8 (H) 12.4 - 15.4 %    Platelets 252 135 - 450 K/uL    MPV 9.2 5.0 - 10.5 fL    PLATELET SLIDE REVIEW Adequate     SLIDE REVIEW see below     Path Consult Yes     Neutrophils % 31.0 %    Lymphocytes % 38.0 %    Monocytes % 2.0 %    Eosinophils % 2.0 %    Basophils % 0.0 %    Neutrophils Absolute 2.3 1.7 - 7.7 K/uL    Lymphocytes Absolute 4.7 1.0 - 5.1 K/uL    Monocytes Absolute 0.1 0.0 - 1.3 K/uL    Eosinophils Absolute 0.1 0.0 - 0.6 K/uL    Basophils Absolute 0.0 0.0 - 0.2 K/uL    Atypical Lymphocytes Relative 27 (H) 0 - 6 %    Anisocytosis Occasional (A)     Macrocytes Occasional (A)     Microcytes Occasional (A)     Poikilocytes Occasional (A)     Ovalocytes Occasional (A)    Comprehensive Metabolic Panel    Collection Time: 01/25/25  2:27 PM   Result Value Ref Range    Sodium 141 136 - 145 mmol/L    Potassium 4.0 3.5 - 5.1 mmol/L    Chloride 
No further workup    LABURIN >100,000 CFU/ml 11/26/2024 02:54 PM     Blood Cultures:   Lab Results   Component Value Date/Time    BC No Growth after 4 days of incubation. 01/31/2020 09:47 PM     Lab Results   Component Value Date/Time    BLOODCULT2 No Growth after 4 days of incubation. 01/31/2020 11:09 PM     Organism:   Lab Results   Component Value Date/Time    ORG Klebsiella pneumoniae 11/26/2024 02:54 PM         Phi Plaza MD

## 2025-01-29 VITALS
RESPIRATION RATE: 16 BRPM | TEMPERATURE: 97.9 F | OXYGEN SATURATION: 98 % | HEART RATE: 64 BPM | SYSTOLIC BLOOD PRESSURE: 137 MMHG | BODY MASS INDEX: 40.84 KG/M2 | WEIGHT: 239.2 LBS | DIASTOLIC BLOOD PRESSURE: 74 MMHG | HEIGHT: 64 IN

## 2025-01-29 LAB
ANION GAP SERPL CALCULATED.3IONS-SCNC: 7 MMOL/L (ref 3–16)
BASOPHILS # BLD: 0 K/UL (ref 0–0.2)
BASOPHILS NFR BLD: 0.5 %
BUN SERPL-MCNC: 24 MG/DL (ref 7–20)
CALCIUM SERPL-MCNC: 8.6 MG/DL (ref 8.3–10.6)
CHLORIDE SERPL-SCNC: 107 MMOL/L (ref 99–110)
CO2 SERPL-SCNC: 26 MMOL/L (ref 21–32)
CREAT SERPL-MCNC: 1.2 MG/DL (ref 0.6–1.2)
DEPRECATED RDW RBC AUTO: 15.6 % (ref 12.4–15.4)
EOSINOPHIL # BLD: 0.2 K/UL (ref 0–0.6)
EOSINOPHIL NFR BLD: 3.5 %
GFR SERPLBLD CREATININE-BSD FMLA CKD-EPI: 47 ML/MIN/{1.73_M2}
GLUCOSE SERPL-MCNC: 83 MG/DL (ref 70–99)
HCT VFR BLD AUTO: 31.9 % (ref 36–48)
HGB BLD-MCNC: 10.6 G/DL (ref 12–16)
LYMPHOCYTES # BLD: 3.1 K/UL (ref 1–5.1)
LYMPHOCYTES NFR BLD: 55 %
MCH RBC QN AUTO: 29.4 PG (ref 26–34)
MCHC RBC AUTO-ENTMCNC: 33.3 G/DL (ref 31–36)
MCV RBC AUTO: 88.4 FL (ref 80–100)
MONOCYTES # BLD: 0.4 K/UL (ref 0–1.3)
MONOCYTES NFR BLD: 7.8 %
NEUTROPHILS # BLD: 1.9 K/UL (ref 1.7–7.7)
NEUTROPHILS NFR BLD: 33.2 %
PLATELET # BLD AUTO: 190 K/UL (ref 135–450)
PMV BLD AUTO: 9.8 FL (ref 5–10.5)
POTASSIUM SERPL-SCNC: 4.4 MMOL/L (ref 3.5–5.1)
RBC # BLD AUTO: 3.61 M/UL (ref 4–5.2)
SODIUM SERPL-SCNC: 140 MMOL/L (ref 136–145)
WBC # BLD AUTO: 5.6 K/UL (ref 4–11)

## 2025-01-29 PROCEDURE — 6370000000 HC RX 637 (ALT 250 FOR IP)

## 2025-01-29 PROCEDURE — 85025 COMPLETE CBC W/AUTO DIFF WBC: CPT

## 2025-01-29 PROCEDURE — 36415 COLL VENOUS BLD VENIPUNCTURE: CPT

## 2025-01-29 PROCEDURE — 80048 BASIC METABOLIC PNL TOTAL CA: CPT

## 2025-01-29 PROCEDURE — 6370000000 HC RX 637 (ALT 250 FOR IP): Performed by: STUDENT IN AN ORGANIZED HEALTH CARE EDUCATION/TRAINING PROGRAM

## 2025-01-29 PROCEDURE — 2500000003 HC RX 250 WO HCPCS

## 2025-01-29 RX ORDER — FUROSEMIDE 20 MG/1
20 TABLET ORAL DAILY
Qty: 30 TABLET | Refills: 2 | Status: SHIPPED | OUTPATIENT
Start: 2025-01-29 | End: 2025-02-28

## 2025-01-29 RX ADMIN — PANTOPRAZOLE SODIUM 40 MG: 40 TABLET, DELAYED RELEASE ORAL at 09:14

## 2025-01-29 RX ADMIN — APIXABAN 5 MG: 5 TABLET, FILM COATED ORAL at 09:14

## 2025-01-29 RX ADMIN — Medication 10 ML: at 09:15

## 2025-01-29 RX ADMIN — ASPIRIN 81 MG: 81 TABLET, CHEWABLE ORAL at 09:14

## 2025-01-29 RX ADMIN — LEVOTHYROXINE SODIUM 150 MCG: 0.15 TABLET ORAL at 09:14

## 2025-01-29 NOTE — PLAN OF CARE
Problem: Discharge Planning  Goal: Discharge to home or other facility with appropriate resources  Outcome: Progressing     Problem: Pain  Goal: Verbalizes/displays adequate comfort level or baseline comfort level  Outcome: Progressing  Flowsheets (Taken 1/28/2025 2130 by Yoko Qiu)  Verbalizes/displays adequate comfort level or baseline comfort level: Encourage patient to monitor pain and request assistance     Problem: Safety - Adult  Goal: Free from fall injury  Outcome: Progressing

## 2025-01-29 NOTE — DISCHARGE INSTR - COC
Continuity of Care Form    Patient Name: Imani Monzon   :  1950  MRN:  [id number]    Admit date:  2025  Discharge date:  ***    Code Status Order: Full Code   Advance Directives:   Advance Care Flowsheet Documentation             Admitting Physician:  Dc Falk DO  PCP: Mauri Bunch DO    Discharging Nurse: ***  Discharging Hospital Unit/Room#: 0522/0522-01  Discharging Unit Phone Number: ***    Emergency Contact:   Extended Emergency Contact Information  Primary Emergency Contact: Sparkle Zhang  Address: [address] [place] [address] [facility]  Home Phone: [phone]  Mobile Phone: [phone]  Relation: Child  Preferred language: English   needed? No    Past Surgical History:  Past Surgical History:   Procedure Laterality Date    BARIATRIC SURGERY  11    Laparoscopic Sleeve Gastrectomy, Lap Hiatal hernia repair    CHOLECYSTECTOMY      COLONOSCOPY       colon polyps; Dr. Ferrell    EYE SURGERY Left 2018    phaco with IOL    HIATAL HERNIA REPAIR      lap    HYSTERECTOMY (CERVIX STATUS UNKNOWN)      TAHBSO for fibroids and DUB    LEG SURGERY      L; s/p tibia and fibular fx's; has uma in L tibia; surgery x 6    OVARY REMOVAL      UT XCAPSL CTRC RMVL INSJ IO LENS PROSTH W/O ECP Left 2018    PHACOEMULSIFICATION OF CATARACT LEFT EYE WITH INTRAOCULAR LENS IMPLANT performed by Tony Reyes MD at [facility]    UT XCAPSL CTRC RMVL INSJ IO LENS PROSTH W/O ECP Right 2018    PHACOEMULSIFICATION OF CATARACT RIGHT  EYE WITH INTRAOCULAR LENS IMPLANT performed by Tony Reyes MD at [facility] OR    SINUS SURGERY      \"Cleaned out\"    TONSILLECTOMY      UPPER GASTROINTESTINAL ENDOSCOPY      Dr. Ferrell    UPPER GASTROINTESTINAL ENDOSCOPY  12    WNL       Immunization History:   Immunization History   Administered Date(s) Administered    COVID-19, PFIZER PURPLE top, DILUTE for use, (age 12 y+), 30mcg/0.3mL 2021,

## 2025-01-29 NOTE — CARE COORDINATION
CASE MANAGEMENT DISCHARGE SUMMARY      Discharge to: Home alone. IPTA. [facility] SN/PT/OT.    IMM given: 1/29/25   Follow-Up copy of Important Message from Medicare (IMM2) has been explained to patient and/or designated healthcare decision maker (HCDM). Pt and/or HCDM aware that patient is permitted to stay an additional 4 hours prior to discharge to consider an appeal if they feel as though they are being discharged too soon. Patient may discharge as planned if chooses to do so.  Patient/HCDM voice no other concerns or questions regarding this process.       New Durable Medical Equipment ordered/agency: n/a    Transportation:    Family/car:private     Confirmed discharge plan with:     Patient: yes     Family:  Pt called dtr      Facility/Agency, name:  Lainey gamboa/Kenny at [facility]      RN, name: Dorina     Note: Discharging nurse to complete SURYA, reconcile AVS, and place final copy with patient's discharge packet.

## 2025-01-30 ENCOUNTER — TELEPHONE (OUTPATIENT)
Dept: FAMILY MEDICINE CLINIC | Age: 75
End: 2025-01-30

## 2025-01-30 RX ORDER — ATORVASTATIN CALCIUM 40 MG/1
40 TABLET, FILM COATED ORAL NIGHTLY
Qty: 90 TABLET | Refills: 3 | Status: SHIPPED | OUTPATIENT
Start: 2025-01-30

## 2025-01-30 NOTE — TELEPHONE ENCOUNTER
Care Transitions Initial Follow Up Call    Outreach made within 2 business days of discharge: Yes    Patient: Imani Monzon Patient : 1950   MRN: [id number]  Reason for Admission: Fall against object.  Discharge Date: 25       Spoke with: Kathleen     Discharge department/facility: [facility] Interactive Patient Contact:  Was patient able to fill all prescriptions: Yes  Was patient instructed to bring all medications to the follow-up visit: Yes  Is patient taking all medications as directed in the discharge summary? Yes  Does patient understand their discharge instructions: Yes  Does patient have questions or concerns that need addressed prior to 7-14 day follow up office visit: no        Scheduled appointment with PCP within 7-14 days    Follow Up  Future Appointments   Date Time Provider Department Center   2025  1:00 PM Mauri Bunch DO [facility]   3/20/2025  1:30 PM Phoebe Torres DO [facility] Michelle Motz, LPN

## 2025-01-30 NOTE — TELEPHONE ENCOUNTER
Refill Request     CONFIRM preferred pharmacy with the patient.    If Mail Order Rx - Pend for 90 day refill.      Last Seen: Last Seen Department: 12/31/2024  Last Seen by PCP: 8/26/2024    Last Written: 5/9/24 #90 - 1 refill     If no future appointment scheduled:  Review the last OV with PCP and review information for follow-up visit,  Route STAFF MESSAGE with patient name to the  Pool for scheduling with the following information:            -  Timing of next visit           -  Visit type ie Physical, OV, etc           -  Diagnoses/Reason ie. COPD, HTN - Do not use MEDICATION, Follow-up or CHECK UP - Give reason for visit      Next Appointment:   Future Appointments   Date Time Provider Department Center   2/3/2025  4:30 PM Mauri Bunch DO [facility]   2/26/2025  1:00 PM Mauri Bunch DO [facility]   3/20/2025  1:30 PM Phoebe Torres DO [facility]       Message sent to  to schedule appt with patient?  NO      Requested Prescriptions     Pending Prescriptions Disp Refills    atorvastatin (LIPITOR) 40 MG tablet 90 tablet 1     Sig: Take 1 tablet by mouth nightly

## 2025-02-01 NOTE — DISCHARGE SUMMARY
Hospital Medicine Discharge Summary    Patient: Imani Monzon   : 1950     Hospital:  [facility]  Admit Date: 2025   Discharge Date: 2025    Disposition:  []Home   []HHC  [x]SNF  []Acute Rehab  []LTAC  []Hospice  Code status:  [x]Full  []DNR/CCA  []Limited (DNR/CCA with Do Not Intubate)  []DNRCC  Condition at Discharge: Stable  Primary Care Provider: Mauri Bunch DO    Admitting Provider: Dc Falk DO  Discharge Provider: Phi Plaza MD     Discharge Diagnoses:      Active Hospital Problems    Diagnosis     History of stroke [Z86.73]     Vestibular migraine [G43.809]     Migraine with aura and with status migrainosus, not intractable [G43.101]     Spell of abnormal behavior [R46.89]     Fall against object [W18.00XA]        Presenting Admission History:      74 y.o. female who presented to [facility] with fall, generalized weakness, debility.  PMHx significant for A-fib on amiodarone and Xarelto (states she has not been on anticoagulant recently due to cost), restless leg syndrome, hypothyroidism, hypertension, hyperlipidemia, lower extremity edema.     Patient presents after having an episode of presyncope and a fall at home.  Of note, she was recently admitted to the hospital 2024 for episode of syncope, discharged to SNF.  States she has been home since around 2025, lives with her boyfriend.  Has been doing okay as far as taking care of herself, but has been eating and drinking poorly about 1 meal a day.  Continues with generalized weakness and debility.  Had an episode today where she was sitting on the toilet this morning, got up to wash her hands and had an episode of lightheadedness and dizziness which caused her to collapse to the ground.  Denies any loss of consciousness, denies hitting her head either.  Did have some difficulties getting up from the ground due to weakness, required the help of her boyfriend.  Subsequently

## 2025-02-03 ENCOUNTER — OFFICE VISIT (OUTPATIENT)
Dept: FAMILY MEDICINE CLINIC | Age: 75
End: 2025-02-03
Payer: COMMERCIAL

## 2025-02-03 VITALS
OXYGEN SATURATION: 99 % | DIASTOLIC BLOOD PRESSURE: 84 MMHG | HEIGHT: 63 IN | HEART RATE: 80 BPM | BODY MASS INDEX: 36.89 KG/M2 | TEMPERATURE: 97 F | WEIGHT: 208.2 LBS | SYSTOLIC BLOOD PRESSURE: 128 MMHG

## 2025-02-03 DIAGNOSIS — R42 VERTIGO: ICD-10-CM

## 2025-02-03 DIAGNOSIS — Z09 HOSPITAL DISCHARGE FOLLOW-UP: Primary | ICD-10-CM

## 2025-02-03 DIAGNOSIS — G25.81 RLS (RESTLESS LEGS SYNDROME): ICD-10-CM

## 2025-02-03 PROCEDURE — 99213 OFFICE O/P EST LOW 20 MIN: CPT | Performed by: FAMILY MEDICINE

## 2025-02-03 PROCEDURE — 1123F ACP DISCUSS/DSCN MKR DOCD: CPT | Performed by: FAMILY MEDICINE

## 2025-02-03 PROCEDURE — 3079F DIAST BP 80-89 MM HG: CPT | Performed by: FAMILY MEDICINE

## 2025-02-03 PROCEDURE — 3074F SYST BP LT 130 MM HG: CPT | Performed by: FAMILY MEDICINE

## 2025-02-03 PROCEDURE — 1159F MED LIST DOCD IN RCRD: CPT | Performed by: FAMILY MEDICINE

## 2025-02-03 RX ORDER — PRAMIPEXOLE DIHYDROCHLORIDE 0.12 MG/1
TABLET ORAL
Qty: 30 TABLET | Refills: 1 | Status: SHIPPED | OUTPATIENT
Start: 2025-02-03 | End: 2025-02-08 | Stop reason: SDUPTHER

## 2025-02-03 SDOH — ECONOMIC STABILITY: FOOD INSECURITY: WITHIN THE PAST 12 MONTHS, YOU WORRIED THAT YOUR FOOD WOULD RUN OUT BEFORE YOU GOT MONEY TO BUY MORE.: NEVER TRUE

## 2025-02-03 SDOH — ECONOMIC STABILITY: FOOD INSECURITY: WITHIN THE PAST 12 MONTHS, THE FOOD YOU BOUGHT JUST DIDN'T LAST AND YOU DIDN'T HAVE MONEY TO GET MORE.: NEVER TRUE

## 2025-02-03 ASSESSMENT — PATIENT HEALTH QUESTIONNAIRE - PHQ9
8. MOVING OR SPEAKING SO SLOWLY THAT OTHER PEOPLE COULD HAVE NOTICED. OR THE OPPOSITE, BEING SO FIGETY OR RESTLESS THAT YOU HAVE BEEN MOVING AROUND A LOT MORE THAN USUAL: NOT AT ALL
SUM OF ALL RESPONSES TO PHQ QUESTIONS 1-9: 9
7. TROUBLE CONCENTRATING ON THINGS, SUCH AS READING THE NEWSPAPER OR WATCHING TELEVISION: NOT AT ALL
1. LITTLE INTEREST OR PLEASURE IN DOING THINGS: NEARLY EVERY DAY
SUM OF ALL RESPONSES TO PHQ9 QUESTIONS 1 & 2: 3
SUM OF ALL RESPONSES TO PHQ QUESTIONS 1-9: 9
6. FEELING BAD ABOUT YOURSELF - OR THAT YOU ARE A FAILURE OR HAVE LET YOURSELF OR YOUR FAMILY DOWN: NOT AT ALL
SUM OF ALL RESPONSES TO PHQ QUESTIONS 1-9: 9
10. IF YOU CHECKED OFF ANY PROBLEMS, HOW DIFFICULT HAVE THESE PROBLEMS MADE IT FOR YOU TO DO YOUR WORK, TAKE CARE OF THINGS AT HOME, OR GET ALONG WITH OTHER PEOPLE: NOT DIFFICULT AT ALL
4. FEELING TIRED OR HAVING LITTLE ENERGY: NEARLY EVERY DAY
5. POOR APPETITE OR OVEREATING: NOT AT ALL
2. FEELING DOWN, DEPRESSED OR HOPELESS: NOT AT ALL
SUM OF ALL RESPONSES TO PHQ QUESTIONS 1-9: 9
9. THOUGHTS THAT YOU WOULD BE BETTER OFF DEAD, OR OF HURTING YOURSELF: NOT AT ALL
3. TROUBLE FALLING OR STAYING ASLEEP: NEARLY EVERY DAY

## 2025-02-03 ASSESSMENT — ANXIETY QUESTIONNAIRES
GAD7 TOTAL SCORE: 1
IF YOU CHECKED OFF ANY PROBLEMS ON THIS QUESTIONNAIRE, HOW DIFFICULT HAVE THESE PROBLEMS MADE IT FOR YOU TO DO YOUR WORK, TAKE CARE OF THINGS AT HOME, OR GET ALONG WITH OTHER PEOPLE: NOT DIFFICULT AT ALL
3. WORRYING TOO MUCH ABOUT DIFFERENT THINGS: NOT AT ALL
6. BECOMING EASILY ANNOYED OR IRRITABLE: SEVERAL DAYS
7. FEELING AFRAID AS IF SOMETHING AWFUL MIGHT HAPPEN: NOT AT ALL
2. NOT BEING ABLE TO STOP OR CONTROL WORRYING: NOT AT ALL
5. BEING SO RESTLESS THAT IT IS HARD TO SIT STILL: NOT AT ALL
4. TROUBLE RELAXING: NOT AT ALL
1. FEELING NERVOUS, ANXIOUS, OR ON EDGE: NOT AT ALL

## 2025-02-03 NOTE — PROGRESS NOTES
Subjective:      Patient ID: Imani Monzon is a 74 y.o. female.    HPI  Patient in for checkup following hospitalization for vertigo and near syncope.  First episode December 18 she was sitting on a stool at work and the next thing she knew she was on the floor and she was sent to the ER by her fellow employees and was informed 11 days-apparently in the ER she was in atrial fibs and they felt at that time that was the reason why she fell off her chair.  On January 25 she had 2-day history of vertigo worse than usual along with headache and she states she had another episode where she sat down hard and was finally someone came to the house she went to the ER.  She has been on meclizine 12-1/2 3 times a day for the dizziness.  She states she would like to either have an increase in restless leg medicine or change.        Review of Systems    Review of Systems   Neurological:  Positive for dizziness, syncope and headaches.       Objective:   Physical Exam      Physical Exam  Constitutional:       General: She is not in acute distress.     Appearance: Normal appearance. She is obese. She is not ill-appearing.   Neck:      Vascular: No carotid bruit.   Cardiovascular:      Rate and Rhythm: Normal rate and regular rhythm.      Heart sounds: Normal heart sounds.   Pulmonary:      Effort: Pulmonary effort is normal.      Breath sounds: Normal breath sounds.   Neurological:      Mental Status: She is alert.      Gait: Gait normal.   Psychiatric:         Mood and Affect: Mood normal.         Behavior: Behavior normal.         Thought Content: Thought content normal.         Judgment: Judgment normal.         Assessment:       Diagnosis Orders   1. Hospital discharge follow-up        2. Vertigo        3. RLS (restless legs syndrome)              Plan:   Vertigo-increase meclizine to 25 mg 3 times a day-could cause drowsiness.    Restless legs syndrome taper off ropinirole over the next 5 days and a prescription is being

## 2025-02-04 ENCOUNTER — TELEPHONE (OUTPATIENT)
Dept: FAMILY MEDICINE CLINIC | Age: 75
End: 2025-02-04

## 2025-02-04 ENCOUNTER — PATIENT MESSAGE (OUTPATIENT)
Dept: CARDIOLOGY CLINIC | Age: 75
End: 2025-02-04

## 2025-02-04 RX ORDER — ATORVASTATIN CALCIUM 40 MG/1
40 TABLET, FILM COATED ORAL DAILY
Qty: 90 TABLET | Refills: 3 | Status: SHIPPED | OUTPATIENT
Start: 2025-02-04

## 2025-02-04 RX ORDER — LEVOTHYROXINE SODIUM 175 UG/1
TABLET ORAL
Qty: 90 TABLET | Refills: 3 | Status: SHIPPED | OUTPATIENT
Start: 2025-02-04

## 2025-02-04 RX ORDER — FUROSEMIDE 20 MG/1
20 TABLET ORAL DAILY
Qty: 90 TABLET | Refills: 3 | Status: SHIPPED | OUTPATIENT
Start: 2025-02-04

## 2025-02-04 NOTE — TELEPHONE ENCOUNTER
Patient returned call.    Advised her to contact cardiology for samples/assistance. [facility] and [facility] to see if they can help her with obtaining her medication.    Patient will call her cardiologist and will let me know if she needs any further assistance.

## 2025-02-04 NOTE — TELEPHONE ENCOUNTER
CLARA briones spoke with pt. She explained how pt assistence would work, and if she would qualify. Pt is going to call her insurance to see if they will work with her on her deductible. Pt was explained about coumadin clilinc. Pt will call us and let us know

## 2025-02-04 NOTE — TELEPHONE ENCOUNTER
MEDICATION SENT TO WRONG PHARMACY ON 1/29/25. PATIENT REQUESTING THIS BE RESENT TO [facility]. PLEASE ADVISE. THANKS!          Refill Request     CONFIRM preferred pharmacy with the patient.    If Mail Order Rx - Pend for 90 day refill.      Last Seen: Last Seen Department: 2/3/2025    Last Seen by PCP: 2/3/2025    Last Written: 1/29/25 30 tablet 2 refills    If no future appointment scheduled:  Review the last OV with PCP and review information for follow-up visit,  Route STAFF MESSAGE with patient name to the  Pool for scheduling with the following information:            -  Timing of next visit           -  Visit type ie Physical, OV, etc           -  Diagnoses/Reason ie. COPD, HTN - Do not use MEDICATION, Follow-up or CHECK UP - Give reason for visit      Next Appointment: 2/26/25  Future Appointments   Date Time Provider Department Center   2/26/2025  1:00 PM Mauri Bunch DO [facility]   3/20/2025  1:30 PM Phoebe Torres DO [facility]       Message sent to  to schedule appt with patient?  NO      Requested Prescriptions     Pending Prescriptions Disp Refills    furosemide (LASIX) 20 MG tablet [Pharmacy Med Name: FUROSEMIDE TABS 20MG]  0

## 2025-02-04 NOTE — TELEPHONE ENCOUNTER
Patient is having difficulty affording her Eliquis. Medication is managed by cardiology and needs to be handled through their office.     Called patient to discuss assistance options but she should contact her cardiologist regarding samples and cost assistance.  No answer, lvm for return call

## 2025-02-07 NOTE — TELEPHONE ENCOUNTER
GOMEZ, PLEASE RESEND THIS MEDICATION TO [facility]. WAS SENT TO WRONG PHARMACY ON 2/3/25.      Refill Request     CONFIRM preferred pharmacy with the patient.    If Mail Order Rx - Pend for 90 day refill.      Last Seen: Last Seen Department: 2/3/2025    Last Seen by PCP: 2/3/2025    Last Written:     If no future appointment scheduled:  Review the last OV with PCP and review information for follow-up visit,  Route STAFF MESSAGE with patient name to the  Pool for scheduling with the following information:            -  Timing of next visit           -  Visit type ie Physical, OV, etc           -  Diagnoses/Reason ie. COPD, HTN - Do not use MEDICATION, Follow-up or CHECK UP - Give reason for visit      Next Appointment: 2/26/25  Future Appointments   Date Time Provider Department Center   2/26/2025  1:00 PM Mauri Bunch DO [facility]   3/20/2025  1:30 PM Phoebe Torres DO [facility]       Message sent to  to schedule appt with patient?  NO      Requested Prescriptions     Pending Prescriptions Disp Refills    pramipexole (MIRAPEX) 0.125 MG tablet [Pharmacy Med Name: PRAMIPEXOLE DIHYDROCHLORIDE TABS 0.125MG]  0

## 2025-02-08 RX ORDER — PRAMIPEXOLE DIHYDROCHLORIDE 0.12 MG/1
TABLET ORAL
Qty: 90 TABLET | Refills: 0 | Status: SHIPPED | OUTPATIENT
Start: 2025-02-08 | End: 2025-02-14 | Stop reason: SDUPTHER

## 2025-02-08 RX ORDER — PRAMIPEXOLE DIHYDROCHLORIDE 0.12 MG/1
TABLET ORAL
Refills: 0 | OUTPATIENT
Start: 2025-02-08

## 2025-02-13 ENCOUNTER — TELEPHONE (OUTPATIENT)
Dept: CARDIOLOGY CLINIC | Age: 75
End: 2025-02-13

## 2025-02-13 DIAGNOSIS — I48.0 PAF (PAROXYSMAL ATRIAL FIBRILLATION) (HCC): Primary | ICD-10-CM

## 2025-02-13 LAB
A/G RATIO, EXTERNAL: 1.3
ALBUMIN, EXTERNAL: 3.5
ALK PHOS, EXTERNAL: 84
ANION GAP, EXTERNAL: 11
BASOPHILS ABSOLUTE: NORMAL
BASOPHILS RELATIVE PERCENT: NORMAL
BILI DIRECT, EXTERNAL: ABNORMAL
BILI TOTAL, EXTERNAL: 0.5
BUN, EXTERNAL: 23
BUN/CREATININE RATIO, EXTERNAL: ABNORMAL
CALCIUM, EXTERNAL: 9.1
CALCIUM, ION, EXTERNAL: 9.7
CHLORIDE, EXTERNAL: 112
CHOLESTEROL, TOTAL: 178 MG/DL
CHOLESTEROL/HDL RATIO: ABNORMAL
CO2, EXTERNAL: 25
CREATININE, EXTERNAL: 1.2
EGFR IF AFA, EXTERNAL: 46
EGFR IF NONAFRICAN AMERICAN: ABNORMAL
EOSINOPHILS ABSOLUTE: NORMAL
EOSINOPHILS RELATIVE PERCENT: NORMAL
FERRITIN: 70.7 NG/ML (ref 9–150)
GLOBULIN, EXTERNAL: 2.7
GLUCOSE SER, EXTERNAL: 96
HCT VFR BLD CALC: 37.2 % (ref 36–46)
HDLC SERPL-MCNC: 75 MG/DL (ref 35–70)
HEMOGLOBIN: 12.1 G/DL (ref 12–16)
IRON: 29
LDL CHOLESTEROL: 71
LYMPHOCYTES ABSOLUTE: NORMAL
LYMPHOCYTES RELATIVE PERCENT: NORMAL
MAGNESIUM: 2.2 MG/DL
MCH RBC QN AUTO: 28.9 PG
MCHC RBC AUTO-ENTMCNC: 32.5 G/DL
MCV RBC AUTO: 88.9 FL
MONOCYTES ABSOLUTE: NORMAL
MONOCYTES RELATIVE PERCENT: NORMAL
NEUTROPHILS ABSOLUTE: NORMAL
NEUTROPHILS RELATIVE PERCENT: NORMAL
NONHDLC SERPL-MCNC: ABNORMAL MG/DL
PLATELET # BLD: 220 K/ΜL
PMV BLD AUTO: 10.3 FL
POTASSIUM, EXTERNAL: 4
PROTEIN TOT, EXTERNAL: 6.2
RBC # BLD: 4.19 10^6/ΜL
SGOT (AST), EXTERNAL: 16
SGPT (ALT), EXTERNAL: 10
SODIUM, EXTERNAL: 144
T4 TOTAL: 2.12
TRIGL SERPL-MCNC: 159 MG/DL
TSH SERPL DL<=0.05 MIU/L-ACNC: 0.02 UIU/ML
VLDLC SERPL CALC-MCNC: 32 MG/DL
WBC # BLD: 7.3 10^3/ML

## 2025-02-13 NOTE — TELEPHONE ENCOUNTER
Elli from [facility] called stated that the pt has stopped taking her Eliquis 5MG because she can no longer afford it. Is there an alternative medication to give her? Please advice.   Elli's call back # [phone]

## 2025-02-14 RX ORDER — WARFARIN SODIUM 2.5 MG/1
2.5 TABLET ORAL DAILY
Qty: 30 TABLET | Refills: 1 | Status: SHIPPED | OUTPATIENT
Start: 2025-02-14

## 2025-02-14 RX ORDER — PRAMIPEXOLE DIHYDROCHLORIDE 0.12 MG/1
TABLET ORAL
Qty: 60 TABLET | Refills: 2 | Status: SHIPPED | OUTPATIENT
Start: 2025-02-14

## 2025-02-14 RX ORDER — MECLIZINE HCL 12.5 MG 12.5 MG/1
TABLET ORAL
Qty: 60 TABLET | Refills: 5 | Status: SHIPPED | OUTPATIENT
Start: 2025-02-14

## 2025-02-14 NOTE — TELEPHONE ENCOUNTER
Spoke with patient. Referral to [facility] placed. Ask that she call today to get an appointment for early next week. She never started eliquis due to cost. Coumadin sent to the her pharmacy.

## 2025-02-14 NOTE — TELEPHONE ENCOUNTER
Previously discussed w/ patient  Alternative is warfarin  If wants to change to warfarin - ok start 2.5 daily and refer to warfarin clinic. Needs seen within 3 days of starting warfarin

## 2025-02-17 ENCOUNTER — TELEPHONE (OUTPATIENT)
Dept: PHARMACY | Age: 75
End: 2025-02-17

## 2025-02-17 NOTE — TELEPHONE ENCOUNTER
Anticoagulation Monitoring/Adjustment Referral received from Dr. Yahir Frederick   Contacted patient, but did not answer, M asking patient to contact clinic back    Jagjit Remy PharmD 2/17/2025 11:45 AM  ---------------------------------------------------------------    2/18/2025 -11:15    Contacted patient about scheduling initial appointment   Patient reports she has not picked up the medication from her pharmacy yet.     Counseled patient on importance of this medication. Patient reports she will  medication today or tomorrow.  Patient reports she will contact clinic when she has picked up medication to schedule appointment    Patient's pharmacy called and confirmed Rx was waiting for patient to .     Jagjit Remy PharmD 2/18/2025 11:18 AM

## 2025-02-18 LAB — ECHO BSA: 2.05 M2

## 2025-02-19 ENCOUNTER — ANTI-COAG VISIT (OUTPATIENT)
Dept: PHARMACY | Age: 75
End: 2025-02-19

## 2025-02-19 DIAGNOSIS — I48.0 PAF (PAROXYSMAL ATRIAL FIBRILLATION) (HCC): Primary | ICD-10-CM

## 2025-02-19 NOTE — PROGRESS NOTES
Ms. Imani Monzon is a 74 y.o. y/o female with history of Afib who presents today for anticoagulation monitoring and adjustment.    Pertinent PMH: PE, TIA, HTN, sleep apnea, GERD, hypothyroidism, RLS    Patient Reported Findings:  Yes     No  [x]   []       Patient verifies current dosing regimen as listed  - Patient has warfarin 2.5 mg tablets  - Patient reports starting warfarin on 2025  []   [x]       S/Sx bleeding/bruising/swelling/SOB/CP  []   [x]       Blood in urine or stool  []   [x]       Procedures scheduled in the future at this time  []   [x]       Missed Dose  []   [x]       Extra Dose  []   [x]       Change in medications  - Medication list reviewed/updated with patient at this visit  []   [x]       Change in health/diet/appetite  []   [x]       Change in alcohol use  []   [x]       Change in activity  []   [x]       Hospital admission  []   [x]       Emergency department visit  []   [x]       Other complaints  - Patient is trying to transition to apixaban    Clinical Outcomes:  Yes     No  []   [x]       Major bleeding event  []   [x]       Thromboembolic event    INR (no units)   Date Value   2022 0.91       Duration of warfarin Therapy: Indefinite  INR Range:  2.0-3.0    As initial clinic visit, provided patientt with counseling for medication use/compliance, adverse events, and nutrition; clinic overview & orientation; and educational materials.    INR 1.0 is BELOW  therapeutic range of 2-3  Recommend to increase to 5 mg on 25, 5 mg on 25 and 2.5 mg on 25    Will continue to monitor and check INR in 3 days  Dosing reminder card given with phone number, appointment date and time.   Return to clinic: 2025    Referral Provider: Dr. Yahir Frederick  Referral Date: 2025  CPA: Signed    Jagjit Remy, PharmD 2025 1:41 PM      For Pharmacy Admin Tracking Only  Intervention Detail: Adherence Monitorin and Dose Adjustment: 1, reason: Therapy

## 2025-02-19 NOTE — PROGRESS NOTES
Patient contacted and informed clinic that she had started warfarin on 2/18/2025  Patient reports taking warfarin 2.5 mg daily     Initial appointment scheduled for 2/21/2025 at 10:15 am    Referral Provider:  Dr. Yahir Frederick  Referral Date: 2/14/2025  CPA: Signed

## 2025-02-21 ENCOUNTER — ANTI-COAG VISIT (OUTPATIENT)
Dept: PHARMACY | Age: 75
End: 2025-02-21
Payer: COMMERCIAL

## 2025-02-21 DIAGNOSIS — I48.0 PAF (PAROXYSMAL ATRIAL FIBRILLATION) (HCC): Primary | ICD-10-CM

## 2025-02-21 LAB
INTERNATIONAL NORMALIZATION RATIO, POC: 1
PROTHROMBIN TIME, POC: 0

## 2025-02-21 PROCEDURE — 99202 OFFICE O/P NEW SF 15 MIN: CPT

## 2025-02-21 PROCEDURE — 85610 PROTHROMBIN TIME: CPT

## 2025-02-24 ENCOUNTER — ANTI-COAG VISIT (OUTPATIENT)
Dept: PHARMACY | Age: 75
End: 2025-02-24
Payer: COMMERCIAL

## 2025-02-24 DIAGNOSIS — I48.0 PAF (PAROXYSMAL ATRIAL FIBRILLATION) (HCC): Primary | ICD-10-CM

## 2025-02-24 LAB
INTERNATIONAL NORMALIZATION RATIO, POC: 1.8
PROTHROMBIN TIME, POC: 0

## 2025-02-24 PROCEDURE — 99211 OFF/OP EST MAY X REQ PHY/QHP: CPT

## 2025-02-24 PROCEDURE — 85610 PROTHROMBIN TIME: CPT

## 2025-02-24 NOTE — PROGRESS NOTES
Ms. Imani Monzon is a 74 y.o. y/o female with history of Afib who presents today for anticoagulation monitoring and adjustment.    Pertinent PMH: PE, TIA, HTN, sleep apnea, GERD, hypothyroidism, RLS    Patient Reported Findings:  Yes     No  [x]   []       Patient verifies current dosing regimen as listed  - Patient has warfarin 2.5 mg tablets  - Patient reports starting warfarin on 2/18/2025  []   [x]       S/Sx bleeding/bruising/swelling/SOB/CP  []   [x]       Blood in urine or stool  []   [x]       Procedures scheduled in the future at this time  []   [x]       Missed Dose  []   [x]       Extra Dose  []   [x]       Change in medications  []   [x]       Change in health/diet/appetite  []   [x]       Change in alcohol use  []   [x]       Change in activity  []   [x]       Hospital admission  []   [x]       Emergency department visit  []   [x]       Other complaints  - Patient is trying to transition to apixaban    Clinical Outcomes:  Yes     No  []   [x]       Major bleeding event  []   [x]       Thromboembolic event    INR (no units)   Date Value   02/09/2022 0.91     INR,(POC) (no units)   Date Value   02/21/2025 1.0       Duration of warfarin Therapy: Indefinite  INR Range:  2.0-3.0      INR 1.8 is BELOW  therapeutic range of 2-3  Recommend to increase to take 5 mg today then continue 2.5 mg daily  Will continue to monitor and check INR in 4 days  Dosing reminder card given with phone number, appointment date and time.   Return to clinic: 2/28/2025    Referral Provider: Dr. Yahir Frederick  Referral Date: 2/14/2025  CPA: Signed    Jagjit Remy, PharmD 2/19/2025 1:41 PM        For Pharmacy Admin Tracking Only  Intervention Detail:   Total # of Interventions Recommended: 0  Total # of Interventions Accepted: 0  Time Spent (min): 15

## 2025-02-28 ENCOUNTER — TELEPHONE (OUTPATIENT)
Dept: NEUROLOGY | Age: 75
End: 2025-02-28

## 2025-02-28 ENCOUNTER — ANTI-COAG VISIT (OUTPATIENT)
Dept: PHARMACY | Age: 75
End: 2025-02-28
Payer: COMMERCIAL

## 2025-02-28 DIAGNOSIS — I48.0 PAF (PAROXYSMAL ATRIAL FIBRILLATION) (HCC): Primary | ICD-10-CM

## 2025-02-28 LAB
INTERNATIONAL NORMALIZATION RATIO, POC: 2.7
PROTHROMBIN TIME, POC: 0

## 2025-02-28 PROCEDURE — 85610 PROTHROMBIN TIME: CPT

## 2025-02-28 PROCEDURE — 99211 OFF/OP EST MAY X REQ PHY/QHP: CPT

## 2025-02-28 NOTE — PROGRESS NOTES
Ms. Imani Monzon is a 74 y.o. y/o female with history of Afib who presents today for anticoagulation monitoring and adjustment.    Pertinent PMH: PE, TIA, HTN, sleep apnea, GERD, hypothyroidism, RLS    Patient Reported Findings:  Yes     No  [x]   []       Patient verifies current dosing regimen as listed  - Patient has warfarin 2.5 mg tablets  - Patient reports starting warfarin on 2025  []   [x]       S/Sx bleeding/bruising/swelling/SOB/CP  []   [x]       Blood in urine or stool  []   [x]       Procedures scheduled in the future at this time  []   [x]       Missed Dose  []   [x]       Extra Dose  []   [x]       Change in medications  []   [x]       Change in health/diet/appetite  []   [x]       Change in alcohol use  []   [x]       Change in activity  []   [x]       Hospital admission  []   [x]       Emergency department visit  []   [x]       Other complaints  - Patient is trying to transition to apixaban    Clinical Outcomes:  Yes     No  []   [x]       Major bleeding event  []   [x]       Thromboembolic event    INR (no units)   Date Value   2022 0.91     INR,(POC) (no units)   Date Value   2025 1.8   2025 1.0       Duration of warfarin Therapy: Indefinite  INR Range:  2.0-3.0      INR 2.7 is WITHIN therapeutic range of 2-3  Recommend to take 5 mg every Mon, Wed, Fri; 2.5 mg all other days   Will continue to monitor and check INR in 5 days  Dosing reminder card given with phone number, appointment date and time.   Return to clinic:3/5/2025    Referral Provider: Dr. Yahir Frederick  Referral Date: 2025  CPA: Signed    Jagjit Remy, PharmD 2025 1:41 PM        For Pharmacy Admin Tracking Only  Intervention Detail: Adherence Monitorin and Dose Adjustment: 1, reason: Therapy Optimization  Total # of Interventions Recommended: 2  Total # of Interventions Accepted: 2  Time Spent (min): 15

## 2025-02-28 NOTE — TELEPHONE ENCOUNTER
LOV seen IP on 1/27/25  NOV: nothing scheduled  Last filled 1/28/25 was sent to [facility]    Patient wants prescription to go to [facility].    Please sign RX if agreeble

## 2025-03-03 LAB — ECHO BSA: 2.05 M2

## 2025-03-03 RX ORDER — TOPIRAMATE 25 MG/1
TABLET, FILM COATED ORAL
Qty: 60 TABLET | Refills: 5 | Status: SHIPPED | OUTPATIENT
Start: 2025-03-03 | End: 2025-09-12

## 2025-03-05 ENCOUNTER — ANTI-COAG VISIT (OUTPATIENT)
Dept: PHARMACY | Age: 75
End: 2025-03-05
Payer: COMMERCIAL

## 2025-03-05 DIAGNOSIS — I48.0 PAF (PAROXYSMAL ATRIAL FIBRILLATION) (HCC): Primary | ICD-10-CM

## 2025-03-05 LAB
INTERNATIONAL NORMALIZATION RATIO, POC: 1.9
PROTHROMBIN TIME, POC: 0

## 2025-03-05 PROCEDURE — 85610 PROTHROMBIN TIME: CPT

## 2025-03-05 PROCEDURE — 99211 OFF/OP EST MAY X REQ PHY/QHP: CPT

## 2025-03-05 NOTE — PROGRESS NOTES
Only  Intervention Detail:   Total # of Interventions Recommended: 0  Total # of Interventions Accepted: 0  Time Spent (min): 15

## 2025-03-07 ENCOUNTER — PATIENT MESSAGE (OUTPATIENT)
Dept: CARDIOLOGY CLINIC | Age: 75
End: 2025-03-07

## 2025-03-07 NOTE — TELEPHONE ENCOUNTER
Last Office Visit: 10/14/2024 Provider: [facility]  **Is provider OOT? No    Next Office Visit: no Follow up with me in 1 year         LAST LABS:   CMP:   Lab Results   Component Value Date     01/29/2025    K 4.4 01/29/2025     01/29/2025    CO2 26 01/29/2025    BUN 24 (H) 01/29/2025    CREATININE 1.2 01/29/2025    GLUCOSE 83 01/29/2025    CALCIUM 8.6 01/29/2025    BILITOT 0.6 01/25/2025    ALKPHOS 83 01/25/2025    AST 20 01/25/2025    ALT 15 01/25/2025    LABGLOM 47 (A) 01/29/2025    GFRAA 49 (A) 05/13/2022    AGRATIO 1.6 01/25/2025    GLOB 2.5 09/21/2021          TSH:   Lab Results   Component Value Date    TSH 0.021 (L) 02/12/2025    TSHFT4 0.06 (L) 01/20/2025

## 2025-03-10 ENCOUNTER — ANTI-COAG VISIT (OUTPATIENT)
Dept: PHARMACY | Age: 75
End: 2025-03-10
Payer: COMMERCIAL

## 2025-03-10 ENCOUNTER — OFFICE VISIT (OUTPATIENT)
Dept: FAMILY MEDICINE CLINIC | Age: 75
End: 2025-03-10
Payer: COMMERCIAL

## 2025-03-10 VITALS
WEIGHT: 196.6 LBS | HEART RATE: 79 BPM | BODY MASS INDEX: 34.84 KG/M2 | SYSTOLIC BLOOD PRESSURE: 126 MMHG | RESPIRATION RATE: 18 BRPM | OXYGEN SATURATION: 98 % | HEIGHT: 63 IN | TEMPERATURE: 98.7 F | DIASTOLIC BLOOD PRESSURE: 72 MMHG

## 2025-03-10 DIAGNOSIS — R68.89 FLU-LIKE SYMPTOMS: ICD-10-CM

## 2025-03-10 DIAGNOSIS — J01.90 ACUTE BACTERIAL SINUSITIS: Primary | ICD-10-CM

## 2025-03-10 DIAGNOSIS — B96.89 ACUTE BACTERIAL SINUSITIS: Primary | ICD-10-CM

## 2025-03-10 DIAGNOSIS — I48.0 PAF (PAROXYSMAL ATRIAL FIBRILLATION) (HCC): Primary | ICD-10-CM

## 2025-03-10 LAB
INFLUENZA A ANTIBODY: NORMAL
INFLUENZA B ANTIBODY: NORMAL
INTERNATIONAL NORMALIZATION RATIO, POC: 5
PROTHROMBIN TIME, POC: 0

## 2025-03-10 PROCEDURE — 87804 INFLUENZA ASSAY W/OPTIC: CPT | Performed by: NURSE PRACTITIONER

## 2025-03-10 PROCEDURE — 1159F MED LIST DOCD IN RCRD: CPT | Performed by: NURSE PRACTITIONER

## 2025-03-10 PROCEDURE — 99211 OFF/OP EST MAY X REQ PHY/QHP: CPT

## 2025-03-10 PROCEDURE — 99213 OFFICE O/P EST LOW 20 MIN: CPT | Performed by: NURSE PRACTITIONER

## 2025-03-10 PROCEDURE — 1123F ACP DISCUSS/DSCN MKR DOCD: CPT | Performed by: NURSE PRACTITIONER

## 2025-03-10 PROCEDURE — 85610 PROTHROMBIN TIME: CPT

## 2025-03-10 PROCEDURE — 3074F SYST BP LT 130 MM HG: CPT | Performed by: NURSE PRACTITIONER

## 2025-03-10 PROCEDURE — 3078F DIAST BP <80 MM HG: CPT | Performed by: NURSE PRACTITIONER

## 2025-03-10 RX ORDER — AMIODARONE HYDROCHLORIDE 100 MG/1
200 TABLET ORAL DAILY
Qty: 180 TABLET | Refills: 1 | Status: SHIPPED | OUTPATIENT
Start: 2025-03-10

## 2025-03-10 RX ORDER — AMOXICILLIN 875 MG/1
875 TABLET, COATED ORAL 2 TIMES DAILY
Qty: 14 TABLET | Refills: 0 | Status: SHIPPED | OUTPATIENT
Start: 2025-03-10 | End: 2025-03-17

## 2025-03-10 ASSESSMENT — ENCOUNTER SYMPTOMS
VOMITING: 0
ABDOMINAL DISTENTION: 0
VOICE CHANGE: 0
COUGH: 1
SORE THROAT: 0
TROUBLE SWALLOWING: 0
DIARRHEA: 0
SINUS PRESSURE: 1
FACIAL SWELLING: 0
CONSTIPATION: 0
CHEST TIGHTNESS: 0
SHORTNESS OF BREATH: 0
NAUSEA: 0
ABDOMINAL PAIN: 0
GASTROINTESTINAL NEGATIVE: 1
RHINORRHEA: 1
COLOR CHANGE: 0
SINUS PAIN: 1
WHEEZING: 0
BLOOD IN STOOL: 0

## 2025-03-10 NOTE — PROGRESS NOTES
Ms. Imani Monzon is a 74 y.o. y/o female with history of Afib who presents today for anticoagulation monitoring and adjustment.    Pertinent PMH: PE, TIA, HTN, sleep apnea, GERD, hypothyroidism, RLS    Patient Reported Findings:  Yes     No  [x]   []       Patient verifies current dosing regimen as listed  - Patient has warfarin 2.5 mg tablets  []   [x]       S/Sx bleeding/bruising/swelling/SOB/CP  []   [x]       Blood in urine or stool  []   [x]       Procedures scheduled in the future at this time  []   [x]       Missed Dose  []   [x]       Extra Dose  []   [x]       Change in medications  []   [x]       Change in health/diet/appetite  - Patient reports still not feeling well, but starting to improve  - Patient reports diet is starting to improve, but still isn't eating a lot  []   [x]       Change in alcohol use  []   [x]       Change in activity  []   [x]       Hospital admission  []   [x]       Emergency department visit  []   [x]       Other complaints      Clinical Outcomes:  Yes     No  []   [x]       Major bleeding event  []   [x]       Thromboembolic event    INR (no units)   Date Value   2022 0.91     INR,(POC) (no units)   Date Value   2025 1.9   2025 2.7   2025 1.8   2025 1.0     Duration of warfarin Therapy: Indefinite  INR Range:  2.0-3.0      INR 5.0 is ABOVE therapeutic range of 2-3  Recommend to HOLD dose today then take 2.5 mg tomorrow  Will continue to monitor and check INR in 2 days  AVS printed and reviewed with patient  Return to clinic:3/12/2025    Referral Provider: Dr. Yahir Frederick  Referral Date: 2025  CPA: Signed    Jagjit Remy, PharmD 2025 1:41 PM          For Pharmacy Admin Tracking Only  Intervention Detail: Adherence Monitorin  Total # of Interventions Recommended: 1  Total # of Interventions Accepted: 1  Time Spent (min): 15

## 2025-03-10 NOTE — PROGRESS NOTES
[facility]  Establish care visit   3/10/2025    Imani Monzon (:  1950) is a 74 y.o. female    Chief Complaint   Patient presents with    Sinusitis     Sxs onset about a week ago. No fever, weak, dizzy and congestion.  Neg covid test     Fatigue        ASSESSMENT/ PLAN  1. Acute bacterial sinusitis    - amoxicillin (AMOXIL) 875 MG tablet; Take 1 tablet by mouth 2 times daily for 7 days  Dispense: 14 tablet; Refill: 0  -Discussed with pt possible Ses of abx prescribed    Advised to seek immediate medical care if:    You have new or worse swelling, redness, or pain in your face or around one or both of your eyes.     You have double vision or a change in your vision.     You have a high fever.     You have a severe headache and a stiff neck.     You have mental changes, such as feeling confused or much less alert.   Watch closely for changes in your health, and be sure to contact your doctor if:    You are not getting better as expected.     2. Flu-like symptoms    - POCT Influenza A/B    Results for orders placed or performed in visit on 03/10/25   POCT Influenza A/B   Result Value Ref Range    Influenza A Ab neg     Influenza B Ab neg         Return if symptoms worsen or fail to improve.    HPI    Pt c/o cough, stuffy nose, chills, and nasal congestion that started 9 days ago. Ran fever of 103 degrees fahrenheit initially in the beginning of symptoms. Fever only lasted 2-3 days and then subsided. Left ear pain started 3-4 days ago. Denies any trauma/injury, chest pain, Sob, or ear drainage.      ROS  Review of Systems   Constitutional:  Positive for chills and fever. Negative for diaphoresis and fatigue.   HENT:  Positive for congestion, postnasal drip, rhinorrhea, sinus pressure and sinus pain. Negative for drooling, ear discharge, ear pain, facial swelling, hearing loss, sore throat, trouble swallowing and voice change.    Respiratory:  Positive for cough (coughing up clear sputum at times;

## 2025-03-11 NOTE — PROGRESS NOTES
CARDIAC ELECTROPHYSIOLOGY CONSULT NOTE  Date: 3/20/25  Patient Name: Imani Monzon  YOB: 1950    Primary Care Physician: Mauri Bunch DO    CHIEF COMPLAINT:   Chief Complaint   Patient presents with    Follow-Up from Hospital    Atrial Fibrillation    Chest Pain     Imani Monzon was seen today on 3/20/2025 in consultation due to chief complaint of atrial fibrillation/syncope.    Patient is a 75 y.o. female with PMH of hypertension, CHF with preserved ejection fraction, prior stroke, diastolic dysfunction, history of PE, CKD stage III, bipolar disorder, hypothyroidism, lower extremity edema who was seen today for atrial fibrillation/syncope.      In 12/2024 patient was admitted with syncope and history of recent dizziness. Patient was orthostatic on presentation and had recurrent syncope again. During admission, metoprolol, verapamil, lisinopril and torsemide. Echo 12/19/2024 demonstrated an LVEF of 60-65%. EP was consulted due to ECG showing atrial fibrillation. Per review of telemetry she went into atrial fibrillation on 12/20/2024 at 23:06. She did covert back to SR at 1829 on 12/21/2024. MRI of brain was negative for acute findings. Patient was started on amiodarone and xarelto. Patient wore a cardiac event monitor from 12/26/2024 to 1/26/2025 which demonstrated predominately SR with an average HR of 72 () BPM, pSVT (longest 19 beats in duration), PAC burden 0.13%, PVC burden <0.01%.     On 1/29/2025 patient presented to the ED after having an episode of presyncope and fall at home. CT head did not show any acute findings or bleed. Of note, she had been off Xarelto due to cost. She was recommended to try to see if Eliquis was more affordable. She was then started on Coumadin and was referred to the anticoagulation clinic for management.     Today, 3/20/2025, she reports she has been feeling overall well. She recalls feeling dizzy at the time of AF event while in the hospital. She

## 2025-03-12 ENCOUNTER — ANTI-COAG VISIT (OUTPATIENT)
Dept: PHARMACY | Age: 75
End: 2025-03-12
Payer: COMMERCIAL

## 2025-03-12 DIAGNOSIS — E03.9 ACQUIRED HYPOTHYROIDISM: ICD-10-CM

## 2025-03-12 DIAGNOSIS — I48.0 PAF (PAROXYSMAL ATRIAL FIBRILLATION) (HCC): Primary | ICD-10-CM

## 2025-03-12 DIAGNOSIS — I48.0 PAF (PAROXYSMAL ATRIAL FIBRILLATION) (HCC): ICD-10-CM

## 2025-03-12 LAB
INTERNATIONAL NORMALIZATION RATIO, POC: 4.7
PROTHROMBIN TIME, POC: 0
TSH SERPL DL<=0.005 MIU/L-ACNC: 0.02 UIU/ML (ref 0.27–4.2)

## 2025-03-12 PROCEDURE — 99211 OFF/OP EST MAY X REQ PHY/QHP: CPT

## 2025-03-12 PROCEDURE — 85610 PROTHROMBIN TIME: CPT

## 2025-03-12 RX ORDER — WARFARIN SODIUM 2.5 MG/1
2.5-5 TABLET ORAL DAILY
Qty: 60 TABLET | Refills: 2 | Status: SHIPPED | OUTPATIENT
Start: 2025-03-12

## 2025-03-12 NOTE — PROGRESS NOTES
Ms. Imani Monzon is a 74 y.o. y/o female with history of Afib who presents today for anticoagulation monitoring and adjustment.    Pertinent PMH: PE, TIA, HTN, sleep apnea, GERD, hypothyroidism, RLS    Patient Reported Findings:  Yes     No  [x]   []       Patient verifies current dosing regimen as listed  - Patient has warfarin 2.5 mg tablets  []   [x]       S/Sx bleeding/bruising/swelling/SOB/CP  []   [x]       Blood in urine or stool  []   [x]       Procedures scheduled in the future at this time  []   [x]       Missed Dose  []   [x]       Extra Dose  []   [x]       Change in medications  []   [x]       Change in health/diet/appetite  - Patient reports that she is feeling better and appetite is starting to return to normal.   []   [x]       Change in alcohol use  []   [x]       Change in activity  []   [x]       Hospital admission  []   [x]       Emergency department visit  []   [x]       Other complaints      Clinical Outcomes:  Yes     No  []   [x]       Major bleeding event  []   [x]       Thromboembolic event    INR (no units)   Date Value   2022 0.91     INR,(POC) (no units)   Date Value   03/10/2025 5.0   2025 1.9   2025 2.7   2025 1.8     Duration of warfarin Therapy: Indefinite  INR Range:  2.0-3.0      INR 4.7 is ABOVE therapeutic range of 2-3  Recommend to HOLD dose today then 5 mg every Mon; 2.5 mg all other days (11.1%)  Patient counseled on risks of supratherapeutic INRs  Will continue to monitor and check INR in 5 days  AVS printed and reviewed with patient  Return to clinic:3/17/2025      Referral Provider: Dr. Yahir Frederick  Referral Date: 2025  CPA: Signed    Jagjit Remy, PharmD 2025 1:41 PM          For Pharmacy Admin Tracking Only  Intervention Detail: Adherence Monitorin and Dose Adjustment: 1, reason: Therapy Optimization  Total # of Interventions Recommended: 2  Total # of Interventions Accepted: 2  Time Spent (min): 15      3/12/25 14:20  INR

## 2025-03-13 RX ORDER — WARFARIN SODIUM 2.5 MG/1
2.5 TABLET ORAL DAILY
Qty: 90 TABLET | Refills: 1 | Status: SHIPPED | OUTPATIENT
Start: 2025-03-13

## 2025-03-14 ENCOUNTER — RESULTS FOLLOW-UP (OUTPATIENT)
Dept: FAMILY MEDICINE CLINIC | Age: 75
End: 2025-03-14

## 2025-03-14 LAB — ECHO BSA: 2.05 M2

## 2025-03-17 ENCOUNTER — ANTI-COAG VISIT (OUTPATIENT)
Dept: PHARMACY | Age: 75
End: 2025-03-17
Payer: COMMERCIAL

## 2025-03-17 DIAGNOSIS — I48.0 PAF (PAROXYSMAL ATRIAL FIBRILLATION) (HCC): Primary | ICD-10-CM

## 2025-03-17 LAB
INTERNATIONAL NORMALIZATION RATIO, POC: 2.8
PROTHROMBIN TIME, POC: 0

## 2025-03-17 PROCEDURE — 85610 PROTHROMBIN TIME: CPT

## 2025-03-17 PROCEDURE — 99211 OFF/OP EST MAY X REQ PHY/QHP: CPT

## 2025-03-17 NOTE — PROGRESS NOTES
Ms. Imani Monzon is a 74 y.o. y/o female with history of Afib who presents today for anticoagulation monitoring and adjustment.    Pertinent PMH: PE, TIA, HTN, sleep apnea, GERD, hypothyroidism, RLS    Patient Reported Findings:  Yes     No  [x]   []       Patient verifies current dosing regimen as listed  - Patient has warfarin 2.5 mg tablets  []   [x]       S/Sx bleeding/bruising/swelling/SOB/CP  []   [x]       Blood in urine or stool  []   [x]       Procedures scheduled in the future at this time  []   [x]       Missed Dose  []   [x]       Extra Dose  []   [x]       Change in medications  []   [x]       Change in health/diet/appetite  []   [x]       Change in alcohol use  []   [x]       Change in activity  []   [x]       Hospital admission  []   [x]       Emergency department visit  []   [x]       Other complaints      Clinical Outcomes:  Yes     No  []   [x]       Major bleeding event  []   [x]       Thromboembolic event    INR (no units)   Date Value   2022 0.91     INR,(POC) (no units)   Date Value   2025 4.7   03/10/2025 5.0   2025 1.9   2025 2.7     Duration of warfarin Therapy: Indefinite  INR Range:  2.0-3.0      INR 2.8 is WITHIN therapeutic range of 2-3  Recommend to 2.5 mg every Mon, Wed, Fri; 1.25 mg all other days   Patient counseled on risks of supratherapeutic INRs  Will continue to monitor and check INR in 7 days  AVS printed and reviewed with patient  Return to clinic:3/17/2025      Referral Provider: Dr. Yahir Frederick  Referral Date: 2025  CPA: Signed    Jagjit Remy PharmD 2025 1:41 PM          For Pharmacy Admin Tracking Only  Intervention Detail: Adherence Monitorin and Dose Adjustment: 1, reason: Therapy Optimization  Total # of Interventions Recommended: 2  Total # of Interventions Accepted: 2  Time Spent (min): 15   abnormal lab result

## 2025-03-20 ENCOUNTER — TELEPHONE (OUTPATIENT)
Dept: CARDIOLOGY CLINIC | Age: 75
End: 2025-03-20

## 2025-03-20 ENCOUNTER — OFFICE VISIT (OUTPATIENT)
Dept: CARDIOLOGY CLINIC | Age: 75
End: 2025-03-20
Payer: COMMERCIAL

## 2025-03-20 VITALS
BODY MASS INDEX: 35.26 KG/M2 | OXYGEN SATURATION: 98 % | HEART RATE: 65 BPM | DIASTOLIC BLOOD PRESSURE: 86 MMHG | HEIGHT: 63 IN | SYSTOLIC BLOOD PRESSURE: 138 MMHG | WEIGHT: 199 LBS

## 2025-03-20 DIAGNOSIS — Z79.01 ANTICOAGULATED: ICD-10-CM

## 2025-03-20 DIAGNOSIS — I48.0 PAF (PAROXYSMAL ATRIAL FIBRILLATION) (HCC): Primary | ICD-10-CM

## 2025-03-20 DIAGNOSIS — E78.2 MIXED HYPERLIPIDEMIA: ICD-10-CM

## 2025-03-20 DIAGNOSIS — R42 DIZZINESS: ICD-10-CM

## 2025-03-20 DIAGNOSIS — R00.2 PALPITATIONS: ICD-10-CM

## 2025-03-20 PROCEDURE — 3079F DIAST BP 80-89 MM HG: CPT | Performed by: INTERNAL MEDICINE

## 2025-03-20 PROCEDURE — 1159F MED LIST DOCD IN RCRD: CPT | Performed by: INTERNAL MEDICINE

## 2025-03-20 PROCEDURE — 99215 OFFICE O/P EST HI 40 MIN: CPT | Performed by: INTERNAL MEDICINE

## 2025-03-20 PROCEDURE — 1123F ACP DISCUSS/DSCN MKR DOCD: CPT | Performed by: INTERNAL MEDICINE

## 2025-03-20 PROCEDURE — 93000 ELECTROCARDIOGRAM COMPLETE: CPT | Performed by: INTERNAL MEDICINE

## 2025-03-20 PROCEDURE — 3075F SYST BP GE 130 - 139MM HG: CPT | Performed by: INTERNAL MEDICINE

## 2025-03-20 RX ORDER — AMIODARONE HYDROCHLORIDE 200 MG/1
200 TABLET ORAL DAILY
Qty: 90 TABLET | Refills: 1 | Status: SHIPPED | OUTPATIENT
Start: 2025-03-20

## 2025-03-20 NOTE — TELEPHONE ENCOUNTER
Case Request placed for ILR insertion. Please call patient to schedule, thank you!     MEDS TO HOLD:  -furosemide the morning of the procedure

## 2025-03-20 NOTE — PATIENT INSTRUCTIONS
PLAN:   -Discussed risks and benefits of implantable loop recorder (ILR)   -patient agreeable and would like to proceed  -Prescription sent for amiodarone 200mg once a day  -Consider Watchman in the future   -Follow up with me after procedure

## 2025-03-24 ENCOUNTER — ANTI-COAG VISIT (OUTPATIENT)
Dept: PHARMACY | Age: 75
End: 2025-03-24
Payer: COMMERCIAL

## 2025-03-24 DIAGNOSIS — I48.0 PAF (PAROXYSMAL ATRIAL FIBRILLATION) (HCC): Primary | ICD-10-CM

## 2025-03-24 LAB
INTERNATIONAL NORMALIZATION RATIO, POC: 3.5
PROTHROMBIN TIME, POC: 0

## 2025-03-24 PROCEDURE — 85610 PROTHROMBIN TIME: CPT

## 2025-03-24 PROCEDURE — 99211 OFF/OP EST MAY X REQ PHY/QHP: CPT

## 2025-03-24 NOTE — PROGRESS NOTES
Ms. Imani Monzon is a 75 y.o. y/o female with history of Afib who presents today for anticoagulation monitoring and adjustment.    Pertinent PMH: PE, TIA, HTN, sleep apnea, GERD, hypothyroidism, RLS    Patient Reported Findings:  Yes     No  []   [x]       Patient verifies current dosing regimen as listed  - Patient has warfarin 2.5 mg tablets  - Patient reports she took 5mg on Mon, Wed, Fri and 2.5 mg all other days   (instead of 2.5 mg every Mon, Wed, Fri; 1.25 mg all other days)  []   [x]       S/Sx bleeding/bruising/swelling/SOB/CP  []   [x]       Blood in urine or stool  []   [x]       Procedures scheduled in the future at this time  - Patient reports she is discussing watchman procedure with her cardiologist  []   [x]       Missed Dose  [x]   []       Extra Dose  - See above  []   [x]       Change in medications  []   [x]       Change in health/diet/appetite  []   [x]       Change in alcohol use  []   [x]       Change in activity  []   [x]       Hospital admission  []   [x]       Emergency department visit  [x]   []       Other complaints      Clinical Outcomes:  Yes     No  []   [x]       Major bleeding event  []   [x]       Thromboembolic event    INR (no units)   Date Value   02/09/2022 0.91     INR,(POC) (no units)   Date Value   03/17/2025 2.8   03/12/2025 4.7   03/10/2025 5.0   03/05/2025 1.9     Duration of warfarin Therapy: Indefinite  INR Range:  2.0-3.0      INR 3.5 is ABOVE therapeutic range of 2-3  Supratherapeutic INR likely due to patient taking higher dose last week. Given this, will have patient resume previous recommended dose and follow-up in 1 week  Recommend to  2.5 mg every Mon, Wed, Fri; 1.25 mg all other days   Patient counseled on risks of supratherapeutic INRs  Will continue to monitor and check INR in 7 days  AVS printed and reviewed with patient  Return to clinic:3/17/2025      Referral Provider: Dr. Yahir Frederick  Referral Date: 2/14/2025  CPA: Signed    Jagjit Remy, Armand

## 2025-03-24 NOTE — TELEPHONE ENCOUNTER
Procedure:  Loop Implant  Doctor:  Dr. Torres  Date:  4/18/25  Time:  1pm  Arrival:  12pm  Reps:  [employer]  Anesthesia:  n/a      Spoke with patient. Please have patient arrive to the main entrance of [facility] ([address] [place] [id number]) and check in with the registration desk.  They will be directed to the Cath Lab.  Do not apply lotions/creams on skin the day of procedure.    Instructions emailed.

## 2025-03-31 ENCOUNTER — ANTI-COAG VISIT (OUTPATIENT)
Dept: PHARMACY | Age: 75
End: 2025-03-31
Payer: COMMERCIAL

## 2025-03-31 DIAGNOSIS — I48.0 PAF (PAROXYSMAL ATRIAL FIBRILLATION) (HCC): Primary | ICD-10-CM

## 2025-03-31 LAB
INTERNATIONAL NORMALIZATION RATIO, POC: 2.4
PROTHROMBIN TIME, POC: 0

## 2025-03-31 PROCEDURE — 99211 OFF/OP EST MAY X REQ PHY/QHP: CPT

## 2025-03-31 PROCEDURE — 85610 PROTHROMBIN TIME: CPT

## 2025-03-31 NOTE — PROGRESS NOTES
Ms. Imani Monzon is a 75 y.o. y/o female with history of Afib who presents today for anticoagulation monitoring and adjustment.    Pertinent PMH: PE, TIA, HTN, sleep apnea, GERD, hypothyroidism, RLS    Patient Reported Findings:  Yes     No  [x]   []       Patient verifies current dosing regimen as listed  - Warfarin 2.5 mg every Mon, Wed, Fri; 1.25 mg all other days   - Patient has warfarin 2.5 mg tablets  []   [x]       S/Sx bleeding/bruising/swelling/SOB/CP  []   [x]       Blood in urine or stool  []   [x]       Procedures scheduled in the future at this time  - Patient reports she is discussing watchman procedure with her cardiologist  []   [x]       Missed Dose  []   [x]       Extra Dose  []   [x]       Change in medications  []   [x]       Change in health/diet/appetite  []   [x]       Change in alcohol use  []   [x]       Change in activity  []   [x]       Hospital admission  []   [x]       Emergency department visit  []   [x]       Other complaints      Clinical Outcomes:  Yes     No  []   [x]       Major bleeding event  []   [x]       Thromboembolic event    INR (no units)   Date Value   02/09/2022 0.91     INR,(POC) (no units)   Date Value   03/24/2025 3.5   03/17/2025 2.8   03/12/2025 4.7   03/10/2025 5.0     Duration of warfarin Therapy: Indefinite  INR Range:  2.0-3.0      INR 2.4 is WITHIN therapeutic range of 2-3  Recommend to  2.5 mg every Mon, Wed, Fri; 1.25 mg all other days   Will continue to monitor and check INR in 2 weeks  AVS printed and reviewed with patient  Return to clinic:4/14/2025      Referral Provider: Dr. Yahir Frederick  Referral Date: 2/14/2025  CPA: Signed    Jagjit Remy, PharmD 2/19/2025 1:41 PM            For Pharmacy Admin Tracking Only  Intervention Detail:   Total # of Interventions Recommended: 0  Total # of Interventions Accepted: 0  Time Spent (min): 15

## 2025-04-10 RX ORDER — WARFARIN SODIUM 2.5 MG/1
2.5-5 TABLET ORAL DAILY
Qty: 180 TABLET | Refills: 1 | OUTPATIENT
Start: 2025-04-10

## 2025-04-14 ENCOUNTER — ANTI-COAG VISIT (OUTPATIENT)
Dept: PHARMACY | Age: 75
End: 2025-04-14
Payer: COMMERCIAL

## 2025-04-14 DIAGNOSIS — I48.0 PAF (PAROXYSMAL ATRIAL FIBRILLATION) (HCC): Primary | ICD-10-CM

## 2025-04-14 LAB
INTERNATIONAL NORMALIZATION RATIO, POC: 2.5
PROTHROMBIN TIME, POC: 0

## 2025-04-14 PROCEDURE — 99211 OFF/OP EST MAY X REQ PHY/QHP: CPT

## 2025-04-14 PROCEDURE — 85610 PROTHROMBIN TIME: CPT

## 2025-04-14 NOTE — PROGRESS NOTES
Ms. Imani Monzon is a 75 y.o. y/o female with history of Afib who presents today for anticoagulation monitoring and adjustment.    Pertinent PMH: PE, TIA, HTN, sleep apnea, GERD, hypothyroidism, RLS    Patient Reported Findings:  Yes     No  [x]   []       Patient verifies current dosing regimen as listed  - Warfarin 2.5 mg every Mon, Wed, Fri; 1.25 mg all other days   - Patient has warfarin 2.5 mg tablets  []   [x]       S/Sx bleeding/bruising/swelling/SOB/CP  []   [x]       Blood in urine or stool  []   [x]       Procedures scheduled in the future at this time  - Patient reports she is discussing watchman procedure with her cardiologist  []   [x]       Missed Dose  []   [x]       Extra Dose  []   [x]       Change in medications  []   [x]       Change in health/diet/appetite  []   [x]       Change in alcohol use  []   [x]       Change in activity  []   [x]       Hospital admission  []   [x]       Emergency department visit  []   [x]       Other complaints      Clinical Outcomes:  Yes     No  []   [x]       Major bleeding event  []   [x]       Thromboembolic event    INR (no units)   Date Value   02/09/2022 0.91     INR,(POC) (no units)   Date Value   03/31/2025 2.4   03/24/2025 3.5   03/17/2025 2.8   03/12/2025 4.7     Duration of warfarin Therapy: Indefinite  INR Range:  2.0-3.0      INR 2.5 is WITHIN therapeutic range of 2-3  Recommend to  2.5 mg every Mon, Wed, Fri; 1.25 mg all other days   Will continue to monitor and check INR in 3 weeks  AVS printed and reviewed with patient  Return to clinic: 5/5/2025      Referral Provider: Dr. Yahir Frederick  Referral Date: 2/14/2025  CPA: Signed    Jagjit Remy, PharmD 2/19/2025 1:41 PM            For Pharmacy Admin Tracking Only  Intervention Detail:   Total # of Interventions Recommended: 0  Total # of Interventions Accepted: 0  Time Spent (min): 15

## 2025-04-18 ENCOUNTER — HOSPITAL ENCOUNTER (OUTPATIENT)
Age: 75
Discharge: HOME | End: 2025-04-18
Attending: INTERNAL MEDICINE | Admitting: INTERNAL MEDICINE
Payer: COMMERCIAL

## 2025-04-18 ENCOUNTER — CLINICAL SUPPORT (OUTPATIENT)
Dept: CARDIOLOGY CLINIC | Age: 75
End: 2025-04-18

## 2025-04-18 VITALS — BODY MASS INDEX: 35.08 KG/M2 | WEIGHT: 198 LBS | HEIGHT: 63 IN

## 2025-04-18 DIAGNOSIS — Z45.09 ENCOUNTER FOR LOOP RECORDER CHECK: Primary | ICD-10-CM

## 2025-04-18 DIAGNOSIS — I48.0 PAF (PAROXYSMAL ATRIAL FIBRILLATION) (HCC): ICD-10-CM

## 2025-04-18 LAB — ECHO BSA: 2 M2

## 2025-04-18 PROCEDURE — C1764 EVENT RECORDER, CARDIAC: HCPCS | Performed by: INTERNAL MEDICINE

## 2025-04-18 PROCEDURE — 2709999900 HC NON-CHARGEABLE SUPPLY: Performed by: INTERNAL MEDICINE

## 2025-04-18 PROCEDURE — 7100000011 HC PHASE II RECOVERY - ADDTL 15 MIN: Performed by: INTERNAL MEDICINE

## 2025-04-18 PROCEDURE — 6360000002 HC RX W HCPCS: Performed by: INTERNAL MEDICINE

## 2025-04-18 PROCEDURE — 33285 INSJ SUBQ CAR RHYTHM MNTR: CPT | Performed by: INTERNAL MEDICINE

## 2025-04-18 PROCEDURE — 7100000010 HC PHASE II RECOVERY - FIRST 15 MIN: Performed by: INTERNAL MEDICINE

## 2025-04-18 DEVICE — ICM LNQ22 LINQ II PRIME US
Type: IMPLANTABLE DEVICE | Site: CHEST  WALL | Status: FUNCTIONAL
Brand: LINQ II™

## 2025-04-18 RX ORDER — LIDOCAINE HYDROCHLORIDE 20 MG/ML
INJECTION, SOLUTION EPIDURAL; INFILTRATION; INTRACAUDAL; PERINEURAL PRN
Status: DISCONTINUED | OUTPATIENT
Start: 2025-04-18 | End: 2025-04-18 | Stop reason: HOSPADM

## 2025-04-18 RX ORDER — BUPIVACAINE HYDROCHLORIDE 5 MG/ML
INJECTION, SOLUTION EPIDURAL; INTRACAUDAL; PERINEURAL PRN
Status: DISCONTINUED | OUTPATIENT
Start: 2025-04-18 | End: 2025-04-18 | Stop reason: HOSPADM

## 2025-04-18 NOTE — DISCHARGE INSTRUCTIONS
Cath Labs at [facility]           Loop Recorder Discharge Instructions     4/18/2025  Imani Monzon   Date of Birth 1950       You will have a temporary ID card to keep in your wallet until you get a permanent card in the mail in 3 weeks.  This is to use  when you go through metal detectors in government buildings or airports or if a provider ask if you have an implantable device.   Patient Manual: is used for a reference. If you have any questions you can look at the manual or call the phone number provided on the manual     Patient Activator Remote:   Keep the remote near you at all times.  If you have symptoms press the heart button and place the remote over the device.  You will hear a beep and see a white checkmark light up beneath the heart button.  The remote will turn off on its own.     Incision:   Keep site clean and dry until seen in the Device Clinic.   Dressing will be removed by the Device Clinic staff when you follow up for your site check or in 7 days by you, whichever comes first. Do not remove the steri strips, let them fall off naturally.    Call your provider for any signs or symptoms of infection such as: fever, drainage or swelling     Sedation Discharge Instructions:  For the next 24 hours do not drive a car, operate machinery, power tools or kitchen appliances.  Do not drink alcohol; including beer or wine.  Do not make any important decisions or sign any important papers.  For the next 24 hours you can expect drowsiness, light-headed or dizziness, nausea/ vomiting, inability to concentrate, fatigue and desire to sleep.  We strongly suggest that a responsible adult be with for the next 24 hours, for your protection and safety.  You are not allowed to drive yourself home, or take any type of public transportation.  If any questions, please call your doctor.  If you cannot reach your Doctor then call the Emergency Department at  [phone].  Explain to the Emergency Department

## 2025-04-18 NOTE — H&P
CARDIAC ELECTROPHYSIOLOGY CONSULT NOTE  Date: 3/20/25  Patient Name: Imani Monzon  YOB: 1950    Primary Care Physician: Mauri Bunch DO    CHIEF COMPLAINT:   No chief complaint on file.    Imani Monzon was seen today on 4/18/2025 in consultation due to chief complaint of atrial fibrillation/syncope.    Patient is a 75 y.o. female with PMH of hypertension, CHF with preserved ejection fraction, prior stroke, diastolic dysfunction, history of PE, CKD stage III, bipolar disorder, hypothyroidism, lower extremity edema who was seen today for atrial fibrillation/syncope.      In 12/2024 patient was admitted with syncope and history of recent dizziness. Patient was orthostatic on presentation and had recurrent syncope again. During admission, metoprolol, verapamil, lisinopril and torsemide. Echo 12/19/2024 demonstrated an LVEF of 60-65%. EP was consulted due to ECG showing atrial fibrillation. Per review of telemetry she went into atrial fibrillation on 12/20/2024 at 23:06. She did covert back to SR at 1829 on 12/21/2024. MRI of brain was negative for acute findings. Patient was started on amiodarone and xarelto. Patient wore a cardiac event monitor from 12/26/2024 to 1/26/2025 which demonstrated predominately SR with an average HR of 72 () BPM, pSVT (longest 19 beats in duration), PAC burden 0.13%, PVC burden <0.01%.     On 1/29/2025 patient presented to the ED after having an episode of presyncope and fall at home. CT head did not show any acute findings or bleed. Of note, she had been off Xarelto due to cost. She was recommended to try to see if Eliquis was more affordable. She was then started on Coumadin and was referred to the anticoagulation clinic for management.     Seen on 3/20/2025- she opted for ILR implantation      ALLERGIES:   Allergies   Allergen Reactions    Morphine      Reports severe abdominal pain        MEDICATIONS:   No current facility-administered medications for this

## 2025-04-18 NOTE — PROCEDURES
PROCEDURE NOTE  Date: 4/18/2025   Name: Imani Monzon  YOB: 1950    Procedures    Loop insertion  Imani Monzon  1950  [id number]    Procedure: Medtronic loop recorder implantation     Attending Physician:Phoebe Torres    Sedation mode and time: Local anesthesia     Indication:  Stroke     Patient History:   75 y.o. female with PMH of hypertension, CHF with preserved ejection fraction, prior stroke, diastolic dysfunction, history of PE, CKD stage III, bipolar disorder, hypothyroidism, lower extremity edema who was seen today for atrial fibrillation/syncope and stroke     Past Medical History  Past Medical History:   Diagnosis Date    Arthritis     Bipolar disorder (HCC)     questionable dx    Cellulitis     L ankle, recurrent; over area of prior surgeries    Chronic back pain     Closed fracture of left fibula 1995    s/p fall; surgery x 6    Closed fracture of left tibia 1995    s/p fall on ice; hx of surgery x 6    Depression     Fibroid (bleeding) (uterine) 1982    Fibromyalgia     GERD (gastroesophageal reflux disease)     History of stroke     Hyperlipidemia     Hypertension     Myoview Lexiscan WNL on 5/17/12    Hypothyroidism     Influenza A 01/31/2020    Migraine     Neuropathy 2011    seen initially by neuro., Dr. Sommers, on 8/11/10; EMG 7/16/10 showed abn.'s c/w L5 radiculopathy & periph. neuropathy; pt. reports sx in hands and feet ; dx'd by rheum.     MAXIMO (obstructive sleep apnea) 06/07/2012    mild-does not require appliance    RLS (restless legs syndrome)     Urine incontinence     saw urology, Dr. Verdin, on 7/21/11 for microscopic hematuria & hematuria; was started on Vesicare & scheduled for cystoscopy    Vitamin D deficiency 02/27/2012    22 ng/mL        Family History  Family History   Problem Relation Age of Onset    Arthritis Mother     Asthma Mother     Diabetes Mother     High Blood Pressure Mother     Depression Mother     Heart Disease Mother 50        CHF    High  Cholesterol Mother     Stroke Mother     Mental Illness Mother     Arthritis Father     Cancer Father 40        colon    Stroke Father 50        x 2    Substance Abuse Father         alcohol    Kidney Disease Sister     Cancer Sister 48        urethral CA    Cancer Sister 53        lymphoma    Cancer Maternal Uncle         prostate    Cancer Maternal Uncle         prostate    Cancer Maternal Uncle         prostate    Breast Cancer Maternal Aunt     Breast Cancer Maternal Aunt     Emphysema Neg Hx     Heart Failure Neg Hx     Hypertension Neg Hx         Labs  Lab Results   Component Value Date/Time     01/29/2025 05:23 AM    K 4.4 01/29/2025 05:23 AM    K 4.7 12/20/2024 05:47 AM     01/29/2025 05:23 AM    CO2 26 01/29/2025 05:23 AM    BUN 24 01/29/2025 05:23 AM    CREATININE 1.2 01/29/2025 05:23 AM    GLUCOSE 83 01/29/2025 05:23 AM    CALCIUM 8.6 01/29/2025 05:23 AM    LABGLOM 47 01/29/2025 05:23 AM    LABGLOM 52 02/17/2024 12:43 PM      Lab Results   Component Value Date    WBC 7.3 02/12/2025    HGB 12.1 02/12/2025    HCT 37.2 02/12/2025    MCV 88.9 02/12/2025     02/12/2025     Lab Results   Component Value Date    INR 2.5 04/14/2025    INR 2.4 03/31/2025    INR 3.5 03/24/2025    PROTIME 0.0 04/14/2025    PROTIME 0.0 03/31/2025    PROTIME 0.0 03/24/2025           Imaging results impression onlyNo results found.   No orders to display       LAST EKG  Results for orders placed or performed during the hospital encounter of 01/25/25   EKG 12 Lead    Collection Time: 01/25/25  2:24 PM   Result Value Ref Range    Ventricular Rate 67 BPM    Atrial Rate 67 BPM    P-R Interval 166 ms    QRS Duration 78 ms    Q-T Interval 432 ms    QTc Calculation (Bazett) 456 ms    P Axis 14 degrees    R Axis 55 degrees    T Axis 41 degrees    Diagnosis       Normal sinus rhythmNonspecific ST abnormalityConfirmed by NELLY VELEZ MD ([id number]) on 1/26/2025 11:30:11 AM       Mini Cog       Echo EF:   Lab Results

## 2025-04-18 NOTE — PROGRESS NOTES
DEVICE MODEL  [id number] LINQ II™  DEVICE SERIAL NUMBER  [id number]  DEVICE TYPE  ICM  DATE OF IMPLANT  18-Apr-2025  Monitor Information  MONITOR STATUS  Distributed to patient  DISTRIBUTION METHOD  Distributed from clinic inventory  DISTRIBUTION DATE  18-Apr-2025  MONITOR SERIAL NUMBER  [id number]  MONITOR MODEL  [employer] Relay™ [id number]

## 2025-04-25 ENCOUNTER — CLINICAL SUPPORT (OUTPATIENT)
Dept: CARDIOLOGY CLINIC | Age: 75
End: 2025-04-25

## 2025-04-25 NOTE — PROGRESS NOTES
Leave incision open to the air; do not apply any dressings, ointments, or bandages to the area. Do not apply lotion, perfume/cologne, or powders to the area until it is completely healed.   Any scabbing or skin glue that is noted will fall off within 1-2 weeks after the post op appointment.   If any oozing, bleeding, or pus drainage occurs, please call the office immediately at [phone].       Remote Monitoring Instructions    Within 2-3 weeks of your device being implanted, you will receive a call from the  of your device. Please answer this call as it is to set up remote monitoring for your device. Once you receive your in-home monitor, please follow the instructions provided to sync the home monitor to your implanted device. Once you have paired your home monitor to your implanted device, keep your monitor plugged in within 6 feet of where you sleep. Your monitor will routinely check in with your device during sleep hours and transmit any urgent events to the Device Clinic for review.     Please do not send additional routine transmissions unless specifically requested by the office staff - the steps to send a manual transmission are the same as when you paired your in-home monitor to your device for the first time.     Your device and monitor are wireless and most transmit cellularly, but please periodically check your monitor is still plugged in to the electrical outlet. If you still use the telephone land line to send, please ensure the connection to the phone gaurav is secure. This will help to ensure successful automatic transmissions in the future.     Please be aware that the remote device transmission sites are periodically monitored only during regular business hours during which simultaneous in-office device clinics are being conducted. If your transmission requires attention, we will contact you as soon as possible.    Your in-home monitor will do a full report on your device every month

## 2025-04-25 NOTE — PROGRESS NOTES
Pt came into office for site check. Site has minimal redness, no drainage, and no swelling. Pt had no complaints of pain/tenderness.

## 2025-04-25 NOTE — PATIENT INSTRUCTIONS
Leave incision open to the air; do not apply any dressings, ointments, or bandages to the area. Do not apply lotion, perfume/cologne, or powders to the area until it is completely healed.   Any scabbing or skin glue that is noted will fall off within 1-2 weeks after the post op appointment.   If any oozing, bleeding, or pus drainage occurs, please call the office immediately at [phone].       Remote Monitoring Instructions    Within 2-3 weeks of your device being implanted, you will receive a call from the  of your device. Please answer this call as it is to set up remote monitoring for your device. Once you receive your in-home monitor, please follow the instructions provided to sync the home monitor to your implanted device. Once you have paired your home monitor to your implanted device, keep your monitor plugged in within 6 feet of where you sleep. Your monitor will routinely check in with your device during sleep hours and transmit any urgent events to the Device Clinic for review.     Please do not send additional routine transmissions unless specifically requested by the office staff - the steps to send a manual transmission are the same as when you paired your in-home monitor to your device for the first time.     Your device and monitor are wireless and most transmit cellularly, but please periodically check your monitor is still plugged in to the electrical outlet. If you still use the telephone land line to send, please ensure the connection to the phone gaurav is secure. This will help to ensure successful automatic transmissions in the future.     Please be aware that the remote device transmission sites are periodically monitored only during regular business hours during which simultaneous in-office device clinics are being conducted. If your transmission requires attention, we will contact you as soon as possible.    Your in-home monitor will do a full report on your device every month

## 2025-04-29 ENCOUNTER — OFFICE VISIT (OUTPATIENT)
Dept: FAMILY MEDICINE CLINIC | Age: 75
End: 2025-04-29
Payer: COMMERCIAL

## 2025-04-29 VITALS
DIASTOLIC BLOOD PRESSURE: 82 MMHG | SYSTOLIC BLOOD PRESSURE: 126 MMHG | HEART RATE: 84 BPM | TEMPERATURE: 98.1 F | OXYGEN SATURATION: 99 % | BODY MASS INDEX: 34.45 KG/M2 | HEIGHT: 63 IN | WEIGHT: 194.4 LBS

## 2025-04-29 DIAGNOSIS — N30.00 ACUTE CYSTITIS WITHOUT HEMATURIA: Primary | ICD-10-CM

## 2025-04-29 LAB
BILIRUBIN, POC: NEGATIVE
BLOOD URINE, POC: NORMAL
CLARITY, POC: NORMAL
COLOR, POC: YELLOW
GLUCOSE URINE, POC: NEGATIVE MG/DL
KETONES, POC: NEGATIVE MG/DL
LEUKOCYTE EST, POC: NORMAL
NITRITE, POC: NEGATIVE
PH, POC: 7
PROTEIN, POC: NORMAL MG/DL
SPECIFIC GRAVITY, POC: 1.02
UROBILINOGEN, POC: 0.2 MG/DL

## 2025-04-29 PROCEDURE — 3074F SYST BP LT 130 MM HG: CPT

## 2025-04-29 PROCEDURE — 1160F RVW MEDS BY RX/DR IN RCRD: CPT

## 2025-04-29 PROCEDURE — 1159F MED LIST DOCD IN RCRD: CPT

## 2025-04-29 PROCEDURE — 1123F ACP DISCUSS/DSCN MKR DOCD: CPT

## 2025-04-29 PROCEDURE — 99213 OFFICE O/P EST LOW 20 MIN: CPT

## 2025-04-29 PROCEDURE — 3079F DIAST BP 80-89 MM HG: CPT

## 2025-04-29 PROCEDURE — 81002 URINALYSIS NONAUTO W/O SCOPE: CPT

## 2025-04-29 RX ORDER — CEPHALEXIN 500 MG/1
500 CAPSULE ORAL 3 TIMES DAILY
Qty: 21 CAPSULE | Refills: 0 | Status: SHIPPED | OUTPATIENT
Start: 2025-04-29 | End: 2025-05-06

## 2025-04-29 SDOH — ECONOMIC STABILITY: FOOD INSECURITY: WITHIN THE PAST 12 MONTHS, YOU WORRIED THAT YOUR FOOD WOULD RUN OUT BEFORE YOU GOT MONEY TO BUY MORE.: NEVER TRUE

## 2025-04-29 SDOH — ECONOMIC STABILITY: FOOD INSECURITY: WITHIN THE PAST 12 MONTHS, THE FOOD YOU BOUGHT JUST DIDN'T LAST AND YOU DIDN'T HAVE MONEY TO GET MORE.: NEVER TRUE

## 2025-04-29 ASSESSMENT — PATIENT HEALTH QUESTIONNAIRE - PHQ9
2. FEELING DOWN, DEPRESSED OR HOPELESS: NOT AT ALL
SUM OF ALL RESPONSES TO PHQ QUESTIONS 1-9: 0
1. LITTLE INTEREST OR PLEASURE IN DOING THINGS: NOT AT ALL
SUM OF ALL RESPONSES TO PHQ QUESTIONS 1-9: 0

## 2025-04-29 ASSESSMENT — ENCOUNTER SYMPTOMS
DIARRHEA: 0
ABDOMINAL PAIN: 0
SHORTNESS OF BREATH: 0
CONSTIPATION: 0
NAUSEA: 0
CHEST TIGHTNESS: 0
TROUBLE SWALLOWING: 0

## 2025-04-29 NOTE — PATIENT INSTRUCTIONS
Thank you for letting us be apart of your care today!  Please  and take antibiotic as prescribed.  Drink plenty of water.  If symptoms do not improve or get any worse please contact us right away.

## 2025-04-29 NOTE — PROGRESS NOTES
Have you been to the ER, urgent care clinic since your last visit?  Hospitalized since your last visit?   NO    Have you seen or consulted any other health care providers outside our system since your last visit?   NO    Urine culture sent to the lab for testing. One gray tube  Labeled and placed in bag with orders.   (ALL URINE CX TO BE PLACED IN THE FRIDGE)        
by mouth 3 times daily as needed for Nausea or Vomiting, Disp: 21 tablet, Rfl: 0    albuterol sulfate HFA (PROVENTIL HFA) 108 (90 Base) MCG/ACT inhaler, Inhale 2 puffs into the lungs every 6 hours as needed for Wheezing, Disp: 18 g, Rfl: 3    vitamin D (ERGOCALCIFEROL) 1.25 MG (95255 UT) CAPS capsule, Take 1 capsule by mouth once a week, Disp: 12 capsule, Rfl: 1    Elastic Bandages & Supports (JOBST KNEE HIGH COMPRESSION SM) MISC, 1 each by Does not apply route daily as needed (swelling), Disp: 1 each, Rfl: 0      Vitals:    04/29/25 1252   TempSrc: Temporal   Weight: 88.2 kg (194 lb 6.4 oz)   Height: 1.6 m (5' 2.99\")       Review of Systems   Constitutional:  Negative for appetite change, chills, fatigue and fever.   HENT:  Negative for trouble swallowing. Tinnitus: culture.   Respiratory:  Negative for chest tightness and shortness of breath.    Cardiovascular:  Negative for chest pain and palpitations.   Gastrointestinal:  Negative for abdominal pain, constipation, diarrhea and nausea.   Genitourinary:  Positive for decreased urine volume, difficulty urinating, dysuria, frequency and urgency. Negative for hematuria, pelvic pain, vaginal bleeding and vaginal discharge.   Musculoskeletal:  Negative for arthralgias and myalgias.   Skin:  Negative for rash.   Neurological:  Negative for dizziness, weakness, light-headedness and headaches.   Psychiatric/Behavioral:  Negative for confusion and sleep disturbance. The patient is not nervous/anxious.        Physical Exam  Constitutional:       Appearance: Normal appearance.   HENT:      Head: Normocephalic.   Eyes:      Conjunctiva/sclera: Conjunctivae normal.      Pupils: Pupils are equal, round, and reactive to light.   Cardiovascular:      Pulses: Normal pulses.      Heart sounds: Normal heart sounds.   Pulmonary:      Effort: Pulmonary effort is normal.      Breath sounds: Normal breath sounds.   Abdominal:      General: Bowel sounds are normal. There is no

## 2025-05-01 ENCOUNTER — RESULTS FOLLOW-UP (OUTPATIENT)
Dept: FAMILY MEDICINE CLINIC | Age: 75
End: 2025-05-01

## 2025-05-01 LAB — BACTERIA UR CULT: NORMAL

## 2025-05-05 ENCOUNTER — ANTI-COAG VISIT (OUTPATIENT)
Dept: PHARMACY | Age: 75
End: 2025-05-05
Payer: COMMERCIAL

## 2025-05-05 DIAGNOSIS — I48.0 PAF (PAROXYSMAL ATRIAL FIBRILLATION) (HCC): Primary | ICD-10-CM

## 2025-05-05 LAB
INTERNATIONAL NORMALIZATION RATIO, POC: 2.1
PROTHROMBIN TIME, POC: 0

## 2025-05-05 PROCEDURE — 99211 OFF/OP EST MAY X REQ PHY/QHP: CPT

## 2025-05-05 PROCEDURE — 85610 PROTHROMBIN TIME: CPT

## 2025-05-05 NOTE — PROGRESS NOTES
Ms. Imani Monzon is a 75 y.o. y/o female with history of Afib who presents today for anticoagulation monitoring and adjustment.    Pertinent PMH: PE, TIA, HTN, sleep apnea, GERD, hypothyroidism, RLS    Patient Reported Findings:  Yes     No  [x]   []       Patient verifies current dosing regimen as listed  - Warfarin 2.5 mg every Mon, Wed, Fri; 1.25 mg all other days   - Patient has warfarin 2.5 mg tablets  []   [x]       S/Sx bleeding/bruising/swelling/SOB/CP  []   [x]       Blood in urine or stool  []   [x]       Procedures scheduled in the future at this time  - Patient reports she is discussing watchman procedure with her cardiologist  []   [x]       Missed Dose  []   [x]       Extra Dose  []   [x]       Change in medications  - Cephalexin 500 mg TID x 7 days started on 4/29/2025 (Just finished)  []   [x]       Change in health/diet/appetite  []   [x]       Change in alcohol use  []   [x]       Change in activity  []   [x]       Hospital admission  []   [x]       Emergency department visit  []   [x]       Other complaints      Clinical Outcomes:  Yes     No  []   [x]       Major bleeding event  []   [x]       Thromboembolic event    INR (no units)   Date Value   02/09/2022 0.91     INR,(POC) (no units)   Date Value   04/14/2025 2.5   03/31/2025 2.4   03/24/2025 3.5   03/17/2025 2.8     Duration of warfarin Therapy: Indefinite  INR Range:  2.0-3.0      INR 2.1 is WITHIN therapeutic range of 2-3  Recommend to  2.5 mg every Mon, Wed, Fri; 1.25 mg all other days   Will continue to monitor and check INR in 4 weeks  AVS printed and reviewed with patient  Return to clinic: 6/2/2025      Referral Provider: Dr. Yahir Frederick  Referral Date: 2/14/2025  CPA: Signed    Jagjit Remy, PharmD 2/19/2025 1:41 PM            For Pharmacy Admin Tracking Only  Intervention Detail:   Total # of Interventions Recommended: 0  Total # of Interventions Accepted: 0  Time Spent (min): 15  
2

## 2025-05-15 ENCOUNTER — TELEMEDICINE (OUTPATIENT)
Dept: FAMILY MEDICINE CLINIC | Age: 75
End: 2025-05-15
Payer: COMMERCIAL

## 2025-05-15 DIAGNOSIS — Z00.00 MEDICARE ANNUAL WELLNESS VISIT, SUBSEQUENT: Primary | ICD-10-CM

## 2025-05-15 PROCEDURE — 1123F ACP DISCUSS/DSCN MKR DOCD: CPT | Performed by: FAMILY MEDICINE

## 2025-05-15 PROCEDURE — G0439 PPPS, SUBSEQ VISIT: HCPCS | Performed by: FAMILY MEDICINE

## 2025-05-15 PROCEDURE — 1159F MED LIST DOCD IN RCRD: CPT | Performed by: FAMILY MEDICINE

## 2025-05-15 SDOH — HEALTH STABILITY: PHYSICAL HEALTH: ON AVERAGE, HOW MANY DAYS PER WEEK DO YOU ENGAGE IN MODERATE TO STRENUOUS EXERCISE (LIKE A BRISK WALK)?: 0 DAYS

## 2025-05-15 SDOH — HEALTH STABILITY: PHYSICAL HEALTH: ON AVERAGE, HOW MANY MINUTES DO YOU ENGAGE IN EXERCISE AT THIS LEVEL?: 0 MIN

## 2025-05-15 ASSESSMENT — LIFESTYLE VARIABLES
HOW OFTEN DO YOU HAVE A DRINK CONTAINING ALCOHOL: NEVER
HOW OFTEN DO YOU HAVE SIX OR MORE DRINKS ON ONE OCCASION: 1
HOW OFTEN DO YOU HAVE A DRINK CONTAINING ALCOHOL: 1
HOW MANY STANDARD DRINKS CONTAINING ALCOHOL DO YOU HAVE ON A TYPICAL DAY: PATIENT DOES NOT DRINK
HOW MANY STANDARD DRINKS CONTAINING ALCOHOL DO YOU HAVE ON A TYPICAL DAY: 0

## 2025-05-15 ASSESSMENT — PATIENT HEALTH QUESTIONNAIRE - PHQ9
SUM OF ALL RESPONSES TO PHQ QUESTIONS 1-9: 2
2. FEELING DOWN, DEPRESSED OR HOPELESS: SEVERAL DAYS
SUM OF ALL RESPONSES TO PHQ QUESTIONS 1-9: 2
1. LITTLE INTEREST OR PLEASURE IN DOING THINGS: SEVERAL DAYS
SUM OF ALL RESPONSES TO PHQ QUESTIONS 1-9: 2
SUM OF ALL RESPONSES TO PHQ QUESTIONS 1-9: 2

## 2025-05-15 NOTE — PROGRESS NOTES
Medicare Annual Wellness Visit    Imani Monzon is here for Medicare AWV    Assessment & Plan   Medicare annual wellness visit, subsequent     No follow-ups on file.     Subjective       Patient's complete Health Risk Assessment and screening values have been reviewed and are found in Flowsheets. The following problems were reviewed today and where indicated follow up appointments were made and/or referrals ordered.    Positive Risk Factor Screenings with Interventions:    Fall Risk:  Do you feel unsteady or are you worried about falling? : (!) (Patient-Rptd) yes  2 or more falls in past year?: (!) (Patient-Rptd) yes  Fall with injury in past year?: (!) (Patient-Rptd) yes     Interventions:    Reviewed medications, home hazards, visual acuity, and co-morbidities that can increase risk for falls  See AVS for additional education material  Wearing heart monitor to determine in Afib causing dizziness- reports feeling much improved lately             Inactivity:  On average, how many days per week do you engage in moderate to strenuous exercise (like a brisk walk)?: (Patient-Rptd) 0 days (!) Abnormal  On average, how many minutes do you engage in exercise at this level?: (Patient-Rptd) 0 min  Interventions:  See AVS for additional education material    Poor Eating Habits/Diet:  Do you eat balanced/healthy meals regularly?: (!) (Patient-Rptd) No  Interventions:  See AVS for additional education material    Abnormal BMI (obese):  There is no height or weight on file to calculate BMI. (!) Abnormal  Interventions:  See AVS for additional education material                           Objective    Patient-Reported Vitals  Patient-Reported Systolic (Top): 130 mmHg  Patient-Reported Diastolic (Bottom): 85 mmHg  Patient-Reported Pulse: 76  Patient-Reported Weight: 190LB  Patient-Reported Height: 5' 3\"                 Allergies   Allergen Reactions    Morphine      Reports severe abdominal pain     Prior to Visit Medications

## 2025-06-02 ENCOUNTER — ANTI-COAG VISIT (OUTPATIENT)
Dept: PHARMACY | Age: 75
End: 2025-06-02
Payer: COMMERCIAL

## 2025-06-02 DIAGNOSIS — I48.0 PAF (PAROXYSMAL ATRIAL FIBRILLATION) (HCC): Primary | ICD-10-CM

## 2025-06-02 LAB
INTERNATIONAL NORMALIZATION RATIO, POC: 2.4
PROTHROMBIN TIME, POC: 0

## 2025-06-02 PROCEDURE — 85610 PROTHROMBIN TIME: CPT

## 2025-06-02 PROCEDURE — 99211 OFF/OP EST MAY X REQ PHY/QHP: CPT

## 2025-06-02 RX ORDER — WARFARIN SODIUM 2.5 MG/1
TABLET ORAL
Qty: 90 TABLET | Refills: 0 | Status: SHIPPED | OUTPATIENT
Start: 2025-06-02

## 2025-06-02 NOTE — PROGRESS NOTES
Ms. Imani Monzon is a 75 y.o. y/o female with history of Afib who presents today for anticoagulation monitoring and adjustment.    Pertinent PMH: PE, TIA, HTN, sleep apnea, GERD, hypothyroidism, RLS    Patient Reported Findings:  Yes     No  [x]   []       Patient verifies current dosing regimen as listed  - Warfarin 2.5 mg every Mon, Wed, Fri; 1.25 mg all other days   - Patient has warfarin 2.5 mg tablets  []   [x]       S/Sx bleeding/bruising/swelling/SOB/CP  []   [x]       Blood in urine or stool  []   [x]       Procedures scheduled in the future at this time  - Patient reports she is discussing watchman procedure with her cardiologist  []   [x]       Missed Dose  []   [x]       Extra Dose  []   [x]       Change in medications  - Cephalexin 500 mg TID x 7 days (4/29/2025 to 5/5/25)  []   [x]       Change in health/diet/appetite  []   [x]       Change in alcohol use  []   [x]       Change in activity  []   [x]       Hospital admission  []   [x]       Emergency department visit  []   [x]       Other complaints      Clinical Outcomes:  Yes     No  []   [x]       Major bleeding event  []   [x]       Thromboembolic event    INR (no units)   Date Value   02/09/2022 0.91     INR,(POC) (no units)   Date Value   05/05/2025 2.1   04/14/2025 2.5   03/31/2025 2.4   03/24/2025 3.5     Duration of warfarin Therapy: Indefinite  INR Range:  2.0-3.0      INR 2.1 is WITHIN therapeutic range of 2-3  Recommend to  2.5 mg every Mon, Wed, Fri; 1.25 mg all other days   Will continue to monitor and check INR in 4 weeks  AVS printed and reviewed with patient  Return to clinic: 6/30/2025      Referral Provider: Dr. Yahir Frederick  Referral Date: 2/14/2025  CPA: Signed    Jagjit Remy, PharmD 2/19/2025 1:41 PM            For Pharmacy Admin Tracking Only  Intervention Detail:   Total # of Interventions Recommended: 0  Total # of Interventions Accepted: 0  Time Spent (min): 15

## 2025-06-16 ENCOUNTER — OFFICE VISIT (OUTPATIENT)
Dept: FAMILY MEDICINE CLINIC | Age: 75
End: 2025-06-16
Payer: COMMERCIAL

## 2025-06-16 VITALS
DIASTOLIC BLOOD PRESSURE: 78 MMHG | BODY MASS INDEX: 33.31 KG/M2 | SYSTOLIC BLOOD PRESSURE: 134 MMHG | HEART RATE: 70 BPM | WEIGHT: 188 LBS | OXYGEN SATURATION: 99 %

## 2025-06-16 DIAGNOSIS — T30.0 FIRST DEGREE BURN: Primary | ICD-10-CM

## 2025-06-16 DIAGNOSIS — L03.114 CELLULITIS OF LEFT UPPER EXTREMITY: ICD-10-CM

## 2025-06-16 PROCEDURE — 3075F SYST BP GE 130 - 139MM HG: CPT | Performed by: PHYSICIAN ASSISTANT

## 2025-06-16 PROCEDURE — 1159F MED LIST DOCD IN RCRD: CPT | Performed by: PHYSICIAN ASSISTANT

## 2025-06-16 PROCEDURE — 99213 OFFICE O/P EST LOW 20 MIN: CPT | Performed by: PHYSICIAN ASSISTANT

## 2025-06-16 PROCEDURE — 1123F ACP DISCUSS/DSCN MKR DOCD: CPT | Performed by: PHYSICIAN ASSISTANT

## 2025-06-16 PROCEDURE — 3078F DIAST BP <80 MM HG: CPT | Performed by: PHYSICIAN ASSISTANT

## 2025-06-16 ASSESSMENT — ENCOUNTER SYMPTOMS: COLOR CHANGE: 1

## 2025-06-16 NOTE — PROGRESS NOTES
Imani Monzon (:  1950) is a 75 y.o. female,Established patient, here for evaluation of the following chief complaint(s):  Burn (Burn on left hand from boiling water, x's 5 days )         Assessment & Plan  First degree burn   There is a small area of drainage still. Keep area covered with antibiotic ointment and nonstick gauze. May transition to vitamin e after the scab falls off. She has full range of motion of hand at this time         Cellulitis of left upper extremity   Suspect infection from the site that the cat ruptured the blister. Start on Augmentin, follow up in 72 hours if symptoms are not improving    Orders:    amoxicillin-clavulanate (AUGMENTIN) 875-125 MG per tablet; Take 1 tablet by mouth 2 times daily for 7 days      Return if symptoms worsen or fail to improve.       Subjective   HPI  The pt is here for evaluation of first degree burn  Timin days  Pt spilled boiling water on her hand, she had a large blister at the base of the thumb followed by a flat blister on the back of the hand.   Tx: ice for two days, tripple antibiotic ointment  Current symptoms: entire hand is pain, new redness on lateral border of thumb  Pt reports that her cat's nail scratched the blister at the base of her thumb and caused it to rupture, since then she has noticed growing erythema of the hand    Review of Systems   Musculoskeletal:  Negative for joint swelling.   Skin:  Positive for color change and wound.   Neurological:  Negative for weakness and numbness.          Objective   Physical Exam  Vitals reviewed.   Constitutional:       Appearance: Normal appearance.   Skin:     Findings: Erythema present.      Comments: Scabbed area on thumb, discoloration with warmth at the base of the fingers   Neurological:      Mental Status: She is alert.                  An electronic signature was used to authenticate this note.    --RACHAEL Martin

## 2025-06-24 NOTE — PROGRESS NOTES
Ms. Imani Monzon is a 75 y.o. y/o female with history of Afib who presents today for anticoagulation monitoring and adjustment.    Pertinent PMH: PE, TIA, HTN, sleep apnea, GERD, hypothyroidism, RLS    Patient Reported Findings:  Yes     No  [x]   []       Patient verifies current dosing regimen as listed  - Warfarin 2.5 mg every Mon, Wed, Fri; 1.25 mg all other days   - Patient has warfarin 2.5 mg tablets  []   [x]       S/Sx bleeding/bruising/swelling/SOB/CP  []   [x]       Blood in urine or stool  []   [x]       Procedures scheduled in the future at this time  - Patient reports she is discussing watchman procedure with her cardiologist  [x]   []       Missed Dose  - Patient reports 2 missed doses within the last week  []   [x]       Extra Dose  []   [x]       Change in medications  []   [x]       Change in health/diet/appetite  []   [x]       Change in alcohol use  []   [x]       Change in activity  []   [x]       Hospital admission  []   [x]       Emergency department visit  []   [x]       Other complaints      Clinical Outcomes:  Yes     No  []   [x]       Major bleeding event  []   [x]       Thromboembolic event    INR (no units)   Date Value   02/09/2022 0.91     INR,(POC) (no units)   Date Value   06/02/2025 2.4   05/05/2025 2.1   04/14/2025 2.5   03/31/2025 2.4     Duration of warfarin Therapy: Indefinite  INR Range:  2.0-3.0      INR 1.8 is BELOW therapeutic range of 2-3  Recommend to take 3.75 mg today then continue  2.5 mg every Mon, Wed, Fri; 1.25 mg all other days   Will continue to monitor and check INR in 2  weeks  AVS printed and reviewed with patient  Return to clinic: 7/14/2025      Referral Provider: Dr. Yahir Frederick  Referral Date: 2/14/2025  CPA: Signed    Aicha MartinD         For Pharmacy Admin Tracking Only  Intervention Detail:   Total # of Interventions Recommended: 0  Total # of Interventions Accepted: 0  Time Spent (min): 15

## 2025-06-30 ENCOUNTER — ANTI-COAG VISIT (OUTPATIENT)
Dept: PHARMACY | Age: 75
End: 2025-06-30
Payer: COMMERCIAL

## 2025-06-30 DIAGNOSIS — I48.0 PAF (PAROXYSMAL ATRIAL FIBRILLATION) (HCC): Primary | ICD-10-CM

## 2025-06-30 LAB
INTERNATIONAL NORMALIZATION RATIO, POC: 1.8
PROTHROMBIN TIME, POC: 0

## 2025-06-30 PROCEDURE — 99211 OFF/OP EST MAY X REQ PHY/QHP: CPT

## 2025-06-30 PROCEDURE — 85610 PROTHROMBIN TIME: CPT

## 2025-07-07 DIAGNOSIS — K21.9 GASTROESOPHAGEAL REFLUX DISEASE WITHOUT ESOPHAGITIS: ICD-10-CM

## 2025-07-07 DIAGNOSIS — F32.89 OTHER DEPRESSION: ICD-10-CM

## 2025-07-07 RX ORDER — QUETIAPINE 150 MG/1
TABLET, FILM COATED, EXTENDED RELEASE ORAL
Qty: 90 TABLET | Refills: 0 | Status: SHIPPED | OUTPATIENT
Start: 2025-07-07

## 2025-07-07 RX ORDER — OMEPRAZOLE 40 MG/1
CAPSULE, DELAYED RELEASE ORAL
Qty: 90 CAPSULE | Refills: 0 | Status: SHIPPED | OUTPATIENT
Start: 2025-07-07

## 2025-07-08 NOTE — PROGRESS NOTES
Ms. Imani Monzon is a 75 y.o. y/o female with history of Afib who presents today for anticoagulation monitoring and adjustment.    Pertinent PMH: PE, TIA, HTN, sleep apnea, GERD, hypothyroidism, RLS    Patient Reported Findings:  Yes     No  [x]   []       Patient verifies current dosing regimen as listed  - Warfarin 2.5 mg every Mon, Wed, Fri; 1.25 mg all other days   - Patient has warfarin 2.5 mg tablets  []   [x]       S/Sx bleeding/bruising/swelling/SOB/CP  []   [x]       Blood in urine or stool  []   [x]       Procedures scheduled in the future at this time  - Patient reports she is discussing watchman procedure with her cardiologist  []   [x]       Missed Dose  []   [x]       Extra Dose  []   [x]       Change in medications  []   [x]       Change in health/diet/appetite  []   [x]       Change in alcohol use  []   [x]       Change in activity  []   [x]       Hospital admission  []   [x]       Emergency department visit  []   [x]       Other complaints      Clinical Outcomes:  Yes     No  []   [x]       Major bleeding event  []   [x]       Thromboembolic event    INR (no units)   Date Value   02/09/2022 0.91     INR,(POC) (no units)   Date Value   06/30/2025 1.8   06/02/2025 2.4   05/05/2025 2.1   04/14/2025 2.5     Duration of warfarin Therapy: Indefinite  INR Range:  2.0-3.0      INR 2.1 is WITHIN therapeutic range of 2-3  Recommend to continue  2.5 mg every Mon, Wed, Fri; 1.25 mg all other days   Will continue to monitor and check INR in 4 weeks  Patient preference for 4 weeks given therapeutic INR  AVS printed and reviewed with patient  Return to clinic: 7/14/2025      Referral Provider: Dr. Yahir Frederick  Referral Date: 2/14/2025  CPA: Signed    Aicha MartinD         For Pharmacy Admin Tracking Only  Intervention Detail:   Total # of Interventions Recommended: 0  Total # of Interventions Accepted: 0  Time Spent (min): 15

## 2025-07-14 ENCOUNTER — ANTI-COAG VISIT (OUTPATIENT)
Dept: PHARMACY | Age: 75
End: 2025-07-14
Payer: COMMERCIAL

## 2025-07-14 DIAGNOSIS — I48.0 PAF (PAROXYSMAL ATRIAL FIBRILLATION) (HCC): Primary | ICD-10-CM

## 2025-07-14 LAB
INTERNATIONAL NORMALIZATION RATIO, POC: 2.1
PROTHROMBIN TIME, POC: 0

## 2025-07-14 PROCEDURE — 99211 OFF/OP EST MAY X REQ PHY/QHP: CPT

## 2025-07-14 PROCEDURE — 85610 PROTHROMBIN TIME: CPT

## 2025-07-21 NOTE — RESULT ENCOUNTER NOTE
Please tell her labs are fine but she is getting a little bit too much thyroid so skip 1 day a week and we will recheck it in 6 weeks and I will put the order in and she can just stop in downstairs-thank you very much. X-rays were reviewed with the patient which demonstrate healing of her fifth metacarpal fracture.  She can use her hand as tolerated for activities of daily living  She will follow-up as needed for this issue  Orders:  •  XR hand 3+ vw right; Future

## 2025-08-05 SDOH — HEALTH STABILITY: PHYSICAL HEALTH: ON AVERAGE, HOW MANY DAYS PER WEEK DO YOU ENGAGE IN MODERATE TO STRENUOUS EXERCISE (LIKE A BRISK WALK)?: 0 DAYS

## 2025-08-07 ENCOUNTER — OFFICE VISIT (OUTPATIENT)
Dept: FAMILY MEDICINE CLINIC | Age: 75
End: 2025-08-07
Payer: COMMERCIAL

## 2025-08-07 VITALS
HEIGHT: 64 IN | DIASTOLIC BLOOD PRESSURE: 82 MMHG | OXYGEN SATURATION: 98 % | HEART RATE: 65 BPM | WEIGHT: 186 LBS | BODY MASS INDEX: 31.76 KG/M2 | SYSTOLIC BLOOD PRESSURE: 118 MMHG

## 2025-08-07 DIAGNOSIS — G25.81 RLS (RESTLESS LEGS SYNDROME): Primary | ICD-10-CM

## 2025-08-07 DIAGNOSIS — E03.9 HYPOTHYROIDISM, UNSPECIFIED TYPE: ICD-10-CM

## 2025-08-07 DIAGNOSIS — D64.9 ANEMIA, UNSPECIFIED TYPE: ICD-10-CM

## 2025-08-07 DIAGNOSIS — G25.81 RLS (RESTLESS LEGS SYNDROME): ICD-10-CM

## 2025-08-07 DIAGNOSIS — G43.809 VESTIBULAR MIGRAINE: ICD-10-CM

## 2025-08-07 PROCEDURE — 1123F ACP DISCUSS/DSCN MKR DOCD: CPT | Performed by: STUDENT IN AN ORGANIZED HEALTH CARE EDUCATION/TRAINING PROGRAM

## 2025-08-07 PROCEDURE — 3079F DIAST BP 80-89 MM HG: CPT | Performed by: STUDENT IN AN ORGANIZED HEALTH CARE EDUCATION/TRAINING PROGRAM

## 2025-08-07 PROCEDURE — 1159F MED LIST DOCD IN RCRD: CPT | Performed by: STUDENT IN AN ORGANIZED HEALTH CARE EDUCATION/TRAINING PROGRAM

## 2025-08-07 PROCEDURE — 99214 OFFICE O/P EST MOD 30 MIN: CPT | Performed by: STUDENT IN AN ORGANIZED HEALTH CARE EDUCATION/TRAINING PROGRAM

## 2025-08-07 PROCEDURE — 3074F SYST BP LT 130 MM HG: CPT | Performed by: STUDENT IN AN ORGANIZED HEALTH CARE EDUCATION/TRAINING PROGRAM

## 2025-08-07 RX ORDER — MECLIZINE HCL 12.5 MG 12.5 MG/1
TABLET ORAL
Qty: 60 TABLET | Refills: 0 | Status: SHIPPED | OUTPATIENT
Start: 2025-08-07

## 2025-08-07 RX ORDER — PRAMIPEXOLE DIHYDROCHLORIDE 0.5 MG/1
0.5 TABLET ORAL NIGHTLY
Qty: 90 TABLET | Refills: 1 | Status: SHIPPED | OUTPATIENT
Start: 2025-08-07

## 2025-08-07 ASSESSMENT — ENCOUNTER SYMPTOMS
SHORTNESS OF BREATH: 0
NAUSEA: 0
COUGH: 0
ABDOMINAL PAIN: 0
VOMITING: 0

## 2025-08-08 LAB
ALBUMIN SERPL-MCNC: 3.8 G/DL (ref 3.4–5)
ALBUMIN/GLOB SERPL: 2 {RATIO} (ref 1.1–2.2)
ALP SERPL-CCNC: 82 U/L (ref 40–129)
ALT SERPL-CCNC: 18 U/L (ref 10–40)
ANION GAP SERPL CALCULATED.3IONS-SCNC: 10 MMOL/L (ref 3–16)
AST SERPL-CCNC: 18 U/L (ref 15–37)
BASOPHILS # BLD: 0 K/UL (ref 0–0.2)
BASOPHILS NFR BLD: 0.6 %
BILIRUB SERPL-MCNC: 0.5 MG/DL (ref 0–1)
BUN SERPL-MCNC: 23 MG/DL (ref 7–20)
CALCIUM SERPL-MCNC: 9 MG/DL (ref 8.3–10.6)
CHLORIDE SERPL-SCNC: 110 MMOL/L (ref 99–110)
CO2 SERPL-SCNC: 25 MMOL/L (ref 21–32)
CREAT SERPL-MCNC: 1.4 MG/DL (ref 0.6–1.2)
DEPRECATED RDW RBC AUTO: 14.4 % (ref 12.4–15.4)
EOSINOPHIL # BLD: 0.1 K/UL (ref 0–0.6)
EOSINOPHIL NFR BLD: 2.1 %
FERRITIN SERPL IA-MCNC: 120 NG/ML (ref 15–150)
FOLATE SERPL-MCNC: 7.05 NG/ML (ref 4.78–24.2)
GFR SERPLBLD CREATININE-BSD FMLA CKD-EPI: 39 ML/MIN/{1.73_M2}
GLUCOSE SERPL-MCNC: 93 MG/DL (ref 70–99)
HCT VFR BLD AUTO: 37.3 % (ref 36–48)
HGB BLD-MCNC: 12.1 G/DL (ref 12–16)
IRON SATN MFR SERPL: 23 % (ref 15–50)
IRON SERPL-MCNC: 61 UG/DL (ref 37–145)
LYMPHOCYTES # BLD: 1.8 K/UL (ref 1–5.1)
LYMPHOCYTES NFR BLD: 40.2 %
MAGNESIUM SERPL-MCNC: 2.23 MG/DL (ref 1.8–2.4)
MCH RBC QN AUTO: 29.4 PG (ref 26–34)
MCHC RBC AUTO-ENTMCNC: 32.4 G/DL (ref 31–36)
MCV RBC AUTO: 90.5 FL (ref 80–100)
MONOCYTES # BLD: 0.4 K/UL (ref 0–1.3)
MONOCYTES NFR BLD: 9 %
NEUTROPHILS # BLD: 2.2 K/UL (ref 1.7–7.7)
NEUTROPHILS NFR BLD: 48.1 %
PLATELET # BLD AUTO: 187 K/UL (ref 135–450)
PMV BLD AUTO: 10.7 FL (ref 5–10.5)
POTASSIUM SERPL-SCNC: 4.3 MMOL/L (ref 3.5–5.1)
PROT SERPL-MCNC: 5.7 G/DL (ref 6.4–8.2)
RBC # BLD AUTO: 4.12 M/UL (ref 4–5.2)
SODIUM SERPL-SCNC: 145 MMOL/L (ref 136–145)
T4 FREE SERPL-MCNC: 2.8 NG/DL (ref 0.9–1.8)
TIBC SERPL-MCNC: 261 UG/DL (ref 260–445)
TSH SERPL DL<=0.005 MIU/L-ACNC: <0.01 UIU/ML (ref 0.27–4.2)
VIT B12 SERPL-MCNC: 311 PG/ML (ref 211–911)
WBC # BLD AUTO: 4.6 K/UL (ref 4–11)

## 2025-08-11 ENCOUNTER — ANTI-COAG VISIT (OUTPATIENT)
Dept: PHARMACY | Age: 75
End: 2025-08-11
Payer: COMMERCIAL

## 2025-08-11 DIAGNOSIS — I48.0 PAF (PAROXYSMAL ATRIAL FIBRILLATION) (HCC): Primary | ICD-10-CM

## 2025-08-11 LAB
INTERNATIONAL NORMALIZATION RATIO, POC: 1.6
PROTHROMBIN TIME, POC: NORMAL

## 2025-08-11 PROCEDURE — 85610 PROTHROMBIN TIME: CPT

## 2025-08-11 PROCEDURE — 99211 OFF/OP EST MAY X REQ PHY/QHP: CPT

## 2025-08-12 DIAGNOSIS — F51.01 PRIMARY INSOMNIA: ICD-10-CM

## 2025-08-12 RX ORDER — TRAZODONE HYDROCHLORIDE 150 MG/1
TABLET ORAL
Qty: 90 TABLET | Refills: 1 | Status: SHIPPED | OUTPATIENT
Start: 2025-08-12

## 2025-08-23 ENCOUNTER — PATIENT MESSAGE (OUTPATIENT)
Dept: CARDIOLOGY CLINIC | Age: 75
End: 2025-08-23

## 2025-08-25 ENCOUNTER — ANTI-COAG VISIT (OUTPATIENT)
Dept: PHARMACY | Age: 75
End: 2025-08-25
Payer: COMMERCIAL

## 2025-08-25 DIAGNOSIS — I48.0 PAF (PAROXYSMAL ATRIAL FIBRILLATION) (HCC): Primary | ICD-10-CM

## 2025-08-25 LAB
INTERNATIONAL NORMALIZATION RATIO, POC: 2.1
PROTHROMBIN TIME, POC: NORMAL

## 2025-08-25 PROCEDURE — 85610 PROTHROMBIN TIME: CPT

## 2025-08-25 PROCEDURE — 99211 OFF/OP EST MAY X REQ PHY/QHP: CPT

## 2025-08-28 ENCOUNTER — PATIENT MESSAGE (OUTPATIENT)
Dept: FAMILY MEDICINE CLINIC | Age: 75
End: 2025-08-28

## 2025-08-28 DIAGNOSIS — L03.039 PARONYCHIA OF TOE, UNSPECIFIED LATERALITY: Primary | ICD-10-CM

## 2025-09-02 RX ORDER — LEVOTHYROXINE SODIUM 150 MCG
150 TABLET ORAL DAILY
Qty: 30 TABLET | Refills: 0 | Status: SHIPPED | OUTPATIENT
Start: 2025-09-02 | End: 2025-09-04 | Stop reason: ALTCHOICE

## 2025-09-04 ENCOUNTER — RESULTS FOLLOW-UP (OUTPATIENT)
Dept: FAMILY MEDICINE CLINIC | Age: 75
End: 2025-09-04

## 2025-09-04 DIAGNOSIS — R79.89 LOW TSH LEVEL: Primary | ICD-10-CM

## 2025-09-04 DIAGNOSIS — E03.9 HYPOTHYROIDISM, UNSPECIFIED TYPE: ICD-10-CM

## 2025-09-04 DIAGNOSIS — G43.809 VESTIBULAR MIGRAINE: ICD-10-CM

## 2025-09-04 RX ORDER — MECLIZINE HCL 12.5 MG 12.5 MG/1
TABLET ORAL
Qty: 60 TABLET | Refills: 1 | Status: SHIPPED | OUTPATIENT
Start: 2025-09-04

## 2025-09-04 RX ORDER — LEVOTHYROXINE SODIUM 125 UG/1
125 TABLET ORAL DAILY
Qty: 30 TABLET | Refills: 0 | Status: SHIPPED | OUTPATIENT
Start: 2025-09-04

## (undated) DEVICE — SOLUTION IV 1000ML LAC RINGERS PH 6.5 INJ USP VIAFLX PLAS

## (undated) DEVICE — PATIENT CARE KIT EYE POST OP

## (undated) DEVICE — SOLUTION IRRIG 250ML STRL H2O PLAS POUR BTL USP

## (undated) DEVICE — CATHETER IV 20GA L1.25IN PNK FEP SFTY STR HUB RADPQ DISP

## (undated) DEVICE — Device: Brand: MALYUGIN RING SYSTEM 7.0MM

## (undated) DEVICE — SET ADMIN PRIMING 7ML L30IN 7.35LB 20 GTT 2ND RLER CLMP

## (undated) DEVICE — SILICONE I/A TIP STRAIGHT: Brand: ALCON

## (undated) DEVICE — 3M™ TEGADERM™ TRANSPARENT FILM DRESSING FRAME STYLE, 1624W, 2-3/8 IN X 2-3/4 IN (6 CM X 7 CM), 100/CT 4CT/CASE: Brand: 3M™ TEGADERM™

## (undated) DEVICE — AMD ANTIMICROBIAL NON-ADHERENT ISLAND DRESSING,0.2% POLYHEXAMETHYLENE BIGUANIDE HCI (PHMB): Brand: TELFA

## (undated) DEVICE — ELECTRODE ECG MONITR FOAM TEAR DROP ADLT RED

## (undated) DEVICE — TOWEL,OR,DSP,ST,BLUE,STD,4/PK,20PK/CS: Brand: MEDLINE

## (undated) DEVICE — GLOVE,SURG,SENSICARE,ALOE,LF,PF,7: Brand: MEDLINE

## (undated) DEVICE — GLOVE SURG SZ 65 L12IN FNGR THK94MIL STD WHT LTX FREE

## (undated) DEVICE — SET GRAV VENT NVENT CK VLV 3 NDL FREE PRT 10 GTT

## (undated) DEVICE — AIRLIFE™ NASAL OXYGEN CANNULA CURVED, FLARED TIP, WITH 7 FEET (2.1 M) CRUSH RESISTANT TUBING, OVER-THE-EAR STYLE: Brand: AIRLIFE™

## (undated) DEVICE — PACEMAKER PACK: Brand: MEDLINE INDUSTRIES, INC.

## (undated) DEVICE — SURGICAL PROCEDURE PACK PPK8711] ALCON LABORATORIES INC]

## (undated) DEVICE — SURGICAL PROCEDURE PACK EYE ANDRSN